# Patient Record
Sex: FEMALE | Race: WHITE | NOT HISPANIC OR LATINO | Employment: OTHER | URBAN - NONMETROPOLITAN AREA
[De-identification: names, ages, dates, MRNs, and addresses within clinical notes are randomized per-mention and may not be internally consistent; named-entity substitution may affect disease eponyms.]

---

## 2017-12-30 ENCOUNTER — HOSPITAL ENCOUNTER (EMERGENCY)
Facility: HOSPITAL | Age: 67
Discharge: HOME | End: 2017-12-30
Attending: EMERGENCY MEDICINE | Admitting: EMERGENCY MEDICINE

## 2017-12-30 VITALS
WEIGHT: 180 LBS | OXYGEN SATURATION: 100 % | SYSTOLIC BLOOD PRESSURE: 122 MMHG | TEMPERATURE: 98.8 F | HEIGHT: 69 IN | HEART RATE: 98 BPM | DIASTOLIC BLOOD PRESSURE: 84 MMHG | RESPIRATION RATE: 17 BRPM | BODY MASS INDEX: 26.66 KG/M2

## 2017-12-30 DIAGNOSIS — Z98.890 HX OF NECK SURGERY: ICD-10-CM

## 2017-12-30 DIAGNOSIS — M54.2 ACUTE NECK PAIN: Primary | ICD-10-CM

## 2017-12-30 PROCEDURE — 25010000002 TRIAMCINOLONE PER 10 MG: Performed by: NURSE PRACTITIONER

## 2017-12-30 PROCEDURE — 96372 THER/PROPH/DIAG INJ SC/IM: CPT

## 2017-12-30 PROCEDURE — 99283 EMERGENCY DEPT VISIT LOW MDM: CPT

## 2017-12-30 PROCEDURE — 25010000002 HYDROMORPHONE PER 4 MG: Performed by: EMERGENCY MEDICINE

## 2017-12-30 PROCEDURE — 25010000002 KETOROLAC TROMETHAMINE PER 15 MG: Performed by: NURSE PRACTITIONER

## 2017-12-30 RX ORDER — KETOROLAC TROMETHAMINE 30 MG/ML
60 INJECTION, SOLUTION INTRAMUSCULAR; INTRAVENOUS ONCE
Status: COMPLETED | OUTPATIENT
Start: 2017-12-30 | End: 2017-12-30

## 2017-12-30 RX ORDER — OXYCODONE AND ACETAMINOPHEN 7.5; 325 MG/1; MG/1
1 TABLET ORAL EVERY 6 HOURS PRN
Qty: 10 TABLET | Refills: 0 | Status: SHIPPED | OUTPATIENT
Start: 2017-12-30 | End: 2019-10-05

## 2017-12-30 RX ORDER — TRIAMCINOLONE ACETONIDE 40 MG/ML
80 INJECTION, SUSPENSION INTRA-ARTICULAR; INTRAMUSCULAR ONCE
Status: COMPLETED | OUTPATIENT
Start: 2017-12-30 | End: 2017-12-30

## 2017-12-30 RX ORDER — ONDANSETRON 4 MG/1
4 TABLET, ORALLY DISINTEGRATING ORAL ONCE
Status: COMPLETED | OUTPATIENT
Start: 2017-12-30 | End: 2017-12-30

## 2017-12-30 RX ADMIN — ONDANSETRON 4 MG: 4 TABLET, ORALLY DISINTEGRATING ORAL at 16:03

## 2017-12-30 RX ADMIN — KETOROLAC TROMETHAMINE 60 MG: 30 INJECTION, SOLUTION INTRAMUSCULAR at 16:03

## 2017-12-30 RX ADMIN — TRIAMCINOLONE ACETONIDE 80 MG: 40 INJECTION, SUSPENSION INTRA-ARTICULAR; INTRAMUSCULAR at 16:03

## 2017-12-30 RX ADMIN — HYDROMORPHONE HYDROCHLORIDE 2 MG: 1 INJECTION, SOLUTION INTRAMUSCULAR; INTRAVENOUS; SUBCUTANEOUS at 16:04

## 2019-04-08 ENCOUNTER — TRANSCRIBE ORDERS (OUTPATIENT)
Dept: ULTRASOUND IMAGING | Facility: HOSPITAL | Age: 69
End: 2019-04-08

## 2019-04-08 DIAGNOSIS — R79.89 ABNORMAL LIVER FUNCTION TEST: Primary | ICD-10-CM

## 2019-04-11 ENCOUNTER — APPOINTMENT (OUTPATIENT)
Dept: ULTRASOUND IMAGING | Facility: HOSPITAL | Age: 69
End: 2019-04-11

## 2019-10-05 ENCOUNTER — APPOINTMENT (OUTPATIENT)
Dept: CT IMAGING | Facility: HOSPITAL | Age: 69
End: 2019-10-05

## 2019-10-05 ENCOUNTER — APPOINTMENT (OUTPATIENT)
Dept: ULTRASOUND IMAGING | Facility: HOSPITAL | Age: 69
End: 2019-10-05

## 2019-10-05 ENCOUNTER — HOSPITAL ENCOUNTER (OUTPATIENT)
Facility: HOSPITAL | Age: 69
Setting detail: OBSERVATION
Discharge: HOME | End: 2019-10-08
Attending: EMERGENCY MEDICINE | Admitting: INTERNAL MEDICINE

## 2019-10-05 ENCOUNTER — APPOINTMENT (OUTPATIENT)
Dept: GENERAL RADIOLOGY | Facility: HOSPITAL | Age: 69
End: 2019-10-05
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
## 2019-10-05 DIAGNOSIS — S20.219A CONTUSION OF CHEST WALL, UNSPECIFIED LATERALITY, INITIAL ENCOUNTER: Primary | ICD-10-CM

## 2019-10-05 DIAGNOSIS — S42.018A CLOSED NONDISPLACED FRACTURE OF STERNAL END OF LEFT CLAVICLE, INITIAL ENCOUNTER: ICD-10-CM

## 2019-10-05 DIAGNOSIS — S22.31XA CLOSED FRACTURE OF ONE RIB OF RIGHT SIDE, INITIAL ENCOUNTER: ICD-10-CM
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
## 2019-10-05 LAB
ALBUMIN SERPL-MCNC: 4.9 G/DL (ref 3.5–5.2)
ALBUMIN/GLOB SERPL: 1.7 G/DL
ALP SERPL-CCNC: 74 U/L (ref 39–117)
ALT SERPL W P-5'-P-CCNC: 53 U/L (ref 1–33)
ANION GAP SERPL CALCULATED.3IONS-SCNC: 18.1 MMOL/L (ref 5–15)
AST SERPL-CCNC: 76 U/L (ref 1–32)
BASOPHILS # BLD AUTO: 0.02 10*3/MM3 (ref 0–0.2)
BASOPHILS NFR BLD AUTO: 0.2 % (ref 0–1.5)
BILIRUB SERPL-MCNC: 0.4 MG/DL (ref 0.2–1.2)
BUN BLD-MCNC: 16 MG/DL (ref 8–23)
BUN/CREAT SERPL: 17.8 (ref 7–25)
CALCIUM SPEC-SCNC: 9.9 MG/DL (ref 8.6–10.5)
CHLORIDE SERPL-SCNC: 95 MMOL/L (ref 98–107)
CO2 SERPL-SCNC: 18.9 MMOL/L (ref 22–29)
CREAT BLD-MCNC: 0.9 MG/DL (ref 0.57–1)
DEPRECATED RDW RBC AUTO: 42.5 FL (ref 37–54)
EOSINOPHIL # BLD AUTO: 0 10*3/MM3 (ref 0–0.4)
EOSINOPHIL NFR BLD AUTO: 0 % (ref 0.3–6.2)
ERYTHROCYTE [DISTWIDTH] IN BLOOD BY AUTOMATED COUNT: 11.9 % (ref 12.3–15.4)
GFR SERPL CREATININE-BSD FRML MDRD: 62 ML/MIN/1.73
GLOBULIN UR ELPH-MCNC: 2.9 GM/DL
GLUCOSE BLD-MCNC: 101 MG/DL (ref 65–99)
HCT VFR BLD AUTO: 37.4 % (ref 34–46.6)
HGB BLD-MCNC: 12.5 G/DL (ref 12–15.9)
IMM GRANULOCYTES # BLD AUTO: 0.19 10*3/MM3 (ref 0–0.05)
IMM GRANULOCYTES NFR BLD AUTO: 2.1 % (ref 0–0.5)
LIPASE SERPL-CCNC: 135 U/L (ref 13–60)
LYMPHOCYTES # BLD AUTO: 1.33 10*3/MM3 (ref 0.7–3.1)
LYMPHOCYTES NFR BLD AUTO: 14.4 % (ref 19.6–45.3)
MCH RBC QN AUTO: 32.5 PG (ref 26.6–33)
MCHC RBC AUTO-ENTMCNC: 33.4 G/DL (ref 31.5–35.7)
MCV RBC AUTO: 97.1 FL (ref 79–97)
MONOCYTES # BLD AUTO: 0.77 10*3/MM3 (ref 0.1–0.9)
MONOCYTES NFR BLD AUTO: 8.4 % (ref 5–12)
NEUTROPHILS # BLD AUTO: 6.9 10*3/MM3 (ref 1.7–7)
NEUTROPHILS NFR BLD AUTO: 74.9 % (ref 42.7–76)
NRBC BLD AUTO-RTO: 0 /100 WBC (ref 0–0.2)
PLATELET # BLD AUTO: 248 10*3/MM3 (ref 140–450)
PMV BLD AUTO: 10.2 FL (ref 6–12)
POTASSIUM BLD-SCNC: 4 MMOL/L (ref 3.5–5.2)
PROT SERPL-MCNC: 7.8 G/DL (ref 6–8.5)
RBC # BLD AUTO: 3.85 10*6/MM3 (ref 3.77–5.28)
SODIUM BLD-SCNC: 132 MMOL/L (ref 136–145)
TROPONIN T SERPL-MCNC: <0.01 NG/ML (ref 0–0.03)
WBC NRBC COR # BLD: 9.21 10*3/MM3 (ref 3.4–10.8)

## 2019-10-05 PROCEDURE — G0378 HOSPITAL OBSERVATION PER HR: HCPCS

## 2019-10-05 PROCEDURE — 93005 ELECTROCARDIOGRAM TRACING: CPT | Performed by: NURSE PRACTITIONER

## 2019-10-05 PROCEDURE — 96376 TX/PRO/DX INJ SAME DRUG ADON: CPT

## 2019-10-05 PROCEDURE — 93971 EXTREMITY STUDY: CPT

## 2019-10-05 PROCEDURE — 85025 COMPLETE CBC W/AUTO DIFF WBC: CPT | Performed by: NURSE PRACTITIONER

## 2019-10-05 PROCEDURE — 82550 ASSAY OF CK (CPK): CPT | Performed by: INTERNAL MEDICINE

## 2019-10-05 PROCEDURE — 96375 TX/PRO/DX INJ NEW DRUG ADDON: CPT

## 2019-10-05 PROCEDURE — 25010000002 ONDANSETRON PER 1 MG: Performed by: INTERNAL MEDICINE

## 2019-10-05 PROCEDURE — 80053 COMPREHEN METABOLIC PANEL: CPT | Performed by: NURSE PRACTITIONER

## 2019-10-05 PROCEDURE — 71250 CT THORAX DX C-: CPT

## 2019-10-05 PROCEDURE — 83690 ASSAY OF LIPASE: CPT | Performed by: NURSE PRACTITIONER

## 2019-10-05 PROCEDURE — 74176 CT ABD & PELVIS W/O CONTRAST: CPT

## 2019-10-05 PROCEDURE — 25010000002 HYDROMORPHONE 1 MG/ML SOLUTION: Performed by: INTERNAL MEDICINE

## 2019-10-05 PROCEDURE — 73030 X-RAY EXAM OF SHOULDER: CPT

## 2019-10-05 PROCEDURE — 96374 THER/PROPH/DIAG INJ IV PUSH: CPT

## 2019-10-05 PROCEDURE — 96372 THER/PROPH/DIAG INJ SC/IM: CPT

## 2019-10-05 PROCEDURE — 99284 EMERGENCY DEPT VISIT MOD MDM: CPT

## 2019-10-05 PROCEDURE — 99219 PR INITIAL OBSERVATION CARE/DAY 50 MINUTES: CPT | Performed by: INTERNAL MEDICINE

## 2019-10-05 PROCEDURE — 71045 X-RAY EXAM CHEST 1 VIEW: CPT

## 2019-10-05 PROCEDURE — 25010000002 ONDANSETRON PER 1 MG: Performed by: NURSE PRACTITIONER

## 2019-10-05 PROCEDURE — 25010000002 ENOXAPARIN PER 10 MG: Performed by: INTERNAL MEDICINE

## 2019-10-05 PROCEDURE — 84484 ASSAY OF TROPONIN QUANT: CPT | Performed by: NURSE PRACTITIONER

## 2019-10-05 PROCEDURE — 25010000002 FENTANYL CITRATE (PF) 100 MCG/2ML SOLUTION: Performed by: NURSE PRACTITIONER

## 2019-10-05 RX ORDER — BENAZEPRIL HYDROCHLORIDE 40 MG/1
40 TABLET, FILM COATED ORAL DAILY
COMMUNITY
End: 2023-01-31

## 2019-10-05 RX ORDER — TIZANIDINE 4 MG/1
4 TABLET ORAL EVERY 8 HOURS PRN
COMMUNITY

## 2019-10-05 RX ORDER — SODIUM CHLORIDE 0.9 % (FLUSH) 0.9 %
10 SYRINGE (ML) INJECTION AS NEEDED
Status: DISCONTINUED | OUTPATIENT
Start: 2019-10-05 | End: 2019-10-08 | Stop reason: HOSPADM

## 2019-10-05 RX ORDER — HYDROCODONE BITARTRATE AND ACETAMINOPHEN 7.5; 325 MG/1; MG/1
1 TABLET ORAL ONCE
Status: COMPLETED | OUTPATIENT
Start: 2019-10-05 | End: 2019-10-05

## 2019-10-05 RX ORDER — NALOXONE HCL 0.4 MG/ML
0.4 VIAL (ML) INJECTION
Status: DISCONTINUED | OUTPATIENT
Start: 2019-10-05 | End: 2019-10-08 | Stop reason: HOSPADM

## 2019-10-05 RX ORDER — ONDANSETRON 4 MG/1
4 TABLET, ORALLY DISINTEGRATING ORAL EVERY 6 HOURS PRN
Qty: 20 TABLET | Refills: 0 | Status: SHIPPED | OUTPATIENT
Start: 2019-10-05 | End: 2023-01-31 | Stop reason: ALTCHOICE

## 2019-10-05 RX ORDER — FENTANYL CITRATE 50 UG/ML
25 INJECTION, SOLUTION INTRAMUSCULAR; INTRAVENOUS ONCE
Status: COMPLETED | OUTPATIENT
Start: 2019-10-05 | End: 2019-10-05

## 2019-10-05 RX ORDER — CHOLECALCIFEROL (VITAMIN D3) 125 MCG
5 CAPSULE ORAL NIGHTLY PRN
Status: DISCONTINUED | OUTPATIENT
Start: 2019-10-05 | End: 2019-10-08 | Stop reason: HOSPADM

## 2019-10-05 RX ORDER — ACETAMINOPHEN 160 MG/5ML
650 SOLUTION ORAL EVERY 4 HOURS PRN
Status: DISCONTINUED | OUTPATIENT
Start: 2019-10-05 | End: 2019-10-08 | Stop reason: HOSPADM

## 2019-10-05 RX ORDER — GABAPENTIN 300 MG/1
300 CAPSULE ORAL 3 TIMES DAILY
COMMUNITY
End: 2022-11-29

## 2019-10-05 RX ORDER — AMLODIPINE BESYLATE 10 MG/1
10 TABLET ORAL DAILY
COMMUNITY

## 2019-10-05 RX ORDER — MONTELUKAST SODIUM 10 MG/1
10 TABLET ORAL DAILY
COMMUNITY
End: 2023-02-28

## 2019-10-05 RX ORDER — ONDANSETRON 2 MG/ML
4 INJECTION INTRAMUSCULAR; INTRAVENOUS ONCE
Status: COMPLETED | OUTPATIENT
Start: 2019-10-05 | End: 2019-10-05

## 2019-10-05 RX ORDER — HYDROCODONE BITARTRATE AND ACETAMINOPHEN 7.5; 325 MG/1; MG/1
1 TABLET ORAL EVERY 6 HOURS PRN
Qty: 12 TABLET | Refills: 0 | Status: SHIPPED | OUTPATIENT
Start: 2019-10-05 | End: 2019-10-08 | Stop reason: HOSPADM

## 2019-10-05 RX ORDER — BISACODYL 10 MG
10 SUPPOSITORY, RECTAL RECTAL DAILY PRN
Status: DISCONTINUED | OUTPATIENT
Start: 2019-10-05 | End: 2019-10-08 | Stop reason: HOSPADM

## 2019-10-05 RX ORDER — ACETAMINOPHEN 325 MG/1
650 TABLET ORAL EVERY 4 HOURS PRN
Status: DISCONTINUED | OUTPATIENT
Start: 2019-10-05 | End: 2019-10-08 | Stop reason: HOSPADM

## 2019-10-05 RX ORDER — ONDANSETRON 2 MG/ML
4 INJECTION INTRAMUSCULAR; INTRAVENOUS EVERY 6 HOURS PRN
Status: DISCONTINUED | OUTPATIENT
Start: 2019-10-05 | End: 2019-10-08 | Stop reason: HOSPADM

## 2019-10-05 RX ORDER — ACETAMINOPHEN 650 MG/1
650 SUPPOSITORY RECTAL EVERY 4 HOURS PRN
Status: DISCONTINUED | OUTPATIENT
Start: 2019-10-05 | End: 2019-10-08 | Stop reason: HOSPADM

## 2019-10-05 RX ORDER — EZETIMIBE 10 MG/1
10 TABLET ORAL DAILY
Status: ON HOLD | COMMUNITY
End: 2020-10-21

## 2019-10-05 RX ORDER — FENTANYL CITRATE 50 UG/ML
50 INJECTION, SOLUTION INTRAMUSCULAR; INTRAVENOUS ONCE
Status: COMPLETED | OUTPATIENT
Start: 2019-10-05 | End: 2019-10-05

## 2019-10-05 RX ORDER — SODIUM CHLORIDE 0.9 % (FLUSH) 0.9 %
10 SYRINGE (ML) INJECTION EVERY 12 HOURS SCHEDULED
Status: DISCONTINUED | OUTPATIENT
Start: 2019-10-05 | End: 2019-10-08 | Stop reason: HOSPADM

## 2019-10-05 RX ADMIN — ONDANSETRON 4 MG: 2 INJECTION INTRAMUSCULAR; INTRAVENOUS at 19:24

## 2019-10-05 RX ADMIN — ONDANSETRON 4 MG: 2 INJECTION INTRAMUSCULAR; INTRAVENOUS at 22:43

## 2019-10-05 RX ADMIN — HYDROCODONE BITARTRATE AND ACETAMINOPHEN 1 TABLET: 7.5; 325 TABLET ORAL at 19:47

## 2019-10-05 RX ADMIN — SODIUM CHLORIDE 1000 ML: 9 INJECTION, SOLUTION INTRAVENOUS at 19:25

## 2019-10-05 RX ADMIN — FENTANYL CITRATE 50 MCG: 50 INJECTION INTRAMUSCULAR; INTRAVENOUS at 20:09

## 2019-10-05 RX ADMIN — FENTANYL CITRATE 25 MCG: 50 INJECTION INTRAMUSCULAR; INTRAVENOUS at 19:24

## 2019-10-05 RX ADMIN — HYDROMORPHONE HYDROCHLORIDE 0.5 MG: 1 INJECTION, SOLUTION INTRAMUSCULAR; INTRAVENOUS; SUBCUTANEOUS at 22:43

## 2019-10-05 RX ADMIN — ENOXAPARIN SODIUM 40 MG: 40 INJECTION SUBCUTANEOUS at 22:48

## 2019-10-05 RX ADMIN — SODIUM CHLORIDE, PRESERVATIVE FREE 10 ML: 5 INJECTION INTRAVENOUS at 22:50

## 2019-10-06 ENCOUNTER — APPOINTMENT (OUTPATIENT)
Dept: GENERAL RADIOLOGY | Facility: HOSPITAL | Age: 69
End: 2019-10-06

## 2019-10-06 PROBLEM — I10 ESSENTIAL HYPERTENSION: Status: ACTIVE | Noted: 2019-10-06
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
## 2019-10-06 PROBLEM — S22.31XA CLOSED FRACTURE OF ONE RIB OF RIGHT SIDE: Status: ACTIVE | Noted: 2019-10-06

## 2019-10-06 LAB — CK SERPL-CCNC: 215 U/L (ref 20–180)

## 2019-10-06 PROCEDURE — 25010000002 HYDROMORPHONE 1 MG/ML SOLUTION: Performed by: INTERNAL MEDICINE

## 2019-10-06 PROCEDURE — 25010000002 PROMETHAZINE PER 50 MG: Performed by: INTERNAL MEDICINE

## 2019-10-06 PROCEDURE — 73562 X-RAY EXAM OF KNEE 3: CPT

## 2019-10-06 PROCEDURE — 25010000002 ONDANSETRON PER 1 MG: Performed by: INTERNAL MEDICINE

## 2019-10-06 PROCEDURE — 96375 TX/PRO/DX INJ NEW DRUG ADDON: CPT

## 2019-10-06 PROCEDURE — G0378 HOSPITAL OBSERVATION PER HR: HCPCS

## 2019-10-06 PROCEDURE — 96376 TX/PRO/DX INJ SAME DRUG ADON: CPT

## 2019-10-06 PROCEDURE — 97162 PT EVAL MOD COMPLEX 30 MIN: CPT

## 2019-10-06 PROCEDURE — 99225 PR SBSQ OBSERVATION CARE/DAY 25 MINUTES: CPT | Performed by: INTERNAL MEDICINE

## 2019-10-06 PROCEDURE — 96361 HYDRATE IV INFUSION ADD-ON: CPT

## 2019-10-06 RX ORDER — GABAPENTIN 300 MG/1
300 CAPSULE ORAL 2 TIMES DAILY
Status: DISCONTINUED | OUTPATIENT
Start: 2019-10-06 | End: 2019-10-08 | Stop reason: HOSPADM

## 2019-10-06 RX ORDER — AMLODIPINE BESYLATE 5 MG/1
10 TABLET ORAL NIGHTLY
Status: DISCONTINUED | OUTPATIENT
Start: 2019-10-06 | End: 2019-10-08 | Stop reason: HOSPADM

## 2019-10-06 RX ORDER — FAMOTIDINE 20 MG/1
20 TABLET, FILM COATED ORAL
Status: DISCONTINUED | OUTPATIENT
Start: 2019-10-06 | End: 2019-10-08 | Stop reason: HOSPADM

## 2019-10-06 RX ORDER — TIZANIDINE 4 MG/1
4 TABLET ORAL EVERY 8 HOURS PRN
Status: DISCONTINUED | OUTPATIENT
Start: 2019-10-06 | End: 2019-10-08 | Stop reason: HOSPADM

## 2019-10-06 RX ORDER — CALCIUM CARBONATE 200(500)MG
2 TABLET,CHEWABLE ORAL 3 TIMES DAILY PRN
Status: DISCONTINUED | OUTPATIENT
Start: 2019-10-06 | End: 2019-10-08 | Stop reason: HOSPADM

## 2019-10-06 RX ORDER — MONTELUKAST SODIUM 10 MG/1
10 TABLET ORAL DAILY
Status: DISCONTINUED | OUTPATIENT
Start: 2019-10-06 | End: 2019-10-08 | Stop reason: HOSPADM

## 2019-10-06 RX ORDER — PROMETHAZINE HYDROCHLORIDE 25 MG/ML
12.5 INJECTION, SOLUTION INTRAMUSCULAR; INTRAVENOUS EVERY 6 HOURS PRN
Status: DISCONTINUED | OUTPATIENT
Start: 2019-10-06 | End: 2019-10-07

## 2019-10-06 RX ORDER — SODIUM CHLORIDE 9 MG/ML
125 INJECTION, SOLUTION INTRAVENOUS CONTINUOUS
Status: DISCONTINUED | OUTPATIENT
Start: 2019-10-06 | End: 2019-10-07

## 2019-10-06 RX ORDER — LISINOPRIL 20 MG/1
20 TABLET ORAL
Status: DISCONTINUED | OUTPATIENT
Start: 2019-10-06 | End: 2019-10-08 | Stop reason: HOSPADM

## 2019-10-06 RX ORDER — AMLODIPINE BESYLATE 5 MG/1
10 TABLET ORAL NIGHTLY
Status: DISCONTINUED | OUTPATIENT
Start: 2019-10-06 | End: 2019-10-06 | Stop reason: SDUPTHER

## 2019-10-06 RX ORDER — SCOLOPAMINE TRANSDERMAL SYSTEM 1 MG/1
1 PATCH, EXTENDED RELEASE TRANSDERMAL
Status: DISCONTINUED | OUTPATIENT
Start: 2019-10-06 | End: 2019-10-08 | Stop reason: HOSPADM

## 2019-10-06 RX ADMIN — HYDROMORPHONE HYDROCHLORIDE 0.5 MG: 1 INJECTION, SOLUTION INTRAMUSCULAR; INTRAVENOUS; SUBCUTANEOUS at 20:33

## 2019-10-06 RX ADMIN — ONDANSETRON 4 MG: 2 INJECTION INTRAMUSCULAR; INTRAVENOUS at 06:31

## 2019-10-06 RX ADMIN — GABAPENTIN 300 MG: 300 CAPSULE ORAL at 00:47

## 2019-10-06 RX ADMIN — TIZANIDINE 4 MG: 4 TABLET ORAL at 14:13

## 2019-10-06 RX ADMIN — GABAPENTIN 300 MG: 300 CAPSULE ORAL at 20:22

## 2019-10-06 RX ADMIN — SODIUM CHLORIDE 125 ML/HR: 9 INJECTION, SOLUTION INTRAVENOUS at 16:22

## 2019-10-06 RX ADMIN — PROMETHAZINE HYDROCHLORIDE 12.5 MG: 25 INJECTION INTRAMUSCULAR; INTRAVENOUS at 08:31

## 2019-10-06 RX ADMIN — AMLODIPINE BESYLATE 10 MG: 5 TABLET ORAL at 00:47

## 2019-10-06 RX ADMIN — HYDROMORPHONE HYDROCHLORIDE 0.5 MG: 1 INJECTION, SOLUTION INTRAMUSCULAR; INTRAVENOUS; SUBCUTANEOUS at 06:30

## 2019-10-06 RX ADMIN — AMLODIPINE BESYLATE 10 MG: 5 TABLET ORAL at 20:22

## 2019-10-06 RX ADMIN — HYDROMORPHONE HYDROCHLORIDE 0.5 MG: 1 INJECTION, SOLUTION INTRAMUSCULAR; INTRAVENOUS; SUBCUTANEOUS at 02:53

## 2019-10-06 RX ADMIN — SODIUM CHLORIDE, PRESERVATIVE FREE 10 ML: 5 INJECTION INTRAVENOUS at 20:27

## 2019-10-06 RX ADMIN — HYDROMORPHONE HYDROCHLORIDE 0.5 MG: 1 INJECTION, SOLUTION INTRAMUSCULAR; INTRAVENOUS; SUBCUTANEOUS at 00:47

## 2019-10-06 RX ADMIN — GABAPENTIN 300 MG: 300 CAPSULE ORAL at 10:00

## 2019-10-06 RX ADMIN — FAMOTIDINE 20 MG: 20 TABLET ORAL at 06:31

## 2019-10-06 RX ADMIN — HYDROMORPHONE HYDROCHLORIDE 0.5 MG: 1 INJECTION, SOLUTION INTRAMUSCULAR; INTRAVENOUS; SUBCUTANEOUS at 12:10

## 2019-10-06 RX ADMIN — CALCIUM CARBONATE (ANTACID) CHEW TAB 500 MG 2 TABLET: 500 CHEW TAB at 04:10

## 2019-10-06 RX ADMIN — HYDROMORPHONE HYDROCHLORIDE 0.5 MG: 1 INJECTION, SOLUTION INTRAMUSCULAR; INTRAVENOUS; SUBCUTANEOUS at 17:04

## 2019-10-06 RX ADMIN — SCOPALAMINE 1 PATCH: 1 PATCH, EXTENDED RELEASE TRANSDERMAL at 12:10

## 2019-10-06 RX ADMIN — MONTELUKAST 10 MG: 10 TABLET, FILM COATED ORAL at 10:00

## 2019-10-06 RX ADMIN — HYDROMORPHONE HYDROCHLORIDE 0.5 MG: 1 INJECTION, SOLUTION INTRAMUSCULAR; INTRAVENOUS; SUBCUTANEOUS at 08:31

## 2019-10-06 RX ADMIN — LISINOPRIL 20 MG: 20 TABLET ORAL at 10:00

## 2019-10-07 ENCOUNTER — APPOINTMENT (OUTPATIENT)
Dept: GENERAL RADIOLOGY | Facility: HOSPITAL | Age: 69
End: 2019-10-07

## 2019-10-07 ENCOUNTER — APPOINTMENT (OUTPATIENT)
Dept: CT IMAGING | Facility: HOSPITAL | Age: 69
End: 2019-10-07

## 2019-10-07 ENCOUNTER — APPOINTMENT (OUTPATIENT)
Dept: ULTRASOUND IMAGING | Facility: HOSPITAL | Age: 69
End: 2019-10-07

## 2019-10-07 PROBLEM — R74.8 ELEVATED LIVER ENZYMES: Status: ACTIVE | Noted: 2019-10-07

## 2019-10-07 PROBLEM — S42.035D CLOSED NONDISPLACED FRACTURE OF ACROMIAL END OF LEFT CLAVICLE WITH ROUTINE HEALING: Status: ACTIVE | Noted: 2019-10-07

## 2019-10-07 PROBLEM — K76.9 LIVER LESION: Status: ACTIVE | Noted: 2019-10-07

## 2019-10-07 PROBLEM — S40.811A: Status: ACTIVE | Noted: 2019-10-07

## 2019-10-07 LAB
ALBUMIN SERPL-MCNC: 3.9 G/DL (ref 3.5–5.2)
ALBUMIN/GLOB SERPL: 1.5 G/DL
ALP SERPL-CCNC: 58 U/L (ref 39–117)
ALT SERPL W P-5'-P-CCNC: 26 U/L (ref 1–33)
ANION GAP SERPL CALCULATED.3IONS-SCNC: 14.5 MMOL/L (ref 5–15)
AST SERPL-CCNC: 25 U/L (ref 1–32)
BILIRUB SERPL-MCNC: 0.5 MG/DL (ref 0.2–1.2)
BUN BLD-MCNC: 10 MG/DL (ref 8–23)
BUN/CREAT SERPL: 13 (ref 7–25)
CALCIUM SPEC-SCNC: 9.1 MG/DL (ref 8.6–10.5)
CHLORIDE SERPL-SCNC: 104 MMOL/L (ref 98–107)
CO2 SERPL-SCNC: 19.5 MMOL/L (ref 22–29)
CREAT BLD-MCNC: 0.77 MG/DL (ref 0.57–1)
DEPRECATED RDW RBC AUTO: 44.5 FL (ref 37–54)
ERYTHROCYTE [DISTWIDTH] IN BLOOD BY AUTOMATED COUNT: 12.1 % (ref 12.3–15.4)
GFR SERPL CREATININE-BSD FRML MDRD: 75 ML/MIN/1.73
GLOBULIN UR ELPH-MCNC: 2.6 GM/DL
GLUCOSE BLD-MCNC: 104 MG/DL (ref 65–99)
HCT VFR BLD AUTO: 33.8 % (ref 34–46.6)
HGB BLD-MCNC: 11.1 G/DL (ref 12–15.9)
LIPASE SERPL-CCNC: 48 U/L (ref 13–60)
MCH RBC QN AUTO: 32.8 PG (ref 26.6–33)
MCHC RBC AUTO-ENTMCNC: 32.8 G/DL (ref 31.5–35.7)
MCV RBC AUTO: 100 FL (ref 79–97)
PLATELET # BLD AUTO: 165 10*3/MM3 (ref 140–450)
PMV BLD AUTO: 10.6 FL (ref 6–12)
POTASSIUM BLD-SCNC: 4.4 MMOL/L (ref 3.5–5.2)
PROT SERPL-MCNC: 6.5 G/DL (ref 6–8.5)
RBC # BLD AUTO: 3.38 10*6/MM3 (ref 3.77–5.28)
SODIUM BLD-SCNC: 138 MMOL/L (ref 136–145)
WBC NRBC COR # BLD: 10.38 10*3/MM3 (ref 3.4–10.8)

## 2019-10-07 PROCEDURE — 97110 THERAPEUTIC EXERCISES: CPT

## 2019-10-07 PROCEDURE — G0378 HOSPITAL OBSERVATION PER HR: HCPCS

## 2019-10-07 PROCEDURE — 97116 GAIT TRAINING THERAPY: CPT

## 2019-10-07 PROCEDURE — 96376 TX/PRO/DX INJ SAME DRUG ADON: CPT

## 2019-10-07 PROCEDURE — 96375 TX/PRO/DX INJ NEW DRUG ADDON: CPT

## 2019-10-07 PROCEDURE — 96361 HYDRATE IV INFUSION ADD-ON: CPT

## 2019-10-07 PROCEDURE — 93971 EXTREMITY STUDY: CPT

## 2019-10-07 PROCEDURE — 25010000002 HYDROMORPHONE 1 MG/ML SOLUTION: Performed by: INTERNAL MEDICINE

## 2019-10-07 PROCEDURE — 99225 PR SBSQ OBSERVATION CARE/DAY 25 MINUTES: CPT | Performed by: NURSE PRACTITIONER

## 2019-10-07 PROCEDURE — 25010000002 ENOXAPARIN PER 10 MG: Performed by: INTERNAL MEDICINE

## 2019-10-07 PROCEDURE — 71275 CT ANGIOGRAPHY CHEST: CPT

## 2019-10-07 PROCEDURE — 25010000002 KETOROLAC TROMETHAMINE PER 15 MG: Performed by: NURSE PRACTITIONER

## 2019-10-07 PROCEDURE — 97530 THERAPEUTIC ACTIVITIES: CPT

## 2019-10-07 PROCEDURE — 85027 COMPLETE CBC AUTOMATED: CPT | Performed by: NURSE PRACTITIONER

## 2019-10-07 PROCEDURE — 83690 ASSAY OF LIPASE: CPT | Performed by: NURSE PRACTITIONER

## 2019-10-07 PROCEDURE — 73562 X-RAY EXAM OF KNEE 3: CPT

## 2019-10-07 PROCEDURE — 96372 THER/PROPH/DIAG INJ SC/IM: CPT

## 2019-10-07 PROCEDURE — 25010000002 IOPAMIDOL 61 % SOLUTION: Performed by: INTERNAL MEDICINE

## 2019-10-07 PROCEDURE — 80053 COMPREHEN METABOLIC PANEL: CPT | Performed by: NURSE PRACTITIONER

## 2019-10-07 RX ORDER — BACITRACIN ZINC 500 [USP'U]/G
OINTMENT TOPICAL 2 TIMES DAILY PRN
Status: DISCONTINUED | OUTPATIENT
Start: 2019-10-07 | End: 2019-10-08 | Stop reason: HOSPADM

## 2019-10-07 RX ORDER — KETOROLAC TROMETHAMINE 30 MG/ML
15 INJECTION, SOLUTION INTRAMUSCULAR; INTRAVENOUS EVERY 6 HOURS PRN
Status: DISCONTINUED | OUTPATIENT
Start: 2019-10-07 | End: 2019-10-08 | Stop reason: HOSPADM

## 2019-10-07 RX ORDER — OXYCODONE HYDROCHLORIDE AND ACETAMINOPHEN 5; 325 MG/1; MG/1
1 TABLET ORAL EVERY 4 HOURS PRN
Status: DISCONTINUED | OUTPATIENT
Start: 2019-10-07 | End: 2019-10-08 | Stop reason: HOSPADM

## 2019-10-07 RX ORDER — KETOROLAC TROMETHAMINE 30 MG/ML
15 INJECTION, SOLUTION INTRAMUSCULAR; INTRAVENOUS ONCE
Status: COMPLETED | OUTPATIENT
Start: 2019-10-07 | End: 2019-10-07

## 2019-10-07 RX ORDER — HYDROCODONE BITARTRATE AND ACETAMINOPHEN 7.5; 325 MG/1; MG/1
1 TABLET ORAL EVERY 4 HOURS PRN
Status: DISCONTINUED | OUTPATIENT
Start: 2019-10-07 | End: 2019-10-07

## 2019-10-07 RX ORDER — HYDROCODONE BITARTRATE AND ACETAMINOPHEN 7.5; 325 MG/1; MG/1
1 TABLET ORAL EVERY 6 HOURS PRN
Status: DISCONTINUED | OUTPATIENT
Start: 2019-10-07 | End: 2019-10-07

## 2019-10-07 RX ORDER — OXYCODONE AND ACETAMINOPHEN 7.5; 325 MG/1; MG/1
1 TABLET ORAL EVERY 4 HOURS PRN
Status: DISCONTINUED | OUTPATIENT
Start: 2019-10-07 | End: 2019-10-07

## 2019-10-07 RX ORDER — DOCUSATE SODIUM 100 MG/1
100 CAPSULE, LIQUID FILLED ORAL 2 TIMES DAILY
Status: DISCONTINUED | OUTPATIENT
Start: 2019-10-07 | End: 2019-10-08 | Stop reason: HOSPADM

## 2019-10-07 RX ORDER — FLUTICASONE PROPIONATE AND SALMETEROL 250; 50 UG/1; UG/1
2 POWDER RESPIRATORY (INHALATION) EVERY MORNING
COMMUNITY
End: 2023-02-06

## 2019-10-07 RX ORDER — HYDROCODONE BITARTRATE AND ACETAMINOPHEN 7.5; 325 MG/1; MG/1
1 TABLET ORAL EVERY 4 HOURS PRN
Qty: 15 TABLET | Refills: 0 | Status: CANCELLED | OUTPATIENT
Start: 2019-10-07 | End: 2019-10-17

## 2019-10-07 RX ADMIN — HYDROMORPHONE HYDROCHLORIDE 0.5 MG: 1 INJECTION, SOLUTION INTRAMUSCULAR; INTRAVENOUS; SUBCUTANEOUS at 04:10

## 2019-10-07 RX ADMIN — OXYCODONE HYDROCHLORIDE AND ACETAMINOPHEN 1 TABLET: 7.5; 325 TABLET ORAL at 13:26

## 2019-10-07 RX ADMIN — OXYCODONE HYDROCHLORIDE AND ACETAMINOPHEN 1 TABLET: 5; 325 TABLET ORAL at 23:09

## 2019-10-07 RX ADMIN — SODIUM CHLORIDE, PRESERVATIVE FREE 10 ML: 5 INJECTION INTRAVENOUS at 20:19

## 2019-10-07 RX ADMIN — HYDROCODONE BITARTRATE AND ACETAMINOPHEN 1 TABLET: 7.5; 325 TABLET ORAL at 09:01

## 2019-10-07 RX ADMIN — AMLODIPINE BESYLATE 10 MG: 5 TABLET ORAL at 20:17

## 2019-10-07 RX ADMIN — BACITRACIN ZINC: 500 OINTMENT TOPICAL at 18:40

## 2019-10-07 RX ADMIN — GABAPENTIN 300 MG: 300 CAPSULE ORAL at 08:54

## 2019-10-07 RX ADMIN — KETOROLAC TROMETHAMINE 15 MG: 30 INJECTION, SOLUTION INTRAMUSCULAR at 20:19

## 2019-10-07 RX ADMIN — MONTELUKAST 10 MG: 10 TABLET, FILM COATED ORAL at 08:54

## 2019-10-07 RX ADMIN — ENOXAPARIN SODIUM 40 MG: 40 INJECTION SUBCUTANEOUS at 00:03

## 2019-10-07 RX ADMIN — SODIUM CHLORIDE 125 ML/HR: 9 INJECTION, SOLUTION INTRAVENOUS at 00:05

## 2019-10-07 RX ADMIN — KETOROLAC TROMETHAMINE 15 MG: 30 INJECTION, SOLUTION INTRAMUSCULAR at 13:26

## 2019-10-07 RX ADMIN — HYDROMORPHONE HYDROCHLORIDE 0.5 MG: 1 INJECTION, SOLUTION INTRAMUSCULAR; INTRAVENOUS; SUBCUTANEOUS at 00:59

## 2019-10-07 RX ADMIN — TIZANIDINE 4 MG: 4 TABLET ORAL at 13:26

## 2019-10-07 RX ADMIN — TIZANIDINE 4 MG: 4 TABLET ORAL at 23:09

## 2019-10-07 RX ADMIN — DOCUSATE SODIUM 100 MG: 100 CAPSULE, LIQUID FILLED ORAL at 20:17

## 2019-10-07 RX ADMIN — FAMOTIDINE 20 MG: 20 TABLET ORAL at 17:20

## 2019-10-07 RX ADMIN — GABAPENTIN 300 MG: 300 CAPSULE ORAL at 20:17

## 2019-10-07 RX ADMIN — FAMOTIDINE 20 MG: 20 TABLET ORAL at 06:30

## 2019-10-07 RX ADMIN — IOPAMIDOL 100 ML: 612 INJECTION, SOLUTION INTRAVENOUS at 15:00

## 2019-10-07 RX ADMIN — LISINOPRIL 20 MG: 20 TABLET ORAL at 08:54

## 2019-10-07 RX ADMIN — OXYCODONE HYDROCHLORIDE AND ACETAMINOPHEN 1 TABLET: 5; 325 TABLET ORAL at 18:40

## 2019-10-08 VITALS
HEART RATE: 72 BPM | DIASTOLIC BLOOD PRESSURE: 83 MMHG | SYSTOLIC BLOOD PRESSURE: 125 MMHG | RESPIRATION RATE: 16 BRPM | OXYGEN SATURATION: 96 % | HEIGHT: 68 IN | WEIGHT: 185 LBS | BODY MASS INDEX: 28.04 KG/M2 | TEMPERATURE: 98.8 F

## 2019-10-08 PROBLEM — S20.02XA TRAUMATIC ECCHYMOSIS OF LEFT FEMALE BREAST: Status: ACTIVE | Noted: 2019-10-08

## 2019-10-08 PROBLEM — R74.8 ELEVATED LIVER ENZYMES: Status: RESOLVED | Noted: 2019-10-07 | Resolved: 2019-10-08

## 2019-10-08 PROBLEM — J98.11 ATELECTASIS: Status: ACTIVE | Noted: 2019-10-08

## 2019-10-08 PROBLEM — S80.11XA TRAUMATIC HEMATOMA OF RIGHT LOWER LEG: Status: ACTIVE | Noted: 2019-10-08

## 2019-10-08 LAB
ANION GAP SERPL CALCULATED.3IONS-SCNC: 11 MMOL/L (ref 5–15)
BUN BLD-MCNC: 15 MG/DL (ref 8–23)
BUN/CREAT SERPL: 17.2 (ref 7–25)
CALCIUM SPEC-SCNC: 8.7 MG/DL (ref 8.6–10.5)
CHLORIDE SERPL-SCNC: 102 MMOL/L (ref 98–107)
CO2 SERPL-SCNC: 21 MMOL/L (ref 22–29)
CREAT BLD-MCNC: 0.87 MG/DL (ref 0.57–1)
DEPRECATED RDW RBC AUTO: 43.8 FL (ref 37–54)
ERYTHROCYTE [DISTWIDTH] IN BLOOD BY AUTOMATED COUNT: 11.9 % (ref 12.3–15.4)
GFR SERPL CREATININE-BSD FRML MDRD: 65 ML/MIN/1.73
GLUCOSE BLD-MCNC: 108 MG/DL (ref 65–99)
HCT VFR BLD AUTO: 27.7 % (ref 34–46.6)
HCT VFR BLD AUTO: 29.6 % (ref 34–46.6)
HGB BLD-MCNC: 9.2 G/DL (ref 12–15.9)
HGB BLD-MCNC: 9.6 G/DL (ref 12–15.9)
MCH RBC QN AUTO: 33.2 PG (ref 26.6–33)
MCHC RBC AUTO-ENTMCNC: 33.2 G/DL (ref 31.5–35.7)
MCV RBC AUTO: 100 FL (ref 79–97)
PLATELET # BLD AUTO: 176 10*3/MM3 (ref 140–450)
PMV BLD AUTO: 10.9 FL (ref 6–12)
POTASSIUM BLD-SCNC: 4.3 MMOL/L (ref 3.5–5.2)
RBC # BLD AUTO: 2.77 10*6/MM3 (ref 3.77–5.28)
SODIUM BLD-SCNC: 134 MMOL/L (ref 136–145)
WBC NRBC COR # BLD: 9.04 10*3/MM3 (ref 3.4–10.8)

## 2019-10-08 PROCEDURE — 80048 BASIC METABOLIC PNL TOTAL CA: CPT | Performed by: NURSE PRACTITIONER

## 2019-10-08 PROCEDURE — G0378 HOSPITAL OBSERVATION PER HR: HCPCS

## 2019-10-08 PROCEDURE — 85014 HEMATOCRIT: CPT | Performed by: NURSE PRACTITIONER

## 2019-10-08 PROCEDURE — 99217 PR OBSERVATION CARE DISCHARGE MANAGEMENT: CPT | Performed by: NURSE PRACTITIONER

## 2019-10-08 PROCEDURE — 85018 HEMOGLOBIN: CPT | Performed by: NURSE PRACTITIONER

## 2019-10-08 PROCEDURE — 25010000002 KETOROLAC TROMETHAMINE PER 15 MG: Performed by: NURSE PRACTITIONER

## 2019-10-08 PROCEDURE — 85027 COMPLETE CBC AUTOMATED: CPT | Performed by: NURSE PRACTITIONER

## 2019-10-08 PROCEDURE — 96376 TX/PRO/DX INJ SAME DRUG ADON: CPT

## 2019-10-08 RX ORDER — INDOMETHACIN 25 MG/1
25 CAPSULE ORAL EVERY 8 HOURS PRN
Qty: 9 CAPSULE | Refills: 0 | Status: SHIPPED | OUTPATIENT
Start: 2019-10-08 | End: 2019-10-11

## 2019-10-08 RX ORDER — GINSENG 100 MG
CAPSULE ORAL 2 TIMES DAILY PRN
Qty: 28.4 G | Refills: 0 | Status: ON HOLD | OUTPATIENT
Start: 2019-10-08 | End: 2020-10-21

## 2019-10-08 RX ORDER — OXYCODONE HYDROCHLORIDE AND ACETAMINOPHEN 5; 325 MG/1; MG/1
1 TABLET ORAL EVERY 4 HOURS PRN
Qty: 15 TABLET | Refills: 0 | Status: SHIPPED | OUTPATIENT
Start: 2019-10-08 | End: 2019-10-17

## 2019-10-08 RX ORDER — INDOMETHACIN 25 MG/1
25 CAPSULE ORAL
Qty: 9 CAPSULE | Refills: 0 | Status: SHIPPED | OUTPATIENT
Start: 2019-10-08 | End: 2019-10-08 | Stop reason: SDUPTHER

## 2019-10-08 RX ORDER — DOCUSATE SODIUM 100 MG/1
100 CAPSULE, LIQUID FILLED ORAL 2 TIMES DAILY
Qty: 20 CAPSULE | Refills: 0 | Status: ON HOLD | OUTPATIENT
Start: 2019-10-08 | End: 2020-10-21

## 2019-10-08 RX ADMIN — OXYCODONE HYDROCHLORIDE AND ACETAMINOPHEN 1 TABLET: 5; 325 TABLET ORAL at 10:43

## 2019-10-08 RX ADMIN — DOCUSATE SODIUM 100 MG: 100 CAPSULE, LIQUID FILLED ORAL at 09:23

## 2019-10-08 RX ADMIN — SODIUM CHLORIDE, PRESERVATIVE FREE 10 ML: 5 INJECTION INTRAVENOUS at 04:37

## 2019-10-08 RX ADMIN — FAMOTIDINE 20 MG: 20 TABLET ORAL at 06:39

## 2019-10-08 RX ADMIN — LISINOPRIL 20 MG: 20 TABLET ORAL at 09:23

## 2019-10-08 RX ADMIN — GABAPENTIN 300 MG: 300 CAPSULE ORAL at 09:23

## 2019-10-08 RX ADMIN — OXYCODONE HYDROCHLORIDE AND ACETAMINOPHEN 1 TABLET: 5; 325 TABLET ORAL at 06:39

## 2019-10-08 RX ADMIN — KETOROLAC TROMETHAMINE 15 MG: 30 INJECTION, SOLUTION INTRAMUSCULAR at 12:52

## 2019-10-08 RX ADMIN — OXYCODONE HYDROCHLORIDE AND ACETAMINOPHEN 1 TABLET: 5; 325 TABLET ORAL at 13:45

## 2019-10-08 RX ADMIN — MONTELUKAST 10 MG: 10 TABLET, FILM COATED ORAL at 09:23

## 2019-10-08 RX ADMIN — KETOROLAC TROMETHAMINE 15 MG: 30 INJECTION, SOLUTION INTRAMUSCULAR at 04:37

## 2019-10-09 ENCOUNTER — TRANSCRIBE ORDERS (OUTPATIENT)
Dept: ADMINISTRATIVE | Facility: HOSPITAL | Age: 69
End: 2019-10-09

## 2019-10-09 DIAGNOSIS — T14.8XXD OTHER INJURY OF UNSPECIFIED BODY REGION, SUBSEQUENT ENCOUNTER: Primary | ICD-10-CM

## 2019-10-18 ENCOUNTER — TRANSCRIBE ORDERS (OUTPATIENT)
Dept: ADMINISTRATIVE | Facility: HOSPITAL | Age: 69
End: 2019-10-18

## 2019-10-18 ENCOUNTER — HOSPITAL ENCOUNTER (OUTPATIENT)
Dept: MRI IMAGING | Facility: HOSPITAL | Age: 69
Discharge: HOME | End: 2019-10-18
Admitting: ORTHOPAEDIC SURGERY

## 2019-10-18 DIAGNOSIS — S42.001A CLOSED BILATERAL CLAVICULAR FRACTURES, INITIAL ENCOUNTER: Primary | ICD-10-CM

## 2019-10-18 DIAGNOSIS — S42.002A CLOSED BILATERAL CLAVICULAR FRACTURES, INITIAL ENCOUNTER: ICD-10-CM

## 2019-10-18 DIAGNOSIS — S42.002A CLOSED BILATERAL CLAVICULAR FRACTURES, INITIAL ENCOUNTER: Primary | ICD-10-CM

## 2019-10-18 DIAGNOSIS — S42.001A CLOSED BILATERAL CLAVICULAR FRACTURES, INITIAL ENCOUNTER: ICD-10-CM

## 2019-10-18 PROCEDURE — 73721 MRI JNT OF LWR EXTRE W/O DYE: CPT

## 2019-10-21 ENCOUNTER — HOSPITAL ENCOUNTER (OUTPATIENT)
Dept: MRI IMAGING | Facility: HOSPITAL | Age: 69
End: 2019-10-21

## 2019-11-04 ENCOUNTER — HOSPITAL ENCOUNTER (OUTPATIENT)
Dept: ULTRASOUND IMAGING | Facility: HOSPITAL | Age: 69
End: 2019-11-04

## 2019-11-08 ENCOUNTER — HOSPITAL ENCOUNTER (OUTPATIENT)
Dept: ULTRASOUND IMAGING | Facility: HOSPITAL | Age: 69
Discharge: HOME | End: 2019-11-08
Admitting: INTERNAL MEDICINE

## 2019-11-08 DIAGNOSIS — T14.8XXD OTHER INJURY OF UNSPECIFIED BODY REGION, SUBSEQUENT ENCOUNTER: ICD-10-CM

## 2019-11-08 PROCEDURE — 93971 EXTREMITY STUDY: CPT

## 2020-10-20 ENCOUNTER — APPOINTMENT (OUTPATIENT)
Dept: GENERAL RADIOLOGY | Facility: HOSPITAL | Age: 70
End: 2020-10-20

## 2020-10-20 ENCOUNTER — HOSPITAL ENCOUNTER (INPATIENT)
Facility: HOSPITAL | Age: 70
LOS: 4 days | Discharge: HOME HEALTH | End: 2020-10-24
Attending: EMERGENCY MEDICINE | Admitting: INTERNAL MEDICINE

## 2020-10-20 DIAGNOSIS — S72.112A DISPLACED FRACTURE OF GREATER TROCHANTER OF LEFT FEMUR, INITIAL ENCOUNTER FOR CLOSED FRACTURE (HCC): ICD-10-CM
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
## 2020-10-20 DIAGNOSIS — S72.002A CLOSED FRACTURE OF LEFT HIP, INITIAL ENCOUNTER (HCC): ICD-10-CM

## 2020-10-20 DIAGNOSIS — J96.01 ACUTE RESPIRATORY FAILURE WITH HYPOXIA (HCC): ICD-10-CM

## 2020-10-20 PROBLEM — S72.102A: Status: ACTIVE | Noted: 2020-10-20

## 2020-10-20 LAB
ALBUMIN SERPL-MCNC: 4.7 G/DL (ref 3.5–5.2)
ALBUMIN/GLOB SERPL: 1.7 G/DL
ALP SERPL-CCNC: 96 U/L (ref 39–117)
ALT SERPL W P-5'-P-CCNC: 12 U/L (ref 1–33)
ANION GAP SERPL CALCULATED.3IONS-SCNC: 17.2 MMOL/L (ref 5–15)
AST SERPL-CCNC: 22 U/L (ref 1–32)
BASOPHILS # BLD AUTO: 0.02 10*3/MM3 (ref 0–0.2)
BASOPHILS NFR BLD AUTO: 0.3 % (ref 0–1.5)
BILIRUB SERPL-MCNC: 0.4 MG/DL (ref 0–1.2)
BILIRUB UR QL STRIP: NEGATIVE
BUN SERPL-MCNC: 20 MG/DL (ref 8–23)
BUN/CREAT SERPL: 17.9 (ref 7–25)
CALCIUM SPEC-SCNC: 9.7 MG/DL (ref 8.6–10.5)
CHLORIDE SERPL-SCNC: 102 MMOL/L (ref 98–107)
CLARITY UR: CLEAR
CO2 SERPL-SCNC: 21.8 MMOL/L (ref 22–29)
COLOR UR: YELLOW
CREAT SERPL-MCNC: 1.12 MG/DL (ref 0.57–1)
DEPRECATED RDW RBC AUTO: 44 FL (ref 37–54)
EOSINOPHIL # BLD AUTO: 0.06 10*3/MM3 (ref 0–0.4)
EOSINOPHIL NFR BLD AUTO: 0.9 % (ref 0.3–6.2)
ERYTHROCYTE [DISTWIDTH] IN BLOOD BY AUTOMATED COUNT: 12.1 % (ref 12.3–15.4)
GFR SERPL CREATININE-BSD FRML MDRD: 48 ML/MIN/1.73
GLOBULIN UR ELPH-MCNC: 2.7 GM/DL
GLUCOSE SERPL-MCNC: 91 MG/DL (ref 65–99)
GLUCOSE UR STRIP-MCNC: NEGATIVE MG/DL
HCT VFR BLD AUTO: 36 % (ref 34–46.6)
HGB BLD-MCNC: 12.4 G/DL (ref 12–15.9)
HGB UR QL STRIP.AUTO: NEGATIVE
IMM GRANULOCYTES # BLD AUTO: 0.03 10*3/MM3 (ref 0–0.05)
IMM GRANULOCYTES NFR BLD AUTO: 0.5 % (ref 0–0.5)
KETONES UR QL STRIP: ABNORMAL
LEUKOCYTE ESTERASE UR QL STRIP.AUTO: NEGATIVE
LYMPHOCYTES # BLD AUTO: 1.82 10*3/MM3 (ref 0.7–3.1)
LYMPHOCYTES NFR BLD AUTO: 28.2 % (ref 19.6–45.3)
MCH RBC QN AUTO: 33.6 PG (ref 26.6–33)
MCHC RBC AUTO-ENTMCNC: 34.4 G/DL (ref 31.5–35.7)
MCV RBC AUTO: 97.6 FL (ref 79–97)
MONOCYTES # BLD AUTO: 0.53 10*3/MM3 (ref 0.1–0.9)
MONOCYTES NFR BLD AUTO: 8.2 % (ref 5–12)
NEUTROPHILS NFR BLD AUTO: 4 10*3/MM3 (ref 1.7–7)
NEUTROPHILS NFR BLD AUTO: 61.9 % (ref 42.7–76)
NITRITE UR QL STRIP: NEGATIVE
NRBC BLD AUTO-RTO: 0 /100 WBC (ref 0–0.2)
PH UR STRIP.AUTO: 7 [PH] (ref 5–8)
PLATELET # BLD AUTO: 206 10*3/MM3 (ref 140–450)
PMV BLD AUTO: 9.9 FL (ref 6–12)
POTASSIUM SERPL-SCNC: 3.7 MMOL/L (ref 3.5–5.2)
PROT SERPL-MCNC: 7.4 G/DL (ref 6–8.5)
PROT UR QL STRIP: NEGATIVE
RBC # BLD AUTO: 3.69 10*6/MM3 (ref 3.77–5.28)
SODIUM SERPL-SCNC: 141 MMOL/L (ref 136–145)
SP GR UR STRIP: 1.02 (ref 1–1.03)
UROBILINOGEN UR QL STRIP: ABNORMAL
WBC # BLD AUTO: 6.46 10*3/MM3 (ref 3.4–10.8)

## 2020-10-20 PROCEDURE — 25010000002 FENTANYL CITRATE (PF) 100 MCG/2ML SOLUTION: Performed by: EMERGENCY MEDICINE

## 2020-10-20 PROCEDURE — 80053 COMPREHEN METABOLIC PANEL: CPT | Performed by: PHYSICIAN ASSISTANT

## 2020-10-20 PROCEDURE — 93005 ELECTROCARDIOGRAM TRACING: CPT | Performed by: PHYSICIAN ASSISTANT

## 2020-10-20 PROCEDURE — 25010000002 MORPHINE PER 10 MG: Performed by: EMERGENCY MEDICINE

## 2020-10-20 PROCEDURE — 84484 ASSAY OF TROPONIN QUANT: CPT | Performed by: INTERNAL MEDICINE

## 2020-10-20 PROCEDURE — 99222 1ST HOSP IP/OBS MODERATE 55: CPT | Performed by: EMERGENCY MEDICINE

## 2020-10-20 PROCEDURE — 99284 EMERGENCY DEPT VISIT MOD MDM: CPT

## 2020-10-20 PROCEDURE — 25010000002 ONDANSETRON PER 1 MG: Performed by: EMERGENCY MEDICINE

## 2020-10-20 PROCEDURE — 71045 X-RAY EXAM CHEST 1 VIEW: CPT

## 2020-10-20 PROCEDURE — 94640 AIRWAY INHALATION TREATMENT: CPT

## 2020-10-20 PROCEDURE — 73502 X-RAY EXAM HIP UNI 2-3 VIEWS: CPT

## 2020-10-20 PROCEDURE — 81003 URINALYSIS AUTO W/O SCOPE: CPT | Performed by: PHYSICIAN ASSISTANT

## 2020-10-20 PROCEDURE — 93005 ELECTROCARDIOGRAM TRACING: CPT | Performed by: EMERGENCY MEDICINE

## 2020-10-20 PROCEDURE — 85025 COMPLETE CBC W/AUTO DIFF WBC: CPT | Performed by: PHYSICIAN ASSISTANT

## 2020-10-20 RX ORDER — MORPHINE SULFATE 4 MG/ML
4 INJECTION, SOLUTION INTRAMUSCULAR; INTRAVENOUS ONCE
Status: COMPLETED | OUTPATIENT
Start: 2020-10-20 | End: 2020-10-20

## 2020-10-20 RX ORDER — MONTELUKAST SODIUM 10 MG/1
10 TABLET ORAL DAILY
Status: DISCONTINUED | OUTPATIENT
Start: 2020-10-21 | End: 2020-10-24 | Stop reason: HOSPADM

## 2020-10-20 RX ORDER — MORPHINE SULFATE 4 MG/ML
4 INJECTION, SOLUTION INTRAMUSCULAR; INTRAVENOUS
Status: DISCONTINUED | OUTPATIENT
Start: 2020-10-20 | End: 2020-10-21

## 2020-10-20 RX ORDER — SODIUM CHLORIDE 9 MG/ML
100 INJECTION, SOLUTION INTRAVENOUS CONTINUOUS
Status: DISCONTINUED | OUTPATIENT
Start: 2020-10-20 | End: 2020-10-21

## 2020-10-20 RX ORDER — ONDANSETRON 2 MG/ML
4 INJECTION INTRAMUSCULAR; INTRAVENOUS ONCE
Status: COMPLETED | OUTPATIENT
Start: 2020-10-20 | End: 2020-10-20

## 2020-10-20 RX ORDER — TIZANIDINE 4 MG/1
4 TABLET ORAL EVERY 8 HOURS PRN
Status: DISCONTINUED | OUTPATIENT
Start: 2020-10-20 | End: 2020-10-21

## 2020-10-20 RX ORDER — GABAPENTIN 300 MG/1
300 CAPSULE ORAL 2 TIMES DAILY
Status: DISCONTINUED | OUTPATIENT
Start: 2020-10-20 | End: 2020-10-24 | Stop reason: HOSPADM

## 2020-10-20 RX ORDER — AMLODIPINE BESYLATE 5 MG/1
10 TABLET ORAL NIGHTLY
Status: DISCONTINUED | OUTPATIENT
Start: 2020-10-20 | End: 2020-10-24 | Stop reason: HOSPADM

## 2020-10-20 RX ORDER — SODIUM CHLORIDE 0.9 % (FLUSH) 0.9 %
10 SYRINGE (ML) INJECTION AS NEEDED
Status: DISCONTINUED | OUTPATIENT
Start: 2020-10-20 | End: 2020-10-24 | Stop reason: HOSPADM

## 2020-10-20 RX ORDER — ACETAMINOPHEN 325 MG/1
650 TABLET ORAL EVERY 4 HOURS PRN
Status: DISCONTINUED | OUTPATIENT
Start: 2020-10-20 | End: 2020-10-24 | Stop reason: HOSPADM

## 2020-10-20 RX ORDER — ACETAMINOPHEN 650 MG/1
650 SUPPOSITORY RECTAL EVERY 4 HOURS PRN
Status: DISCONTINUED | OUTPATIENT
Start: 2020-10-20 | End: 2020-10-24 | Stop reason: HOSPADM

## 2020-10-20 RX ORDER — FENTANYL CITRATE 50 UG/ML
50 INJECTION, SOLUTION INTRAMUSCULAR; INTRAVENOUS
Status: DISCONTINUED | OUTPATIENT
Start: 2020-10-20 | End: 2020-10-21

## 2020-10-20 RX ORDER — DOCUSATE SODIUM 100 MG/1
100 CAPSULE, LIQUID FILLED ORAL 2 TIMES DAILY
Status: DISCONTINUED | OUTPATIENT
Start: 2020-10-20 | End: 2020-10-24 | Stop reason: HOSPADM

## 2020-10-20 RX ORDER — ACETAMINOPHEN 160 MG/5ML
650 SOLUTION ORAL EVERY 4 HOURS PRN
Status: DISCONTINUED | OUTPATIENT
Start: 2020-10-20 | End: 2020-10-24 | Stop reason: HOSPADM

## 2020-10-20 RX ORDER — SODIUM CHLORIDE 9 MG/ML
125 INJECTION, SOLUTION INTRAVENOUS CONTINUOUS
Status: DISCONTINUED | OUTPATIENT
Start: 2020-10-20 | End: 2020-10-20

## 2020-10-20 RX ORDER — SODIUM CHLORIDE 9 MG/ML
100 INJECTION, SOLUTION INTRAVENOUS CONTINUOUS
Status: DISCONTINUED | OUTPATIENT
Start: 2020-10-20 | End: 2020-10-20

## 2020-10-20 RX ORDER — BACLOFEN 10 MG/1
10 TABLET ORAL 3 TIMES DAILY
Status: DISCONTINUED | OUTPATIENT
Start: 2020-10-20 | End: 2020-10-24 | Stop reason: HOSPADM

## 2020-10-20 RX ORDER — BUDESONIDE AND FORMOTEROL FUMARATE DIHYDRATE 80; 4.5 UG/1; UG/1
2 AEROSOL RESPIRATORY (INHALATION)
Status: DISCONTINUED | OUTPATIENT
Start: 2020-10-20 | End: 2020-10-21

## 2020-10-20 RX ORDER — BACLOFEN 10 MG/1
10 TABLET ORAL 3 TIMES DAILY
COMMUNITY
End: 2022-11-29

## 2020-10-20 RX ORDER — ONDANSETRON 2 MG/ML
4 INJECTION INTRAMUSCULAR; INTRAVENOUS EVERY 6 HOURS PRN
Status: DISCONTINUED | OUTPATIENT
Start: 2020-10-20 | End: 2020-10-21

## 2020-10-20 RX ORDER — SODIUM CHLORIDE 0.9 % (FLUSH) 0.9 %
10 SYRINGE (ML) INJECTION EVERY 12 HOURS SCHEDULED
Status: DISCONTINUED | OUTPATIENT
Start: 2020-10-20 | End: 2020-10-24 | Stop reason: HOSPADM

## 2020-10-20 RX ADMIN — MORPHINE SULFATE 4 MG: 4 INJECTION, SOLUTION INTRAMUSCULAR; INTRAVENOUS at 19:37

## 2020-10-20 RX ADMIN — GABAPENTIN 300 MG: 300 CAPSULE ORAL at 23:17

## 2020-10-20 RX ADMIN — MORPHINE SULFATE 4 MG: 4 INJECTION, SOLUTION INTRAMUSCULAR; INTRAVENOUS at 20:10

## 2020-10-20 RX ADMIN — BUDESONIDE AND FORMOTEROL FUMARATE DIHYDRATE 2 PUFF: 80; 4.5 AEROSOL RESPIRATORY (INHALATION) at 23:33

## 2020-10-20 RX ADMIN — SODIUM CHLORIDE 100 ML/HR: 9 INJECTION, SOLUTION INTRAVENOUS at 23:18

## 2020-10-20 RX ADMIN — ONDANSETRON 4 MG: 2 INJECTION INTRAMUSCULAR; INTRAVENOUS at 20:10

## 2020-10-20 RX ADMIN — SODIUM CHLORIDE 125 ML/HR: 9 INJECTION, SOLUTION INTRAVENOUS at 19:44

## 2020-10-20 RX ADMIN — TIZANIDINE 4 MG: 4 TABLET ORAL at 23:38

## 2020-10-20 RX ADMIN — FENTANYL CITRATE 50 MCG: 50 INJECTION INTRAMUSCULAR; INTRAVENOUS at 19:04

## 2020-10-20 RX ADMIN — DOCUSATE SODIUM 100 MG: 100 CAPSULE, LIQUID FILLED ORAL at 23:16

## 2020-10-20 RX ADMIN — MORPHINE SULFATE 4 MG: 4 INJECTION INTRAVENOUS at 21:59

## 2020-10-20 RX ADMIN — AMLODIPINE BESYLATE 10 MG: 5 TABLET ORAL at 23:16

## 2020-10-20 RX ADMIN — BACLOFEN 10 MG: 10 TABLET ORAL at 23:16

## 2020-10-20 NOTE — ED PROVIDER NOTES
Subjective   70-year-old female that presents to the emergency department with chief complaint of fall times just prior to arrival.  Patient states that she tripped over her dog landing on her left hip.  Patient complains of aching, throbbing left hip pain.  Patient denies any other associated injuries at this time.  Patient has history of asthma, hyperlipidemia, hypertension.  Denies any previous history of diabetes, coronary artery disease.      History provided by:  Patient   used: No    Fall  Mechanism of injury: fall    Injury location:  Leg  Leg injury location:  L hip  Incident location:  Home  Time since incident:  1 day  Arrived directly from scene: no    Fall:     Fall occurred:  Tripped and walking    Impact surface:  Hard floor    Entrapped after fall: no    Protective equipment: none    Suspicion of alcohol use: no    Suspicion of drug use: no    Tetanus status:  Unknown  Prior to arrival data:     Bystander interventions:  None    Patient ambulatory at scene: no      Blood loss:  None    Orientation at scene:  Person, place, situation and time    Loss of consciousness: no      Amnesic to event: no      Airway interventions:  None    IV access status:  None    IO access:  None    Fluids administered:  None    Cardiac interventions:  None    Medications administered:  None    Immobilization:  None  Associated symptoms: no abdominal pain, no headaches, no hearing loss, no neck pain and no seizures    Risk factors: no anticoagulation therapy, no asthma, no beta blocker therapy, no COPD, no diabetes, no dialysis, no pacemaker, no past MI and not pregnant        Review of Systems   Constitutional: Negative.  Negative for activity change, appetite change, chills and diaphoresis.   HENT: Negative for hearing loss.    Eyes: Negative.  Negative for pain, discharge and redness.   Respiratory: Negative.    Gastrointestinal: Negative for abdominal distention, abdominal pain, anal bleeding and  constipation.   Endocrine: Negative.  Negative for cold intolerance and heat intolerance.   Genitourinary: Negative.  Negative for difficulty urinating, dyspareunia, dysuria, enuresis, flank pain and frequency.   Musculoskeletal: Positive for arthralgias, joint swelling and myalgias. Negative for gait problem and neck pain.   Skin: Negative.  Negative for color change and rash.   Neurological: Negative for seizures and headaches.   Hematological: Negative.  Negative for adenopathy. Does not bruise/bleed easily.   Psychiatric/Behavioral: Negative.  Negative for agitation, behavioral problems, confusion and hallucinations.   All other systems reviewed and are negative.      Past Medical History:   Diagnosis Date   • Asthma    • Hyperlipidemia    • Hypertension    • Scoliosis        Allergies   Allergen Reactions   • Lovastatin Other (See Comments)     Muscle cramps        Past Surgical History:   Procedure Laterality Date   • ADENOIDECTOMY     • CATARACT EXTRACTION     • KNEE SURGERY     • NECK SURGERY     • TONSILLECTOMY         No family history on file.    Social History     Socioeconomic History   • Marital status:      Spouse name: Not on file   • Number of children: Not on file   • Years of education: Not on file   • Highest education level: Not on file   Tobacco Use   • Smoking status: Never Smoker   Substance and Sexual Activity   • Alcohol use: Yes     Comment: socially           Objective   Physical Exam  Vitals signs reviewed.   Constitutional:       General: She is not in acute distress.     Appearance: Normal appearance. She is not ill-appearing, toxic-appearing or diaphoretic.   HENT:      Head: Normocephalic and atraumatic.      Right Ear: Tympanic membrane, ear canal and external ear normal. There is no impacted cerumen.      Left Ear: Tympanic membrane, ear canal and external ear normal. There is no impacted cerumen.      Nose: Nose normal. No congestion or rhinorrhea.      Mouth/Throat:       Mouth: Mucous membranes are moist.      Pharynx: Oropharynx is clear. No oropharyngeal exudate or posterior oropharyngeal erythema.   Eyes:      General: No scleral icterus.        Right eye: No discharge.         Left eye: No discharge.      Extraocular Movements: Extraocular movements intact.      Conjunctiva/sclera: Conjunctivae normal.      Pupils: Pupils are equal, round, and reactive to light.   Neck:      Musculoskeletal: Normal range of motion. No neck rigidity or muscular tenderness.      Vascular: No carotid bruit.   Cardiovascular:      Rate and Rhythm: Normal rate and regular rhythm.      Pulses: Normal pulses.      Heart sounds: Normal heart sounds. No murmur. No friction rub. No gallop.    Pulmonary:      Effort: Pulmonary effort is normal. No respiratory distress.      Breath sounds: Normal breath sounds. No stridor. No wheezing, rhonchi or rales.   Chest:      Chest wall: No tenderness.   Abdominal:      General: Abdomen is flat. Bowel sounds are normal. There is no distension.      Palpations: There is no mass.      Tenderness: There is no abdominal tenderness. There is no right CVA tenderness, left CVA tenderness, guarding or rebound.      Hernia: No hernia is present.   Musculoskeletal: Normal range of motion.         General: Tenderness, deformity and signs of injury present. No swelling.      Right lower leg: No edema.      Left lower leg: No edema.   Lymphadenopathy:      Cervical: No cervical adenopathy.   Skin:     General: Skin is warm and dry.      Capillary Refill: Capillary refill takes less than 2 seconds.      Coloration: Skin is not jaundiced or pale.      Findings: No bruising, erythema, lesion or rash.   Neurological:      General: No focal deficit present.      Mental Status: She is alert and oriented to person, place, and time. Mental status is at baseline.      Cranial Nerves: No cranial nerve deficit.      Sensory: No sensory deficit.      Motor: No weakness.      Coordination:  Coordination normal.      Gait: Gait normal.      Deep Tendon Reflexes: Reflexes normal.   Psychiatric:         Mood and Affect: Mood normal.         Behavior: Behavior normal.         Thought Content: Thought content normal.         Procedures           ED Course  ED Course as of Oct 20 2021   Tue Oct 20, 2020   1950 Discussed care with Dr. Winters orthopedic surgery on-call.  Is advised to admit for medical clearance to the hospitalist.  Will consult on patient.    []   1954 Call placed to hospitalist for possible admit. Patient has a displaced greater troch fracture.     [BH]   2008 EKG interpreted by me reveals sinus tachycardia rate 111.  And low voltage.  Nonspecific T wave changes.  No ectopy.  No ischemic changes.    [PF]      ED Course User Index  [] Alfie Lundberg PA-C  [PF] Shane Gomez, DO                                           [facility]    Final diagnoses:   Fall, initial encounter   Closed fracture of left hip, initial encounter (CMS/[facility])   Displaced fracture of greater trochanter of left femur, initial encounter for closed fracture (CMS/[facility])            Alfie Lundberg PA-C  10/20/20 2021

## 2020-10-21 ENCOUNTER — APPOINTMENT (OUTPATIENT)
Dept: GENERAL RADIOLOGY | Facility: HOSPITAL | Age: 70
End: 2020-10-21

## 2020-10-21 ENCOUNTER — ANESTHESIA EVENT (OUTPATIENT)
Dept: PERIOP | Facility: HOSPITAL | Age: 70
End: 2020-10-21

## 2020-10-21 ENCOUNTER — ANESTHESIA (OUTPATIENT)
Dept: PERIOP | Facility: HOSPITAL | Age: 70
End: 2020-10-21

## 2020-10-21 PROBLEM — S72.112A: Status: ACTIVE | Noted: 2020-10-20

## 2020-10-21 PROBLEM — J96.01 ACUTE RESPIRATORY FAILURE WITH HYPOXIA: Status: ACTIVE | Noted: 2020-10-21

## 2020-10-21 PROBLEM — J69.0 ASPIRATION PNEUMONITIS: Status: ACTIVE | Noted: 2020-10-21

## 2020-10-21 LAB
A-A DO2: 50.2 MMHG
ARTERIAL PATENCY WRIST A: ABNORMAL
ATMOSPHERIC PRESS: 739 MMHG
BASE EXCESS BLDA CALC-SCNC: -0.2 MMOL/L (ref 0–2)
BDY SITE: ABNORMAL
COHGB MFR BLD: 1.3 % (ref 0–2)
GAS FLOW AIRWAY: 5 LPM
GLUCOSE BLDC GLUCOMTR-MCNC: 124 MG/DL (ref 70–130)
HCO3 BLDA-SCNC: 24.4 MMOL/L (ref 22–28)
HCT VFR BLD CALC: 36.2 %
METHGB BLD QL: 1.3 % (ref 0–1.5)
MODALITY: ABNORMAL
NOTE: ABNORMAL
OXYHGB MFR BLDV: 85.2 % (ref 94–99)
PCO2 BLDA: 38.6 MM HG (ref 35–45)
PCO2 TEMP ADJ BLD: ABNORMAL MM[HG]
PH BLDA: 7.41 PH UNITS (ref 7.3–7.5)
PH, TEMP CORRECTED: ABNORMAL
PO2 BLDA: 51.5 MM HG (ref 75–100)
PO2 TEMP ADJ BLD: ABNORMAL MM[HG]
SAO2 % BLDCOA: 87.5 % (ref 94–100)
SARS-COV-2 RNA PNL SPEC NAA+PROBE: NOT DETECTED
TROPONIN T SERPL-MCNC: <0.01 NG/ML (ref 0–0.03)
VENTILATOR MODE: ABNORMAL

## 2020-10-21 PROCEDURE — 99222 1ST HOSP IP/OBS MODERATE 55: CPT | Performed by: INTERNAL MEDICINE

## 2020-10-21 PROCEDURE — 82375 ASSAY CARBOXYHB QUANT: CPT

## 2020-10-21 PROCEDURE — 83050 HGB METHEMOGLOBIN QUAN: CPT

## 2020-10-21 PROCEDURE — 94799 UNLISTED PULMONARY SVC/PX: CPT

## 2020-10-21 PROCEDURE — 25010000002 HYDROMORPHONE 1 MG/ML SOLUTION: Performed by: ORTHOPAEDIC SURGERY

## 2020-10-21 PROCEDURE — 25010000002 ONDANSETRON PER 1 MG: Performed by: EMERGENCY MEDICINE

## 2020-10-21 PROCEDURE — 94660 CPAP INITIATION&MGMT: CPT

## 2020-10-21 PROCEDURE — 87040 BLOOD CULTURE FOR BACTERIA: CPT | Performed by: INTERNAL MEDICINE

## 2020-10-21 PROCEDURE — C1776 JOINT DEVICE (IMPLANTABLE): HCPCS | Performed by: ORTHOPAEDIC SURGERY

## 2020-10-21 PROCEDURE — 0SRS0JA REPLACEMENT OF LEFT HIP JOINT, FEMORAL SURFACE WITH SYNTHETIC SUBSTITUTE, UNCEMENTED, OPEN APPROACH: ICD-10-PCS | Performed by: ORTHOPAEDIC SURGERY

## 2020-10-21 PROCEDURE — 88311 DECALCIFY TISSUE: CPT | Performed by: ORTHOPAEDIC SURGERY

## 2020-10-21 PROCEDURE — 99232 SBSQ HOSP IP/OBS MODERATE 35: CPT | Performed by: INTERNAL MEDICINE

## 2020-10-21 PROCEDURE — 25010000003 CEFAZOLIN SODIUM-DEXTROSE 2-3 GM-%(50ML) RECONSTITUTED SOLUTION: Performed by: ORTHOPAEDIC SURGERY

## 2020-10-21 PROCEDURE — 36600 WITHDRAWAL OF ARTERIAL BLOOD: CPT

## 2020-10-21 PROCEDURE — 88305 TISSUE EXAM BY PATHOLOGIST: CPT | Performed by: ORTHOPAEDIC SURGERY

## 2020-10-21 PROCEDURE — 25010000002 SUCCINYLCHOLINE PER 20 MG: Performed by: NURSE ANESTHETIST, CERTIFIED REGISTERED

## 2020-10-21 PROCEDURE — 25010000002 DEXAMETHASONE PER 1 MG: Performed by: NURSE ANESTHETIST, CERTIFIED REGISTERED

## 2020-10-21 PROCEDURE — 25010000002 PROPOFOL 200 MG/20ML EMULSION: Performed by: NURSE ANESTHETIST, CERTIFIED REGISTERED

## 2020-10-21 PROCEDURE — 25010000002 ONDANSETRON PER 1 MG: Performed by: NURSE ANESTHETIST, CERTIFIED REGISTERED

## 2020-10-21 PROCEDURE — 73501 X-RAY EXAM HIP UNI 1 VIEW: CPT

## 2020-10-21 PROCEDURE — 25010000002 MORPHINE PER 10 MG: Performed by: EMERGENCY MEDICINE

## 2020-10-21 PROCEDURE — 82805 BLOOD GASES W/O2 SATURATION: CPT

## 2020-10-21 PROCEDURE — 25010000002 METHYLPREDNISOLONE PER 40 MG: Performed by: INTERNAL MEDICINE

## 2020-10-21 PROCEDURE — 82962 GLUCOSE BLOOD TEST: CPT

## 2020-10-21 PROCEDURE — 71045 X-RAY EXAM CHEST 1 VIEW: CPT

## 2020-10-21 PROCEDURE — 25010000002 PROMETHAZINE PER 50 MG: Performed by: ORTHOPAEDIC SURGERY

## 2020-10-21 PROCEDURE — 87635 SARS-COV-2 COVID-19 AMP PRB: CPT | Performed by: ORTHOPAEDIC SURGERY

## 2020-10-21 DEVICE — CAP PRT HIP BIPOL: Type: IMPLANTABLE DEVICE | Status: FUNCTIONAL

## 2020-10-21 DEVICE — CUP ACET RINGLOC BIPOL 28MM 46MM: Type: IMPLANTABLE DEVICE | Site: HIP | Status: FUNCTIONAL

## 2020-10-21 DEVICE — HD FEM MOD COCR 28MM STD/NK: Type: IMPLANTABLE DEVICE | Site: HIP | Status: FUNCTIONAL

## 2020-10-21 DEVICE — IMPLANTABLE DEVICE: Type: IMPLANTABLE DEVICE | Site: HIP | Status: FUNCTIONAL

## 2020-10-21 RX ORDER — ALBUTEROL SULFATE 90 UG/1
2 AEROSOL, METERED RESPIRATORY (INHALATION) EVERY 4 HOURS PRN
COMMUNITY

## 2020-10-21 RX ORDER — SODIUM CHLORIDE 0.9 % (FLUSH) 0.9 %
10 SYRINGE (ML) INJECTION AS NEEDED
Status: DISCONTINUED | OUTPATIENT
Start: 2020-10-21 | End: 2020-10-21 | Stop reason: HOSPADM

## 2020-10-21 RX ORDER — CLINDAMYCIN PHOSPHATE 900 MG/50ML
900 INJECTION, SOLUTION INTRAVENOUS ONCE
Status: CANCELLED | OUTPATIENT
Start: 2020-10-21 | End: 2020-10-21

## 2020-10-21 RX ORDER — CEFAZOLIN SODIUM 2 G/50ML
2 SOLUTION INTRAVENOUS ONCE
Status: COMPLETED | OUTPATIENT
Start: 2020-10-21 | End: 2020-10-21

## 2020-10-21 RX ORDER — IPRATROPIUM BROMIDE AND ALBUTEROL SULFATE 2.5; .5 MG/3ML; MG/3ML
3 SOLUTION RESPIRATORY (INHALATION) EVERY 4 HOURS PRN
Status: DISCONTINUED | OUTPATIENT
Start: 2020-10-21 | End: 2020-10-24 | Stop reason: HOSPADM

## 2020-10-21 RX ORDER — CEFTRIAXONE 1 G/50ML
1 INJECTION, SOLUTION INTRAVENOUS EVERY 24 HOURS
Status: COMPLETED | OUTPATIENT
Start: 2020-10-22 | End: 2020-10-24

## 2020-10-21 RX ORDER — CALCIUM CARBONATE 200(500)MG
2 TABLET,CHEWABLE ORAL 3 TIMES DAILY PRN
COMMUNITY
End: 2023-03-28

## 2020-10-21 RX ORDER — IPRATROPIUM BROMIDE AND ALBUTEROL SULFATE 2.5; .5 MG/3ML; MG/3ML
3 SOLUTION RESPIRATORY (INHALATION)
Status: DISCONTINUED | OUTPATIENT
Start: 2020-10-21 | End: 2020-10-24 | Stop reason: HOSPADM

## 2020-10-21 RX ORDER — BUPIVACAINE HYDROCHLORIDE 2.5 MG/ML
INJECTION, SOLUTION EPIDURAL; INFILTRATION; INTRACAUDAL
Status: COMPLETED
Start: 2020-10-21 | End: 2020-10-21

## 2020-10-21 RX ORDER — IPRATROPIUM BROMIDE AND ALBUTEROL SULFATE 2.5; .5 MG/3ML; MG/3ML
SOLUTION RESPIRATORY (INHALATION)
Status: COMPLETED
Start: 2020-10-21 | End: 2020-10-21

## 2020-10-21 RX ORDER — NEOSTIGMINE METHYLSULFATE 5 MG/5 ML
SYRINGE (ML) INTRAVENOUS AS NEEDED
Status: DISCONTINUED | OUTPATIENT
Start: 2020-10-21 | End: 2020-10-21 | Stop reason: SURG

## 2020-10-21 RX ORDER — METHYLPREDNISOLONE SODIUM SUCCINATE 40 MG/ML
40 INJECTION, POWDER, LYOPHILIZED, FOR SOLUTION INTRAMUSCULAR; INTRAVENOUS EVERY 12 HOURS
Status: DISCONTINUED | OUTPATIENT
Start: 2020-10-21 | End: 2020-10-23

## 2020-10-21 RX ORDER — MULTIPLE VITAMINS W/ MINERALS TAB 9MG-400MCG
1 TAB ORAL DAILY
COMMUNITY

## 2020-10-21 RX ORDER — ONDANSETRON 2 MG/ML
INJECTION INTRAMUSCULAR; INTRAVENOUS AS NEEDED
Status: DISCONTINUED | OUTPATIENT
Start: 2020-10-21 | End: 2020-10-21 | Stop reason: SURG

## 2020-10-21 RX ORDER — ONDANSETRON 2 MG/ML
4 INJECTION INTRAMUSCULAR; INTRAVENOUS EVERY 6 HOURS PRN
Status: DISCONTINUED | OUTPATIENT
Start: 2020-10-21 | End: 2020-10-24 | Stop reason: HOSPADM

## 2020-10-21 RX ORDER — CALCIUM CARBONATE 200(500)MG
2 TABLET,CHEWABLE ORAL 3 TIMES DAILY PRN
Status: DISCONTINUED | OUTPATIENT
Start: 2020-10-21 | End: 2020-10-24 | Stop reason: HOSPADM

## 2020-10-21 RX ORDER — BUDESONIDE 0.5 MG/2ML
0.5 INHALANT ORAL
Status: DISCONTINUED | OUTPATIENT
Start: 2020-10-21 | End: 2020-10-24 | Stop reason: HOSPADM

## 2020-10-21 RX ORDER — ONDANSETRON 4 MG/1
4 TABLET, FILM COATED ORAL EVERY 6 HOURS PRN
Status: DISCONTINUED | OUTPATIENT
Start: 2020-10-21 | End: 2020-10-24 | Stop reason: HOSPADM

## 2020-10-21 RX ORDER — BUPIVACAINE HYDROCHLORIDE 2.5 MG/ML
INJECTION, SOLUTION EPIDURAL; INFILTRATION; INTRACAUDAL
Status: DISCONTINUED | OUTPATIENT
Start: 2020-10-21 | End: 2020-10-21 | Stop reason: SURG

## 2020-10-21 RX ORDER — CEFAZOLIN SODIUM 2 G/50ML
2 SOLUTION INTRAVENOUS EVERY 8 HOURS
Status: COMPLETED | OUTPATIENT
Start: 2020-10-21 | End: 2020-10-22

## 2020-10-21 RX ORDER — DOCUSATE SODIUM 100 MG/1
100 CAPSULE, LIQUID FILLED ORAL 2 TIMES DAILY PRN
Status: DISCONTINUED | OUTPATIENT
Start: 2020-10-21 | End: 2020-10-24 | Stop reason: HOSPADM

## 2020-10-21 RX ORDER — AMOXICILLIN 250 MG
2 CAPSULE ORAL 2 TIMES DAILY
Status: DISCONTINUED | OUTPATIENT
Start: 2020-10-21 | End: 2020-10-24 | Stop reason: HOSPADM

## 2020-10-21 RX ORDER — HYDROCODONE BITARTRATE AND ACETAMINOPHEN 7.5; 325 MG/1; MG/1
1 TABLET ORAL EVERY 4 HOURS PRN
Status: DISCONTINUED | OUTPATIENT
Start: 2020-10-21 | End: 2020-10-24 | Stop reason: HOSPADM

## 2020-10-21 RX ORDER — SUCCINYLCHOLINE CHLORIDE 20 MG/ML
INJECTION INTRAMUSCULAR; INTRAVENOUS AS NEEDED
Status: DISCONTINUED | OUTPATIENT
Start: 2020-10-21 | End: 2020-10-21 | Stop reason: SURG

## 2020-10-21 RX ORDER — PROPOFOL 10 MG/ML
INJECTION, EMULSION INTRAVENOUS AS NEEDED
Status: DISCONTINUED | OUTPATIENT
Start: 2020-10-21 | End: 2020-10-21 | Stop reason: SURG

## 2020-10-21 RX ORDER — SODIUM CHLORIDE, SODIUM LACTATE, POTASSIUM CHLORIDE, CALCIUM CHLORIDE 600; 310; 30; 20 MG/100ML; MG/100ML; MG/100ML; MG/100ML
1000 INJECTION, SOLUTION INTRAVENOUS CONTINUOUS
Status: DISCONTINUED | OUTPATIENT
Start: 2020-10-21 | End: 2020-10-21

## 2020-10-21 RX ORDER — L.ACID,PARA/B.BIFIDUM/S.THERM 8B CELL
1 CAPSULE ORAL 2 TIMES DAILY
Status: DISCONTINUED | OUTPATIENT
Start: 2020-10-21 | End: 2020-10-24 | Stop reason: HOSPADM

## 2020-10-21 RX ORDER — DEXAMETHASONE SODIUM PHOSPHATE 4 MG/ML
INJECTION, SOLUTION INTRA-ARTICULAR; INTRALESIONAL; INTRAMUSCULAR; INTRAVENOUS; SOFT TISSUE AS NEEDED
Status: DISCONTINUED | OUTPATIENT
Start: 2020-10-21 | End: 2020-10-21 | Stop reason: SURG

## 2020-10-21 RX ORDER — OMEPRAZOLE 40 MG/1
40 CAPSULE, DELAYED RELEASE ORAL DAILY
COMMUNITY
End: 2023-02-09 | Stop reason: HOSPADM

## 2020-10-21 RX ORDER — LIDOCAINE HYDROCHLORIDE 20 MG/ML
INJECTION, SOLUTION INTRAVENOUS AS NEEDED
Status: DISCONTINUED | OUTPATIENT
Start: 2020-10-21 | End: 2020-10-21 | Stop reason: SURG

## 2020-10-21 RX ORDER — PANTOPRAZOLE SODIUM 40 MG/1
40 TABLET, DELAYED RELEASE ORAL
Status: DISCONTINUED | OUTPATIENT
Start: 2020-10-21 | End: 2020-10-24 | Stop reason: HOSPADM

## 2020-10-21 RX ORDER — IPRATROPIUM BROMIDE AND ALBUTEROL SULFATE 2.5; .5 MG/3ML; MG/3ML
3 SOLUTION RESPIRATORY (INHALATION)
Status: DISCONTINUED | OUTPATIENT
Start: 2020-10-21 | End: 2020-10-21

## 2020-10-21 RX ORDER — HYDROCODONE BITARTRATE AND ACETAMINOPHEN 7.5; 325 MG/1; MG/1
2 TABLET ORAL EVERY 4 HOURS PRN
Status: DISCONTINUED | OUTPATIENT
Start: 2020-10-21 | End: 2020-10-21

## 2020-10-21 RX ADMIN — HYDROCODONE BITARTRATE AND ACETAMINOPHEN 1 TABLET: 7.5; 325 TABLET ORAL at 17:03

## 2020-10-21 RX ADMIN — Medication 3 MG: at 13:17

## 2020-10-21 RX ADMIN — SODIUM CHLORIDE, PRESERVATIVE FREE 10 ML: 5 INJECTION INTRAVENOUS at 20:07

## 2020-10-21 RX ADMIN — MORPHINE SULFATE 4 MG: 4 INJECTION INTRAVENOUS at 00:01

## 2020-10-21 RX ADMIN — IPRATROPIUM BROMIDE AND ALBUTEROL SULFATE 3 ML: 2.5; .5 SOLUTION RESPIRATORY (INHALATION) at 14:47

## 2020-10-21 RX ADMIN — ONDANSETRON 4 MG: 2 INJECTION INTRAMUSCULAR; INTRAVENOUS at 04:40

## 2020-10-21 RX ADMIN — Medication 1 CAPSULE: at 20:04

## 2020-10-21 RX ADMIN — HYDROMORPHONE HYDROCHLORIDE 1 MG: 1 INJECTION, SOLUTION INTRAMUSCULAR; INTRAVENOUS; SUBCUTANEOUS at 10:11

## 2020-10-21 RX ADMIN — SODIUM CHLORIDE 100 ML/HR: 9 INJECTION, SOLUTION INTRAVENOUS at 04:35

## 2020-10-21 RX ADMIN — PROMETHAZINE HYDROCHLORIDE 25 MG: 25 INJECTION INTRAMUSCULAR; INTRAVENOUS at 09:47

## 2020-10-21 RX ADMIN — IPRATROPIUM BROMIDE AND ALBUTEROL SULFATE 3 ML: .5; 3 SOLUTION RESPIRATORY (INHALATION) at 14:47

## 2020-10-21 RX ADMIN — AMLODIPINE BESYLATE 10 MG: 5 TABLET ORAL at 20:03

## 2020-10-21 RX ADMIN — LIDOCAINE HYDROCHLORIDE 60 MG: 20 INJECTION, SOLUTION INTRAVENOUS at 12:12

## 2020-10-21 RX ADMIN — CEFAZOLIN SODIUM 2 G: 2 SOLUTION INTRAVENOUS at 20:05

## 2020-10-21 RX ADMIN — IPRATROPIUM BROMIDE AND ALBUTEROL SULFATE 3 ML: .5; 3 SOLUTION RESPIRATORY (INHALATION) at 18:53

## 2020-10-21 RX ADMIN — MONTELUKAST 10 MG: 10 TABLET, FILM COATED ORAL at 17:03

## 2020-10-21 RX ADMIN — SODIUM CHLORIDE, POTASSIUM CHLORIDE, SODIUM LACTATE AND CALCIUM CHLORIDE: 600; 310; 30; 20 INJECTION, SOLUTION INTRAVENOUS at 13:17

## 2020-10-21 RX ADMIN — ONDANSETRON 4 MG: 2 INJECTION INTRAMUSCULAR; INTRAVENOUS at 12:46

## 2020-10-21 RX ADMIN — GABAPENTIN 300 MG: 300 CAPSULE ORAL at 20:04

## 2020-10-21 RX ADMIN — BACLOFEN 10 MG: 10 TABLET ORAL at 20:06

## 2020-10-21 RX ADMIN — GABAPENTIN 300 MG: 300 CAPSULE ORAL at 17:03

## 2020-10-21 RX ADMIN — MORPHINE SULFATE 4 MG: 4 INJECTION INTRAVENOUS at 07:31

## 2020-10-21 RX ADMIN — BUPIVACAINE HYDROCHLORIDE 60 ML: 2.5 INJECTION, SOLUTION EPIDURAL; INFILTRATION; INTRACAUDAL; PERINEURAL at 13:50

## 2020-10-21 RX ADMIN — PROPOFOL 200 MG: 10 INJECTION, EMULSION INTRAVENOUS at 12:12

## 2020-10-21 RX ADMIN — BUDESONIDE 0.5 MG: 0.5 INHALANT RESPIRATORY (INHALATION) at 18:53

## 2020-10-21 RX ADMIN — MORPHINE SULFATE 4 MG: 4 INJECTION INTRAVENOUS at 04:35

## 2020-10-21 RX ADMIN — PANTOPRAZOLE SODIUM 40 MG: 40 TABLET, DELAYED RELEASE ORAL at 17:04

## 2020-10-21 RX ADMIN — BACLOFEN 10 MG: 10 TABLET ORAL at 17:03

## 2020-10-21 RX ADMIN — GLYCOPYRROLATE 0.4 MG: 0.2 INJECTION, SOLUTION INTRAMUSCULAR; INTRAVENOUS at 13:17

## 2020-10-21 RX ADMIN — CEFAZOLIN SODIUM 2 G: 2 SOLUTION INTRAVENOUS at 12:11

## 2020-10-21 RX ADMIN — DEXAMETHASONE SODIUM PHOSPHATE 8 MG: 4 INJECTION, SOLUTION INTRAMUSCULAR; INTRAVENOUS at 12:46

## 2020-10-21 RX ADMIN — SUCCINYLCHOLINE CHLORIDE 100 MG: 20 INJECTION, SOLUTION INTRAMUSCULAR; INTRAVENOUS at 12:12

## 2020-10-21 RX ADMIN — METHYLPREDNISOLONE SODIUM SUCCINATE 40 MG: 40 INJECTION, POWDER, FOR SOLUTION INTRAMUSCULAR; INTRAVENOUS at 17:03

## 2020-10-21 RX ADMIN — SODIUM CHLORIDE, POTASSIUM CHLORIDE, SODIUM LACTATE AND CALCIUM CHLORIDE: 600; 310; 30; 20 INJECTION, SOLUTION INTRAVENOUS at 12:01

## 2020-10-21 RX ADMIN — MORPHINE SULFATE 4 MG: 4 INJECTION INTRAVENOUS at 01:52

## 2020-10-21 NOTE — ED NOTES
Admission info to House supervisor will call back with room assignment, possibly go to women's care     Yoselin Hernández, RN  10/20/20 2033

## 2020-10-21 NOTE — ANESTHESIA PROCEDURE NOTES
Peripheral Block      Patient reassessed immediately prior to procedure    Reason for block: at surgeon's request and post-op pain management  Performed by  CRNA: Ramu Gray CRNA  Preanesthetic Checklist  Completed: patient identified, site marked, surgical consent, pre-op evaluation, timeout performed, IV checked, risks and benefits discussed and monitors and equipment checked  Prep:  Pt Position: supine  Sterile barriers:cap, gloves and mask  Prep: ChloraPrep  Patient monitoring: EKG, continuous pulse oximetry and blood pressure monitoring  Procedure  Sedation:yes    Guidance:ultrasound guided  Images:still images obtained    Block Type:fascia iliaca compartment  Injection Technique:single-shot  Needle Type:echogenic      Medications Used: bupivacaine PF (MARCAINE) injection 0.25%, 60 mL      Medications  Comment:.    Post Assessment  Injection Assessment: negative aspiration for heme, no paresthesia on injection and incremental injection  Patient Tolerance:comfortable throughout block  Complications:no  Additional Notes  Procedure:          FASCIA ILIACA BLOCK                                Patient analgesia was achieved with Skin infiltration 2ml Lidocaine or Bupivacaine      Pt was placed in the supine position.   The insertion site was prepped in sterile fashion with Chloraprep.  A BBraun echogenic needle was advance In-plane under ultrasound guidance. The Anterior superior Iliac crest was initially visualized and the probe was directed slightly medially and slightly towards the umbilicus.  The course of the needle was tracked over the sartorius muscle through the fascia Iliacus and into the anterior portion of the Iliacus muscle.  Major vessels where identified and avoided as were structures of the peritoneal cavity.  LA injection was made incrementally in 1-5 ml amounts and spread was visualized superiorly below the fascia iliacus.  Injection was completed with negative aspiration of blood and  negative intravascular injection.  Injection pressures were normal or minimal resistance.

## 2020-10-21 NOTE — PROGRESS NOTES
[facility]    Name:  Silvia Chung   Age:  70 y.o.  Sex:  female  :  1950  MRN:  [id number]   Visit Number:  [id number]  Admission Date:  10/20/2020  Date Of Service:  10/21/20  Primary Care Physician:  Janell Almazan MD     LOS: 1 day :  Patient Care Team:  Janell Almazan MD as PCP - General (Internal Medicine):    Chief Complaint:      Nausea and vomiting.  Left hip pain.    Subjective / Interval History:     Ms. Chung was seen and examined a couple of times today.  She was seen in the second floor this morning while she was waiting for orthopedic surgery.  She was complaining of multiple episodes of nausea and vomiting likely related to morphine therapy.  This was changed to Dilaudid per orthopedic surgery.  Patient was also seen by Dr. Cameron from cardiology who cleared her for surgery.    I was called later by the anesthetist that while intubation, patient did have an episode of aspiration of gastric content into the lungs.  Patient did have lavage of the lung with removal of the aspiration contents.  Postoperatively, she required 6 L of nasal cannula oxygen to maintain saturation above 90%.  She was however hemodynamically stable and was subsequently transported back to the medical floor with telemetry on the fourth floor.  Patient was admitted from the emergency room last night after she sustained a fall at home with left hip fracture.  She denies any prior history of CAD but does have history of asthma and uses inhaler at home.    Review of Systems:     General ROS: Patient denies any fevers, chills or loss of consciousness.  Respiratory ROS: Cough and shortness of breath.  Cardiovascular ROS: Denies chest pain or palpitations. No history of exertional chest pain.  Gastrointestinal ROS: Nausea and vomiting denies any abdominal pain. No diarrhea.  Neurological ROS: Denies any focal weakness. No loss of consciousness. Denies any  numbness.  Dermatological ROS: Denies any redness or pruritis.    Vital Signs:    Temp:  [98 °F (36.7 °C)-100.9 °F (38.3 °C)] 98.8 °F (37.1 °C)  Heart Rate:  [] 102  Resp:  [12-22] 12  BP: ()/(46-99) 109/69    Intake and output:    I/O last 3 completed shifts:  In: 2100 [I.V.:1000; Other:1100]  Out: -   I/O this shift:  In: 1000 [I.V.:1000]  Out: 150 [Blood:150]    Physical Examination:    General Appearance:  Alert and cooperative, not in any acute distress.   Head:  Atraumatic and normocephalic, without obvious abnormality.   Eyes:          PERRLA, conjunctivae and sclerae normal, no Icterus. No pallor. Extraocular movements are within normal limits.   Neck: Supple, trachea midline, no thyromegaly, no carotid bruit.   Lungs:   Chest shape is normal. Breath sounds heard bilaterally equally.  No crackles or wheezing. No pleural rub or bronchial breathing.   Heart:  Normal S1 and S2, no murmur, no gallop, no rub. No JVD   Abdomen:   Normal bowel sounds, no masses, no organomegaly. Soft, nontender, nondistended, no guarding, no rebound tenderness.   Extremities: Moves all extremities, no edema, no cyanosis, no clubbing.  Tenderness noted in the left hip area without any ecchymosis.   Skin: No bleeding or rash.   Neurologic: Awake, alert and oriented times 3. Moves all 4 extremities equally except for the left lower extremity.     Laboratory results:    Results from last 7 days   Lab Units 10/20/20  1941   SODIUM mmol/L 141   POTASSIUM mmol/L 3.7   CHLORIDE mmol/L 102   CO2 mmol/L 21.8*   BUN mg/dL 20   CREATININE mg/dL 1.12*   CALCIUM mg/dL 9.7   BILIRUBIN mg/dL 0.4   ALK PHOS U/L 96   ALT (SGPT) U/L 12   AST (SGOT) U/L 22   GLUCOSE mg/dL 91     Results from last 7 days   Lab Units 10/20/20  1941   WBC 10*3/mm3 6.46   HEMOGLOBIN g/dL 12.4   HEMATOCRIT % 36.0   PLATELETS 10*3/mm3 206         Results from last 7 days   Lab Units 10/20/20  1941   TROPONIN T ng/mL <0.010         Results from last 7 days   Lab  Units 10/21/20  1503   PH, ARTERIAL pH units 7.409   PO2 ART mm Hg 51.5*   PCO2, ARTERIAL mm Hg 38.6   HCO3 ART mmol/L 24.4     I have reviewed the patient's laboratory results.    Radiology results:    Imaging Results (Last 24 Hours)     Procedure Component Value Units Date/Time    XR Chest 1 View [[id number]] Collected: 10/21/20 1431     Updated: 10/21/20 1434    Narrative:      PROCEDURE: XR CHEST 1 VW-     HISTORY: possible aspiration; W19.XXXA-Unspecified fall, initial  encounter; S72.002A-Fracture of unspecified part of neck of left femur,  initial encounter for closed fracture; S72.112A-Displaced fracture of  greater trochanter of left femur, initial encounter for closed fracture     COMPARISON: None.     FINDINGS: The heart is normal in size. The mediastinum is unremarkable.  There are perihilar opacities felt to reflect pulmonary edema. No  effusions are evident. There is no pneumothorax.  There are no acute  osseous abnormalities.       Impression:      Perihilar opacities felt to reflect pulmonary edema.     Continued followup is recommended.     This report was finalized on 10/21/2020 2:32 PM by Padmini Cortez M.D..    XR Hip With or Without Pelvis 1 View Left [[id number]] Collected: 10/21/20 1428     Updated: 10/21/20 1431    Narrative:      PROCEDURE: XR HIP W OR WO PELVIS 1 VIEW LEFT-     HISTORY: Post-Op Hip Arthroplasty; W19.XXXA-Unspecified fall, initial  encounter; S72.002A-Fracture of unspecified part of neck of left femur,  initial encounter for closed fracture; S72.112A-Displaced fracture of  greater trochanter of left femur, initial encounter for closed fracture     COMPARISON: None.     FINDINGS: The patient is status post left hip hemiarthroplasty. There  are no hardware complications. The pubic symphysis and SI joints are  intact. The expected postoperative gas is seen in the soft tissues of  the left hip.       Impression:      Status post left hip hemiarthroplasty with no  hardware  complications.     This report was finalized on 10/21/2020 2:29 PM by Padmini Cortez M.D..    XR Hip With or Without Pelvis 2 - 3 View Left [[id number]] Collected: 10/21/20 0804     Updated: 10/21/20 0806    Narrative:      PROCEDURE: XR HIP W OR WO PELVIS 2-3 VIEW LEFT-     HISTORY: fall pre-op     COMPARISON: None.     FINDINGS: There is a left femoral neck fracture. No other fractures or  dislocations are evident. The pubic symphysis and SI joints are intact.  The soft tissues are unremarkable.       Impression:      Left femoral neck fracture.     This report was finalized on 10/21/2020 8:04 AM by Padmini Cortez M.D..    XR Chest 1 View [[id number]] Collected: 10/21/20 0803     Updated: 10/21/20 0806    Narrative:      PROCEDURE: XR CHEST 1 VW-     HISTORY: pre-op     COMPARISON: 10/05/2019.     FINDINGS: The heart is normal in size. The mediastinum is unremarkable.  The lungs are clear. There is no pneumothorax.  There are no acute  osseous abnormalities.       Impression:      No acute cardiopulmonary process.     Continued followup is recommended.     This report was finalized on 10/21/2020 8:04 AM by Padmini Cortez M.D..        I have reviewed the patient's radiology reports.    Medication Review:     I have reviewed the patient's active and prn medications.       Unspecified trochanteric fracture of left femur, initial encounter for closed fracture (CMS/HCC)    Essential hypertension    Closed fracture of one rib of right side    Closed nondisplaced fracture of acromial end of left clavicle with routine healing    Fall    Displaced fracture of greater trochanter of left femur, initial encounter for closed fracture (CMS/HCC)    Assessment:    1.  Status post mechanical fall at home.  2.  Left hip fracture secondary to #1, POA, left hip hemiarthroplasty on 10/21/2020.  3.  Right lower lobe aspiration pneumonitis, not present on admission.  4.  Acute hypoxic respiratory failure secondary  to #3, not present on admission.  5.  History of asthma.  6.  Essential hypertension.  7.  Nausea and vomiting secondary to opiate use.    Plan:    Ms. Chung is currently back from orthopedic surgery.  Unfortunately, she developed short episode of aspiration with right lower lobe infiltrate.  She did have a fever of 100.9.  ABG was done and does show hypoxia and we will continue her on nasal cannula oxygen and BiPAP as needed.  I will place her on IV antibiotic therapy with Rocephin for right lower lobe aspiration pneumonitis.  We will repeat her CBC and procalcitonin levels tomorrow.    Patient will be started on physical and Occupational Therapy.  She is on Lovenox for DVT prophylaxis.  She lives with her  and was independent in daily activities prior to this fall.  Discussed with nursing staff and anesthetist.    Jason Ruelas MD  10/21/20  15:48 EDT    Dictated utilizing Dragon dictation.

## 2020-10-21 NOTE — ANESTHESIA PREPROCEDURE EVALUATION
Anesthesia Evaluation     Patient summary reviewed and Nursing notes reviewed   no history of anesthetic complications:  NPO Solid Status: > 8 hours  NPO Liquid Status: > 8 hours           Airway   Mallampati: I  TM distance: >3 FB  Neck ROM: full  no difficulty expected  Dental - normal exam     Pulmonary - normal exam   (+) asthma,  Cardiovascular - normal exam    Rhythm: regular  Rate: normal    (+) hypertension 2 medications or greater, hyperlipidemia,       Neuro/Psych  GI/Hepatic/Renal/Endo      Musculoskeletal     Abdominal  - normal exam    Abdomen: soft.  Bowel sounds: normal.   Substance History   (+) alcohol use,      OB/GYN          Other                        Anesthesia Plan    ASA 2     general with block   (Risks and benefits discussed including risk of aspiration, recall and dental damage. All patient questions answered. Will continue with POC.    FI PNB)  intravenous induction     Anesthetic plan, all risks, benefits, and alternatives have been provided, discussed and informed consent has been obtained with: patient.

## 2020-10-21 NOTE — CONSULTS
[facility] Cardiology Consult Note    Silvia Chung  1950  [id number]  10/21/20    Requesting Physician: Jason Ruelas MD    Chief Complaint: Left hip pain    History of Present Illness:   Mrs. Silvia Chung is a 70 y.o. female who is being seen by Cardiology for preoperative cardiac risk assessment.  The patient has a past medical history significant for hypertension, hyperlipidemia, and asthma.  She does not have any significant past cardiac history.  She presented to [facility] on 10/20/2020 for evaluation of acute onset left hip pain after a fall.  The patient reports that after her dog (a great Pyrenees) jumped up to greet her, she subsequently fell landing on her left hip.  She notes immediate onset left hip pain.  On evaluation in the emergency department, she was found to have a left femur fracture.  Orthopedic surgery has been consulted, and plans for surgical intervention.  An ECG on admission revealed sinus rhythm with a possible prior anteroseptal MI.  On further questioning, the patient denies any recent chest pain or anginal symptoms.  No exertional dyspnea.  No orthopnea, PND, or lower extremity swelling.  She has not previously had to undergo stress testing or coronary angiography.  She does, however, report a syncopal episode several months ago for which she underwent outpatient Holter monitoring which was reportedly unremarkable.  She has not had any recurrent syncopal episodes.  No other specific complaints at this time.    Review of Systems:   Review of Systems   Constitutional: Negative for activity change, appetite change, chills, diaphoresis, fatigue, fever, unexpected weight gain and unexpected weight loss.   Respiratory: Negative for cough, chest tightness, shortness of breath and wheezing.    Cardiovascular: Negative for chest pain, palpitations and leg swelling.   Gastrointestinal: Negative for abdominal pain, anal bleeding, blood in stool  and GERD.   Endocrine: Negative for cold intolerance and heat intolerance.   Genitourinary: Negative for hematuria.   Musculoskeletal:        Left hip pain   Neurological: Negative for dizziness, syncope, weakness and light-headedness.   Hematological: Does not bruise/bleed easily.   Psychiatric/Behavioral: Negative for depressed mood and stress. The patient is not nervous/anxious.        Past Medical History:   Past Medical History:   Diagnosis Date   • Asthma    • Hyperlipidemia    • Hypertension    • Scoliosis        Past Surgical History:   Past Surgical History:   Procedure Laterality Date   • ADENOIDECTOMY     • CATARACT EXTRACTION     • KNEE SURGERY     • NECK SURGERY     • TONSILLECTOMY         Family History: No family history on file.    Social History:   Social History     Socioeconomic History   • Marital status:      Spouse name: Not on file   • Number of children: Not on file   • Years of education: Not on file   • Highest education level: Not on file   Tobacco Use   • Smoking status: Never Smoker   Substance and Sexual Activity   • Alcohol use: Yes     Comment: socially       Medications:     Current Facility-Administered Medications:   •  acetaminophen (TYLENOL) tablet 650 mg, 650 mg, Oral, Q4H PRN **OR** acetaminophen (TYLENOL) 160 MG/5ML solution 650 mg, 650 mg, Oral, Q4H PRN **OR** acetaminophen (TYLENOL) suppository 650 mg, 650 mg, Rectal, Q4H PRN, Master Chan,   •  amLODIPine (NORVASC) tablet 10 mg, 10 mg, Oral, Nightly, Master Chan, DO, 10 mg at 10/20/20 2316  •  baclofen (LIORESAL) tablet 10 mg, 10 mg, Oral, TID, Master Chan, , 10 mg at 10/20/20 2316  •  budesonide-formoterol (SYMBICORT) 80-4.5 MCG/ACT inhaler 2 puff, 2 puff, Inhalation, BID - RT, Master Chan, , 2 puff at 10/20/20 2333  •  [START ON 10/22/2020] calcium 500 mg vitamin D 5 mcg (200 UT) per tablet 2 tablet, 2 tablet, Oral, Daily, Master Chan,   •  docusate sodium (COLACE) capsule 100 mg, 100 mg, Oral, BID,  Isaias Chanar, DO, 100 mg at 10/20/20 2316  •  [START ON 10/22/2020] enoxaparin (LOVENOX) syringe 40 mg, 40 mg, Subcutaneous, Nightly, Isaias Chanar, DO  •  fentaNYL citrate (PF) (SUBLIMAZE) injection 50 mcg, 50 mcg, Intravenous, Q1H PRN, Isaias Chanar, DO, 50 mcg at 10/20/20 1904  •  gabapentin (NEURONTIN) capsule 300 mg, 300 mg, Oral, BID, Isaias Chanar, DO, 300 mg at 10/20/20 2317  •  HYDROmorphone (DILAUDID) injection 1 mg, 1 mg, Intravenous, Q2H PRN, Humza Winters MD  •  montelukast (SINGULAIR) tablet 10 mg, 10 mg, Oral, Daily, Master Chan, DO  •  ondansetron (ZOFRAN) injection 4 mg, 4 mg, Intravenous, Q6H PRN, Master Chan, DO, 4 mg at 10/21/20 0440  •  promethazine (PHENERGAN) 25 mg in sodium chloride 0.9 % 50 mL, 25 mg, Intravenous, Q4H PRN, Humza Winters MD, 25 mg at 10/21/20 0947  •  [COMPLETED] Insert peripheral IV, , , Once **AND** sodium chloride 0.9 % flush 10 mL, 10 mL, Intravenous, PRN, Rocio, Isaiasar, DO  •  sodium chloride 0.9 % flush 10 mL, 10 mL, Intravenous, Q12H, Isaias Chanar, DO  •  sodium chloride 0.9 % flush 10 mL, 10 mL, Intravenous, PRN, Rocio, Isaiasar, DO  •  sodium chloride 0.9 % infusion, 100 mL/hr, Intravenous, Continuous, Isaias Chanar, DO, Last Rate: 100 mL/hr at 10/21/20 0435, 100 mL/hr at 10/21/20 0435  •  tiZANidine (ZANAFLEX) tablet 4 mg, 4 mg, Oral, Q8H PRN, Master Chan, DO, 4 mg at 10/20/20 2338    Allergies:   Allergies   Allergen Reactions   • Lovastatin Other (See Comments)     Muscle cramps        Physical Exam:  Vital Signs:   Vitals:    10/20/20 2115 10/21/20 0100 10/21/20 0600 10/21/20 0900   BP: 149/93 121/71 145/66 119/64   BP Location: Left arm Left arm Left arm Left arm   Patient Position: Lying Lying Lying Lying   Pulse: 106 105 101 (!) 125   Resp: 20 18 18 18   Temp: 98.3 °F (36.8 °C)  98 °F (36.7 °C) (!) 100.9 °F (38.3 °C)   TempSrc: Oral  Oral Oral   SpO2:    (!) 89%   Weight:       Height:           Physical Exam  Vitals signs and nursing note reviewed.    Constitutional:       General: She is not in acute distress.     Appearance: Normal appearance. She is well-developed. She is not diaphoretic.   HENT:      Head: Normocephalic and atraumatic.   Eyes:      General: No scleral icterus.     Pupils: Pupils are equal, round, and reactive to light.   Neck:      Musculoskeletal: Neck supple. No muscular tenderness.      Trachea: No tracheal deviation.   Cardiovascular:      Rate and Rhythm: Normal rate and regular rhythm.      Heart sounds: Normal heart sounds. No murmur. No friction rub. No gallop.       Comments: Normal JVD.  Pulmonary:      Effort: Pulmonary effort is normal. No respiratory distress.      Breath sounds: Normal breath sounds. No stridor. No wheezing or rales.   Chest:      Chest wall: No tenderness.   Abdominal:      General: Bowel sounds are normal. There is no distension.      Palpations: Abdomen is soft.      Tenderness: There is no abdominal tenderness. There is no guarding or rebound.   Musculoskeletal:         General: No swelling.      Comments: Shortening and external rotation of the left lower extremity   Lymphadenopathy:      Cervical: No cervical adenopathy.   Skin:     General: Skin is warm and dry.      Findings: No erythema.   Neurological:      General: No focal deficit present.      Mental Status: She is alert and oriented to person, place, and time.   Psychiatric:         Mood and Affect: Mood normal.         Behavior: Behavior normal.         Results Review:   Results from last 7 days   Lab Units 10/20/20  1941   SODIUM mmol/L 141   POTASSIUM mmol/L 3.7   CHLORIDE mmol/L 102   CO2 mmol/L 21.8*   BUN mg/dL 20   CREATININE mg/dL 1.12*   CALCIUM mg/dL 9.7   BILIRUBIN mg/dL 0.4   ALK PHOS U/L 96   ALT (SGPT) U/L 12   AST (SGOT) U/L 22   GLUCOSE mg/dL 91     Results from last 7 days   Lab Units 10/20/20  1941   TROPONIN T ng/mL <0.010     Results from last 7 days   Lab Units 10/20/20  1941   WBC 10*3/mm3 6.46   HEMOGLOBIN g/dL 12.4    HEMATOCRIT % 36.0   PLATELETS 10*3/mm3 206                   I personally viewed and interpreted the patient's EKG/Telemetry data     Assessment / Plan:     1.  Preoperative cardiac risk assessment  --No significant past cardiac history  --Presents with a left hip fracture, planning to undergo surgical repair  --ECG on presentation shows NSR, cannot rule out prior anteroseptal MI  --Currently without chest pain or history of anginal symptoms, with low suspicion for prior MI  --No current signs or symptoms to suggest decompensated CHF, valvulopathy, or arrhythmia  --Previous outpatient Holter monitoring reportedly unremarkable  --The patient is currently at low risk for adverse perioperative cardiac events, and given her current functional status is >4 METS without anginal symptoms it is reasonable to proceed with the proposed surgery without further cardiac testing or interventions    2.  Left hip fracture  --Management per orthopedic surgery      Thank you for allowing me to participate in the care of your patient. Please do not hesitate to contact me with additional questions or concerns.     MARANDA Cameron MD  Interventional Cardiology   10/21/20  09:53 EDT

## 2020-10-21 NOTE — CONSULTS
Patient Care Team:  Janell Almazan MD as PCP - General (Internal Medicine)    Chief complaint left hip pain  Subjective     Patient is a 70 y.o. female presents with presents with left hip pain following a fall she was knocked over by a large dog complained of pain in her left hip.  She was community ambulator but she has had multiple falls and is being evaluated currently by neurology.  She denies any other previous similar symptoms she was not having hip pain prior    Review of Systems   Pertinent items are noted in HPI    History  Past Medical History:   Diagnosis Date   • Asthma    • Hyperlipidemia    • Hypertension    • Scoliosis      Past Surgical History:   Procedure Laterality Date   • ADENOIDECTOMY     • CATARACT EXTRACTION     • KNEE SURGERY     • NECK SURGERY     • TONSILLECTOMY       No family history on file.  Social History     Tobacco Use   • Smoking status: Never Smoker   Substance Use Topics   • Alcohol use: Yes     Comment: socially   • Drug use: Not on file     Medications Prior to Admission   Medication Sig Dispense Refill Last Dose   • amLODIPine (NORVASC) 10 MG tablet Take 10 mg by mouth Every Night.      • baclofen (LIORESAL) 10 MG tablet Take 10 mg by mouth 3 (Three) Times a Day.      • benazepril (LOTENSIN) 20 MG tablet Take 20 mg by mouth Daily.      • fluticasone-salmeterol (ADVAIR) 250-50 MCG/DOSE DISKUS Inhale 1 puff Every Morning.      • gabapentin (NEURONTIN) 300 MG capsule Take 300 mg by mouth 2 (Two) Times a Day.      • montelukast (SINGULAIR) 10 MG tablet Take 10 mg by mouth Daily.      • bacitracin 500 UNIT/GM ointment Apply topically to the appropriate area as directed 2 (Two) Times a Day As Needed for Wound Care. 28.4 g 0    • docusate sodium (COLACE) 100 MG capsule Take 1 capsule by mouth 2 (Two) Times a Day. 20 capsule 0    • ezetimibe (ZETIA) 10 MG tablet Take 10 mg by mouth Daily.      • ondansetron ODT (ZOFRAN-ODT) 4 MG disintegrating tablet Take 1 tablet by mouth  Every 6 (Six) Hours As Needed for Nausea. 20 tablet 0    • tiZANidine (ZANAFLEX) 4 MG tablet Take 4 mg by mouth Every 8 (Eight) Hours As Needed for Muscle Spasms.        Allergies:  Lovastatin    Objective     Vital Signs  Temp:  [98 °F (36.7 °C)-98.7 °F (37.1 °C)] 98 °F (36.7 °C)  Heart Rate:  [101-110] 101  Resp:  [18-22] 18  BP: (121-166)/(66-99) 145/66    Physical Exam:      General Appearance:    Alert, cooperative, in no acute distress   Head:    Normocephalic, without obvious abnormality, atraumatic   Eyes:            Lids and lashes normal, conjunctivae and sclerae normal, no   icterus, no pallor, corneas clear, PERRLA   Ears:    Ears appear intact with no abnormalities noted   Throat:   No oral lesions, no thrush, oral mucosa moist   Neck:   No adenopathy, supple, trachea midline, no thyromegaly, no     carotid bruit, no JVD   Back:     No kyphosis present, no scoliosis present, no skin lesions,       erythema or scars, no tenderness to percussion or                   palpation,   range of motion normal   Lungs:     Clear to auscultation,respirations regular, even and                   unlabored    Heart:    Regular rhythm and normal rate, normal S1 and S2, no            murmur, no gallop, no rub, no click   Breast Exam:    Deferred   Abdomen:     Normal bowel sounds, no masses, no organomegaly, soft        non-tender, non-distended, no guarding, no rebound                 tenderness   Genitalia:    Deferred   Extremities:   Moves all extremities well, no edema, no cyanosis, no              redness shortened externally rotated left lower extremity with sensation intact L1 S1 she has intact EHL FHL gastroc tib ant 2+ pulses   Pulses:   Pulses palpable and equal bilaterally   Skin:   No bleeding, bruising or rash   Lymph nodes:   No palpable adenopathy   Neurologic:   Cranial nerves 2 - 12 grossly intact, sensation intact, DTR        present and equal bilaterally       Results Review:    I reviewed the  patient's new clinical results.    Assessment/Plan       Unspecified trochanteric fracture of left femur, initial encounter for closed fracture (CMS/HCC)    Essential hypertension    Closed fracture of one rib of right side    Closed nondisplaced fracture of acromial end of left clavicle with routine healing    Fall      Left hip hemiarthroplasty she understands that she understands procedure risk benefits desires to proceed    I discussed the patients findings and my recommendations with patient.     Humza Winters MD  10/21/20

## 2020-10-21 NOTE — PROGRESS NOTES
Discharge Planning Assessment  [facility]     Patient Name: Silvia Chung  MRN: [id number]  Today's Date: 10/21/2020    Admit Date: 10/20/2020    Discharge Needs Assessment     Row Name 10/21/20 0956       Living Environment    Lives With  spouse    Name(s) of Who Lives With Patient  Bong Chung, spouse    Current Living Arrangements  home/apartment/condo    Duration at Residence  Reports 3 steps to enter home.  Home essentially one level with one room requiring step    Potentially Unsafe Housing Conditions  -- Has large dog contributed to fall    Primary Care Provided by  self    Provides Primary Care For  no one    Family Caregiver if Needed  spouse    Quality of Family Relationships  supportive    Able to Return to Prior Arrangements  yes Informed will probably need rehab at [facility]       Resource/Environmental Concerns    Resource/Environmental Concerns  none       Transition Planning    Patient/Family Anticipates Transition to  inpatient rehabilitation facility    Patient/Family Anticipated Services at Transition  rehabilitation services    Transportation Anticipated  family or friend will provide;other (see comments) personl vehicle vs ambulance       Discharge Needs Assessment    Readmission Within the Last 30 Days  no previous admission in last 30 days    Equipment Currently Used at Home  none    Anticipated Changes Related to Illness  other (see comments) Limited mobility initially    Equipment Needed After Discharge  rollator    Discharge Facility/Level of Care Needs  rehabilitation facility;nursing facility, skilled    Patient's Choice of Community Agency(s)  Discussed briefly pre-op.  Will address with list post-op. No family here at time of visit.    Current Discharge Risk  physical impairment        Discharge Plan     Row Name 10/21/20 1006       Plan    Plan Comments  Pt alert and oriented. c/o left hip pain. Had been medicated. Pt reported lives with spouse in single level house. 3 steps to enter  "home. Confirmed home address,telephone # and PCP.   to be pt's spouse, Bong Chung.  Pt denies difficulty obtaining medications or needs.  Pt reports being fairly healthy--does water aerobics and walks 0.5 mile.  Reports does have back issues limiting activity tolerance. Also reported some \"balance\" problems  She denies having any equipment at home.  Informed pt short term rehab will probably be recommended at [facility].  Brief discussion re: rehab which pt requests to wait until after surgery.  Pt reports has a Living Will and POA which is not in the system.  Requested she ask her spouse to bring in to be scanned when he is able.  Pt having pain and further discussion deferred until post op.        Continued Care and Services - Admitted Since 10/20/2020    Coordination has not been started for this encounter.         Demographic Summary     Row Name 10/21/20 0948       General Information    Admission Type  inpatient    Arrived From  home    Expected Length of Stay (LOS)  3 day    Referral Source  admission list    Reason for Consult  discharge planning    Preferred Language  English     Used During This Interaction  no       Contact Information    Permission Granted to Share Info With  family/designee;    Contact Information Obtained for      Contact Information Comments  :  Spouse, Bong Chung, [phone] (pt could not remember cell # at this time)        Functional Status     Row Name 10/21/20 0951       Functional Status    Usual Activity Tolerance  fair reports \"back issues limit walking distance to 0.5 mile\"    Current Activity Tolerance  other (see comments) pre-op fx left greater trochanter --activity  maribel poor    Functional Status Comments  Reports \"some balance problems\"       Functional Status, IADL    Medications  independent    Meal Preparation  independent    Housekeeping  independent    Laundry  independent    Shopping  independent    "     Psychosocial    No documentation.       Abuse/Neglect    No documentation.       Legal    No documentation.       Substance Abuse    No documentation.       Patient Forms    No documentation.           Magda Murguia RN

## 2020-10-21 NOTE — OP NOTE
[address], [address]  [place]  [address] [phone]      OPERATIVE REPORT         PATIENT NAME:  Silvia Chung                            YOB: 1950        PREOP DIAGNOSIS:   Left hip femoral neck fracture    POSTOP DIAGNOSIS:  Left hip femoral neck fracture    PROCEDURE:   Left hip hemiarthroplasty    SURGEON:   Graeme Winters MD    OPERATIVE TEAM:   Circulator: Ralph Baer RN; Rosmery Pimentel RN  Scrub Person: Krishna Sales CSA; Cecil Saucedo    ANESTHETIST:  TESFAYE: Ramu Gray CRNA    ANESTHESIA:   General      SPECIMENS:   Femoral head sent to pathology      FINDINGS:   Femoral neck fracture    HARDWARE:                        [employer]     COMPLICATIONS:  None    DISPOSITION:  Stable to recovery.    INDICATIONS AND NARRATIVE:  The patient presents for hip hemiarthroplasty following a ground-level fall and a displaced femoral neck fracture.   Treatment alternatives have been discussed, and the patient wishes to proceed with hemiarthroplasty .  risks and benefits of the proposed procedure have been discussed, and informed consent obtained.  Risks were discussed including but not limited to, anesthesia, infection, nerve/vessel/tendon injury, fracture, prosthetic wear, loosening or dislocation, DVT, pulmonary embolus, blood loss and the possible need for transfusion.  Arrangements have been made for tranexemic acid IV protocol.  Goals of the procedure include the potential for pain relief, improved hip motion, limb alignment and improved tolerance with ambulation and activities.      Antibiotic prophylaxis was given.  Surgeon site marking and a time out were performed.  Anesthesia was effective and well tolerated.  The hip and leg were prepped and draped in the usual sterile fashion.  The patient was placed in the lateral decubitus position for the procedure, with care taken to pad all areas of the body including a bean bag mold,  axillary roll, and fibular head and malleolar padding.      After sterile prep and drape, a curved incision was made centered on the greater trochanter of the hip.  Dissection proceeded in line with the skin incision and through the tensor fascia brody.  A Charnley self-retaining retractor was placed.  The hip was gently internally rotated and a blunt Cobra was placed under the gluteus medius.  The piriformis tendon and short external rotators were released from the fossa and tagged for subsequent repair.  A T capsulotomy was performed and the capsule was tagged for repair.  The femoral neck fracture was identified cut was made 1 cm proximal to the lesser trochanter.  The femoral head was extracted from the hip, and sent to pathology as a specimen.        Next, steps were taken to prepare the femur.  A box-cutting osteotome was used to lateralize the entry to the canal and along the greater trochanter.  Sequential broaching with the broach was performed, up to the best-fit sizes, which provided an excellent press fit, no toggling.  Care was taken to broach 15-20 degrees of femoral anteversion.  Next, trial heads and necks were placed to find the best range of motion and stability.  There was smooth, free flexion, full extension of the hip and smooth full external and internal rotation with stability.  The trial components were removed.  The wound and bone surfaces were pulse irrigated with copious antibiotic solution, and then suctioned.  Next, the actual stem was placed, which had an excellent press fit that was in good position.  The actual head was then Grant tapered onto the femoral stem.  A final reduction was performed.  Clinically the leg lengths were equal and there was full range of motion and stability of the hip.  The wound was irrigated copiously.      Routine closure was performed and consisted of interrupted #1 Vicryl to repair the capsule, #5 non-absorbable Ethibond to repair the piriformis tendon  through greater trochanter bone tunnels, running barbed absorbable suture to repair the tensor fascia brody, 2-0 Vicryl for the deep and superficial subcutaneous layers, and running barbed suture for the skin.  A sterile Dermabond and Covaderm dressing was applied.  An abduction pillow was placed and the patient was moved supine on the hospital bed.  Anesthesia was effective and well tolerated.  There were no complications of surgery.  The patient was transferred in stable condition to the recovery room and x-rays of the pelvis and hip were requested.

## 2020-10-21 NOTE — ANESTHESIA POSTPROCEDURE EVALUATION
Patient: Silvia Chung    Procedure Summary     Date: 10/21/20 Room / Location: [facility] OR 2 /  [facility]    Anesthesia Start: 1201 Anesthesia Stop: 1333    Procedure: HIP HEMIARTHROPLASTY LEFT (Left Hip) Diagnosis:       Displaced fracture of greater trochanter of left femur, initial encounter for closed fracture (CMS/HCC)      (Displaced fracture of greater trochanter of left femur, initial encounter for closed fracture (CMS/HCC) [S72.112A])    Surgeon: Humza Winters MD Provider: Ramu Gray CRNA    Anesthesia Type: general with block ASA Status: 2          Anesthesia Type: general with block    Vitals  Vitals Value Taken Time   /65 10/21/20 1515   Temp 98.8 °F (37.1 °C) 10/21/20 1515   Pulse 102 10/21/20 1515   Resp 16 10/21/20 1515   SpO2 93 % 10/21/20 1515           Post Anesthesia Care and Evaluation    Patient location during evaluation: PACU  Patient participation: complete - patient participated  Level of consciousness: awake  Pain score: 3  Pain management: adequate  Airway patency: patent  Anesthetic complications: No anesthetic complications  PONV Status: controlled  Cardiovascular status: acceptable and stable  Respiratory status: acceptable and face mask  Hydration status: acceptable

## 2020-10-21 NOTE — ANESTHESIA PROCEDURE NOTES
Airway  Urgency: elective    Date/Time: 10/21/2020 12:10 PM  Airway not difficult    General Information and Staff    Patient location during procedure: OR  CRNA: Ramu Gray CRNA    Indications and Patient Condition  Indications for airway management: airway protection    Preoxygenated: yes  Mask difficulty assessment: 1 - vent by mask    Final Airway Details  Final airway type: endotracheal airway      Successful airway: ETT  Cuffed: yes   Successful intubation technique: direct laryngoscopy  Endotracheal tube insertion site: oral  Blade: Gloria  Blade size: 3  ETT size (mm): 7.5  Cormack-Lehane Classification: grade I - full view of glottis  Placement verified by: chest auscultation and capnometry   Measured from: lips  ETT/EBT  to lips (cm): 21  Number of attempts at approach: 1    Additional Comments  Possible aspiration emesis noted in oropharynx will contact hospital ist for aspiration protocal

## 2020-10-21 NOTE — NURSING NOTE
Patient down via bed to OR, in Stable condition.  Pain and nausea meds. Given before going down. Report given to OR Nurse.

## 2020-10-21 NOTE — PLAN OF CARE
Goal Outcome Evaluation:  Plan of Care Reviewed With: patient  Progress: no change   Patient is doing well S/P hip surgery.

## 2020-10-21 NOTE — ED NOTES
House Supervisor called back with a change of RM. Patient will now be going to . RNEliana notified.      Franca Grijalva  10/20/20 2058

## 2020-10-21 NOTE — PLAN OF CARE
Goal Outcome Evaluation:  Plan of Care Reviewed With: patient  Progress: no change  Outcome Summary: VSS, adequate pain control,anticpate surgery today on left hip

## 2020-10-22 LAB
ANION GAP SERPL CALCULATED.3IONS-SCNC: 11.4 MMOL/L (ref 5–15)
BUN SERPL-MCNC: 13 MG/DL (ref 8–23)
BUN/CREAT SERPL: 17.8 (ref 7–25)
CALCIUM SPEC-SCNC: 9.3 MG/DL (ref 8.6–10.5)
CHLORIDE SERPL-SCNC: 102 MMOL/L (ref 98–107)
CO2 SERPL-SCNC: 23.6 MMOL/L (ref 22–29)
CREAT SERPL-MCNC: 0.73 MG/DL (ref 0.57–1)
DEPRECATED RDW RBC AUTO: 45.4 FL (ref 37–54)
ERYTHROCYTE [DISTWIDTH] IN BLOOD BY AUTOMATED COUNT: 12 % (ref 12.3–15.4)
GFR SERPL CREATININE-BSD FRML MDRD: 79 ML/MIN/1.73
GLUCOSE SERPL-MCNC: 135 MG/DL (ref 65–99)
HCT VFR BLD AUTO: 33.1 % (ref 34–46.6)
HGB BLD-MCNC: 10.7 G/DL (ref 12–15.9)
MCH RBC QN AUTO: 33.1 PG (ref 26.6–33)
MCHC RBC AUTO-ENTMCNC: 32.3 G/DL (ref 31.5–35.7)
MCV RBC AUTO: 102.5 FL (ref 79–97)
PLATELET # BLD AUTO: 138 10*3/MM3 (ref 140–450)
PMV BLD AUTO: 10.5 FL (ref 6–12)
POTASSIUM SERPL-SCNC: 3.9 MMOL/L (ref 3.5–5.2)
PROCALCITONIN SERPL-MCNC: 1.98 NG/ML (ref 0–0.25)
RBC # BLD AUTO: 3.23 10*6/MM3 (ref 3.77–5.28)
SODIUM SERPL-SCNC: 137 MMOL/L (ref 136–145)
TROPONIN T SERPL-MCNC: <0.01 NG/ML (ref 0–0.03)
WBC # BLD AUTO: 14.49 10*3/MM3 (ref 3.4–10.8)

## 2020-10-22 PROCEDURE — 99232 SBSQ HOSP IP/OBS MODERATE 35: CPT | Performed by: INTERNAL MEDICINE

## 2020-10-22 PROCEDURE — 80048 BASIC METABOLIC PNL TOTAL CA: CPT | Performed by: ORTHOPAEDIC SURGERY

## 2020-10-22 PROCEDURE — 25010000002 METHYLPREDNISOLONE PER 40 MG: Performed by: INTERNAL MEDICINE

## 2020-10-22 PROCEDURE — 84145 PROCALCITONIN (PCT): CPT | Performed by: INTERNAL MEDICINE

## 2020-10-22 PROCEDURE — 97162 PT EVAL MOD COMPLEX 30 MIN: CPT

## 2020-10-22 PROCEDURE — 97165 OT EVAL LOW COMPLEX 30 MIN: CPT

## 2020-10-22 PROCEDURE — 97110 THERAPEUTIC EXERCISES: CPT

## 2020-10-22 PROCEDURE — 94799 UNLISTED PULMONARY SVC/PX: CPT

## 2020-10-22 PROCEDURE — 97116 GAIT TRAINING THERAPY: CPT

## 2020-10-22 PROCEDURE — 85027 COMPLETE CBC AUTOMATED: CPT | Performed by: ORTHOPAEDIC SURGERY

## 2020-10-22 PROCEDURE — 25010000003 CEFAZOLIN SODIUM-DEXTROSE 2-3 GM-%(50ML) RECONSTITUTED SOLUTION: Performed by: ORTHOPAEDIC SURGERY

## 2020-10-22 PROCEDURE — 25010000002 CEFTRIAXONE SODIUM-DEXTROSE 1-3.74 GM-%(50ML) RECONSTITUTED SOLUTION: Performed by: INTERNAL MEDICINE

## 2020-10-22 PROCEDURE — 84484 ASSAY OF TROPONIN QUANT: CPT | Performed by: INTERNAL MEDICINE

## 2020-10-22 PROCEDURE — 25010000002 ENOXAPARIN PER 10 MG: Performed by: ORTHOPAEDIC SURGERY

## 2020-10-22 RX ADMIN — DOCUSATE SODIUM 100 MG: 100 CAPSULE, LIQUID FILLED ORAL at 21:34

## 2020-10-22 RX ADMIN — METHYLPREDNISOLONE SODIUM SUCCINATE 40 MG: 40 INJECTION, POWDER, FOR SOLUTION INTRAMUSCULAR; INTRAVENOUS at 16:03

## 2020-10-22 RX ADMIN — HYDROCODONE BITARTRATE AND ACETAMINOPHEN 1 TABLET: 7.5; 325 TABLET ORAL at 08:05

## 2020-10-22 RX ADMIN — HYDROCODONE BITARTRATE AND ACETAMINOPHEN 1 TABLET: 7.5; 325 TABLET ORAL at 13:38

## 2020-10-22 RX ADMIN — CEFAZOLIN SODIUM 2 G: 2 SOLUTION INTRAVENOUS at 04:31

## 2020-10-22 RX ADMIN — HYDROCODONE BITARTRATE AND ACETAMINOPHEN 1 TABLET: 7.5; 325 TABLET ORAL at 00:07

## 2020-10-22 RX ADMIN — DOCUSATE SODIUM 50MG AND SENNOSIDES 8.6MG 2 TABLET: 8.6; 5 TABLET, FILM COATED ORAL at 08:05

## 2020-10-22 RX ADMIN — PANTOPRAZOLE SODIUM 40 MG: 40 TABLET, DELAYED RELEASE ORAL at 05:47

## 2020-10-22 RX ADMIN — BUDESONIDE 0.5 MG: 0.5 INHALANT RESPIRATORY (INHALATION) at 07:08

## 2020-10-22 RX ADMIN — BACLOFEN 10 MG: 10 TABLET ORAL at 16:03

## 2020-10-22 RX ADMIN — IPRATROPIUM BROMIDE AND ALBUTEROL SULFATE 3 ML: .5; 3 SOLUTION RESPIRATORY (INHALATION) at 12:52

## 2020-10-22 RX ADMIN — MONTELUKAST 10 MG: 10 TABLET, FILM COATED ORAL at 08:05

## 2020-10-22 RX ADMIN — IPRATROPIUM BROMIDE AND ALBUTEROL SULFATE 3 ML: .5; 3 SOLUTION RESPIRATORY (INHALATION) at 07:08

## 2020-10-22 RX ADMIN — HYDROCODONE BITARTRATE AND ACETAMINOPHEN 1 TABLET: 7.5; 325 TABLET ORAL at 21:34

## 2020-10-22 RX ADMIN — GABAPENTIN 300 MG: 300 CAPSULE ORAL at 21:33

## 2020-10-22 RX ADMIN — IPRATROPIUM BROMIDE AND ALBUTEROL SULFATE 3 ML: .5; 3 SOLUTION RESPIRATORY (INHALATION) at 19:11

## 2020-10-22 RX ADMIN — AMLODIPINE BESYLATE 10 MG: 5 TABLET ORAL at 21:33

## 2020-10-22 RX ADMIN — BACLOFEN 10 MG: 10 TABLET ORAL at 21:34

## 2020-10-22 RX ADMIN — BACLOFEN 10 MG: 10 TABLET ORAL at 08:05

## 2020-10-22 RX ADMIN — CEFTRIAXONE 1 G: 1 INJECTION, SOLUTION INTRAVENOUS at 08:10

## 2020-10-22 RX ADMIN — GABAPENTIN 300 MG: 300 CAPSULE ORAL at 08:05

## 2020-10-22 RX ADMIN — SODIUM CHLORIDE, PRESERVATIVE FREE 10 ML: 5 INJECTION INTRAVENOUS at 21:34

## 2020-10-22 RX ADMIN — CALCIUM CARBONATE 500 MG (1,250 MG)-VITAMIN D3 200 UNIT TABLET 2 TABLET: at 08:05

## 2020-10-22 RX ADMIN — METHYLPREDNISOLONE SODIUM SUCCINATE 40 MG: 40 INJECTION, POWDER, FOR SOLUTION INTRAMUSCULAR; INTRAVENOUS at 04:33

## 2020-10-22 RX ADMIN — BUDESONIDE 0.5 MG: 0.5 INHALANT RESPIRATORY (INHALATION) at 19:11

## 2020-10-22 RX ADMIN — Medication 1 CAPSULE: at 21:34

## 2020-10-22 RX ADMIN — IPRATROPIUM BROMIDE AND ALBUTEROL SULFATE 3 ML: .5; 3 SOLUTION RESPIRATORY (INHALATION) at 00:13

## 2020-10-22 RX ADMIN — DOCUSATE SODIUM 100 MG: 100 CAPSULE, LIQUID FILLED ORAL at 08:05

## 2020-10-22 RX ADMIN — Medication 1 CAPSULE: at 08:05

## 2020-10-22 RX ADMIN — ENOXAPARIN SODIUM 40 MG: 40 INJECTION SUBCUTANEOUS at 08:05

## 2020-10-22 NOTE — PLAN OF CARE
Pt. Admitted to the hosp. R/T diagnosis left femur fracture. Vital signs have remained stable so far this shift. C/O pain however relieved with medication. No acute distress noted.     Problem: Fall Injury Risk  Goal: Absence of Fall and Fall-Related Injury  Outcome: Ongoing, Progressing  Intervention: Identify and Manage Contributors to Fall Injury Risk  Recent Flowsheet Documentation  Taken 10/22/2020 1200 by Gayle Morales RN  Medication Review/Management: medications reviewed  Taken 10/22/2020 0817 by Gayle Morales RN  Medication Review/Management: medications reviewed  Intervention: Promote Injury-Free Environment  Recent Flowsheet Documentation  Taken 10/22/2020 1400 by Gayle Morales RN  Safety Promotion/Fall Prevention:   activity supervised   assistive device/personal items within reach   clutter free environment maintained  Taken 10/22/2020 1200 by Gayle Morales RN  Safety Promotion/Fall Prevention:   activity supervised   assistive device/personal items within reach   clutter free environment maintained  Taken 10/22/2020 1000 by Gayle Morales RN  Safety Promotion/Fall Prevention:   activity supervised   assistive device/personal items within reach   clutter free environment maintained  Taken 10/22/2020 0817 by Gayle Morales RN  Safety Promotion/Fall Prevention:   activity supervised   assistive device/personal items within reach   clutter free environment maintained     Problem: Adult Inpatient Plan of Care  Goal: Plan of Care Review  Outcome: Ongoing, Progressing     Problem: Bleeding (Hip Arthroplasty)  Goal: Absence of Bleeding  Outcome: Ongoing, Progressing     Problem: Bowel Elimination Impaired (Hip Arthroplasty)  Goal: Effective Bowel Elimination  Outcome: Ongoing, Progressing     Problem: Infection (Hip Arthroplasty)  Goal: Absence of Infection Signs and Symptoms  Outcome: Ongoing, Progressing     Problem: Joint Function Impaired (Hip Arthroplasty)  Goal: Optimal Functional Ability  Outcome: Ongoing,  Progressing  Intervention: Protect Joint Integrity  Recent Flowsheet Documentation  Taken 10/22/2020 1400 by Gayle Morales RN  Positioning/Transfer Devices:   pillows   in use  Taken 10/22/2020 1200 by Gayle Morales RN  Positioning/Transfer Devices:   pillows   in use  Taken 10/22/2020 1000 by Gayle Morales RN  Positioning/Transfer Devices:   pillows   applied  Taken 10/22/2020 0817 by Gayle Morales RN  Positioning/Transfer Devices: aBduction splint/pillow     Problem: Ongoing Anesthesia Effects (Hip Arthroplasty)  Goal: Anesthesia/Sedation Recovery  Outcome: Ongoing, Progressing  Intervention: Optimize Anesthesia Recovery  Recent Flowsheet Documentation  Taken 10/22/2020 1400 by Gayle Morales RN  Patient Tolerance (IS): good  Safety Promotion/Fall Prevention:   activity supervised   assistive device/personal items within reach   clutter free environment maintained  Taken 10/22/2020 1200 by Gayle Morales RN  Safety Promotion/Fall Prevention:   activity supervised   assistive device/personal items within reach   clutter free environment maintained  Taken 10/22/2020 1000 by Gayle Morales RN  Patient Tolerance (IS): good  Safety Promotion/Fall Prevention:   activity supervised   assistive device/personal items within reach   clutter free environment maintained  Taken 10/22/2020 0817 by Gayle Morales RN  Patient Tolerance (IS): good  Safety Promotion/Fall Prevention:   activity supervised   assistive device/personal items within reach   clutter free environment maintained  Level Incentive Spirometer (mL): 2000     Problem: Pain (Hip Arthroplasty)  Goal: Acceptable Pain Control  Outcome: Ongoing, Progressing  Intervention: Prevent or Manage Pain  Recent Flowsheet Documentation  Taken 10/22/2020 1200 by Gayle Morales RN  Diversional Activities: television  Taken 10/22/2020 0817 by Gayle Morales RN  Diversional Activities: television     Problem: Postoperative Nausea and Vomiting (Hip Arthroplasty)  Goal: Nausea and Vomiting  Relief  Outcome: Ongoing, Progressing     Problem: Postoperative Urinary Retention (Hip Arthroplasty)  Goal: Effective Urinary Elimination  Outcome: Ongoing, Progressing     Problem: Skin Injury Risk Increased  Goal: Skin Health and Integrity  Outcome: Ongoing, Progressing  Intervention: Optimize Skin Protection  Recent Flowsheet Documentation  Taken 10/22/2020 1400 by Gayle Morales RN  Pressure Reduction Techniques: frequent weight shift encouraged  Head of Bed (HOB): [facility] elevated  Pressure Reduction Devices: pressure-redistributing mattress utilized  Taken 10/22/2020 1200 by Gayle Morales RN  Pressure Reduction Techniques: frequent weight shift encouraged  Head of Bed ([facility]): [facility] elevated  Pressure Reduction Devices: pressure-redistributing mattress utilized  Skin Protection: incontinence pads utilized  Taken 10/22/2020 1000 by Gayle Morales RN  Pressure Reduction Techniques: frequent weight shift encouraged  Head of Bed ([facility]): [facility] elevated  Pressure Reduction Devices: pressure-redistributing mattress utilized  Taken 10/22/2020 0817 by Gayle Morales RN  Pressure Reduction Techniques: frequent weight shift encouraged  Head of Bed ([facility]): [facility] elevated  Pressure Reduction Devices: pressure-redistributing mattress utilized  Skin Protection: incontinence pads utilized   Goal Outcome Evaluation:  Plan of Care Reviewed With: patient  Progress: improving

## 2020-10-22 NOTE — PROGRESS NOTES
Continued Stay Note  JAMES Gong     Patient Name: Silvia Chung  MRN: [id number]  Today's Date: 10/22/2020    Admit Date: 10/20/2020    Discharge Plan     Row Name 10/22/20 1022       Plan    Plan Spoke to pt in room, wants to go home at discharge, Lives with her  and he can help her, states she does not want inpt rehab.  Chose from [facility].  Has a walker with wheels and a BSC. Thinks she also has some hip kit items.  Unsure if will need o2.  Per Dr Ruelas will likely be ready tomorrow.        Discharge Codes    No documentation.       Expected Discharge Date and Time     Expected Discharge Date Expected Discharge Time    Oct 23, 2020             Crystal Pratt RN

## 2020-10-22 NOTE — PLAN OF CARE
Problem: Adult Inpatient Plan of Care  Goal: Plan of Care Review  Recent Flowsheet Documentation  Taken 10/22/2020 0908 by Jossy Marc, PT  Progress: no change  Plan of Care Reviewed With: patient  Outcome Summary: PT eval completed. Patient presents s/p fall and L DOROTHY surgical repair and is WBAT. She has deficits in strength, ROM, balance, endurance and independence but is expected to benefit from continued skilled PT intervention to improve her mobility status prior to D/C.

## 2020-10-22 NOTE — PROGRESS NOTES
[facility]    Name:  Silvia Chung   Age:  70 y.o.  Sex:  female  :  1950  MRN:  [id number]   Visit Number:  [id number]  Admission Date:  10/20/2020  Date Of Service:  10/22/20  Primary Care Physician:  Janell Almazan MD     LOS: 2 days :  Patient Care Team:  Janell Almazan MD as PCP - General (Internal Medicine):    Chief Complaint:      Follow-up of left hip fracture.    Subjective / Interval History:     Ms. Chung is currently sitting up on the chair and is comfortable at rest.  She does have some cough and does drop her saturations in the upper 80s on removal of nasal cannula oxygen.  She however does not have any significant shortness of breath subjectively.  Did have a spike of fever yesterday but has been afebrile since then.  Patient states that every time she has surgery or takes morphine she gets significant nausea and vomiting and states that that Zofran does not work for her but the Phenergan works better.  She did undergo left hip surgery yesterday but was complicated by aspiration.  She did undergo lung lavage by the anesthetist.  She has been on IV antibiotic therapy with Rocephin since yesterday.    Review of Systems:     General ROS: Patient denies any fevers, chills or loss of consciousness.  Respiratory ROS: Cough and chest congestion.  Cardiovascular ROS: Denies chest pain or palpitations. No history of exertional chest pain.  Gastrointestinal ROS: History of nausea and vomiting.  Denies any abdominal pain. No diarrhea.  Neurological ROS: Denies any focal weakness. No loss of consciousness. Denies any numbness.  Dermatological ROS: Denies any redness or pruritis.    Vital Signs:    Temp:  [98.5 °F (36.9 °C)-99.8 °F (37.7 °C)] 99.8 °F (37.7 °C)  Heart Rate:  [] 105  Resp:  [16-18] 16  BP: (103-126)/(52-77) 126/77    Intake and output:    I/O last 3 completed shifts:  In: 3800 [I.V.:2300; Other:1100; IV  Piggyback:400]  Out: 950 [Urine:800; Blood:150]  I/O this shift:  In: 320 [P.O.:320]  Out: -     Physical Examination:    General Appearance:  Alert and cooperative, not in any acute distress.   Head:  Atraumatic and normocephalic, without obvious abnormality.   Eyes:          PERRLA, conjunctivae and sclerae normal, no Icterus. No pallor. Extraocular movements are within normal limits.   Neck: Supple, trachea midline, no thyromegaly, no carotid bruit.   Lungs:   Chest shape is normal. Breath sounds heard bilaterally equally.  No wheezing.  Occasional basal crackles heard in the right base.  No pleural rub or bronchial breathing.   Heart:  Normal S1 and S2, no murmur, no gallop, no rub. No JVD   Abdomen:   Normal bowel sounds, no masses, no organomegaly. Soft, nontender, nondistended, no guarding, no rebound tenderness.   Extremities: Moves all extremities, no edema, no cyanosis, no clubbing.  Surgical bandage noted in the left hip area.   Skin: No bleeding or rash.   Neurologic: Awake, alert and oriented times 3. Moves all 4 extremities equally except for the left lower extremity.     Laboratory results:    Results from last 7 days   Lab Units 10/22/20  0524 10/20/20  1941   SODIUM mmol/L 137 141   POTASSIUM mmol/L 3.9 3.7   CHLORIDE mmol/L 102 102   CO2 mmol/L 23.6 21.8*   BUN mg/dL 13 20   CREATININE mg/dL 0.73 1.12*   CALCIUM mg/dL 9.3 9.7   BILIRUBIN mg/dL  --  0.4   ALK PHOS U/L  --  96   ALT (SGPT) U/L  --  12   AST (SGOT) U/L  --  22   GLUCOSE mg/dL 135* 91     Results from last 7 days   Lab Units 10/22/20  0524 10/20/20  1941   WBC 10*3/mm3 14.49* 6.46   HEMOGLOBIN g/dL 10.7* 12.4   HEMATOCRIT % 33.1* 36.0   PLATELETS 10*3/mm3 138* 206         Results from last 7 days   Lab Units 10/22/20  0524 10/20/20  1941   TROPONIN T ng/mL <0.010 <0.010     Results from last 7 days   Lab Units 10/21/20  1652 10/21/20  1643   BLOODCX  No growth at less than 24 hours No growth at less than 24 hours     Results from last  7 days   Lab Units 10/21/20  1503   PH, ARTERIAL pH units 7.409   PO2 ART mm Hg 51.5*   PCO2, ARTERIAL mm Hg 38.6   HCO3 ART mmol/L 24.4     I have reviewed the patient's laboratory results.    Radiology results:    Imaging Results (Last 24 Hours)     ** No results found for the last 24 hours. **        I have reviewed the patient's radiology reports.    Medication Review:     I have reviewed the patient's active and prn medications.       Unspecified trochanteric fracture of left femur, initial encounter for closed fracture (CMS/[facility])    Essential hypertension    Closed fracture of one rib of right side    Closed nondisplaced fracture of acromial end of left clavicle with routine healing    Fall    Displaced fracture of greater trochanter of left femur, initial encounter for closed fracture (CMS/HCC)    Acute respiratory failure with hypoxia (CMS/HCC)    Aspiration pneumonitis (CMS/[facility])    Assessment:    1.  Status post mechanical fall at home.  2.  Left hip fracture secondary to #1, POA, left hip hemiarthroplasty on 10/21/2020.  3.  Right lower lobe aspiration pneumonitis, not present on admission.  4.  Acute hypoxic respiratory failure secondary to #3, not present on admission.  5.  History of asthma.  6.  Essential hypertension.  7.  Nausea and vomiting secondary to opiate use.    Plan:    Ms. Chung is hemodynamically stable and is improving.  We will continue nasal cannula oxygen today.  She will be continued on IV Solu-Medrol and bronchodilators.  Have strongly advised her to use the incentive spirometry.  We will continue Rocephin.    Patient will be continued on physical and Occupational Therapy.  Her home medications have been continued.  She is on Lovenox for DVT prophylaxis.  She plans to return home with home health hopefully tomorrow.  I have discussed the patient's condition and discharge plan with her  who is at the bedside.    Jason Ruelas MD  10/22/20  15:31 EDT    Dictated utilizing Dragon  dictation.

## 2020-10-22 NOTE — PLAN OF CARE
Problem: Fall Injury Risk  Goal: Absence of Fall and Fall-Related Injury  Outcome: Ongoing, Progressing  Intervention: Identify and Manage Contributors to Fall Injury Risk  Recent Flowsheet Documentation  Taken 10/22/2020 0000 by Macrina Dejesus RN  Medication Review/Management: medications reviewed  Taken 10/21/2020 2000 by Macrina Dejesus RN  Medication Review/Management: medications reviewed  Intervention: Promote Injury-Free Environment  Recent Flowsheet Documentation  Taken 10/22/2020 0400 by Macrina Dejesus RN  Safety Promotion/Fall Prevention: safety round/check completed  Taken 10/22/2020 0200 by Macrina Dejesus RN  Safety Promotion/Fall Prevention: safety round/check completed  Taken 10/22/2020 0000 by Macrina Dejesus RN  Safety Promotion/Fall Prevention: safety round/check completed  Taken 10/21/2020 2200 by Macrina Dejesus RN  Safety Promotion/Fall Prevention: safety round/check completed  Taken 10/21/2020 2000 by Macrina Dejesus RN  Safety Promotion/Fall Prevention: safety round/check completed     Problem: Adult Inpatient Plan of Care  Goal: Plan of Care Review  Outcome: Ongoing, Progressing  Flowsheets (Taken 10/22/2020 0415)  Plan of Care Reviewed With: patient  Outcome Summary: VSS. Patient resting well in bed. Pain controlled per MAR. Will continue to monitor.  Goal: Patient-Specific Goal (Individualized)  Outcome: Ongoing, Progressing  Goal: Absence of Hospital-Acquired Illness or Injury  Outcome: Ongoing, Progressing  Intervention: Identify and Manage Fall Risk  Recent Flowsheet Documentation  Taken 10/22/2020 0400 by Macrina Dejesus RN  Safety Promotion/Fall Prevention: safety round/check completed  Taken 10/22/2020 0200 by Macrina Dejesus RN  Safety Promotion/Fall Prevention: safety round/check completed  Taken 10/22/2020 0000 by Macrina Dejesus RN  Safety Promotion/Fall Prevention: safety round/check completed  Taken 10/21/2020 2200 by Macrina Dejesus RN  Safety Promotion/Fall Prevention: safety round/check  completed  Taken 10/21/2020 2000 by Macrina Dejesus RN  Safety Promotion/Fall Prevention: safety round/check completed  Intervention: Prevent Skin Injury  Recent Flowsheet Documentation  Taken 10/22/2020 0400 by Macrina Dejesus RN  Body Position: supine  Taken 10/22/2020 0200 by Macrina Dejesus RN  Body Position: supine  Taken 10/22/2020 0000 by Macrina Dejesus RN  Body Position: supine  Taken 10/21/2020 2200 by Macrina Dejesus RN  Body Position: supine, legs elevated  Taken 10/21/2020 2000 by Macrina Dejesus RN  Body Position: supine, legs elevated  Goal: Optimal Comfort and Wellbeing  Outcome: Ongoing, Progressing  Intervention: Provide Person-Centered Care  Recent Flowsheet Documentation  Taken 10/21/2020 2000 by Macrina Dejesus RN  Trust Relationship/Rapport: care explained  Goal: Readiness for Transition of Care  Outcome: Ongoing, Progressing     Problem: Bleeding (Hip Arthroplasty)  Goal: Absence of Bleeding  Outcome: Ongoing, Progressing     Problem: Bowel Elimination Impaired (Hip Arthroplasty)  Goal: Effective Bowel Elimination  Outcome: Ongoing, Progressing     Problem: Infection (Hip Arthroplasty)  Goal: Absence of Infection Signs and Symptoms  Outcome: Ongoing, Progressing     Problem: Joint Function Impaired (Hip Arthroplasty)  Goal: Optimal Functional Ability  Outcome: Ongoing, Progressing  Intervention: Protect Joint Integrity  Recent Flowsheet Documentation  Taken 10/22/2020 0400 by Macrina Dejesus RN  Positioning/Transfer Devices:   pillows   aBduction splint/pillow  Taken 10/22/2020 0200 by Macrina Dejesus RN  Positioning/Transfer Devices:   pillows   aBduction splint/pillow  Taken 10/22/2020 0000 by Macrina Dejesus RN  Positioning/Transfer Devices:   pillows   aBduction splint/pillow  Taken 10/21/2020 2200 by Macrina Dejesus RN  Positioning/Transfer Devices:   pillows   in use  Taken 10/21/2020 2000 by Macrina Dejesus RN  Positioning/Transfer Devices:   pillows   in use     Problem: Ongoing Anesthesia Effects (Hip  Arthroplasty)  Goal: Anesthesia/Sedation Recovery  Outcome: Ongoing, Progressing  Intervention: Optimize Anesthesia Recovery  Recent Flowsheet Documentation  Taken 10/22/2020 0400 by Macrina Dejesus RN  Safety Promotion/Fall Prevention: safety round/check completed  Taken 10/22/2020 0200 by Macrina Dejesus RN  Safety Promotion/Fall Prevention: safety round/check completed  Taken 10/22/2020 0000 by Macrina Dejesus RN  Safety Promotion/Fall Prevention: safety round/check completed  Taken 10/21/2020 2200 by Macrina Dejesus RN  Safety Promotion/Fall Prevention: safety round/check completed  Taken 10/21/2020 2000 by Macrina Dejesus RN  Patient Tolerance (IS): good  Safety Promotion/Fall Prevention: safety round/check completed  Administration (IS): instruction provided, follow-up  Level Incentive Spirometer (mL): 1500  Number of Repetitions (IS): 6     Problem: Pain (Hip Arthroplasty)  Goal: Acceptable Pain Control  Outcome: Ongoing, Progressing  Intervention: Prevent or Manage Pain  Recent Flowsheet Documentation  Taken 10/22/2020 0007 by Macrina Dejesus RN  Pain Management Interventions: see MAR     Problem: Postoperative Nausea and Vomiting (Hip Arthroplasty)  Goal: Nausea and Vomiting Relief  Outcome: Ongoing, Progressing     Problem: Postoperative Urinary Retention (Hip Arthroplasty)  Goal: Effective Urinary Elimination  Outcome: Ongoing, Progressing     Problem: Skin Injury Risk Increased  Goal: Skin Health and Integrity  Outcome: Ongoing, Progressing  Intervention: Optimize Skin Protection  Recent Flowsheet Documentation  Taken 10/22/2020 0400 by Macrina Dejesus RN  Head of Bed (HOB): HOB elevated  Taken 10/22/2020 0200 by Macrina Dejesus RN  Head of Bed (HOB): HOB elevated  Taken 10/22/2020 0000 by Macrina Dejesus RN  Head of Bed (HOB): HOB elevated  Taken 10/21/2020 2200 by Macrina Dejesus RN  Head of Bed (HOB): HOB elevated  Taken 10/21/2020 2000 by Macrina Dejesus RN  Head of Bed (HOB): HOB elevated   Goal Outcome Evaluation:  Plan  of Care Reviewed With: patient  Progress: no change  Outcome Summary: VSS. Patient resting well in bed. Pain controlled per MAR. Will continue to monitor.
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
## 2020-10-22 NOTE — PLAN OF CARE
Problem: Adult Inpatient Plan of Care  Goal: Plan of Care Review  Recent Flowsheet Documentation  Taken 10/22/2020 1308 by Jossy Marc, PT  Progress: improving  Plan of Care Reviewed With: patient  Outcome Summary: PT tx completed. Patient performs LE ther ex as indicated on care map. Able to ambulate 56' with RW and CGA. Cont to goals.  Taken 10/22/2020 0908 by Jossy Marc, PT  Progress: no change  Plan of Care Reviewed With: patient  Outcome Summary: PT eval completed. Patient presents s/p fall and L DOROTHY surgical repair and is WBAT. She has deficits in strength, ROM, balance, endurance and independence but is expected to benefit from continued skilled PT intervention to improve her mobility status prior to D/C.
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
## 2020-10-22 NOTE — PROGRESS NOTES
Orthopedic Spine Progress Note    Subjective     Post-Operative Day: POD 1 sp Left hip hemiarthroplasty  Systemic or Specific Complaints: Pain well controlled. Pt walked the hallway with PT this AM.    Objective     Vital signs in last 24 hours:  Temp:  [98.5 °F (36.9 °C)-99.8 °F (37.7 °C)] 99.8 °F (37.7 °C)  Heart Rate:  [] 105  Resp:  [12-19] 16  BP: ()/(46-77) 126/77    General: A&OXIII,NAD.   Neurovascular: NV intact LLE   Wound: No Drainage and Wound Intact   Range of Motion: {   DVT Exam: Negative Constance's sign.  No significant calf/ankle edema.     Data Review  CBC:  Results from last 7 days   Lab Units 10/22/20  0524   WBC 10*3/mm3 14.49*   RBC 10*6/mm3 3.23*   HEMOGLOBIN g/dL 10.7*   HEMATOCRIT % 33.1*   PLATELETS 10*3/mm3 138*       Assessment/Plan     Status post-left hip hemiarthroplasty, stable    Pain Relief: stable        Activity: WBAT with walker and fall precautions. Plan DC to home with home PT, nursing care for home needs assessment. FU with ortho in 10-14 days.    Weight Bearing: WBAT     LOS: 2 days     Devon Avila PA-C    Date: 10/22/2020  Time: 12:06 EDT
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
## 2020-10-22 NOTE — PLAN OF CARE
Problem: Adult Inpatient Plan of Care  Goal: Plan of Care Review  Recent Flowsheet Documentation  Taken 10/22/2020 0956 by Joelle Duvall OT  Progress: no change  Plan of Care Reviewed With: patient  Outcome Summary:   OT eval completed. Patient presents deficits in strength, endurance, balance, mobility and ADL performance following L DOROTHY   patient is expected to benefit from continued OT services prior to DC.

## 2020-10-23 ENCOUNTER — APPOINTMENT (OUTPATIENT)
Dept: GENERAL RADIOLOGY | Facility: HOSPITAL | Age: 70
End: 2020-10-23

## 2020-10-23 LAB
ANION GAP SERPL CALCULATED.3IONS-SCNC: 9.6 MMOL/L (ref 5–15)
BUN SERPL-MCNC: 15 MG/DL (ref 8–23)
BUN/CREAT SERPL: 21.7 (ref 7–25)
CALCIUM SPEC-SCNC: 9.7 MG/DL (ref 8.6–10.5)
CHLORIDE SERPL-SCNC: 102 MMOL/L (ref 98–107)
CO2 SERPL-SCNC: 27.4 MMOL/L (ref 22–29)
CREAT SERPL-MCNC: 0.69 MG/DL (ref 0.57–1)
DEPRECATED RDW RBC AUTO: 45 FL (ref 37–54)
ERYTHROCYTE [DISTWIDTH] IN BLOOD BY AUTOMATED COUNT: 12 % (ref 12.3–15.4)
GFR SERPL CREATININE-BSD FRML MDRD: 85 ML/MIN/1.73
GLUCOSE SERPL-MCNC: 143 MG/DL (ref 65–99)
HCT VFR BLD AUTO: 32.3 % (ref 34–46.6)
HGB BLD-MCNC: 10.6 G/DL (ref 12–15.9)
MCH RBC QN AUTO: 33 PG (ref 26.6–33)
MCHC RBC AUTO-ENTMCNC: 32.8 G/DL (ref 31.5–35.7)
MCV RBC AUTO: 100.6 FL (ref 79–97)
PLATELET # BLD AUTO: 149 10*3/MM3 (ref 140–450)
PMV BLD AUTO: 10.7 FL (ref 6–12)
POTASSIUM SERPL-SCNC: 4.1 MMOL/L (ref 3.5–5.2)
RBC # BLD AUTO: 3.21 10*6/MM3 (ref 3.77–5.28)
SODIUM SERPL-SCNC: 139 MMOL/L (ref 136–145)
WBC # BLD AUTO: 16.03 10*3/MM3 (ref 3.4–10.8)

## 2020-10-23 PROCEDURE — 97530 THERAPEUTIC ACTIVITIES: CPT

## 2020-10-23 PROCEDURE — 25010000002 PROMETHAZINE PER 50 MG: Performed by: ORTHOPAEDIC SURGERY

## 2020-10-23 PROCEDURE — 94799 UNLISTED PULMONARY SVC/PX: CPT

## 2020-10-23 PROCEDURE — 25010000002 ONDANSETRON PER 1 MG: Performed by: ORTHOPAEDIC SURGERY

## 2020-10-23 PROCEDURE — 99232 SBSQ HOSP IP/OBS MODERATE 35: CPT | Performed by: INTERNAL MEDICINE

## 2020-10-23 PROCEDURE — 80048 BASIC METABOLIC PNL TOTAL CA: CPT | Performed by: INTERNAL MEDICINE

## 2020-10-23 PROCEDURE — 97116 GAIT TRAINING THERAPY: CPT

## 2020-10-23 PROCEDURE — 85027 COMPLETE CBC AUTOMATED: CPT | Performed by: INTERNAL MEDICINE

## 2020-10-23 PROCEDURE — 25010000002 CEFTRIAXONE SODIUM-DEXTROSE 1-3.74 GM-%(50ML) RECONSTITUTED SOLUTION: Performed by: INTERNAL MEDICINE

## 2020-10-23 PROCEDURE — 97535 SELF CARE MNGMENT TRAINING: CPT

## 2020-10-23 PROCEDURE — 25010000002 ENOXAPARIN PER 10 MG: Performed by: ORTHOPAEDIC SURGERY

## 2020-10-23 PROCEDURE — 71046 X-RAY EXAM CHEST 2 VIEWS: CPT

## 2020-10-23 PROCEDURE — 97110 THERAPEUTIC EXERCISES: CPT

## 2020-10-23 PROCEDURE — 25010000002 METHYLPREDNISOLONE PER 40 MG: Performed by: INTERNAL MEDICINE

## 2020-10-23 RX ORDER — ONDANSETRON 4 MG/1
4 TABLET, FILM COATED ORAL EVERY 8 HOURS PRN
Qty: 20 TABLET | Refills: 0 | Status: SHIPPED | OUTPATIENT
Start: 2020-10-23 | End: 2023-01-31 | Stop reason: ALTCHOICE

## 2020-10-23 RX ORDER — SIMETHICONE 80 MG
80 TABLET,CHEWABLE ORAL 4 TIMES DAILY PRN
Status: DISCONTINUED | OUTPATIENT
Start: 2020-10-23 | End: 2020-10-24 | Stop reason: HOSPADM

## 2020-10-23 RX ORDER — HYDROCODONE BITARTRATE AND ACETAMINOPHEN 7.5; 325 MG/1; MG/1
1 TABLET ORAL EVERY 4 HOURS PRN
Qty: 20 TABLET | Refills: 0 | OUTPATIENT
Start: 2020-10-23 | End: 2022-11-23

## 2020-10-23 RX ORDER — CEPHALEXIN 500 MG/1
500 CAPSULE ORAL EVERY 12 HOURS
Qty: 6 CAPSULE | Refills: 0 | Status: SHIPPED | OUTPATIENT
Start: 2020-10-23 | End: 2020-10-26

## 2020-10-23 RX ORDER — ASPIRIN 325 MG
325 TABLET, DELAYED RELEASE (ENTERIC COATED) ORAL DAILY
Qty: 7 TABLET | Refills: 0 | Status: SHIPPED | OUTPATIENT
Start: 2020-10-23 | End: 2020-10-30

## 2020-10-23 RX ADMIN — METHYLPREDNISOLONE SODIUM SUCCINATE 40 MG: 40 INJECTION, POWDER, FOR SOLUTION INTRAMUSCULAR; INTRAVENOUS at 04:09

## 2020-10-23 RX ADMIN — BUDESONIDE 0.5 MG: 0.5 INHALANT RESPIRATORY (INHALATION) at 06:46

## 2020-10-23 RX ADMIN — MONTELUKAST 10 MG: 10 TABLET, FILM COATED ORAL at 08:18

## 2020-10-23 RX ADMIN — IPRATROPIUM BROMIDE AND ALBUTEROL SULFATE 3 ML: .5; 3 SOLUTION RESPIRATORY (INHALATION) at 12:36

## 2020-10-23 RX ADMIN — SIMETHICONE 80 MG: 80 TABLET, CHEWABLE ORAL at 16:30

## 2020-10-23 RX ADMIN — DOCUSATE SODIUM 50MG AND SENNOSIDES 8.6MG 2 TABLET: 8.6; 5 TABLET, FILM COATED ORAL at 20:47

## 2020-10-23 RX ADMIN — Medication 1 CAPSULE: at 08:19

## 2020-10-23 RX ADMIN — BACLOFEN 10 MG: 10 TABLET ORAL at 08:19

## 2020-10-23 RX ADMIN — BUDESONIDE 0.5 MG: 0.5 INHALANT RESPIRATORY (INHALATION) at 20:02

## 2020-10-23 RX ADMIN — GABAPENTIN 300 MG: 300 CAPSULE ORAL at 08:19

## 2020-10-23 RX ADMIN — GABAPENTIN 300 MG: 300 CAPSULE ORAL at 20:48

## 2020-10-23 RX ADMIN — PROMETHAZINE HYDROCHLORIDE 25 MG: 25 INJECTION INTRAMUSCULAR; INTRAVENOUS at 17:33

## 2020-10-23 RX ADMIN — CALCIUM CARBONATE 2 TABLET: 500 TABLET, CHEWABLE ORAL at 05:42

## 2020-10-23 RX ADMIN — AMLODIPINE BESYLATE 10 MG: 5 TABLET ORAL at 20:47

## 2020-10-23 RX ADMIN — DOCUSATE SODIUM 100 MG: 100 CAPSULE, LIQUID FILLED ORAL at 08:18

## 2020-10-23 RX ADMIN — CEFTRIAXONE 1 G: 1 INJECTION, SOLUTION INTRAVENOUS at 08:20

## 2020-10-23 RX ADMIN — IPRATROPIUM BROMIDE AND ALBUTEROL SULFATE 3 ML: .5; 3 SOLUTION RESPIRATORY (INHALATION) at 20:02

## 2020-10-23 RX ADMIN — ENOXAPARIN SODIUM 40 MG: 40 INJECTION SUBCUTANEOUS at 08:19

## 2020-10-23 RX ADMIN — Medication 1 CAPSULE: at 20:48

## 2020-10-23 RX ADMIN — METHYLPREDNISOLONE SODIUM SUCCINATE 40 MG: 40 INJECTION, POWDER, FOR SOLUTION INTRAMUSCULAR; INTRAVENOUS at 16:00

## 2020-10-23 RX ADMIN — BACLOFEN 10 MG: 10 TABLET ORAL at 16:00

## 2020-10-23 RX ADMIN — BACLOFEN 10 MG: 10 TABLET ORAL at 20:48

## 2020-10-23 RX ADMIN — HYDROCODONE BITARTRATE AND ACETAMINOPHEN 1 TABLET: 7.5; 325 TABLET ORAL at 08:19

## 2020-10-23 RX ADMIN — PANTOPRAZOLE SODIUM 40 MG: 40 TABLET, DELAYED RELEASE ORAL at 06:01

## 2020-10-23 RX ADMIN — ONDANSETRON 4 MG: 2 INJECTION INTRAMUSCULAR; INTRAVENOUS at 13:34

## 2020-10-23 RX ADMIN — CALCIUM CARBONATE 500 MG (1,250 MG)-VITAMIN D3 200 UNIT TABLET 2 TABLET: at 08:19

## 2020-10-23 RX ADMIN — HYDROCODONE BITARTRATE AND ACETAMINOPHEN 1 TABLET: 7.5; 325 TABLET ORAL at 20:48

## 2020-10-23 RX ADMIN — IPRATROPIUM BROMIDE AND ALBUTEROL SULFATE 3 ML: .5; 3 SOLUTION RESPIRATORY (INHALATION) at 01:14

## 2020-10-23 RX ADMIN — IPRATROPIUM BROMIDE AND ALBUTEROL SULFATE 3 ML: .5; 3 SOLUTION RESPIRATORY (INHALATION) at 06:46

## 2020-10-23 RX ADMIN — SODIUM CHLORIDE, PRESERVATIVE FREE 10 ML: 5 INJECTION INTRAVENOUS at 08:20

## 2020-10-23 RX ADMIN — DOCUSATE SODIUM 50MG AND SENNOSIDES 8.6MG 2 TABLET: 8.6; 5 TABLET, FILM COATED ORAL at 08:19

## 2020-10-23 RX ADMIN — DOCUSATE SODIUM 100 MG: 100 CAPSULE, LIQUID FILLED ORAL at 20:47
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
## 2020-10-23 NOTE — PROGRESS NOTES
[facility]    Name:  Silvia Chung   Age:  70 y.o.  Sex:  female  :  1950  MRN:  [id number]   Visit Number:  [id number]  Admission Date:  10/20/2020  Date Of Service:  10/23/20  Primary Care Physician:  Janell Almazan MD     LOS: 3 days :  Patient Care Team:  Janell Almazan MD as PCP - General (Internal Medicine):    Chief Complaint:      Follow-up of left hip fracture.    Subjective / Interval History:     Ms. Chung is currently lying down on the bed and is comfortable at rest.  She does however states that she is feeling generalized weakness today.  She states her shortness of breath has improved.  She is apparently a retired pediatrician.  Repeat chest x-ray done this morning shows improvement in her lung aeration with minimal opacity in the left lower lobe.  She has been afebrile.  She has been tolerating physical therapy.  No further episodes of nausea or vomiting.  She is on IV antibiotic therapy with Rocephin.    Review of Systems:     General ROS: Patient denies any fevers, chills or loss of consciousness.  Generalized weakness.  Respiratory ROS: Cough and chest congestion.  Cardiovascular ROS: Denies chest pain or palpitations. No history of exertional chest pain.  Gastrointestinal ROS: History of nausea and vomiting.  Denies any abdominal pain. No diarrhea.  Neurological ROS: Denies any focal weakness. No loss of consciousness. Denies any numbness.  Dermatological ROS: Denies any redness or pruritis.    Vital Signs:    Temp:  [97.6 °F (36.4 °C)-98.8 °F (37.1 °C)] 97.7 °F (36.5 °C)  Heart Rate:  [] 95  Resp:  [16-18] 18  BP: (120-151)/(69-85) 148/85    Intake and output:    I/O last 3 completed shifts:  In: 720 [P.O.:720]  Out: 192 [Urine:1920]  I/O this shift:  In: 240 [P.O.:240]  Out: -     Physical Examination:    General Appearance:  Alert and cooperative, not in any acute distress.   Head:  Atraumatic and normocephalic,  without obvious abnormality.   Eyes:          PERRLA, conjunctivae and sclerae normal, no Icterus. No pallor. Extraocular movements are within normal limits.   Neck: Supple, trachea midline, no thyromegaly, no carotid bruit.   Lungs:   Chest shape is normal. Breath sounds heard bilaterally equally.  No wheezing.  Occasional basal crackles heard in the right base.  No pleural rub or bronchial breathing.   Heart:  Normal S1 and S2, no murmur, no gallop, no rub. No JVD   Abdomen:   Normal bowel sounds, no masses, no organomegaly. Soft, nontender, nondistended, no guarding, no rebound tenderness.   Extremities: Moves all extremities, no edema, no cyanosis, no clubbing.  Surgical bandage noted in the left hip area.   Skin: No bleeding or rash.   Neurologic: Awake, alert and oriented times 3. Moves all 4 extremities equally except for the left lower extremity.     Laboratory results:    Results from last 7 days   Lab Units 10/23/20  0522 10/22/20  0524 10/20/20  1941   SODIUM mmol/L 139 137 141   POTASSIUM mmol/L 4.1 3.9 3.7   CHLORIDE mmol/L 102 102 102   CO2 mmol/L 27.4 23.6 21.8*   BUN mg/dL 15 13 20   CREATININE mg/dL 0.69 0.73 1.12*   CALCIUM mg/dL 9.7 9.3 9.7   BILIRUBIN mg/dL  --   --  0.4   ALK PHOS U/L  --   --  96   ALT (SGPT) U/L  --   --  12   AST (SGOT) U/L  --   --  22   GLUCOSE mg/dL 143* 135* 91     Results from last 7 days   Lab Units 10/23/20  0522 10/22/20  0524 10/20/20  1941   WBC 10*3/mm3 16.03* 14.49* 6.46   HEMOGLOBIN g/dL 10.6* 10.7* 12.4   HEMATOCRIT % 32.3* 33.1* 36.0   PLATELETS 10*3/mm3 149 138* 206         Results from last 7 days   Lab Units 10/22/20  0524 10/20/20  1941   TROPONIN T ng/mL <0.010 <0.010     Results from last 7 days   Lab Units 10/21/20  1652 10/21/20  1643   BLOODCX  No growth at 24 hours No growth at 24 hours     Results from last 7 days   Lab Units 10/21/20  1503   PH, ARTERIAL pH units 7.409   PO2 ART mm Hg 51.5*   PCO2, ARTERIAL mm Hg 38.6   HCO3 ART mmol/L 24.4     I  have reviewed the patient's laboratory results.    Radiology results:    Imaging Results (Last 24 Hours)     Procedure Component Value Units Date/Time    XR Chest PA & Lateral [[id number]] Collected: 10/23/20 1141     Updated: 10/23/20 1438    Narrative:      PROCEDURE: XR CHEST PA AND LATERAL-        HISTORY: Aspiration; W19.XXXA-Unspecified fall, initial encounter;  S72.002A-Fracture of unspecified part of neck of left femur, initial  encounter for closed fracture; S72.112A-Displaced fracture of greater  trochanter of left femur, initial encounter for closed fracture     COMPARISON: 10/21/2020.     FINDINGS: The heart is normal in size. The mediastinum is unremarkable.  There is improved aeration of the lungs with patchy airspace disease  remaining in the left lung base. There is no pneumothorax. There are no  acute osseous abnormalities.       Impression:      Improved aeration of the lungs with patchy airspace disease  remaining in the left lung base.                       Images were reviewed, interpreted, and dictated by Dr. Padmini Cortez M.D.  Transcribed by Roxane Weber PA-C.     This report was finalized on 10/23/2020 2:36 PM by Padmini Cortez M.D..        I have reviewed the patient's radiology reports.    Medication Review:     I have reviewed the patient's active and prn medications.       Unspecified trochanteric fracture of left femur, initial encounter for closed fracture (CMS/HCC)    Essential hypertension    Closed fracture of one rib of right side    Closed nondisplaced fracture of acromial end of left clavicle with routine healing    Fall    Displaced fracture of greater trochanter of left femur, initial encounter for closed fracture (CMS/HCC)    Acute respiratory failure with hypoxia (CMS/HCC)    Aspiration pneumonitis (CMS/HCC)    Assessment:    1.  Status post mechanical fall at home.  2.  Left hip fracture secondary to #1, POA, left hip hemiarthroplasty on 10/21/2020.  3.  Right  lower lobe aspiration pneumonia, not present on admission, improving.  4.  Acute hypoxic respiratory failure secondary to #3, not present on admission, improving.  5.  History of asthma.  6.  Essential hypertension.  7.  Nausea and vomiting secondary to opiate use.    Plan:    Ms. Chung is hemodynamically stable and is improving.  Repeat chest x-ray with PA and lateral views shows improvement in her opacities.  Lung auscultation is clear.  I will discontinue Solu-Medrol.  She does have mild elevation of white blood cell count likely related to steroid use.    She will be continued on physical therapy.  We will continue ceftriaxone for aspiration pneumonia.  Blood cultures done on 10/21/2020 have been negative so far.  She continues to use incentive spirometry.  We will get walking oximetry in the morning tomorrow to see if she qualifies for home oxygen.  She is on Lovenox for DVT prophylaxis.  She lives with her  and is independent in daily activities.  We will arrange home health at the time of discharge.      Jason Ruelas MD  10/23/20  16:22 EDT    Dictated utilizing Dragon dictation.

## 2020-10-23 NOTE — PLAN OF CARE
Problem: Adult Inpatient Plan of Care  Goal: Plan of Care Review  Recent Flowsheet Documentation  Taken 10/23/2020 1159 by Elvin Woods, PTA  Progress: improving  Plan of Care Reviewed With: patient  Outcome Summary: Pt tolerates treatment well. Pt performed L LE strengthening in supine, bed mobility and gait training.  Pt was reviewed per hip precautions and maintaining those with mobility.  Pt was able to advance gait distance to 145' cga with rw.  Pt was left uic reclined with call light at hand as well as pillow between knees.  See flowsheet for details

## 2020-10-23 NOTE — THERAPY TREATMENT NOTE
Pt declines treatment this afternoon. Pt reports feeling nauseous and dizzy even after given medication. HEP was given to pt and copy placed in chart. Pt was instructed and performed HEP in am treatment. Pt will be instructed in stair training tomorrow.

## 2020-10-23 NOTE — PLAN OF CARE
Goal Outcome Evaluation:  Plan of Care Reviewed With: patient  Progress: improving   VSS. Pain controlled with prn meds. Pt states she feels more weak tonight. Will continue to monitor.
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
## 2020-10-23 NOTE — PROGRESS NOTES
Continued Stay Note   [place]     Patient Name: Silvia Chung  MRN: [id number]  Today's Date: 10/23/2020    Admit Date: 10/20/2020    Discharge Plan     Row Name 10/23/20 0932       Plan    Plan Spoke again to pt in room.  Worried about going home and getting up steps at her home, states her  can bring her walker so she will have it.  Informed could also use lift assist by EMS if necessary.  Wants to use [facility] and would like to have Rx ready today if possible if going to be discharged over weekend. Dr Ruelas updated.         Discharge Codes    No documentation.       Expected Discharge Date and Time     Expected Discharge Date Expected Discharge Time    Oct 23, 2020             Crystal Pratt RN

## 2020-10-23 NOTE — PLAN OF CARE
Problem: Adult Inpatient Plan of Care  Goal: Plan of Care Review  Recent Flowsheet Documentation  Taken 10/23/2020 1239 by Janell Coronado  Progress: improving  Plan of Care Reviewed With: patient  Outcome Summary: Pt received sitting up in her chair, educated on use of adaptive equipment for LB dressing/bathing.  Pt verbal cues with supervision to doff/don her socks and underwear.  Pt cga to stand and pull up her underwear.  Pt was left sitting reclined in her chair.  Called [employer] rep, to order hip kit for pt.

## 2020-10-24 VITALS
HEART RATE: 70 BPM | DIASTOLIC BLOOD PRESSURE: 88 MMHG | HEIGHT: 68 IN | OXYGEN SATURATION: 91 % | SYSTOLIC BLOOD PRESSURE: 142 MMHG | WEIGHT: 172 LBS | RESPIRATION RATE: 18 BRPM | BODY MASS INDEX: 26.07 KG/M2 | TEMPERATURE: 98.6 F

## 2020-10-24 LAB
DEPRECATED RDW RBC AUTO: 46.5 FL (ref 37–54)
ERYTHROCYTE [DISTWIDTH] IN BLOOD BY AUTOMATED COUNT: 12.5 % (ref 12.3–15.4)
HCT VFR BLD AUTO: 30.7 % (ref 34–46.6)
HGB BLD-MCNC: 10.1 G/DL (ref 12–15.9)
MCH RBC QN AUTO: 33.2 PG (ref 26.6–33)
MCHC RBC AUTO-ENTMCNC: 32.9 G/DL (ref 31.5–35.7)
MCV RBC AUTO: 101 FL (ref 79–97)
PLATELET # BLD AUTO: 152 10*3/MM3 (ref 140–450)
PMV BLD AUTO: 10.8 FL (ref 6–12)
RBC # BLD AUTO: 3.04 10*6/MM3 (ref 3.77–5.28)
WBC # BLD AUTO: 15.5 10*3/MM3 (ref 3.4–10.8)

## 2020-10-24 PROCEDURE — 25010000002 CEFTRIAXONE SODIUM-DEXTROSE 1-3.74 GM-%(50ML) RECONSTITUTED SOLUTION: Performed by: INTERNAL MEDICINE

## 2020-10-24 PROCEDURE — 97116 GAIT TRAINING THERAPY: CPT

## 2020-10-24 PROCEDURE — 94618 PULMONARY STRESS TESTING: CPT

## 2020-10-24 PROCEDURE — 85027 COMPLETE CBC AUTOMATED: CPT | Performed by: INTERNAL MEDICINE

## 2020-10-24 PROCEDURE — 94799 UNLISTED PULMONARY SVC/PX: CPT

## 2020-10-24 PROCEDURE — 25010000002 ENOXAPARIN PER 10 MG: Performed by: ORTHOPAEDIC SURGERY

## 2020-10-24 PROCEDURE — 99238 HOSP IP/OBS DSCHRG MGMT 30/<: CPT | Performed by: INTERNAL MEDICINE

## 2020-10-24 RX ORDER — GUAIFENESIN/DEXTROMETHORPHAN 100-10MG/5
5 SYRUP ORAL EVERY 4 HOURS PRN
Status: DISCONTINUED | OUTPATIENT
Start: 2020-10-24 | End: 2020-10-24 | Stop reason: HOSPADM

## 2020-10-24 RX ORDER — GUAIFENESIN/DEXTROMETHORPHAN 100-10MG/5
5 SYRUP ORAL EVERY 4 HOURS PRN
Qty: 354 ML | Refills: 0 | Status: SHIPPED | OUTPATIENT
Start: 2020-10-24 | End: 2022-11-29

## 2020-10-24 RX ORDER — GUAIFENESIN/DEXTROMETHORPHAN 100-10MG/5
5 SYRUP ORAL EVERY 4 HOURS PRN
Start: 2020-10-24 | End: 2020-10-24

## 2020-10-24 RX ORDER — METHYLPREDNISOLONE 4 MG/1
TABLET ORAL
Qty: 21 EACH | Refills: 0 | Status: SHIPPED | OUTPATIENT
Start: 2020-10-24 | End: 2022-11-29

## 2020-10-24 RX ADMIN — CEFTRIAXONE 1 G: 1 INJECTION, SOLUTION INTRAVENOUS at 08:41

## 2020-10-24 RX ADMIN — DOCUSATE SODIUM 100 MG: 100 CAPSULE, LIQUID FILLED ORAL at 08:41

## 2020-10-24 RX ADMIN — Medication 1 CAPSULE: at 08:41

## 2020-10-24 RX ADMIN — CALCIUM CARBONATE 500 MG (1,250 MG)-VITAMIN D3 200 UNIT TABLET 2 TABLET: at 08:41

## 2020-10-24 RX ADMIN — PANTOPRAZOLE SODIUM 40 MG: 40 TABLET, DELAYED RELEASE ORAL at 05:43

## 2020-10-24 RX ADMIN — DOCUSATE SODIUM 50MG AND SENNOSIDES 8.6MG 2 TABLET: 8.6; 5 TABLET, FILM COATED ORAL at 08:41

## 2020-10-24 RX ADMIN — GUAIFENESIN AND DEXTROMETHORPHAN 5 ML: 100; 10 SYRUP ORAL at 12:49

## 2020-10-24 RX ADMIN — MONTELUKAST 10 MG: 10 TABLET, FILM COATED ORAL at 08:41

## 2020-10-24 RX ADMIN — SODIUM CHLORIDE, PRESERVATIVE FREE 10 ML: 5 INJECTION INTRAVENOUS at 08:41

## 2020-10-24 RX ADMIN — BUDESONIDE 0.5 MG: 0.5 INHALANT RESPIRATORY (INHALATION) at 07:34

## 2020-10-24 RX ADMIN — BACLOFEN 10 MG: 10 TABLET ORAL at 08:41

## 2020-10-24 RX ADMIN — ENOXAPARIN SODIUM 40 MG: 40 INJECTION SUBCUTANEOUS at 08:41

## 2020-10-24 RX ADMIN — HYDROCODONE BITARTRATE AND ACETAMINOPHEN 1 TABLET: 7.5; 325 TABLET ORAL at 01:32

## 2020-10-24 RX ADMIN — IPRATROPIUM BROMIDE AND ALBUTEROL SULFATE 3 ML: .5; 3 SOLUTION RESPIRATORY (INHALATION) at 07:34

## 2020-10-24 RX ADMIN — GABAPENTIN 300 MG: 300 CAPSULE ORAL at 08:41

## 2020-10-24 RX ADMIN — HYDROCODONE BITARTRATE AND ACETAMINOPHEN 1 TABLET: 7.5; 325 TABLET ORAL at 10:22

## 2020-10-24 NOTE — DISCHARGE SUMMARY
[facility]   DISCHARGE SUMMARY      Name:  Silvia Chung   Age:  70 y.o.  Sex:  female  :  1950  MRN:  [id number]   Visit Number:  [id number]    Admission Date:  10/20/2020  Date of Discharge:  10/24/2020  Primary Care Physician:  Janell Almazan MD    Important issues to note:    1.  Continue Keflex as prescribed for 4 more days with Medrol Dosepak use as directed.  2.  Follow-up with Dr. Winters in 2 weeks.  3.  Follow-up with primary care physician in 1 week.  4.  Patient did have a walking oximetry prior to discharge and will be arranged home oxygen at 2 L/min continuously.  5.  Patient will be arranged home health.    Discharge Diagnoses:     1.  Status post mechanical fall at home.  2.  Left hip fracture secondary to #1, POA, left hip hemiarthroplasty on 10/21/2020.  3.  Right lower lobe aspiration pneumonia, not present on admission, improving.  4.  Acute hypoxic respiratory failure secondary to #3, not present on admission.  5.  History of asthma, no evidence of exacerbation..  6.  Essential hypertension.  7.  Nausea and vomiting secondary to opiate use, resolved.    Problem List:       Unspecified trochanteric fracture of left femur, initial encounter for closed fracture (CMS/HCC)    Essential hypertension    Closed fracture of one rib of right side    Closed nondisplaced fracture of acromial end of left clavicle with routine healing    Fall    Displaced fracture of greater trochanter of left femur, initial encounter for closed fracture (CMS/HCC)    Acute respiratory failure with hypoxia (CMS/HCC)    Aspiration pneumonitis (CMS/HCC)    Presenting Problem:    Fall, initial encounter [W19.XXXA]     Consults:     Consulting Physician(s)     Provider Relationship Specialty    Perez Cameron MD Consulting Physician Cardiology    Humza Winters MD Consulting Physician Orthopedic Surgery        Procedures Performed:    1.  Left hip hemiarthroplasty done on  10/21/2020.    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  She was noted to have left hip fracture.  Patient was placed on IV fluids and IV morphine and was admitted to the medical floor.  Unfortunately, she had significant nausea and vomiting related to the IV morphine that was not controlled with Zofran.  She was subsequently placed on Phenergan and her morphine was changed to Dilaudid which did not cause significant nausea and vomiting.  Patient was seen by Dr. Winters from orthopedic services.    Patient was evaluated by Dr. Cameron from cardiology and he cleared her for surgery.  Patient did undergo left hip hemiarthroplasty on 10/21/2020.  However, during intubation, she did have an episode of aspiration into the lung.  This was moderate and the anesthetist was immediately able to lavage her lungs and clean her lungs from the vomitus.  Patient was subsequently extubated and was placed on nonrebreather mask.  She was given IV steroids and bronchodilator nebulizations.  She did have an episode of fever.  She received cefazolin perioperatively but was continued on ceftriaxone for suspected aspiration pneumonia.  She required 4 to 6 L of nasal cannula oxygen on the floor which slowly was decreased.  She was advised incentive spirometry.  Blood cultures were drawn and they have remained negative so far.  She has been afebrile.    Patient was evaluated by physical and Occupational Therapy and was able to walk a few steps in the room.  Postoperative hemoglobin remained stable.  She did not have any significant wheezing.  Repeat chest x-ray with PA and lateral views done on 10/23/2020 showed improvement in her infiltrates.    Patient is currently feeling better except for cough and chest congestion.  She will be discharged home with home health.   She will be given antibiotic therapy with Keflex for 3 more days as well as a Medrol Dosepak taper.  She is advised to follow-up with Dr. Winters in 2 weeks and her primary care physician in 1 week.  Patient states that she is planning to follow-up with a pulmonologist as an outpatient.  She lives with her  and was independent in daily activities prior to her hip fracture.  Patient is fairly ambulatory and will be treated with aspirin for 1 week for DVT prophylaxis.  Patient did have a walking oximetry prior to discharge and requires 2 L of nasal cannula oxygen on exertion.  She will also be prescribed Zofran for nausea, Lortab for pain and Robitussin-DM for cough.    Vital Signs:    Temp:  [97.7 °F (36.5 °C)-98.6 °F (37 °C)] 98.6 °F (37 °C)  Heart Rate:  [70-96] 70  Resp:  [16-18] 18  BP: (134-148)/(76-88) 142/88    Physical Exam:    General Appearance:  Alert and cooperative, not in any acute distress.   Head:  Atraumatic and normocephalic, without obvious abnormality.   Eyes:          PERRLA, conjunctivae and sclerae normal, no icterus. No pallor. Extraocular movements are within normal limits.   Ears:  Ears appear intact with no abnormalities noted.   Throat: No oral lesions, no thrush, oral mucosa moist.   Neck: Supple, trachea midline, no thyromegaly, no carotid bruit.   Back:   No kyphoscoliosis present. No tenderness to palpation,   range of motion normal.   Lungs:   Chest shape is normal. Breath sounds heard bilaterally equally.  No crackles or wheezing. No Pleural rub or bronchial breathing.   Heart:  Normal S1 and S2, no murmur, no gallop, no rub. No JVD.   Abdomen:   Normal bowel sounds, no masses, no organomegaly. Soft, nontender, nondistended, no guarding, no rebound tenderness.   Extremities: Moves all extremities, no edema, no cyanosis, no clubbing.  Surgical bandage noted in the left hip area.   Pulses: Pulses palpable and equal bilaterally.   Skin: No bleeding or rash.   Neurologic: Alert and  oriented x 3. Moves all four limbs equally but does have generalized weakness. No tremors. No facial asymmetry.     Pertinent Lab Results:     Results from last 7 days   Lab Units 10/23/20  0522 10/22/20  0524 10/20/20  1941   SODIUM mmol/L 139 137 141   POTASSIUM mmol/L 4.1 3.9 3.7   CHLORIDE mmol/L 102 102 102   CO2 mmol/L 27.4 23.6 21.8*   BUN mg/dL 15 13 20   CREATININE mg/dL 0.69 0.73 1.12*   CALCIUM mg/dL 9.7 9.3 9.7   BILIRUBIN mg/dL  --   --  0.4   ALK PHOS U/L  --   --  96   ALT (SGPT) U/L  --   --  12   AST (SGOT) U/L  --   --  22   GLUCOSE mg/dL 143* 135* 91     Results from last 7 days   Lab Units 10/24/20  0518 10/23/20  0522 10/22/20  0524   WBC 10*3/mm3 15.50* 16.03* 14.49*   HEMOGLOBIN g/dL 10.1* 10.6* 10.7*   HEMATOCRIT % 30.7* 32.3* 33.1*   PLATELETS 10*3/mm3 152 149 138*         Results from last 7 days   Lab Units 10/22/20  0524 10/20/20  1941   TROPONIN T ng/mL <0.010 <0.010                 Results from last 7 days   Lab Units 10/21/20  1503   PH, ARTERIAL pH units 7.409   PO2 ART mm Hg 51.5*   PCO2, ARTERIAL mm Hg 38.6   HCO3 ART mmol/L 24.4     Results from last 7 days   Lab Units 10/20/20  2021   COLOR UA  Yellow   GLUCOSE UA  Negative   KETONES UA  Trace*   LEUKOCYTES UA  Negative   PH, URINE  7.0   BILIRUBIN UA  Negative   UROBILINOGEN UA  1.0 E.U./dL     Results from last 7 days   Lab Units 10/21/20  1652 10/21/20  1643   BLOODCX  No growth at 2 days No growth at 2 days     Pertinent Radiology Results:    Imaging Results (All)     Procedure Component Value Units Date/Time    XR Chest PA & Lateral [[id number]] Collected: 10/23/20 1141     Updated: 10/23/20 1438    Narrative:      PROCEDURE: XR CHEST PA AND LATERAL-        HISTORY: Aspiration; W19.XXXA-Unspecified fall, initial encounter;  S72.002A-Fracture of unspecified part of neck of left femur, initial  encounter for closed fracture; S72.112A-Displaced fracture of greater  trochanter of left femur, initial encounter for closed fracture      COMPARISON: 10/21/2020.     FINDINGS: The heart is normal in size. The mediastinum is unremarkable.  There is improved aeration of the lungs with patchy airspace disease  remaining in the left lung base. There is no pneumothorax. There are no  acute osseous abnormalities.       Impression:      Improved aeration of the lungs with patchy airspace disease  remaining in the left lung base.     Images were reviewed, interpreted, and dictated by Dr. Padmini Cortez M.D.  Transcribed by Roxane Weber PA-C.     This report was finalized on 10/23/2020 2:36 PM by Padmini Cortez M.D..    XR Chest 1 View [[id number]] Collected: 10/21/20 1431     Updated: 10/21/20 1434    Narrative:      PROCEDURE: XR CHEST 1 VW-     HISTORY: possible aspiration; W19.XXXA-Unspecified fall, initial  encounter; S72.002A-Fracture of unspecified part of neck of left femur,  initial encounter for closed fracture; S72.112A-Displaced fracture of  greater trochanter of left femur, initial encounter for closed fracture     COMPARISON: None.     FINDINGS: The heart is normal in size. The mediastinum is unremarkable.  There are perihilar opacities felt to reflect pulmonary edema. No  effusions are evident. There is no pneumothorax.  There are no acute  osseous abnormalities.       Impression:      Perihilar opacities felt to reflect pulmonary edema.     Continued followup is recommended.     This report was finalized on 10/21/2020 2:32 PM by Padmini Cortez M.D..    XR Hip With or Without Pelvis 1 View Left [[id number]] Collected: 10/21/20 1428     Updated: 10/21/20 1431    Narrative:      PROCEDURE: XR HIP W OR WO PELVIS 1 VIEW LEFT-     HISTORY: Post-Op Hip Arthroplasty; W19.XXXA-Unspecified fall, initial  encounter; S72.002A-Fracture of unspecified part of neck of left femur,  initial encounter for closed fracture; S72.112A-Displaced fracture of  greater trochanter of left femur, initial encounter for closed fracture     COMPARISON:  None.     FINDINGS: The patient is status post left hip hemiarthroplasty. There  are no hardware complications. The pubic symphysis and SI joints are  intact. The expected postoperative gas is seen in the soft tissues of  the left hip.       Impression:      Status post left hip hemiarthroplasty with no hardware  complications.     This report was finalized on 10/21/2020 2:29 PM by Padmini Cortez M.D..    XR Hip With or Without Pelvis 2 - 3 View Left [[id number]] Collected: 10/21/20 0804     Updated: 10/21/20 0806    Narrative:      PROCEDURE: XR HIP W OR WO PELVIS 2-3 VIEW LEFT-     HISTORY: fall pre-op     COMPARISON: None.     FINDINGS: There is a left femoral neck fracture. No other fractures or  dislocations are evident. The pubic symphysis and SI joints are intact.  The soft tissues are unremarkable.       Impression:      Left femoral neck fracture.     This report was finalized on 10/21/2020 8:04 AM by Padmini Cortez M.D..    XR Chest 1 View [[id number]] Collected: 10/21/20 0803     Updated: 10/21/20 0806    Narrative:      PROCEDURE: XR CHEST 1 VW-     HISTORY: pre-op     COMPARISON: 10/05/2019.     FINDINGS: The heart is normal in size. The mediastinum is unremarkable.  The lungs are clear. There is no pneumothorax.  There are no acute  osseous abnormalities.       Impression:      No acute cardiopulmonary process.     Continued followup is recommended.     This report was finalized on 10/21/2020 8:04 AM by Padmini Cortez M.D..        Condition on Discharge:      Stable.    Code status during the hospital stay:    Code Status and Medical Interventions:   Ordered at: 10/20/20 2210     Code Status:    CPR     Medical Interventions (Level of Support Prior to Arrest):    Full     Discharge Disposition:    Home-Health Care [facility] [place]    Discharge Medications:       Discharge Medications      New Medications      Instructions Start Date   aspirin  MG tablet   325 mg, Oral, Daily      cephalexin 500  MG capsule  Commonly known as: Keflex   500 mg, Oral, Every 12 Hours      guaiFENesin-dextromethorphan 100-10 MG/5ML syrup  Commonly known as: ROBITUSSIN DM   5 mL, Oral, Every 4 Hours PRN      HYDROcodone-acetaminophen 7.5-325 MG per tablet  Commonly known as: NORCO  Notes to patient: Take only as needed. Can cause constipation and sedation. No driving. Use stool softener when taking this to prevent constipation and straining.   1 tablet, Oral, Every 4 Hours PRN      methylPREDNISolone 4 MG dose pack  Commonly known as: MEDROL   Take as directed on package instructions.      ondansetron 4 MG tablet  Commonly known as: Zofran   4 mg, Oral, Every 8 Hours PRN         Continue These Medications      Instructions Start Date   albuterol sulfate  (90 Base) MCG/ACT inhaler  Commonly known as: PROVENTIL HFA;VENTOLIN HFA;PROAIR HFA   2 puffs, Inhalation, Every 4 Hours PRN      amLODIPine 10 MG tablet  Commonly known as: NORVASC   10 mg, Oral, Nightly      baclofen 10 MG tablet  Commonly known as: LIORESAL   10 mg, Oral, 3 Times Daily      benazepril 20 MG tablet  Commonly known as: LOTENSIN   40 mg, Oral, Daily      calcium carbonate 500 MG chewable tablet  Commonly known as: TUMS   2 tablets, Oral, 3 Times Daily PRN      fluticasone-salmeterol 500-50 MCG/DOSE DISKUS  Commonly known as: ADVAIR   2 puffs, Inhalation, Every Morning      gabapentin 300 MG capsule  Commonly known as: NEURONTIN   300 mg, Oral, 3 Times Daily      montelukast 10 MG tablet  Commonly known as: SINGULAIR   10 mg, Oral, Daily      multivitamin with minerals tablet tablet   1 tablet, Oral, Daily      omeprazole 20 MG capsule  Commonly known as: priLOSEC   20 mg, Oral, Daily      ondansetron ODT 4 MG disintegrating tablet  Commonly known as: ZOFRAN-ODT   4 mg, Oral, Every 6 Hours PRN      tiZANidine 4 MG tablet  Commonly known as: ZANAFLEX   4 mg, Oral, Every 8 Hours PRN           Discharge Diet:     Diet Instructions     Diet: Cardiac; Thin       Discharge Diet: Cardiac    Fluid Consistency: Thin        Activity at Discharge:     Activity Instructions     Activity as Tolerated          Follow-up Appointments:    Additional Instructions for the Follow-ups that You Need to Schedule     Ambulatory Referral to Home Health   As directed      Face to Face Visit Date: 10/24/2020    Follow-up provider for Plan of Care?: I treated the patient in an acute care facility and will not continue treatment after discharge.    Follow-up provider: JANELL ALMAZAN [[id number]]    Reason/Clinical Findings: Left hip Fracture, Asthma, Pneumonia    Describe mobility limitations that make leaving home difficult: Hip Fracture, Fall risk    Nursing/Therapeutic Services Requested: Skilled Nursing Physical Therapy Occupational Therapy    Skilled nursing orders: Medication education Cardiopulmonary assessments    PT orders: Therapeutic exercise Total joint pathway Gait Training Transfer training Strengthening    Weight Bearing Status: As Tolerated    Occupational orders: Activities of daily living Strengthening    Frequency: 1 Week 1           Follow-up Information     Janell Almazan MD Follow up in 1 week(s).    Specialty: Internal Medicine  Contact information:  [address] [facility] [address]  [phone]             Humza Winters MD Follow up in 2 week(s).    Specialty: Orthopedic Surgery  Contact information:  [address] [place] [address] 7  [facility] [address]  [phone]                 Test Results Pending at Discharge:    Pending Labs     Order Current Status    Tissue Pathology Exam In process    Blood Culture With QUINTON - Blood, Arm, Left Preliminary result    Blood Culture With QUINTON - Blood, Arm, Left Preliminary result           Jason Ruelas MD  10/24/20  12:50 EDT    Time: I spent 25 minutes on this discharge activity which included: face-to-face encounter with the patient, reviewing the data in the system, coordination of the care with the nursing staff as well as  consultants, documentation, and entering orders.     Dictated utilizing Dragon dictation.
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
## 2020-10-24 NOTE — PLAN OF CARE
Goal Outcome Evaluation:  Plan of Care Reviewed With: patient  Progress: improving  Outcome Summary: VSS.  No acute events noted.  Pt S/P hip fx.  Plan to DC home in AM with Home health

## 2020-10-24 NOTE — PROGRESS NOTES
Case Management Discharge Note      Final Note: Discharged home         Selected Continued Care - Admitted Since 10/20/2020     Destination    No services have been selected for the patient.              Durable Medical Equipment Coordination complete    Service Provider Selected Services Address Phone Fax    [facility]  [facility] [address] [place], [place] [address] [address] [phone]          Dialysis/Infusion    No services have been selected for the patient.              Home Medical Care Coordination complete    Service Provider Selected Services Address Phone Fax    [facility]  Home Health [facility] [address] [facility] [address] [phone]          Therapy    No services have been selected for the patient.              Community Resources    No services have been selected for the patient.                  Transportation Services  Private: Car    Final Discharge Disposition Code: 06 - home with home health care

## 2020-10-24 NOTE — PROGRESS NOTES
Discharge Planning Assessment   [facility]     Patient Name: Silvia Chung  MRN: [id number]  Today's Date: 10/24/2020    Admit Date: 10/20/2020    Discharge Needs Assessment    No documentation.       Discharge Plan     Row Name 10/24/20 1235       Plan    Plan  Discharge orders for [facility]  noted (pts choice) and spoke with on call staff about referral  Orders for O2@[facility] [place] continuously noted   Spoke with pt about DME and she chose [facility]  Spoke with Alex about O2 order and faxed along with discharge summary, face sheet and 6 min walk     Pt awaiting O2 and discharge instructions        Continued Care and Services - Admitted Since 10/20/2020    Coordination has not been started for this encounter.       Expected Discharge Date and Time     Expected Discharge Date Expected Discharge Time    Oct 24, 2020         Demographic Summary    No documentation.       Functional Status    No documentation.       Psychosocial    No documentation.       Abuse/Neglect    No documentation.       Legal    No documentation.       Substance Abuse    No documentation.       Patient Forms    No documentation.           Roselia Kennedy RN

## 2020-10-24 NOTE — PROGRESS NOTES
Exercise Oximetry    Patient Name:Silvia Chung   MRN: [id number]   Date: 10/24/20             ROOM AIR BASELINE   SpO2%  91   Heart Rate   101   Blood Pressure      EXERCISE ON ROOM AIR SpO2% EXERCISE ON O2 @  LPM SpO2%   1 MINUTE 89 1 MINUTE    2 MINUTES 87 2 MINUTES    3 MINUTES  3 MINUTES                     2 90   4 MINUTES  4 MINUTES    5 MINUTES  5 MINUTES    6 MINUTES  6 MINUTES               Distance Walked   Distance Walked                       50   Dyspnea (Jus Scale)   Dyspnea (Jus Scale)                3   Fatigue (Jus Scale)   Fatigue (Jus Scale)                   8   SpO2% Post Exercise   SpO2% Post Exercise               94   HR Post Exercise   HR Post Exercise                         99   Time to Recovery   Time to Recovery     Comments: pt walked at a slow pace with the assistance of a walker.  Pt required nc @ 2lpm while ambulating.

## 2020-10-25 ENCOUNTER — READMISSION MANAGEMENT (OUTPATIENT)
Dept: CALL CENTER | Facility: HOSPITAL | Age: 70
End: 2020-10-25

## 2020-10-25 NOTE — OUTREACH NOTE
Prep Survey      Responses   [facility]   Is LACE score < 7 ?  No   Eligibility  Readm Mgmt   Discharge diagnosis  Status post mechanical fall at home.   Does the patient have one of the following disease processes/diagnoses(primary or secondary)?  General Surgery   Does the patient have Home health ordered?  Yes   What is the Home health agency?   [facility]    Is there a DME ordered?  No   Medication alerts for this patient  ASA    General alerts for this patient   Pt retired pediatrician    Prep survey completed?  Yes          Danielle Gifford RN

## 2020-10-26 LAB
BACTERIA SPEC AEROBE CULT: NORMAL
BACTERIA SPEC AEROBE CULT: NORMAL

## 2020-10-26 NOTE — PROGRESS NOTES
Continued Stay Note   [facility]     Patient Name: Silvia Chung  MRN: [id number]  Today's Date: 10/26/2020    Admit Date: 10/20/2020    Discharge Plan     Row Name 10/26/20 1256       Plan    Plan Received message from pt staying she did not get her Hip Kit.  and was wondering if she would be getting and another o2 system stated they had told her they would bring out another kind of tank.  Called to Zoila at [facility] and does plan to deliver another tank today.  Zoila does not have hip kits.  Called to Janell at [facility] and stated she was told by Occupational Therapy on Friday that she needed a kit but was unable to get it delivered.  Plans to take it to her tomorrow as she is home sick today.  Called back to pt and informed.  Will need to call before [place] takes them to the wrong place.          Discharge Codes    No documentation.       Expected Discharge Date and Time     Expected Discharge Date Expected Discharge Time    Oct 24, 2020             Crystal Pratt RN

## 2020-10-28 LAB
LAB AP CASE REPORT: NORMAL
PATH REPORT.FINAL DX SPEC: NORMAL

## 2020-10-30 ENCOUNTER — READMISSION MANAGEMENT (OUTPATIENT)
Dept: CALL CENTER | Facility: HOSPITAL | Age: 70
End: 2020-10-30

## 2020-10-30 NOTE — OUTREACH NOTE
General Surgery Week 1 Survey      Responses   [facility] patient discharged from?  [facility]   Does the patient have one of the following disease processes/diagnoses(primary or secondary)?  General Surgery   Week 1 attempt successful?  Yes   Call start time  1015   Call end time  1021   General alerts for this patient      Discharge diagnosis  Status post mechanical fall at home.   Meds reviewed with patient/caregiver?  Yes   Is the patient having any side effects they believe may be caused by any medication additions or changes?  No   Does the patient have all medications related to this admission filled (includes all antibiotics, pain medications, etc.)  Yes   Is the patient taking all medications as directed (includes completed medication regime)?  Yes   Does the patient have a follow up appointment scheduled with their surgeon?  Yes   Has the patient kept scheduled appointments due by today?  Yes   What DME was ordered?  o2   Has all DME been delivered?  Yes   Psychosocial issues?  No   Did the patient receive a copy of their discharge instructions?  Yes   Nursing interventions  Reviewed instructions with patient   What is the patient's perception of their health status since discharge?  Improving   Nursing interventions  Nurse provided patient education   Is the patient /caregiver able to teach back basic post-op care?  Take showers only when approved by MD-sponge bathe until then, No tub bath, swimming, or hot tub until instructed by MD, Lifting as instructed by MD in discharge instructions, Keep incision areas clean,dry and protected, Drive as instructed by MD in discharge instructions   Is the patient/caregiver able to teach back signs and symptoms of incisional infection?  Increased drainage or bleeding, Fever, Pus or odor from incision, Increased redness, swelling or pain at the incisonal site, Incisional warmth   Is the patient/caregiver able to teach back steps to recovery at home?  Rest and  rebuild strength, gradually increase activity, Set small, achievable goals for return to baseline health, Practice good oral hygiene, Weigh daily   Is the patient/caregiver able to teach back the hierarchy of who to call/visit for symptoms/problems? PCP, Specialist, Home health nurse, Urgent Care, ED, 911  Yes   Week 1 call completed?  Yes          Wale Kilpatrick RN

## 2020-11-09 ENCOUNTER — READMISSION MANAGEMENT (OUTPATIENT)
Dept: CALL CENTER | Facility: HOSPITAL | Age: 70
End: 2020-11-09

## 2020-11-09 NOTE — OUTREACH NOTE
General Surgery Week 2 Survey      Responses   [facility] patient discharged from?  [facility]   Does the patient have one of the following disease processes/diagnoses(primary or secondary)?  General Surgery   Week 2 attempt successful?  Yes   Call start time  1637   Call end time  1644   Meds reviewed with patient/caregiver?  Yes   Is the patient having any side effects they believe may be caused by any medication additions or changes?  No   Does the patient have all medications related to this admission filled (includes all antibiotics, pain medications, etc.)  Yes   Is the patient taking all medications as directed (includes completed medication regime)?  Yes   Does the patient have a follow up appointment scheduled with their surgeon?  Yes   Has the patient kept scheduled appointments due by today?  Yes   Has home health visited the patient within 72 hours of discharge?  Yes   Has all DME been delivered?  Yes   DME comments  States no longer using home oxygen.   Psychosocial issues?  No   Did the patient receive a copy of their discharge instructions?  Yes   Nursing interventions  Reviewed instructions with patient   What is the patient's perception of their health status since discharge?  Improving   Nursing interventions  Nurse provided patient education   If the patient is a current smoker, are they able to teach back resources for cessation?  Not a smoker   Is the patient/caregiver able to teach back the hierarchy of who to call/visit for symptoms/problems? PCP, Specialist, Home health nurse, Urgent Care, ED, 911  Yes   Week 2 call completed?  Yes   Revoked  No further contact(revokes)-requires comment   Graduated/Revoked comments  States is doing well-denies any needs or any need for further calls.   Wrap up additional comments  States is feeling better-denies any s/s of infection at incision. Reports O2 sats at 95% room air. States no longer needing oxygen. Continues with HH PT. Denies any needs today.           Diamond Campos, RN

## 2022-05-31 ENCOUNTER — TRANSCRIBE ORDERS (OUTPATIENT)
Dept: PHYSICAL THERAPY | Facility: CLINIC | Age: 72
End: 2022-05-31

## 2022-05-31 DIAGNOSIS — G20 PARKINSON'S DISEASE: Primary | ICD-10-CM

## 2022-06-14 ENCOUNTER — TREATMENT (OUTPATIENT)
Dept: PHYSICAL THERAPY | Facility: CLINIC | Age: 72
End: 2022-06-14

## 2022-06-14 DIAGNOSIS — G20 PARKINSON'S DISEASE: Primary | ICD-10-CM

## 2022-06-14 PROCEDURE — 97162 PT EVAL MOD COMPLEX 30 MIN: CPT | Performed by: PHYSICAL THERAPIST

## 2022-06-14 NOTE — PROGRESS NOTES
Physical Therapy Initial Evaluation and Plan of Care      Patient: Silvia Chung   : 1950  Diagnosis/ICD-10 Code:  Parkinson's disease (HCC) [G20]  Referring practitioner: Janell Almazan MD  Date of Initial Visit: 2022  Today's Date: 2022  Patient seen for 1 sessions      Subjective:     Subjective Questionnaire: NEWELL 38/56  FGA   TUG 11.25 sec  10 Meter 12.04 sec      Subjective Evaluation    History of Present Illness  Mechanism of injury: Patient relates she has PD plus she has had back and neck pain due to DDD. She was dx about 2 years ago when she fell and broke her L hip. She had a L hip replacement. She relates she has no reaction and cannot respond to falling. Pt relates she fell because her dog jumped on her. Her physician is Dr. Colon from [facility]. She is on carbidopa/levodopa 3x/day. Amantadine 1x/day. She is starting to see fine motor control issues. Pt is fearful of falling in the shower. She has a bench in the shower and bars.       Patient Occupation: retired - pediatrician  Pain  Current pain ratin  At best pain ratin  At worst pain ratin    Social Support  Lives in: one-story house  Lives with: spouse    Treatments  Previous treatment: physical therapy  Patient Goals  Patient goals for therapy: increased strength and improved balance             Objective          Static Posture     Comments  Scoliotic curve noted, R side curve    Neurological Testing     Sensation     Ankle/Foot   Left Ankle/Foot   Diminished: light touch    Right Ankle/Foot   Diminished: light touch    Strength/Myotome Testing     Left Hip   Planes of Motion   Flexion: 4  Extension: 2+  Abduction: 4    Right Hip   Planes of Motion   Flexion: 4  Extension: 2-  Abduction: 2    Left Knee   Flexion: 4+  Extension: 4+    Right Knee   Flexion: 4+  Extension: 4+    Left Ankle/Foot   Dorsiflexion: 4+    Right Ankle/Foot   Dorsiflexion: 4+    Ambulation     Observational Gait      Additional Observational Gait Details  Pt demonstrates midline crossing of LE's bilaterally, however L>R. Pt is also noted to have a LLD L>R.         PT Neuro         Assessment & Plan     Assessment  Impairments: abnormal coordination, abnormal gait, activity intolerance, impaired balance, impaired physical strength and safety issue  Functional Limitations: carrying objects, walking, standing and stooping  Assessment details: Patient is a 72 YOF that presents to PT with issues related to PD diagnosis. She is reporting several falls, one of which caused her to fracture her L hip with subsequent L hip replacement. Patient demonstrates bilateral weak hip musculature with R worse than L, along with antalgic gait pattern with midline cross, minimal arm swing and decreased step length. She is highly fearful of falls and demonstrates static and dynamic balance deficits as seen on NEWELL and FGA testing. Patient will require skilled PT intervention to address deficits listed in order to decrease risk of falls, improve gait pattern, as well as improve strength.   Prognosis: fair    Goals  Plan Goals: STG (4 visits)  1. Patient will report compliance with initial HEP.   2. Patient to improve NEWELL balance score to >/= 42/56 to decrease patient's risk of falls.  3. Patient to perform TUG within <11 sec without LOB for improved functional mobility.  4. Patient to ambulate 10 meters without AD within 11 sec without LOB for improved gait brittany and functional mobility.  5. Patient to improve FGA score to >/= 17 /30 to decrease patient's risk of falls and improve community ambulation.    LTG (8 visits)  1. Patient will be I with final HEP.   2. Patient to improve NEWELL balance score to >/= 46 /56 to decrease patient's risk of falls.  3. Patient to perform TUG within 10 sec without LOB for improved functional mobility.  4. Patient to ambulate 10 meters without AD within 10 sec without LOB for improved gait brittany and functional  mobility.  5. Patient to improve FGA score to >/= 20 /30 to decrease patient's risk of falls and improve community ambulation.    Plan  Therapy options: will be seen for skilled therapy services  Planned modality interventions: TENS  Planned therapy interventions: ADL retraining, balance/weight-bearing training, flexibility, gait training, manual therapy, neuromuscular re-education, motor coordination training, postural training, strengthening, stretching, therapeutic activities, transfer training and home exercise program  Frequency: 1x week  Duration in visits: 8  Treatment plan discussed with: patient  Plan details: Patient will be seen 1x/wk x 8wks with treatment to include strengthening, stretching, manual therapy, neuromuscular re-education, balance, gait and endurance training. An OT order will be placed to her PCP.           Timed:  Manual Therapy:    0     mins  48411;  Therapeutic Exercise:    0     mins  15512;     Neuromuscular Neena:    0    mins  19053;    Therapeutic Activity:     0     mins  94078;     Gait Trainin     mins  23404;     Electrical Stimulation:    0     mins  62214 ( );    Untimed:  Canalith Repositioning    0     mins 76698    Timed Treatment:   0   mins   Total Treatment:     45   mins    PT SIGNATURE: Maribel Paz, PT, DPT, MSCS, CDP, CSRS  [place] License #[id number]  DATE TREATMENT INITIATED: 2022      Medicare Initial Certification Certification Period: 2022thru2022  I certify that the therapy services are furnished while this patient is under my care.  The services outlined above are required by this patient, and will be reviewed every 90 days.     PHYSICIAN: Janell Almazan MD  NPI: [id number]                                         DATE:     Please sign and return via fax to [phone].   Thank you,   [facility] Physical Therapy.

## 2022-06-15 ENCOUNTER — TRANSCRIBE ORDERS (OUTPATIENT)
Dept: PHYSICAL THERAPY | Facility: CLINIC | Age: 72
End: 2022-06-15

## 2022-06-15 DIAGNOSIS — G20 PARKINSON'S DISEASE: Primary | ICD-10-CM

## 2022-06-20 ENCOUNTER — TREATMENT (OUTPATIENT)
Dept: PHYSICAL THERAPY | Facility: CLINIC | Age: 72
End: 2022-06-20

## 2022-06-20 DIAGNOSIS — Z74.09 IMPAIRED FUNCTIONAL MOBILITY, BALANCE, GAIT, AND ENDURANCE: ICD-10-CM

## 2022-06-20 DIAGNOSIS — G20 PARKINSON'S DISEASE: Primary | ICD-10-CM

## 2022-06-20 PROCEDURE — 97116 GAIT TRAINING THERAPY: CPT | Performed by: PHYSICAL THERAPIST

## 2022-06-20 PROCEDURE — 97112 NEUROMUSCULAR REEDUCATION: CPT | Performed by: PHYSICAL THERAPIST

## 2022-06-20 NOTE — PROGRESS NOTES
Physical Therapy Daily Note  Visit: 2  Date of Initial Visit: Type: THERAPY  Noted: 2022    Patient: Silvia Chung   : 1950  Diagnosis/ICD-10 Code:  Parkinson's disease (HCC) [G20]  Referring practitioner: Janell Almazan MD  Date of Initial Visit: Type: THERAPY  Noted: 2022  Today's Date: 2022  Patient seen for 2 sessions      Subjective:   Patient reports: She has been exercising frequently in the pool. Her low back has been bothering her. She relates she tripped and fell prior to coming to therapy today over a cord.    Pain: 0/10  Clinical Progress: unchanged  Home Program Compliance: Yes  Treatment has included: neuromuscular re-education and gait training    Objective   See Exercise, Manual, and Modality Logs for complete treatment.    PT Neuro          Assessment & Plan     Assessment    Assessment details: Patient demonstrated decreased ankle proprioception, postural awareness, and hip and gluteal strength. She is heavily reliant on her visual system due to decreased somatosensory sensation. She exhibited difficulty with static and dynamic balance on unstable surfaces and proper gait mechanics with retro walking. Her right LE is more affected than her left.     Plan  Plan details: Patient will continue with PT interventions to address deficits related to balance, coordination, gait, transfers, functional strength, and overall global mobility.         Timed:  Manual Therapy:                 mins  89471;  Therapeutic Exercise:        mins  91021;     Neuromuscular Neena:    25    mins  08951;    Therapeutic Activity:           mins  25035;     Gait Trainin    mins  53041;     Electrical Stimulation:        mins  74147 ( );     Untimed:  Canalith Repositioning techniques __ 03999      Timed Treatment:   45   mins   Total Treatment:     45   mins      Fernando Che PT student     I was present in the PT Department guiding the student by approving,  concurring, and confirming the skilled judgement for all services rendered.     Maribel Paz, PT  [place] License: [id number]

## 2022-06-21 ENCOUNTER — TREATMENT (OUTPATIENT)
Dept: PHYSICAL THERAPY | Facility: CLINIC | Age: 72
End: 2022-06-21

## 2022-06-21 DIAGNOSIS — Z74.09 IMPAIRED MOBILITY AND ADLS: Primary | ICD-10-CM

## 2022-06-21 DIAGNOSIS — R29.898 DECREASED GRIP STRENGTH OF LEFT HAND: ICD-10-CM

## 2022-06-21 DIAGNOSIS — R27.8 DECREASED COORDINATION: ICD-10-CM

## 2022-06-21 DIAGNOSIS — Z78.9 IMPAIRED MOBILITY AND ADLS: Primary | ICD-10-CM

## 2022-06-21 PROCEDURE — 97166 OT EVAL MOD COMPLEX 45 MIN: CPT | Performed by: OCCUPATIONAL THERAPIST

## 2022-06-21 NOTE — PROGRESS NOTES
Occupational Therapy Initial Evaluation and Plan of Care      Patient: Silvia Chung   : 1950  Diagnosis/ICD-10 Code:  Impaired mobility and ADLs [Z74.09, Z78.9]  Referring practitioner: Janell Almazan MD  Date of Initial Visit: Type: THERAPY  Noted: 2022  Today's Date: 2022  Patient seen for 1 sessions      Subjective:     Subjective Questionnaire: 9HPT RIGHT: 22.08       LEFT: 24.66  Appropriate translation and in-hand manipulation bilaterally however mild targeting issues L.     Subjective Evaluation    History of Present Illness  Mechanism of injury: Patient states she was dx w/ PD plus but has had back and neck pain due to DDD. She was dx about 2 years ago when she fell and broke her L hip. She underwent a L hip replacement. She states she has no balance reaction and cannot respond when falling. Her physician is Dr. Colon from [facility] and is on carbidopa/levodopa 3x/day. & Amantadine 1x/day. She is starting to see fine motor control issues, with particular difficulty performing jewelry clasps, buttons,  & zippers. She has had to apply magnetic latches on her clasps which has helped significantly. Notes slight tremor in her R hand but is not noticing it affecting her daily tasks yet. Pt notes that her HW has become slightly less legibile over the past few years. Does notice difficulty with applying eye make-up such as liner, eye shadow, etc.   She is also starting to note difficulty with getting up off lower surfaces. Feels stiffness first thing in the morning and has slightly increased difficulty with getting OOB in the morning. Has had family and friends start to assist her with donning jackets so she feels as though she must be less flexible with reaching behind herself. She is having trouble performing clasps/hooks on bras and must complete in front and slide bra around self to position properly.  Placing fitted sheets and making her bed has become very difficult and  fatigues her greatly.   She is now noting mild difficulty with swallowing pills and hoarseness of voice. After eating a dry consistency, notes choking feeling in her throat.   Feels as though she is weaker in her hands and is required to use scissors or a knife more often for opening packages.   She becomes easily fatigued with showering. Has LOB occasionally when stepping out of shower but notes it has improved as her  no longer has to physically assist her. She experiences difficulty with washing and drying and feet but contributes this somewhat to her low back pain.       Patient Occupation: retired - pediatrician  Pain  Current pain ratin  At best pain ratin  At worst pain ratin    Social Support  Lives in: one-story house ([place] shower with bench and grab bars)  Lives with: spouse    Hand dominance: right    Treatments  Previous treatment: physical therapy  Patient Goals  Patient goals for therapy: increased strength and improved balance           Objective          Active Range of Motion   Left Shoulder   Flexion: WFL    Right Shoulder   Flexion: WFL    Left Elbow   Flexion: WFL  Extension: WFL    Right Elbow   Flexion: WFL  Extension: WFL    Additional Active Range of Motion Details  N/T in fingers after sleeping-positional-mostly thumb through ring finger bilaterally (L slightly worse than R) goes up lateral portion of arms and into neck    Strength/Myotome Testing     Left Wrist/Hand      (2nd hand position)     Trial 1: 40 lbs    Trial 2: 55 lbs    Trial 3: 53 lbs    Average: 49.33 lbs    Right Wrist/Hand      (2nd hand position)     Trial 1: 55 lbs    Trial 2: 60 lbs    Trial 3: 50 lbs    Average: 55 lbs      OT Neuro        Assessment & Plan     Assessment  Impairments: abnormal coordination, abnormal gait, activity intolerance, impaired balance, impaired physical strength, lacks appropriate home exercise program, pain with function and safety issue  Functional Limitations:  carrying objects, lifting, walking, pulling, pushing, uncomfortable because of pain, moving in bed, standing, stooping, reaching behind back, reaching overhead and unable to perform repetitive tasks  Assessment details: Pt presents with deficits related to her PD that directly relate to her daily performance in ADLs, mobility, transfers and HM engagement. In particular, pt is having difficulty with HW, performing jewelry clasps, buttons and all LB dressing tasks. Pt is also having difficulty maintaining balance on uneven surfaces and completing transfers from any low surface. Recommend skilled OT services to address these areas and to promote increased safety and overall QOL. Pt to benefit from LSVT BIG when able.   Prognosis: fair    Goals  Plan Goals:   Pt will improve bilateral scores by 3 or more seconds on the 9HPT to demonstrate improved accuracy and speed with fine motor coordination by 8 wks.      Pt will increase R/L  strength by 5 or more # by 8 wks to demonstrate improved strength and function for daily tasks.     Pt will be independent with hand strengthening HEP to increase independence with ADL/IADL performance tasks by 4 wks.    Pt will be independent with hand FMC HEP to increase independence with ADL/IADL performance tasks by 4 wks.     Pt will be independent assist with LBD dressing tasks w/use of AE PRN to promote independence and efficiency by 8 wks.     Pt will complete successfully 5/5 transfers with correct technique to improve safety and independence in daily activities by 4 wks.       Plan  Planned therapy interventions: ADL retraining, balance/weight-bearing training, fine motor coordination training, flexibility, functional ROM exercises, home exercise program, motor coordination training, neuromuscular re-education, postural training, strengthening, stretching, therapeutic activities, transfer training and IADL retraining  Frequency: 1x week  Duration in visits: 12  Treatment plan  discussed with: patient  Plan details: Est OT POC and goals to meet pt needs and promote increased independence, safety and engagement in daily tasks.         Timed:  Manual Therapy:    0     mins  87069;  Therapeutic Exercise:    0     mins  96814;     Neuromuscular Neena:    0    mins  79531;    Therapeutic Activity:     0     mins  56978;     Self-Care/ADL     0     mins  99666;   Sensory Int. Tech      0     mins 14609;  Ultrasound:     0     mins  13292;    Electrical Stimulation:    0     mins  34342 ( );    Untimed:  Electrical Stimulation:    0     mins  79654 ( );    Timed Treatment:   0   mins   Total Treatment:     45   mins    OT Signature: Martha Crowder MS, OTR/L, CDP  [place] License #: [id number]  DATE TREATMENT INITIATED: 6/21/2022    Initial Certification Certification Period: 6/21/2022 thru 9/18/2022  I certify that the therapy services are furnished while this patient is under my care. The services outlined above are required by this patient and will be reviewed every 90 days.     PHYSICIAN: Janell Almazan MD  NPI: [id number]                                      DATE:     Physician Signature: __________________________________  Janell Almazan MD    Please sign and return via fax to [phone]  Thank you,   [facility] Occupational Therapy

## 2022-06-27 ENCOUNTER — TREATMENT (OUTPATIENT)
Dept: PHYSICAL THERAPY | Facility: CLINIC | Age: 72
End: 2022-06-27

## 2022-06-27 DIAGNOSIS — G20 PARKINSON'S DISEASE: Primary | ICD-10-CM

## 2022-06-27 DIAGNOSIS — Z74.09 IMPAIRED FUNCTIONAL MOBILITY, BALANCE, GAIT, AND ENDURANCE: ICD-10-CM

## 2022-06-27 PROCEDURE — 97110 THERAPEUTIC EXERCISES: CPT | Performed by: PHYSICAL THERAPIST

## 2022-06-27 PROCEDURE — 97112 NEUROMUSCULAR REEDUCATION: CPT | Performed by: PHYSICAL THERAPIST

## 2022-06-27 NOTE — PROGRESS NOTES
Physical Therapy Daily Note  Visit: 3  Date of Initial Visit: Type: THERAPY  Noted: 2022    Patient: Silvia Chung   : 1950  Diagnosis/ICD-10 Code:  Parkinson's disease (HCC) [G20]  Referring practitioner: Janell Almazan MD  Date of Initial Visit: Type: THERAPY  Noted: 2022  Today's Date: 2022  Patient seen for 3 sessions      Subjective:   Patient reports: Her low back is hurting her today and has been for the past few days.   Pain: 6/10  Clinical Progress: unchanged  Home Program Compliance: Yes  Treatment has included: therapeutic exercise and neuromuscular re-education    Objective   See Exercise, Manual, and Modality Logs for complete treatment.    PT Neuro          Assessment & Plan     Assessment    Assessment details: Patient's LBP limited the amount of functional activities that could be performed today. The pain seems more muscular in nature and was slightly relieved with various stretches, but flared back up with closed chain activities. She exhibited a scissored gait pattern with excessive hip IR due to compensation from weak hip musculature. Interventions also targeted thoracic rotation (decreased from WFL) and postural awareness/control during movements. Will continue to progress patient as tolerated and as her LBP decreases.     Plan  Plan details: Patient will continue receiving skilled PT interventions to address impairments related to gait, balance, coordination, endurance, stretching, strengthening, and overall functional mobility.         Timed:  Manual Therapy:                 mins  23906;  Therapeutic Exercise:    25    mins  46336;     Neuromuscular Neena:    13    mins  44996;    Therapeutic Activity:           mins  70026;     Gait Training:                     mins  26825;     Electrical Stimulation:        mins  71970 ( );     Untimed:  Canalith Repositioning techniques __ 31983      Timed Treatment:   38   mins   Total Treatment:     38    radha Che, PT student     I was present in the PT Department guiding the student by approving, concurring, and confirming the skilled judgement for all services rendered.     Maribel Paz, PT  [place] License: [id number]

## 2022-07-06 ENCOUNTER — TREATMENT (OUTPATIENT)
Dept: PHYSICAL THERAPY | Facility: CLINIC | Age: 72
End: 2022-07-06

## 2022-07-06 DIAGNOSIS — G20 PARKINSON'S DISEASE: Primary | ICD-10-CM

## 2022-07-06 DIAGNOSIS — Z74.09 IMPAIRED MOBILITY AND ADLS: Primary | ICD-10-CM

## 2022-07-06 DIAGNOSIS — R29.898 DECREASED GRIP STRENGTH OF LEFT HAND: ICD-10-CM

## 2022-07-06 DIAGNOSIS — Z74.09 IMPAIRED FUNCTIONAL MOBILITY, BALANCE, GAIT, AND ENDURANCE: ICD-10-CM

## 2022-07-06 DIAGNOSIS — R27.8 DECREASED COORDINATION: ICD-10-CM

## 2022-07-06 DIAGNOSIS — Z78.9 IMPAIRED MOBILITY AND ADLS: Primary | ICD-10-CM

## 2022-07-06 PROCEDURE — 97112 NEUROMUSCULAR REEDUCATION: CPT | Performed by: PHYSICAL THERAPIST

## 2022-07-06 PROCEDURE — 97110 THERAPEUTIC EXERCISES: CPT | Performed by: OCCUPATIONAL THERAPIST

## 2022-07-06 PROCEDURE — 97535 SELF CARE MNGMENT TRAINING: CPT | Performed by: OCCUPATIONAL THERAPIST

## 2022-07-06 PROCEDURE — 97116 GAIT TRAINING THERAPY: CPT | Performed by: PHYSICAL THERAPIST

## 2022-07-06 NOTE — PROGRESS NOTES
"Occupational Therapy Daily Treatment Note  Visit: 2  Date of Initial Visit: Type: THERAPY  Noted: 2022      Patient: Silvia Chung   : 1950  Diagnosis/ICD-10 Code:  Impaired mobility and ADLs [Z74.09, Z78.9]  Referring practitioner: Janell Almazan MD  Date of Initial Visit: Type: THERAPY  Noted: 2022  Today's Date: 2022  Patient seen for 2 sessions      Subjective:   Patient reports: \"I saw my neurosx and to my surprise I will be having to undergo sx on my neck. He said it is unstable and will have an EMG to f/u with the increasing N/T in both my arms and hands then schedule sx from there\"  Pain: 3/10 hands, shoulders, 5/10 neck pre and post  Subjective Questionnaire: n/a  Clinical Progress: unchanged  Home Program Compliance: Yes  Treatment has included: therapeutic exercise and self-care/ADL retraining    Subjective   Objective     OT Neuro          OT Exercises     Row Name 22 1415             Exercise 1    Exercise Name 1 log roll education for in/OOB with print-out for carry over with correct technique  -ST              Exercise 2    Exercise Name 2 education on donning/doffing LB clothing with cervical precautions (avoiding trunk flexion) and performing LE crossing method  -ST              Exercise 3    Exercise Name 3 education on hair washing techniques with addition of neck brace (simulation of in preparation for sx)  -ST              Exercise 4    Exercise Name 4 pink foam block, power grasp, pincer grasp, thumb IP flexion R/L hands R/L  -ST            User Key  (r) = Recorded By, (t) = Taken By, (c) = Cosigned By    Initials Name Provider Type    Martha Jaime OTR Occupational Therapist                 Assessment & Plan     Assessment    Assessment details: Pt reports that she will be undergoing cervical sx of some kind to stabilize her unstable neck in the upcoming month. She was not given any directions or precautions however educated pt on general cervical " precautions with no lifting over 5#, no pushing/pulling with force and performing log roll in/out of bed to ensure straightness of spine. Also educated pt at length on ADL/IADL retaining techniques to improve outcomes after sx (hair washing, LB/UB dressing, grooming w/o trunk flexion, etc.) Issued pt  strengthening for hands with focus on pincer, power and thumb IP joint strengthening to maintain fxnl grasp for feeding and basic self-care tasks.        Visit Diagnoses:    ICD-10-CM ICD-9-CM   1. Impaired mobility and ADLs  Z74.09 V49.89    Z78.9    2. Decreased coordination  R27.8 781.3   3. Decreased  strength of left hand  R29.898 729.89             Timed:  Manual Therapy:    0     mins  50052;  Therapeutic Exercise:    10     mins  89967;     Neuromuscular Neena:    0    mins  66971;    Therapeutic Activity:     0     mins  27529;     Self-Care/ADL     30     mins  27899;   Sensory Int. Tech      0     mins 16772;  Ultrasound:     0     mins  25477;    Electrical Stimulation:    0     mins  37962 ( );    Untimed:  Electrical Stimulation:    0     mins  54781 ( );    Timed Treatment:   40   mins   Total Treatment:     40   mins    OT Signature: Marhta Crowder MS, OTR/L, CDP  [place] License #: [id number]

## 2022-07-06 NOTE — PROGRESS NOTES
Physical Therapy Daily Note  Visit: 4  Date of Initial Visit: Type: THERAPY  Noted: 2022    Patient: Silvia Chung   : 1950  Diagnosis/ICD-10 Code:  Parkinson's disease (HCC) [G20]  Referring practitioner: Janell Almazan MD  Date of Initial Visit: Type: THERAPY  Noted: 2022  Today's Date: 2022  Patient seen for 4 sessions      Subjective:   Patient reports: Went to the doctor for neck/back pain and doctor reported that her c-spine is unstable after previous fusion. She is to have an EMG/NCS soon and surgery in 2 weeks.   Pain: 0/10  Clinical Progress: unchanged  Home Program Compliance: Yes  Treatment has included: neuromuscular re-education and gait training    Objective   See Exercise, Manual, and Modality Logs for complete treatment.    PT Neuro          Assessment & Plan     Assessment    Assessment details: Patient exhibits gait scissoring and narrow MINISTERIO causing unsteadiness. Interventions focused on this without use of UEs given current orthopedic issue (cervical spine instability). Resistance was applied to her LEs to help decrease knee valgus and strengthen hips. Pt is consulting with surgeon to determine whether to continue with PT before surgery due to unstable nature of cervical spine. Current professional opinion is to hold off on PT until after procedure due to safety.         Timed:  Manual Therapy:                 mins  93121;  Therapeutic Exercise:        mins  57910;     Neuromuscular Neena:    14    mins  04005;    Therapeutic Activity:           mins  01265;     Gait Trainin    mins  50815;     Electrical Stimulation:        mins  92678 ( );     Untimed:  Canalith Repositioning techniques __ 74819      Timed Treatment:   38   mins   Total Treatment:     38   mins      Fernando Che PT student     I was present in the PT Department guiding the student by approving, concurring, and confirming the skilled judgement for all services rendered.      Maribel Paz, PT  [place] License: [id number]

## 2022-08-03 ENCOUNTER — DOCUMENTATION (OUTPATIENT)
Dept: PHYSICAL THERAPY | Facility: CLINIC | Age: 72
End: 2022-08-03

## 2022-08-03 DIAGNOSIS — Z78.9 IMPAIRED MOBILITY AND ADLS: Primary | ICD-10-CM

## 2022-08-03 DIAGNOSIS — R29.898 DECREASED GRIP STRENGTH OF LEFT HAND: ICD-10-CM

## 2022-08-03 DIAGNOSIS — Z74.09 IMPAIRED MOBILITY AND ADLS: Primary | ICD-10-CM

## 2022-08-03 DIAGNOSIS — R27.8 DECREASED COORDINATION: ICD-10-CM

## 2022-08-03 NOTE — PROGRESS NOTES
Occupational Therapy D/C Note    Patient: Silvia Chung   : 1950  Diagnosis/ICD-10 Code:  Impaired mobility and ADLs [Z74.09, Z78.9]  Date of Initial Visit: Type: THERAPY  Noted: 2022  Today's Date: 8/3/2022    Assessment & Plan     Assessment    Assessment details: Pt seeing neuro sx for unstable cervical intervention. Will d/c at this time until after pt undergoes sx and released from MD for therapy services.    Plan  Plan details: D/C until released by neuro surgery services.        Visit Diagnoses:    ICD-10-CM ICD-9-CM   1. Impaired mobility and ADLs  Z74.09 V49.89    Z78.9    2. Decreased coordination  R27.8 781.3   3. Decreased  strength of left hand  R29.898 729.89             Timed:  Manual Therapy:    0     mins  04871;  Therapeutic Exercise:    0     mins  90913;     Neuromuscular Neena:    0    mins  44002;    Therapeutic Activity:     0     mins  82772;     Self-Care/ADL     0     mins  68921;   Sensory Int. Tech      0     mins 62850;  Ultrasound:     0     mins  77237;    Electrical Stimulation:    0     mins  80200 ( );    Untimed:  Electrical Stimulation:    0     mins  79541 ( );    Timed Treatment:   0   mins   Total Treatment:     0   mins    OT Signature: Martha Crowder MS, OTR/L, CDP  [place] License #: [id number]

## 2022-11-23 ENCOUNTER — APPOINTMENT (OUTPATIENT)
Dept: CT IMAGING | Facility: HOSPITAL | Age: 72
End: 2022-11-23

## 2022-11-23 ENCOUNTER — HOSPITAL ENCOUNTER (EMERGENCY)
Facility: HOSPITAL | Age: 72
Discharge: HOME | End: 2022-11-23
Attending: EMERGENCY MEDICINE | Admitting: EMERGENCY MEDICINE

## 2022-11-23 VITALS
HEIGHT: 69 IN | OXYGEN SATURATION: 100 % | WEIGHT: 156 LBS | SYSTOLIC BLOOD PRESSURE: 160 MMHG | TEMPERATURE: 97.8 F | BODY MASS INDEX: 23.11 KG/M2 | DIASTOLIC BLOOD PRESSURE: 102 MMHG | RESPIRATION RATE: 18 BRPM | HEART RATE: 119 BPM

## 2022-11-23 DIAGNOSIS — K86.89 PANCREATIC MASS: Primary | ICD-10-CM

## 2022-11-23 LAB
ALBUMIN SERPL-MCNC: 4.2 G/DL (ref 3.5–5.2)
ALBUMIN/GLOB SERPL: 1.4 G/DL
ALP SERPL-CCNC: 100 U/L (ref 39–117)
ALT SERPL W P-5'-P-CCNC: <5 U/L (ref 1–33)
ANION GAP SERPL CALCULATED.3IONS-SCNC: 11.7 MMOL/L (ref 5–15)
AST SERPL-CCNC: 16 U/L (ref 1–32)
BACTERIA UR QL AUTO: ABNORMAL /HPF
BASOPHILS # BLD AUTO: 0.02 10*3/MM3 (ref 0–0.2)
BASOPHILS NFR BLD AUTO: 0.3 % (ref 0–1.5)
BILIRUB SERPL-MCNC: 0.2 MG/DL (ref 0–1.2)
BILIRUB UR QL STRIP: NEGATIVE
BUN SERPL-MCNC: 11 MG/DL (ref 8–23)
BUN/CREAT SERPL: 16.2 (ref 7–25)
CALCIUM SPEC-SCNC: 9.7 MG/DL (ref 8.6–10.5)
CHLORIDE SERPL-SCNC: 97 MMOL/L (ref 98–107)
CLARITY UR: ABNORMAL
CO2 SERPL-SCNC: 25.3 MMOL/L (ref 22–29)
COLOR UR: YELLOW
CREAT SERPL-MCNC: 0.68 MG/DL (ref 0.57–1)
DEPRECATED RDW RBC AUTO: 47.5 FL (ref 37–54)
EGFRCR SERPLBLD CKD-EPI 2021: 93.8 ML/MIN/1.73
EOSINOPHIL # BLD AUTO: 0.13 10*3/MM3 (ref 0–0.4)
EOSINOPHIL NFR BLD AUTO: 2 % (ref 0.3–6.2)
ERYTHROCYTE [DISTWIDTH] IN BLOOD BY AUTOMATED COUNT: 13 % (ref 12.3–15.4)
GLOBULIN UR ELPH-MCNC: 3.1 GM/DL
GLUCOSE SERPL-MCNC: 108 MG/DL (ref 65–99)
GLUCOSE UR STRIP-MCNC: NEGATIVE MG/DL
HCT VFR BLD AUTO: 38 % (ref 34–46.6)
HGB BLD-MCNC: 12.9 G/DL (ref 12–15.9)
HGB UR QL STRIP.AUTO: NEGATIVE
HYALINE CASTS UR QL AUTO: ABNORMAL /LPF
IMM GRANULOCYTES # BLD AUTO: 0.01 10*3/MM3 (ref 0–0.05)
IMM GRANULOCYTES NFR BLD AUTO: 0.2 % (ref 0–0.5)
KETONES UR QL STRIP: NEGATIVE
LEUKOCYTE ESTERASE UR QL STRIP.AUTO: ABNORMAL
LIPASE SERPL-CCNC: 28 U/L (ref 13–60)
LYMPHOCYTES # BLD AUTO: 1.48 10*3/MM3 (ref 0.7–3.1)
LYMPHOCYTES NFR BLD AUTO: 22.5 % (ref 19.6–45.3)
MCH RBC QN AUTO: 33.8 PG (ref 26.6–33)
MCHC RBC AUTO-ENTMCNC: 33.9 G/DL (ref 31.5–35.7)
MCV RBC AUTO: 99.5 FL (ref 79–97)
MONOCYTES # BLD AUTO: 0.6 10*3/MM3 (ref 0.1–0.9)
MONOCYTES NFR BLD AUTO: 9.1 % (ref 5–12)
NEUTROPHILS NFR BLD AUTO: 4.34 10*3/MM3 (ref 1.7–7)
NEUTROPHILS NFR BLD AUTO: 65.9 % (ref 42.7–76)
NITRITE UR QL STRIP: NEGATIVE
NRBC BLD AUTO-RTO: 0 /100 WBC (ref 0–0.2)
PH UR STRIP.AUTO: 7 [PH] (ref 5–8)
PLATELET # BLD AUTO: 313 10*3/MM3 (ref 140–450)
PMV BLD AUTO: 10.7 FL (ref 6–12)
POTASSIUM SERPL-SCNC: 3.8 MMOL/L (ref 3.5–5.2)
PROT SERPL-MCNC: 7.3 G/DL (ref 6–8.5)
PROT UR QL STRIP: NEGATIVE
RBC # BLD AUTO: 3.82 10*6/MM3 (ref 3.77–5.28)
RBC # UR STRIP: ABNORMAL /HPF
REF LAB TEST METHOD: ABNORMAL
SODIUM SERPL-SCNC: 134 MMOL/L (ref 136–145)
SP GR UR STRIP: 1.01 (ref 1–1.03)
SQUAMOUS #/AREA URNS HPF: ABNORMAL /HPF
UNIDENT CRYS URNS QL MICRO: ABNORMAL /HPF
UROBILINOGEN UR QL STRIP: ABNORMAL
WBC # UR STRIP: ABNORMAL /HPF
WBC NRBC COR # BLD: 6.58 10*3/MM3 (ref 3.4–10.8)

## 2022-11-23 PROCEDURE — 80053 COMPREHEN METABOLIC PANEL: CPT | Performed by: EMERGENCY MEDICINE

## 2022-11-23 PROCEDURE — 99283 EMERGENCY DEPT VISIT LOW MDM: CPT

## 2022-11-23 PROCEDURE — 36415 COLL VENOUS BLD VENIPUNCTURE: CPT

## 2022-11-23 PROCEDURE — 25010000002 MORPHINE PER 10 MG: Performed by: NURSE PRACTITIONER

## 2022-11-23 PROCEDURE — 96374 THER/PROPH/DIAG INJ IV PUSH: CPT

## 2022-11-23 PROCEDURE — 81001 URINALYSIS AUTO W/SCOPE: CPT | Performed by: EMERGENCY MEDICINE

## 2022-11-23 PROCEDURE — 85025 COMPLETE CBC W/AUTO DIFF WBC: CPT | Performed by: EMERGENCY MEDICINE

## 2022-11-23 PROCEDURE — 74176 CT ABD & PELVIS W/O CONTRAST: CPT

## 2022-11-23 PROCEDURE — 25010000002 ONDANSETRON PER 1 MG: Performed by: NURSE PRACTITIONER

## 2022-11-23 PROCEDURE — 96375 TX/PRO/DX INJ NEW DRUG ADDON: CPT

## 2022-11-23 PROCEDURE — 83690 ASSAY OF LIPASE: CPT | Performed by: EMERGENCY MEDICINE

## 2022-11-23 RX ORDER — ONDANSETRON 4 MG/1
4 TABLET, ORALLY DISINTEGRATING ORAL 4 TIMES DAILY PRN
Qty: 20 TABLET | Refills: 0 | Status: SHIPPED | OUTPATIENT
Start: 2022-11-23 | End: 2022-11-29

## 2022-11-23 RX ORDER — HYDROCODONE BITARTRATE AND ACETAMINOPHEN 5; 325 MG/1; MG/1
1 TABLET ORAL EVERY 6 HOURS PRN
Qty: 10 TABLET | Refills: 0 | Status: SHIPPED | OUTPATIENT
Start: 2022-11-23 | End: 2022-11-29 | Stop reason: SDUPTHER

## 2022-11-23 RX ORDER — ONDANSETRON 2 MG/ML
4 INJECTION INTRAMUSCULAR; INTRAVENOUS ONCE
Status: COMPLETED | OUTPATIENT
Start: 2022-11-23 | End: 2022-11-23

## 2022-11-23 RX ORDER — SODIUM CHLORIDE 0.9 % (FLUSH) 0.9 %
10 SYRINGE (ML) INJECTION AS NEEDED
Status: DISCONTINUED | OUTPATIENT
Start: 2022-11-23 | End: 2022-11-23 | Stop reason: HOSPADM

## 2022-11-23 RX ADMIN — MORPHINE SULFATE 4 MG: 4 INJECTION, SOLUTION INTRAMUSCULAR; INTRAVENOUS at 14:37

## 2022-11-23 RX ADMIN — ONDANSETRON 4 MG: 2 INJECTION INTRAMUSCULAR; INTRAVENOUS at 14:37

## 2022-11-23 NOTE — ED PROVIDER NOTES
Subjective   History of Present Illness  72-year-old female presents to the ED with a chief complaint of abdominal pain.  Patient complaining of left upper quadrant abdominal pain that is been intermittent for 3 to 4 months.  Slightly worsening since onset.  She notes that the pain does come and go.  Pain is present as a sharp stabbing pain.  The pain is associated with nausea and a few episodes of vomiting.  She denies diarrhea.  No fever or chills.  No chest pain or shortness of breath.  No cough or wheeze.  No prior treatments or alleviating factors.  No prior evaluations.  No other complaints at this time.        Review of Systems   Constitutional: Negative for fatigue and fever.   Respiratory: Negative for cough, shortness of breath and wheezing.    Cardiovascular: Negative for chest pain.   Gastrointestinal: Positive for abdominal pain, nausea and vomiting. Negative for diarrhea.   All other systems reviewed and are negative.      Past Medical History:   Diagnosis Date   • Asthma    • Hyperlipidemia    • Hypertension    • Impaired functional mobility, balance, gait, and endurance    • Scoliosis        Allergies   Allergen Reactions   • Lovastatin Other (See Comments)     Muscle cramps        Past Surgical History:   Procedure Laterality Date   • ADENOIDECTOMY     • CATARACT EXTRACTION     • HIP HEMIARTHROPLASTY Left 10/21/2020    Procedure: HIP HEMIARTHROPLASTY LEFT;  Surgeon: Humza Winters MD;  Location: [facility];  Service: Orthopedics;  Laterality: Left;   • KNEE SURGERY     • NECK SURGERY     • TONSILLECTOMY         History reviewed. No pertinent family history.    Social History     Socioeconomic History   • Marital status:    Tobacco Use   • Smoking status: Never   Substance and Sexual Activity   • Alcohol use: Yes     Comment: socially           Objective   Physical Exam  Vitals and nursing note reviewed.   Constitutional:       General: She is not in acute distress.     Appearance: She is  well-developed. She is not diaphoretic.   HENT:      Head: Normocephalic and atraumatic.      Nose: Nose normal.   Eyes:      Conjunctiva/sclera: Conjunctivae normal.   Cardiovascular:      Rate and Rhythm: Normal rate and regular rhythm.   Pulmonary:      Effort: Pulmonary effort is normal. No respiratory distress.      Breath sounds: Normal breath sounds.   Abdominal:      General: There is no distension.      Palpations: Abdomen is soft.      Tenderness: There is abdominal tenderness in the left upper quadrant. There is no guarding.   Musculoskeletal:         General: No deformity.   Neurological:      Mental Status: She is alert and oriented to person, place, and time.      Cranial Nerves: No cranial nerve deficit.         Procedures           ED Course                                           MDM  Lab Results (last 24 hours)     Procedure Component Value Units Date/Time    CBC & Differential [[id number]]  (Abnormal) Collected: 11/23/22 1138    Specimen: Blood Updated: 11/23/22 1146    Narrative:      The following orders were created for panel order CBC & Differential.  Procedure                               Abnormality         Status                     ---------                               -----------         ------                     CBC Auto Differential[[id number]]        Abnormal            Final result                 Please view results for these tests on the individual orders.    CBC Auto Differential [[id number]]  (Abnormal) Collected: 11/23/22 1138    Specimen: Blood Updated: 11/23/22 1146     WBC 6.58 10*3/mm3      RBC 3.82 10*6/mm3      Hemoglobin 12.9 g/dL      Hematocrit 38.0 %      MCV 99.5 fL      MCH 33.8 pg      MCHC 33.9 g/dL      RDW 13.0 %      RDW-SD 47.5 fl      MPV 10.7 fL      Platelets 313 10*3/mm3      Neutrophil % 65.9 %      Lymphocyte % 22.5 %      Monocyte % 9.1 %      Eosinophil % 2.0 %      Basophil % 0.3 %      Immature Grans % 0.2 %      Neutrophils, Absolute 4.34 10*3/mm3       Lymphocytes, Absolute 1.48 10*3/mm3      Monocytes, Absolute 0.60 10*3/mm3      Eosinophils, Absolute 0.13 10*3/mm3      Basophils, Absolute 0.02 10*3/mm3      Immature Grans, Absolute 0.01 10*3/mm3      nRBC 0.0 /100 WBC     Urinalysis With Microscopic If Indicated (No Culture) - Urine, Clean Catch [[id number]]  (Abnormal) Collected: 11/23/22 1200    Specimen: Urine, Clean Catch Updated: 11/23/22 1210     Color, UA Yellow     Appearance, UA Cloudy     pH, UA 7.0     Specific Gravity, UA 1.010     Glucose, UA Negative     Ketones, UA Negative     Bilirubin, UA Negative     Blood, UA Negative     Protein, UA Negative     Leuk Esterase, UA Large (3+)     Nitrite, UA Negative     Urobilinogen, UA 0.2 E.U./dL    Urinalysis, Microscopic Only - Urine, Clean Catch [[id number]]  (Abnormal) Collected: 11/23/22 1200    Specimen: Urine, Clean Catch Updated: 11/23/22 1213     RBC, UA 0-2 /HPF      WBC, UA 3-5 /HPF      Bacteria, UA 2+ /HPF      Squamous Epithelial Cells, UA 3-6 /HPF      Hyaline Casts, UA None Seen /LPF      Amorphous Urate Crystals, UA Small/1+ /HPF      Methodology Manual Light Microscopy    Comprehensive Metabolic Panel [[id number]]  (Abnormal) Collected: 11/23/22 1215    Specimen: Blood Updated: 11/23/22 1239     Glucose 108 mg/dL      BUN 11 mg/dL      Creatinine 0.68 mg/dL      Sodium 134 mmol/L      Potassium 3.8 mmol/L      Comment: Slight hemolysis detected by analyzer. Results may be affected.        Chloride 97 mmol/L      CO2 25.3 mmol/L      Calcium 9.7 mg/dL      Total Protein 7.3 g/dL      Albumin 4.20 g/dL      ALT (SGPT) <5 U/L      AST (SGOT) 16 U/L      Comment: Slight hemolysis detected by analyzer. Results may be affected.        Alkaline Phosphatase 100 U/L      Total Bilirubin 0.2 mg/dL      Globulin 3.1 gm/dL      A/G Ratio 1.4 g/dL      BUN/Creatinine Ratio 16.2     Anion Gap 11.7 mmol/L      eGFR 93.8 mL/min/1.73      Comment: National Kidney Foundation and American Society of  Nephrology (ASN) Task Force recommended calculation based on the Chronic Kidney Disease Epidemiology Collaboration (CKD-EPI) equation refit without adjustment for race.       Narrative:      GFR Normal >60  Chronic Kidney Disease <60  Kidney Failure <15      Lipase [[id number]]  (Normal) Collected: 11/23/22 1215    Specimen: Blood Updated: 11/23/22 1237     Lipase 28 U/L         CT Abdomen Pelvis Without Contrast    Result Date: 11/23/2022  CT SCAN OF THE ABDOMEN AND PELVIS WITHOUT CONTRAST     11/23/2022 1:03 PM  HISTORY: LUQ abd pain nvd left upper quadrant abdominal pain. Nausea, vomiting and diarrhea.  PROCEDURE: Axial CT images were obtained from the lung bases to the pubic symphysis without IV contrast. Reformatted images were generated from the axial data set and provided for interpretation. This study was performed with techniques to keep radiation doses as low as reasonably achievable (ALARA). Individualized dose reduction techniques using automated exposure control or adjustment of mA and/or kV according to the patient size were employed. 599.83 mGy.cm 11.59 mGy  COMPARISON: CT abdomen and pelvis dated 10/05/2019.  FINDINGS: Lack of IV contrast limits evaluation of the abdominal and pelvic solid organs.  LOWER CHEST: The heart is normal in size. Coronary artery disease present. Lingular and left lower lobe subsegmental atelectasis. Otherwise the lung bases are clear. Subcentimeter groundglass nodule in the right middle lobe.  ABDOMEN/PELVIS: Liver, gallbladder and bile ducts: Unenhanced liver is grossly unremarkable without new suspicious focal abnormality. Stable 2.3 cm hypodense lesion in the right hemiliver, probable benign findings such as cysts. Unremarkable gallbladder. No definite biliary ductal dilatation.  Adrenal glands: The adrenal glands are morphologically unremarkable without suspicious lesion.  Kidneys, ureters and urinary bladder: No nephrolithiasis. No hydronephrosis. Unremarkable urinary  bladder.  Spleen: The spleen is normal in size.  Pancreas: There is lobulated soft tissue mass in the distal pancreatic body and tail, measuring 6.8 x 3.7 cm in axial dimension (series 2, image 38) and measuring 5.6 cm in craniocaudal dimension (series 5, image 45). There is very minimal surrounding inflammation. No pancreatic ductal dilatation. Uncinate process, pancreatic head and neck are grossly unremarkable.  Gastrointestinal system and mesentery: There is no evidence of bowel obstruction. The appendix is visualized and unremarkable. No significant mesenteric inflammation. Moderate amount of stool burden in the colon.  Lymph nodes: Multiple borderline enlarged lymph nodes near the celiac axis and mesenteric axis (series 2, image 37)..  Vessels: The abdominal aorta is normal in caliber. Aortic and bilateral iliac atherosclerotic disease. The inferior vena cava is unremarkable.  Peritoneum: No free intraperitoneal fluid or pneumoperitoneum.  Pelvic viscera: No acute findings. Stable 2.2 cm left ovarian cystic lesion.  Body wall: No acute findings. No significant body wall hernias.  Bones: No acute fracture. Postsurgical changes from left total hip arthroplasty prosthesis placement. Severe degenerative changes of the thoracolumbar spine with dextroscoliosis of the lumbar spine. No aggressive osseous sclerotic or lucent lesions.      Impression: There is a lobulated soft tissue mass in the distal pancreatic body and tail (6.8 x 3.7 x 5.6 cm) with very minimal surrounding inflammation. This finding is concerning for primary pancreatic malignancy/adenocarcinoma. Other differential considerations include sequela of prior pancreatitis or autoimmune pancreatitis. Mildly enlarged celiac axis lymph nodes, infectious/inflammatory/neoplastic in etiology. No pancreatic ductal dilatation. Evaluation is limited due to lack of intravenous contrast. Consider further evaluation with CT/MRI pancreatic mass protocol as clinically  indicated. Other chronic findings as above.      Images personally reviewed, interpreted and dictated by SIMON Rooney.       This report was signed and finalized on 11/23/2022 1:18 PM by SIMON Rooney.        72-year-old female presents to the ED with left upper quadrant abdominal pain.  Intermittent in nature with associated nausea and vomiting.  Labs are reassuring.  CT abdomen pelvis shows large pancreatic mass.  Discussed with general surgery on-call, Dr. Garcia.  He will see the patient in office on Monday and help with referral to oncology.  Patient is agreeable to this plan.        Final diagnoses:   Pancreatic mass       ED Disposition  ED Disposition     ED Disposition   Discharge    Condition   Stable    Comment   --             Janell Almazan MD  [address] [facility] [address]  [phone]               Medication List      New Prescriptions    HYDROcodone-acetaminophen 5-325 MG per tablet  Commonly known as: NORCO  Take 1 tablet by mouth Every 6 (Six) Hours As Needed for Severe Pain.  Replaces: HYDROcodone-acetaminophen 7.5-325 MG per tablet        Changed    * ondansetron ODT 4 MG disintegrating tablet  Commonly known as: ZOFRAN-ODT  Take 1 tablet by mouth Every 6 (Six) Hours As Needed for Nausea.  What changed: Another medication with the same name was added. Make sure you understand how and when to take each.     * ondansetron ODT 4 MG disintegrating tablet  Commonly known as: ZOFRAN-ODT  Place 1 tablet on the tongue 4 (Four) Times a Day As Needed for Nausea.  What changed: You were already taking a medication with the same name, and this prescription was added. Make sure you understand how and when to take each.         * This list has 2 medication(s) that are the same as other medications prescribed for you. Read the directions carefully, and ask your doctor or other care provider to review them with you.            Stop    HYDROcodone-acetaminophen 7.5-325 MG per  tablet  Commonly known as: NORCO  Replaced by: HYDROcodone-acetaminophen 5-325 MG per tablet           Where to Get Your Medications      These medications were sent to [id number] - [place], [place] - [address] - [phone]  - [phone] FX  [address] [place] [address] [place] [address]    Phone: [phone]   · HYDROcodone-acetaminophen 5-325 MG per tablet  · ondansetron ODT 4 MG disintegrating tablet          Felix Petty, DO  11/23/22 0527

## 2022-11-28 ENCOUNTER — OFFICE VISIT (OUTPATIENT)
Dept: SURGERY | Facility: CLINIC | Age: 72
End: 2022-11-28

## 2022-11-28 VITALS
DIASTOLIC BLOOD PRESSURE: 84 MMHG | OXYGEN SATURATION: 97 % | HEART RATE: 128 BPM | BODY MASS INDEX: 23.55 KG/M2 | WEIGHT: 159 LBS | SYSTOLIC BLOOD PRESSURE: 138 MMHG | TEMPERATURE: 97.8 F | HEIGHT: 69 IN

## 2022-11-28 DIAGNOSIS — K86.89 PANCREATIC MASS: Primary | ICD-10-CM

## 2022-11-28 PROCEDURE — 99204 OFFICE O/P NEW MOD 45 MIN: CPT | Performed by: SURGERY

## 2022-11-28 NOTE — PROGRESS NOTES
Patient: Silvia Chung    YOB: 1950    Date: 11/28/2022    Primary Care Provider: Janell Almazan MD    Chief Complaint   Patient presents with   • Abdominal Pain     Left upper quadrant   • Nausea     And vomiting       SUBJECTIVE:    History of present illness:  Patient is here for evaluation of a pancreatic mass which was found by CT scan of the abdomen and pelvis which was performed at the time of recent emergency department visit 11/23/2022.  Patient presented to the emergency department at [facility] complaining of left upper quadrant pain with bouts of nausea and vomiting.    She does give a several month history of vague left upper quadrant pain that radiates into her back.  Apparently this initially started out as a dull discomfort and now can be sharp.  Nothing seems to make it worse or better, this is uncomfortable to the patient.  There is occasional bouts of nausea but she does not know if this is associated with the pain or not.    The following portions of the patient's history were reviewed and updated as appropriate: allergies, current medications, past family history, past medical history, past social history, past surgical history and problem list.      Review of Systems   Constitutional: Negative for chills, fever and unexpected weight change.   HENT: Negative for hearing loss, trouble swallowing and voice change.    Eyes: Negative for visual disturbance.   Respiratory: Negative for apnea, cough, chest tightness, shortness of breath and wheezing.    Cardiovascular: Negative for chest pain, palpitations and leg swelling.   Gastrointestinal: Negative for abdominal distention, abdominal pain, anal bleeding, blood in stool, constipation, diarrhea, nausea, rectal pain and vomiting.   Endocrine: Negative for cold intolerance and heat intolerance.   Genitourinary: Negative for difficulty urinating, dysuria and flank pain.   Musculoskeletal: Negative for back pain and gait  problem.   Skin: Negative for color change, rash and wound.   Neurological: Negative for dizziness, syncope, speech difficulty, weakness, light-headedness, numbness and headaches.   Hematological: Negative for adenopathy. Does not bruise/bleed easily.   Psychiatric/Behavioral: Negative for confusion. The patient is not nervous/anxious.        Allergies:  Allergies   Allergen Reactions   • Zofran [Ondansetron] GI Intolerance   • Lovastatin Other (See Comments)     Muscle cramps        Medications:    Current Outpatient Medications:   •  albuterol sulfate  (90 Base) MCG/ACT inhaler, Inhale 2 puffs Every 4 (Four) Hours As Needed for Wheezing., Disp: , Rfl:   •  amLODIPine (NORVASC) 10 MG tablet, Take 10 mg by mouth Every Night., Disp: , Rfl:   •  baclofen (LIORESAL) 10 MG tablet, Take 10 mg by mouth 3 (Three) Times a Day., Disp: , Rfl:   •  benazepril (LOTENSIN) 20 MG tablet, Take 40 mg by mouth Daily., Disp: , Rfl:   •  calcium carbonate (TUMS) 500 MG chewable tablet, Chew 2 tablets 3 (Three) Times a Day As Needed for Indigestion or Heartburn., Disp: , Rfl:   •  fluticasone-salmeterol (ADVAIR) 500-50 MCG/DOSE DISKUS, Inhale 2 puffs Every Morning., Disp: , Rfl:   •  gabapentin (NEURONTIN) 300 MG capsule, Take 300 mg by mouth 3 (Three) Times a Day., Disp: , Rfl:   •  guaiFENesin-dextromethorphan (ROBITUSSIN DM) 100-10 MG/5ML syrup, Take 5 mL by mouth Every 4 (Four) Hours As Needed for Cough., Disp: 354 mL, Rfl: 0  •  HYDROcodone-acetaminophen (NORCO) 5-325 MG per tablet, Take 1 tablet by mouth Every 6 (Six) Hours As Needed for Severe Pain., Disp: 10 tablet, Rfl: 0  •  methylPREDNISolone (MEDROL) 4 MG dose pack, Take as directed on package instructions., Disp: 21 each, Rfl: 0  •  montelukast (SINGULAIR) 10 MG tablet, Take 10 mg by mouth Daily., Disp: , Rfl:   •  Multiple Vitamins-Minerals (multivitamin with minerals) tablet tablet, Take 1 tablet by mouth Daily., Disp: , Rfl:   •  omeprazole (priLOSEC) 20 MG  "capsule, Take 20 mg by mouth Daily., Disp: , Rfl:   •  tiZANidine (ZANAFLEX) 4 MG tablet, Take 4 mg by mouth Every 8 (Eight) Hours As Needed for Muscle Spasms., Disp: , Rfl:   •  ondansetron (Zofran) 4 MG tablet, Take 1 tablet by mouth Every 8 (Eight) Hours As Needed for Nausea or Vomiting., Disp: 20 tablet, Rfl: 0  •  ondansetron ODT (ZOFRAN-ODT) 4 MG disintegrating tablet, Take 1 tablet by mouth Every 6 (Six) Hours As Needed for Nausea., Disp: 20 tablet, Rfl: 0  •  ondansetron ODT (ZOFRAN-ODT) 4 MG disintegrating tablet, Place 1 tablet on the tongue 4 (Four) Times a Day As Needed for Nausea., Disp: 20 tablet, Rfl: 0    History:  Past Medical History:   Diagnosis Date   • Asthma    • Hyperlipidemia    • Hypertension    • Impaired functional mobility, balance, gait, and endurance    • Parkinson disease (HCC)    • Scoliosis        Past Surgical History:   Procedure Laterality Date   • ADENOIDECTOMY     • CATARACT EXTRACTION     • HIP HEMIARTHROPLASTY Left 10/21/2020    Procedure: HIP HEMIARTHROPLASTY LEFT;  Surgeon: Humza Winters MD;  Location: [facility];  Service: Orthopedics;  Laterality: Left;   • KNEE SURGERY     • NECK SURGERY     • TONSILLECTOMY         Family History   Problem Relation Age of Onset   • Cancer Father    • Cancer Brother        Social History     Tobacco Use   • Smoking status: Never   Vaping Use   • Vaping Use: Never used   Substance Use Topics   • Alcohol use: Yes     Comment: socially   • Drug use: Defer        OBJECTIVE:    Vital Signs:   Vitals:    11/28/22 0854   BP: 138/84   Pulse: (!) 128   Temp: 97.8 °F (36.6 °C)   SpO2: 97%   Weight: 72.1 kg (159 lb)   Height: 175.3 cm (69\")       Physical Exam:   General Appearance:    Alert, cooperative, in no acute distress   Head:    Normocephalic, without obvious abnormality, atraumatic   Eyes:            Lids and lashes normal, conjunctivae and sclerae normal, no   icterus, no pallor, corneas clear, PERRLA   Ears:    Ears appear intact with no " abnormalities noted   Throat:   No oral lesions, no thrush, oral mucosa moist   Neck:   No adenopathy, supple, trachea midline, no thyromegaly, no   carotid bruit, no JVD   Lungs:     Clear to auscultation,respirations regular, even and                  unlabored    Heart:    Regular rhythm and normal rate, normal S1 and S2, no            murmur, no gallop, no rub, no click   Chest Wall:    No abnormalities observed   Abdomen:     Normal bowel sounds, no masses, no organomegaly, soft        non-tender, non-distended, no guarding, no rebound                tenderness, no evidence of palpable masses   Extremities:   Moves all extremities well, no edema, no cyanosis, no             redness   Pulses:   Pulses palpable and equal bilaterally   Skin:   No bleeding, bruising or rash, well-healed wound on the posterior cervical region from recent neck surgery for spinal fusion   Lymph nodes:   No palpable adenopathy   Neurologic:   Cranial nerves 2 - 12 grossly intact, sensation intact, DTR       present and equal bilaterally     Results Review:   I reviewed the patient's new clinical results.  I reviewed the patient's new imaging results and agree with the interpretation.  I reviewed the patient's other test results and agree with the interpretation    Review of Systems was reviewed and confirmed as accurate as documented by the MA.     I did review all the patient's old records, recent ER visit, I did look at the CT scan myself and my interpretation shows evidence of a pancreatic body mass present    ASSESSMENT/PLAN:    1. Pancreatic mass        I did have a detailed and extensive discussion with the patient and her  in the office today.  We went over the findings of the recent CT scan and the fact that I do not think surgical intervention is necessary but rather she needs to be referred for oncology evaluation.  I will try to set this up for this week if an appointment can be obtained.    I discussed the patients  findings and my recommendations with patient and family        Electronically signed by Marcio Garcia MD  11/28/22

## 2022-11-29 ENCOUNTER — LAB (OUTPATIENT)
Dept: LAB | Facility: HOSPITAL | Age: 72
End: 2022-11-29

## 2022-11-29 ENCOUNTER — HOSPITAL ENCOUNTER (OUTPATIENT)
Dept: CARDIOLOGY | Facility: HOSPITAL | Age: 72
Discharge: HOME | End: 2022-11-29

## 2022-11-29 ENCOUNTER — CONSULT (OUTPATIENT)
Dept: ONCOLOGY | Facility: CLINIC | Age: 72
End: 2022-11-29

## 2022-11-29 VITALS
HEART RATE: 141 BPM | BODY MASS INDEX: 23.11 KG/M2 | SYSTOLIC BLOOD PRESSURE: 143 MMHG | DIASTOLIC BLOOD PRESSURE: 84 MMHG | TEMPERATURE: 98.4 F | RESPIRATION RATE: 16 BRPM | OXYGEN SATURATION: 97 % | WEIGHT: 156 LBS | HEIGHT: 69 IN

## 2022-11-29 DIAGNOSIS — C25.1 MALIGNANT NEOPLASM OF BODY OF PANCREAS: ICD-10-CM

## 2022-11-29 DIAGNOSIS — K86.89 PANCREATIC MASS: ICD-10-CM

## 2022-11-29 DIAGNOSIS — K86.89 MASS OF PANCREAS: ICD-10-CM

## 2022-11-29 DIAGNOSIS — K86.89 MASS OF PANCREAS: Primary | ICD-10-CM

## 2022-11-29 LAB — LDH SERPL-CCNC: 159 U/L (ref 135–214)

## 2022-11-29 PROCEDURE — 36415 COLL VENOUS BLD VENIPUNCTURE: CPT

## 2022-11-29 PROCEDURE — 83615 LACTATE (LD) (LDH) ENZYME: CPT

## 2022-11-29 PROCEDURE — 99205 OFFICE O/P NEW HI 60 MIN: CPT | Performed by: INTERNAL MEDICINE

## 2022-11-29 PROCEDURE — 86301 IMMUNOASSAY TUMOR CA 19-9: CPT

## 2022-11-29 RX ORDER — CARBIDOPA AND LEVODOPA 50; 200 MG/1; MG/1
1 TABLET, EXTENDED RELEASE ORAL 3 TIMES DAILY
COMMUNITY
End: 2023-03-28

## 2022-11-29 RX ORDER — AMANTADINE HYDROCHLORIDE 100 MG/1
100 TABLET ORAL DAILY
COMMUNITY
End: 2023-01-31

## 2022-11-29 RX ORDER — SERTRALINE HYDROCHLORIDE 100 MG/1
100 TABLET, FILM COATED ORAL DAILY
COMMUNITY

## 2022-11-29 RX ORDER — HYDROCODONE BITARTRATE AND ACETAMINOPHEN 5; 325 MG/1; MG/1
1 TABLET ORAL EVERY 6 HOURS PRN
Qty: 45 TABLET | Refills: 0 | Status: SHIPPED | OUTPATIENT
Start: 2022-11-29 | End: 2022-12-13 | Stop reason: SDUPTHER

## 2022-11-29 NOTE — PROGRESS NOTES
"  Subjective     PROBLEM LIST:  1. Pancreas mass  2. parkinsons disease  3. Hypertension  4. Hyperlipidemia  5. asthma    CHIEF COMPLAINT: pancreas mass      HISTORY OF PRESENT ILLNESS:  The patient is a 72 y.o. female, referred for evaluation of a mass in the pancreas.    She presented to the ED on 11/23/22 with LUQ pain, worsening over 3-4 months, associated with nausea and occasional vomiting.  She has had GI side effects with sinemet for her PD so she associated this symptom with that medication.  She has lost about 10 lbs. CT a/p without contrast showed a 6.8 x 3.7 cm mass in the distal pancreatic body and tail with minimal surrounding inflammation.  Multiple borderline enalrged nodes near celiac axis and mesenteric axis.  She followed up with Dr. Garcia yesterday.    She has been taking hydrocodone 2-3 times a day for the pain.    She is a retired pediatrician, previously worked in [place].  She and her  moved to [place] when she retired about 4 years ago.    Her heart rate has been high recently.  She reports having an ekg in September which was ok.    REVIEW OF SYSTEMS:  A 14 point review of systems was performed and is negative except as noted above.    Past Medical History:   Diagnosis Date   • Asthma    • Hyperlipidemia    • Hypertension    • Impaired functional mobility, balance, gait, and endurance    • Parkinson disease (HCC)    • Scoliosis              Objective     /84   Pulse (!) 141   Temp 98.4 °F (36.9 °C) (Temporal)   Resp 16   Ht 175.3 cm (69\")   Wt 70.8 kg (156 lb)   SpO2 97%   BMI 23.04 kg/m²   Performance Status:1              General: well appearing female in no acute distress  Neuro: alert and oriented  HEENT: sclerae anicteric, oropharynx clear  Cardiovascular: tachycardic, regular, no murmurs  Extremities: no lower extremity edema  Skin: no rashes, lesions, bruising, or petechiae  Psych: mood and affect appropriate    Lab Results   Component Value Date    WBC 6.58 " 11/23/2022    HGB 12.9 11/23/2022    HCT 38.0 11/23/2022    MCV 99.5 (H) 11/23/2022     11/23/2022     Lab Results   Component Value Date    GLUCOSE 108 (H) 11/23/2022    BUN 11 11/23/2022    CREATININE 0.68 11/23/2022    EGFRIFNONA 85 10/23/2020    BCR 16.2 11/23/2022    K 3.8 11/23/2022    CO2 25.3 11/23/2022    CALCIUM 9.7 11/23/2022    ALBUMIN 4.20 11/23/2022    AST 16 11/23/2022    ALT <5 11/23/2022       CT Abdomen Pelvis Without Contrast  Narrative: CT SCAN OF THE ABDOMEN AND PELVIS WITHOUT CONTRAST     11/23/2022 1:03  PM      HISTORY:  LUQ abd pain nvd left upper quadrant abdominal pain. Nausea, vomiting  and diarrhea.     PROCEDURE:   Axial CT images were obtained from the lung bases to the pubic symphysis  without IV contrast. Reformatted images were generated from the axial  data set and provided for interpretation. This study was performed with  techniques to keep radiation doses as low as reasonably achievable  (ALARA). Individualized dose reduction techniques using automated  exposure control or adjustment of mA and/or kV according to the patient  size were employed. 599.83 mGy.cm 11.59 mGy     COMPARISON:   CT abdomen and pelvis dated 10/05/2019.     FINDINGS:   Lack of IV contrast limits evaluation of the abdominal and pelvic solid  organs.     LOWER CHEST:  The heart is normal in size. Coronary artery disease present. Lingular  and left lower lobe subsegmental atelectasis. Otherwise the lung bases  are clear. Subcentimeter groundglass nodule in the right middle lobe.     ABDOMEN/PELVIS:  Liver, gallbladder and bile ducts:  Unenhanced liver is grossly unremarkable without new suspicious focal  abnormality. Stable 2.3 cm hypodense lesion in the right hemiliver,  probable benign findings such as cysts. Unremarkable gallbladder. No  definite biliary ductal dilatation.      Adrenal glands:  The adrenal glands are morphologically unremarkable without suspicious  lesion.     Kidneys, ureters and  urinary bladder:  No nephrolithiasis. No hydronephrosis. Unremarkable urinary bladder.     Spleen:  The spleen is normal in size.     Pancreas:  There is lobulated soft tissue mass in the distal pancreatic body and  tail, measuring 6.8 x 3.7 cm in axial dimension (series 2, image 38) and  measuring 5.6 cm in craniocaudal dimension (series 5, image 45). There  is very minimal surrounding inflammation. No pancreatic ductal  dilatation. Uncinate process, pancreatic head and neck are grossly  unremarkable.      Gastrointestinal system and mesentery:  There is no evidence of bowel obstruction. The appendix is visualized  and unremarkable. No significant mesenteric inflammation. Moderate  amount of stool burden in the colon.     Lymph nodes:  Multiple borderline enlarged lymph nodes near the celiac axis and  mesenteric axis (series 2, image 37)..     Vessels:  The abdominal aorta is normal in caliber. Aortic and bilateral iliac  atherosclerotic disease. The inferior vena cava is unremarkable.     Peritoneum:  No free intraperitoneal fluid or pneumoperitoneum.     Pelvic viscera:  No acute findings. Stable 2.2 cm left ovarian cystic lesion.     Body wall:  No acute findings. No significant body wall hernias.     Bones:  No acute fracture. Postsurgical changes from left total hip arthroplasty  prosthesis placement. Severe degenerative changes of the thoracolumbar  spine with dextroscoliosis of the lumbar spine. No aggressive osseous  sclerotic or lucent lesions.     Impression: There is a lobulated soft tissue mass in the distal pancreatic body and  tail (6.8 x 3.7 x 5.6 cm) with very minimal surrounding inflammation.  This finding is concerning for primary pancreatic  malignancy/adenocarcinoma. Other differential considerations include  sequela of prior pancreatitis or autoimmune pancreatitis. Mildly  enlarged celiac axis lymph nodes, infectious/inflammatory/neoplastic in  etiology. No pancreatic ductal dilatation.  Evaluation is limited due to  lack of intravenous contrast. Consider further evaluation with CT/MRI  pancreatic mass protocol as clinically indicated. Other chronic findings  as above.                 Images personally reviewed, interpreted and dictated by SIMON Rooney.                    This report was signed and finalized on 11/23/2022 1:18 PM by SIMON Rooney.    I personally reviewed the ct images.        ASSESSMENT AND PLAN:     Silvia Chung is a 72 y.o. female with a newly discovered pancreatic mass.    We discussed that there is certainly concern for malignancy based on the imaging, but occasionally other non-malignant entities can present with a pancreatic mass.  The first step is to obtain better imaging.  I will get a ct a/p with contrast/pancreas protocol, as well as labs including ca 19-9, LDH.    Depending on imaging findings, we can discuss evaluation by surgery if this appears to be resectable disease.  If not, we will likely try to obtain a biopsy for definitive diagnosis.    I will contact her by phone with ct results when available and make further plans from there.    Tachycardia: ekg today.  No prior history of arrhythmia    Cancer related pain: norco refilled today, 45 tabs.    Parkinson disease: continue current medications.               A total greater than 60 mins minutes was spent in face to face patient time, examination, counseling, charting, reviewing test results, and reviewing outside records.    Janell Corrales MD    11/29/2022

## 2022-11-30 LAB — CANCER AG19-9 SERPL-ACNC: 175 U/ML

## 2022-12-01 ENCOUNTER — HOSPITAL ENCOUNTER (OUTPATIENT)
Dept: CT IMAGING | Facility: HOSPITAL | Age: 72
Discharge: HOME | End: 2022-12-01
Admitting: INTERNAL MEDICINE

## 2022-12-01 DIAGNOSIS — K86.89 MASS OF PANCREAS: ICD-10-CM

## 2022-12-01 PROCEDURE — 0 IOPAMIDOL PER 1 ML: Performed by: INTERNAL MEDICINE

## 2022-12-01 PROCEDURE — 74178 CT ABD&PLV WO CNTR FLWD CNTR: CPT

## 2022-12-01 RX ADMIN — IOPAMIDOL 95 ML: 755 INJECTION, SOLUTION INTRAVENOUS at 13:38

## 2022-12-02 ENCOUNTER — TELEPHONE (OUTPATIENT)
Dept: ONCOLOGY | Facility: CLINIC | Age: 72
End: 2022-12-02

## 2022-12-02 DIAGNOSIS — C25.1 MALIGNANT NEOPLASM OF BODY OF PANCREAS: Primary | ICD-10-CM

## 2022-12-02 NOTE — TELEPHONE ENCOUNTER
Called patient re: ct scan results.  No evidence of distant spread or belgica disease.  Spoke with Dr. Robles at  - he will see her next week.  Briefly discussed potential role of chemotherapy in her treatment.

## 2022-12-13 DIAGNOSIS — K86.89 PANCREATIC MASS: ICD-10-CM

## 2022-12-13 RX ORDER — HYDROCODONE BITARTRATE AND ACETAMINOPHEN 5; 325 MG/1; MG/1
1 TABLET ORAL EVERY 6 HOURS PRN
Qty: 45 TABLET | Refills: 0 | Status: SHIPPED | OUTPATIENT
Start: 2022-12-13 | End: 2023-01-31

## 2022-12-13 NOTE — TELEPHONE ENCOUNTER
Caller: Silvia Chung    Relationship: Self    Best call back number: [phone]    Requested Prescriptions:   Requested Prescriptions     Pending Prescriptions Disp Refills   • HYDROcodone-acetaminophen (NORCO) 5-325 MG per tablet 45 tablet 0     Sig: Take 1 tablet by mouth Every 6 (Six) Hours As Needed for Severe Pain.        Pharmacy where request should be sent: [id number], [place] - [address]  - [phone] [facility] [phone] FX     Additional details provided by patient: PT STATED SHE IS ALMOST OUT OF HER HYDROCODONE AND SURGERY ISN'T SCHED UNTIL NEXT Monday.     Does the patient have less than a 3 day supply:  [x] Yes  [] No    Would you like a call back once the refill request has been completed: [x] Yes [] No    If the office needs to give you a call back, can they leave a voicemail: [x] Yes [] No

## 2022-12-14 ENCOUNTER — TRANSCRIBE ORDERS (OUTPATIENT)
Dept: LAB | Facility: HOSPITAL | Age: 72
End: 2022-12-14

## 2022-12-14 ENCOUNTER — LAB (OUTPATIENT)
Dept: LAB | Facility: HOSPITAL | Age: 72
End: 2022-12-14

## 2022-12-14 DIAGNOSIS — Z01.818 OTHER SPECIFIED PRE-OPERATIVE EXAMINATION: Primary | ICD-10-CM

## 2022-12-14 DIAGNOSIS — Z01.818 OTHER SPECIFIED PRE-OPERATIVE EXAMINATION: ICD-10-CM

## 2022-12-14 PROCEDURE — U0004 COV-19 TEST NON-CDC HGH THRU: HCPCS

## 2022-12-14 PROCEDURE — U0005 INFEC AGEN DETEC AMPLI PROBE: HCPCS

## 2022-12-14 PROCEDURE — C9803 HOPD COVID-19 SPEC COLLECT: HCPCS

## 2022-12-16 LAB — SARS-COV-2 RNA PNL SPEC NAA+PROBE: NOT DETECTED

## 2023-01-19 ENCOUNTER — TELEPHONE (OUTPATIENT)
Dept: ONCOLOGY | Facility: CLINIC | Age: 73
End: 2023-01-19
Payer: MEDICARE

## 2023-01-19 NOTE — TELEPHONE ENCOUNTER
Per Janell Corrales MD, I called patient to set up an appt to see us in [facility].  Patient has staples and drains out now but is very fatigued. She has a post op appt at  again on Jan 25 so we will see her Tuesday, Jan. 31st.  I gave appt time to patient and scheduling.

## 2023-01-31 ENCOUNTER — OFFICE VISIT (OUTPATIENT)
Dept: ONCOLOGY | Facility: CLINIC | Age: 73
End: 2023-01-31
Payer: MEDICARE

## 2023-01-31 VITALS
TEMPERATURE: 97.7 F | HEART RATE: 81 BPM | WEIGHT: 155 LBS | RESPIRATION RATE: 16 BRPM | OXYGEN SATURATION: 99 % | DIASTOLIC BLOOD PRESSURE: 69 MMHG | SYSTOLIC BLOOD PRESSURE: 114 MMHG | BODY MASS INDEX: 22.96 KG/M2 | HEIGHT: 69 IN

## 2023-01-31 DIAGNOSIS — C25.1 MALIGNANT NEOPLASM OF BODY OF PANCREAS: Primary | ICD-10-CM

## 2023-01-31 PROCEDURE — 99215 OFFICE O/P EST HI 40 MIN: CPT | Performed by: INTERNAL MEDICINE

## 2023-01-31 RX ORDER — ASPIRIN 81 MG/1
81 TABLET, CHEWABLE ORAL DAILY
COMMUNITY
Start: 2023-01-04 | End: 2024-01-04

## 2023-01-31 RX ORDER — LIDOCAINE AND PRILOCAINE 25; 25 MG/G; MG/G
1 CREAM TOPICAL AS NEEDED
Qty: 30 G | Refills: 3 | Status: SHIPPED | OUTPATIENT
Start: 2023-01-31

## 2023-01-31 RX ORDER — PSEUDOEPHEDRINE HCL 30 MG
100 TABLET ORAL
COMMUNITY

## 2023-01-31 RX ORDER — ONDANSETRON HYDROCHLORIDE 8 MG/1
8 TABLET, FILM COATED ORAL 3 TIMES DAILY PRN
Qty: 30 TABLET | Refills: 5 | Status: SHIPPED | OUTPATIENT
Start: 2023-01-31

## 2023-01-31 RX ORDER — DIPHENHYDRAMINE HCL 25 MG
25 CAPSULE ORAL
COMMUNITY

## 2023-01-31 RX ORDER — POLYETHYLENE GLYCOL 3350 17 G/17G
17 POWDER, FOR SOLUTION ORAL
COMMUNITY

## 2023-01-31 NOTE — PROGRESS NOTES
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  She has had GI side effects with sinemet for her PD so she associated this symptom with that medication.  She has lost about 10 lbs. CT a/p without contrast showed a 6.8 x 3.7 cm mass in the distal pancreatic body and tail with minimal surrounding inflammation.  Multiple borderline enalrged nodes near celiac axis and mesenteric axis.  CT a/p with contrast 12/1/22 showed a 5.6 x 3.6 cm mass in the tail and distal body of the pancreas, no adenopathy.  B) distal pancreatectomy and splenectomy performed on 12/19/2022.  Pathology showed poorly differentiated pancreatic adenosquamous carcinoma with involvement of adrenal gland by direct extension, 1 out of 5 lymph nodes involved.  Surgical margins negative. + lymphovascular invasion, + perineural invasion.  2. parkinsons disease  3. Hypertension  4. Hyperlipidemia  5. asthma    Subjective     CHIEF COMPLAINT: pancreas cancer    HISTORY OF PRESENT ILLNESS:   Silvia Chung returns for follow-up.   She underwent surgical resection of the pancreas tumor about a month ago.    She says she has been feeling better recently.  She feels like she is getting some strength and appetite back.  She has started doing some water aerobics.  No nausea.  Bowel movements have been normal.      Objective      /69   Pulse 81   Temp 97.7 °F (36.5 °C) (Temporal)   Resp 16   Ht 175.3 cm (69\")   Wt 70.3 kg (155 lb)   SpO2 99%   BMI 22.89 kg/m²      Performance Status:0                General: well appearing female in no acute distress  Neuro: alert and oriented  HEENT: sclera anicteric, oropharynx clear  Skin: no rashes, lesions, bruising, or petechiae  Psych: mood and affect appropriate          RECENT LABS:  Lab Results   Component Value Date    WBC 7.53 01/25/2023    " HGB 11.5 01/25/2023    HCT 35.1 01/25/2023    MCV 99 (H) 01/25/2023     (H) 01/25/2023       Lab Results   Component Value Date    GLUCOSE 172 (H) 12/20/2022    BUN 11 11/23/2022    CREATININE 0.68 11/23/2022    EGFRIFNONA 85 10/23/2020    BCR 16.2 11/23/2022    K 4.5 12/20/2022    CO2 25.3 11/23/2022    CALCIUM 9.7 11/23/2022    ALBUMIN 4.20 11/23/2022    AST 16 11/23/2022    ALT <5 11/23/2022               ASSESSMENT AND PLAN:     Silvia Chung is a 72 y.o. female with a T3N1M0 adenosquamous carcinoma of the pancreas, here for follow up after surgery.      We discussed that lymph node involvement does somewhat increase the risk of recurrence, but fortunately the margins were all negative with her resection.    We discussed the option of adjuvant chemotherapy with FOLFIRINOX.  We reviewed the side effects of FOLFIRINOX including nausea, fatigue, alopecia, neuropathy, mucositis, cold sensitivity, myelosuppression, and diarrhea.    I will request port placement, and will hope to begin treatment next week.    Parkinson's: She should be able to continue her regular Parkinson's medications.    Post splenectomy vaccines given at [facility].    F/u 3 week with cycle 2.          Total time of patient care on day of service including time prior to, face to face with patient, and following visit spent in reviewing records, lab results, imaging studies, discussion with patient, and documentation/charting was > 42 minutes.          Janell Corrales MD  [facility]    1/31/2023          CC:

## 2023-02-01 ENCOUNTER — TELEPHONE (OUTPATIENT)
Dept: ONCOLOGY | Facility: CLINIC | Age: 73
End: 2023-02-01

## 2023-02-01 NOTE — TELEPHONE ENCOUNTER
Caller: Silvia Chung    Relationship: Self    Best call back number: [phone]    What is the best time to reach you: ANYTIME     Who are you requesting to speak with (clinical staff, provider,  specific staff member): SCHEDULING     What was the call regarding: PT NEEDS TO R/S HER CHEMO EDUCATION ON 2/6    Do you require a callback: YES TO ADVISE OF CHANGE PLEASE

## 2023-02-03 NOTE — PROGRESS NOTES
Patient: Silvia Chung    YOB: 1950    Date: 02/06/2023    Primary Care Provider: Janell Almazan MD    Chief Complaint   Patient presents with   • Consult     Port placement        SUBJECTIVE:    History of present illness: Patient has a history significant for pancreatic carcinoma.   I saw the patient in the office today as a consultation for a port-a-cath placement.    I saw the patient in late October as a consultation from the ER after CT scan showed evidence of a pancreatic body mass.  She subsequently has undergone exploratory laparotomy with pancreatectomy of the body and tail of the pancreas associated with splenectomy, adrenalectomy, and cholecystectomy at that time.  This was done at the [facility], she is now scheduled to undergo chemotherapy and needs Port-A-Cath placement.    T3N1M0 adenosquamous carcinoma of the pancreas.    The following portions of the patient's history were reviewed and updated as appropriate: allergies, current medications, past family history, past medical history, past social history, past surgical history and problem list.    Review of Systems    History:  Past Medical History:   Diagnosis Date   • Asthma    • Hyperlipidemia    • Hypertension    • Impaired functional mobility, balance, gait, and endurance    • Parkinson disease (HCC)    • Scoliosis        Past Surgical History:   Procedure Laterality Date   • ADENOIDECTOMY     • CATARACT EXTRACTION     • HIP HEMIARTHROPLASTY Left 10/21/2020    Procedure: HIP HEMIARTHROPLASTY LEFT;  Surgeon: Humza Winters MD;  Location: [facility];  Service: Orthopedics;  Laterality: Left;   • KNEE SURGERY     • NECK SURGERY     • TONSILLECTOMY         Family History   Problem Relation Age of Onset   • Heart failure Father    • Lung cancer Father    • Cancer Brother        Social History     Tobacco Use   • Smoking status: Never   • Smokeless tobacco: Never   Vaping Use   • Vaping Use: Never used    Substance Use Topics   • Alcohol use: Yes     Alcohol/week: 2.0 standard drinks     Types: 2 Cans of beer per week     Comment: socially   • Drug use: Defer       Medications:     Current Outpatient Medications:   •  amLODIPine (NORVASC) 10 MG tablet, Take 10 mg by mouth Every Night., Disp: , Rfl:   •  aspirin 81 MG chewable tablet, Chew 81 mg Daily., Disp: , Rfl:   •  carbidopa-levodopa CR (SINEMET CR)  MG per CR tablet, Take 1 tablet by mouth Daily., Disp: , Rfl:   •  diphenhydrAMINE (BENADRYL) 25 mg capsule, 25 mg., Disp: , Rfl:   •  docusate sodium 100 MG capsule, Take 100 mg by mouth., Disp: , Rfl:   •  metoprolol tartrate (LOPRESSOR) 25 MG tablet, Take 12.5 mg by mouth 2 (Two) Times a Day., Disp: , Rfl:   •  montelukast (SINGULAIR) 10 MG tablet, Take 10 mg by mouth Daily., Disp: , Rfl:   •  Multiple Vitamins-Minerals (multivitamin with minerals) tablet tablet, Take 1 tablet by mouth Daily., Disp: , Rfl:   •  ondansetron (ZOFRAN) 8 MG tablet, Take 1 tablet by mouth 3 (Three) Times a Day As Needed for Nausea or Vomiting., Disp: 30 tablet, Rfl: 5  •  polyethylene glycol (MIRALAX) 17 GM/SCOOP powder, 17 g., Disp: , Rfl:   •  Promethazine HCl (PHENERGAN PO), Take  by mouth Daily As Needed., Disp: , Rfl:   •  sertraline (ZOLOFT) 100 MG tablet, Take 100 mg by mouth Daily., Disp: , Rfl:   •  tiZANidine (ZANAFLEX) 4 MG tablet, Take 4 mg by mouth Every 8 (Eight) Hours As Needed for Muscle Spasms., Disp: , Rfl:   •  albuterol sulfate  (90 Base) MCG/ACT inhaler, Inhale 2 puffs Every 4 (Four) Hours As Needed for Wheezing., Disp: , Rfl:   •  calcium carbonate (TUMS) 500 MG chewable tablet, Chew 2 tablets 3 (Three) Times a Day As Needed for Indigestion or Heartburn., Disp: , Rfl:   •  lidocaine-prilocaine (EMLA) 2.5-2.5 % cream, Apply 1 application topically to the appropriate area as directed As Needed (45-60 minutes prior to port access.  Cover with saran/plastic wrap.)., Disp: 30 g, Rfl: 3  •  omeprazole  "(priLOSEC) 40 MG capsule, Take 40 mg by mouth Daily., Disp: , Rfl:      Allergies:  Allergies   Allergen Reactions   • Lovastatin Other (See Comments)     Muscle cramps    • Zofran [Ondansetron] GI Intolerance       OBJECTIVE:    Vital Signs:   Vitals:    02/06/23 1010   BP: 112/82   BP Location: Left arm   Patient Position: Sitting   Cuff Size: Adult   Pulse: 118   Resp: 18   Temp: 97.7 °F (36.5 °C)   TempSrc: Temporal   SpO2: 97%   Weight: 68.9 kg (152 lb)   Height: 175.3 cm (69\")       Physical Exam:   General Appearance:    Alert, cooperative, in no acute distress   Head:    Normocephalic, without obvious abnormality, atraumatic   Eyes:            Lids and lashes normal, conjunctivae and sclerae normal, no   icterus, no pallor, corneas clear,   Ears:    Ears appear intact with no abnormalities noted   Throat:   No oral lesions, no thrush, oral mucosa moist   Lungs:     Clear to auscultation,respirations regular, even and                  Unlabored    Heart:    Regular rhythm and normal rate, no murmur, no gallop.   Chest Wall:    No abnormalities observed   Abdomen:     Normal bowel sounds, no masses, no organomegaly, soft        non-tender, non-distended, no guarding.   Extremities:   Moves all extremities well, no edema, no cyanosis, no             redness   Pulses:   Pulses palpable and equal bilaterally   Skin:   No bleeding, bruising or rash   Lymph nodes:   No palpable adenopathy   Neurologic:   Cranial nerves 2 - 12 grossly intact.     Results Review:   I reviewed the patient's new clinical results.  I reviewed the patient's new imaging results and agree with the interpretation.  I reviewed the patient's other test results and agree with the interpretation    Review of Systems was reviewed and confirmed as accurate as documented by the MA.    ASSESSMENT/PLAN:    1. Pancreatic mass        I offered the patient a Bren-cath. I explained the procedure in detail and the patient understood the procedure. The " patient also understands the risks which include but are not limited to bleeding, infection, pneumothorax, DVT etc. I have answered their questions and they wish to proceed with Bren-cath placement.     Electronically signed by Marcio Garcia MD  02/06/23  10:30 EST

## 2023-02-03 NOTE — H&P (VIEW-ONLY)
Patient: Silvia Chung    YOB: 1950    Date: 02/06/2023    Primary Care Provider: Janell Almazan MD    Chief Complaint   Patient presents with   • Consult     Port placement        SUBJECTIVE:    History of present illness: Patient has a history significant for pancreatic carcinoma.   I saw the patient in the office today as a consultation for a port-a-cath placement.    I saw the patient in late October as a consultation from the ER after CT scan showed evidence of a pancreatic body mass.  She subsequently has undergone exploratory laparotomy with pancreatectomy of the body and tail of the pancreas associated with splenectomy, adrenalectomy, and cholecystectomy at that time.  This was done at the [facility], she is now scheduled to undergo chemotherapy and needs Port-A-Cath placement.    T3N1M0 adenosquamous carcinoma of the pancreas.    The following portions of the patient's history were reviewed and updated as appropriate: allergies, current medications, past family history, past medical history, past social history, past surgical history and problem list.    Review of Systems    History:  Past Medical History:   Diagnosis Date   • Asthma    • Hyperlipidemia    • Hypertension    • Impaired functional mobility, balance, gait, and endurance    • Parkinson disease (HCC)    • Scoliosis        Past Surgical History:   Procedure Laterality Date   • ADENOIDECTOMY     • CATARACT EXTRACTION     • HIP HEMIARTHROPLASTY Left 10/21/2020    Procedure: HIP HEMIARTHROPLASTY LEFT;  Surgeon: Humza Winters MD;  Location: [facility];  Service: Orthopedics;  Laterality: Left;   • KNEE SURGERY     • NECK SURGERY     • TONSILLECTOMY         Family History   Problem Relation Age of Onset   • Heart failure Father    • Lung cancer Father    • Cancer Brother        Social History     Tobacco Use   • Smoking status: Never   • Smokeless tobacco: Never   Vaping Use   • Vaping Use: Never used    Substance Use Topics   • Alcohol use: Yes     Alcohol/week: 2.0 standard drinks     Types: 2 Cans of beer per week     Comment: socially   • Drug use: Defer       Medications:     Current Outpatient Medications:   •  amLODIPine (NORVASC) 10 MG tablet, Take 10 mg by mouth Every Night., Disp: , Rfl:   •  aspirin 81 MG chewable tablet, Chew 81 mg Daily., Disp: , Rfl:   •  carbidopa-levodopa CR (SINEMET CR)  MG per CR tablet, Take 1 tablet by mouth Daily., Disp: , Rfl:   •  diphenhydrAMINE (BENADRYL) 25 mg capsule, 25 mg., Disp: , Rfl:   •  docusate sodium 100 MG capsule, Take 100 mg by mouth., Disp: , Rfl:   •  metoprolol tartrate (LOPRESSOR) 25 MG tablet, Take 12.5 mg by mouth 2 (Two) Times a Day., Disp: , Rfl:   •  montelukast (SINGULAIR) 10 MG tablet, Take 10 mg by mouth Daily., Disp: , Rfl:   •  Multiple Vitamins-Minerals (multivitamin with minerals) tablet tablet, Take 1 tablet by mouth Daily., Disp: , Rfl:   •  ondansetron (ZOFRAN) 8 MG tablet, Take 1 tablet by mouth 3 (Three) Times a Day As Needed for Nausea or Vomiting., Disp: 30 tablet, Rfl: 5  •  polyethylene glycol (MIRALAX) 17 GM/SCOOP powder, 17 g., Disp: , Rfl:   •  Promethazine HCl (PHENERGAN PO), Take  by mouth Daily As Needed., Disp: , Rfl:   •  sertraline (ZOLOFT) 100 MG tablet, Take 100 mg by mouth Daily., Disp: , Rfl:   •  tiZANidine (ZANAFLEX) 4 MG tablet, Take 4 mg by mouth Every 8 (Eight) Hours As Needed for Muscle Spasms., Disp: , Rfl:   •  albuterol sulfate  (90 Base) MCG/ACT inhaler, Inhale 2 puffs Every 4 (Four) Hours As Needed for Wheezing., Disp: , Rfl:   •  calcium carbonate (TUMS) 500 MG chewable tablet, Chew 2 tablets 3 (Three) Times a Day As Needed for Indigestion or Heartburn., Disp: , Rfl:   •  lidocaine-prilocaine (EMLA) 2.5-2.5 % cream, Apply 1 application topically to the appropriate area as directed As Needed (45-60 minutes prior to port access.  Cover with saran/plastic wrap.)., Disp: 30 g, Rfl: 3  •  omeprazole  "(priLOSEC) 40 MG capsule, Take 40 mg by mouth Daily., Disp: , Rfl:      Allergies:  Allergies   Allergen Reactions   • Lovastatin Other (See Comments)     Muscle cramps    • Zofran [Ondansetron] GI Intolerance       OBJECTIVE:    Vital Signs:   Vitals:    02/06/23 1010   BP: 112/82   BP Location: Left arm   Patient Position: Sitting   Cuff Size: Adult   Pulse: 118   Resp: 18   Temp: 97.7 °F (36.5 °C)   TempSrc: Temporal   SpO2: 97%   Weight: 68.9 kg (152 lb)   Height: 175.3 cm (69\")       Physical Exam:   General Appearance:    Alert, cooperative, in no acute distress   Head:    Normocephalic, without obvious abnormality, atraumatic   Eyes:            Lids and lashes normal, conjunctivae and sclerae normal, no   icterus, no pallor, corneas clear,   Ears:    Ears appear intact with no abnormalities noted   Throat:   No oral lesions, no thrush, oral mucosa moist   Lungs:     Clear to auscultation,respirations regular, even and                  Unlabored    Heart:    Regular rhythm and normal rate, no murmur, no gallop.   Chest Wall:    No abnormalities observed   Abdomen:     Normal bowel sounds, no masses, no organomegaly, soft        non-tender, non-distended, no guarding.   Extremities:   Moves all extremities well, no edema, no cyanosis, no             redness   Pulses:   Pulses palpable and equal bilaterally   Skin:   No bleeding, bruising or rash   Lymph nodes:   No palpable adenopathy   Neurologic:   Cranial nerves 2 - 12 grossly intact.     Results Review:   I reviewed the patient's new clinical results.  I reviewed the patient's new imaging results and agree with the interpretation.  I reviewed the patient's other test results and agree with the interpretation    Review of Systems was reviewed and confirmed as accurate as documented by the MA.    ASSESSMENT/PLAN:    1. Pancreatic mass        I offered the patient a Bren-cath. I explained the procedure in detail and the patient understood the procedure. The " patient also understands the risks which include but are not limited to bleeding, infection, pneumothorax, DVT etc. I have answered their questions and they wish to proceed with Bren-cath placement.     Electronically signed by Marcio Garcia MD  02/06/23  10:30 EST

## 2023-02-06 ENCOUNTER — OFFICE VISIT (OUTPATIENT)
Dept: SURGERY | Facility: CLINIC | Age: 73
End: 2023-02-06
Payer: MEDICARE

## 2023-02-06 VITALS
RESPIRATION RATE: 18 BRPM | HEART RATE: 118 BPM | BODY MASS INDEX: 22.51 KG/M2 | SYSTOLIC BLOOD PRESSURE: 112 MMHG | OXYGEN SATURATION: 97 % | DIASTOLIC BLOOD PRESSURE: 82 MMHG | TEMPERATURE: 97.7 F | HEIGHT: 69 IN | WEIGHT: 152 LBS

## 2023-02-06 DIAGNOSIS — K86.89 PANCREATIC MASS: Primary | ICD-10-CM

## 2023-02-06 PROCEDURE — 99214 OFFICE O/P EST MOD 30 MIN: CPT | Performed by: SURGERY

## 2023-02-06 NOTE — PROGRESS NOTES
"CHEMOTHERAPY PREPARATION    Silvia Chung  [id number]  1950    Subjective   Chief Complaint: Treatment Preparation and Needs Assessment    History of present illness:  Silvia Chung is a 72 y.o. year old female who is here today for chemotherapy preparation and needs assessment. The patient has been diagnosed with pancreatic cancer and is scheduled to begin  IV treatment with FOLFIRINOX.     Oncology History:    Oncology/Hematology History   Malignant neoplasm of body of pancreas (HCC)   1/31/2023 Initial Diagnosis    Malignant neoplasm of body of pancreas (HCC)     2/13/2023 -  Chemotherapy    OP PANCREATIC mFOLFIRINOX (OXALIplatin / Leucovorin / Irinotecan / Fluorouracil CIV)         The current medication list and allergy list were reviewed and reconciled.     Past Medical History, Past Surgical History, Social History, Family History have been reviewed and are without significant changes except as mentioned.      Review of Systems    Objective   Physical Exam  Vital Signs: /62   Pulse 72   Temp 98.4 °F (36.9 °C) (Temporal)   Resp 16   Ht 172.7 cm (68\")   Wt 69.9 kg (154 lb)   SpO2 97%   BMI 23.42 kg/m²    General Appearance:  alert, cooperative, no apparent distress and appears stated age   Neurologic/Psychiatric: A&O x 3, gait steady, appropriate affect   HEENT:  Normocephalic, without obvious abnormality, mucous membranes moist   Lungs:   Clear to auscultation bilaterally; respirations regular, even, and unlabored bilaterally   Heart:  Regular rate and rhythm, no murmurs appreciated   Extremities: Normal, atraumatic; no clubbing, cyanosis, or edema    Skin: No rashes, lesions, or abnormal coloration noted     ECOG Performance Status: 1 - Symptomatic but completely ambulatory            NEEDS ASSESSMENTS    Genetics  The patient's new diagnosis and family history have been reviewed for genetic counseling needs. A genetic referral is not recommended.     Psychosocial  The patient has " "completed a PHQ-9 Depression Screening and the Distress Thermometer (DT) today.   PHQ-9 results show 5-9 (Mild Depression). The patient scored their distress today as 2 on a scale of 0-10 with 0 being no distress and 10 being extreme distress.   Problems marked as being an issue for her within the last week include physical problems.   Results were reviewed along with psychosocial resources offered by our cancer center. Our oncology social worker will be flagged for a DT score of 4 or above, and a same day call will be made for a score of 9 or 10. A mental health referral is not recommended at this time. The patient is not accepting of a referral to [facility].   Copies of patient's questionnaires will be scanned into EMR for details and further reference.    Barriers to care  A barriers form was also completed by the patient today. We discussed services offered by our facility to help her have adequate access to care. The patient was given the name and card for our Oncology Social Worker. Based upon barriers assessment today, the patient will not require a follow-up call from the  to further discuss needs.   A copy of the barriers form will also be scanned into EMR for details and further reference.     VAD Assessment  The patient and I discussed planned intervenous chemotherapy as well as other IV treatments that are often needed throughout the course of treatment. These may include, but are not limited to blood transfusions, antibiotics, and IV hydration. The patient's vasculature does not appear to be adequate for multiple peripheral IVs throughout their treatment course. Discussed risks and benefits of VADs. The patient would like to pursue Port-A-Cath insertion prior to initiation of treatment.       Advance Care Planning   The patient and I discussed advanced care planning, \"Conversations that Matter\".   This service was offered, free of charge, for development of advance directives with a " certified ACP facilitator.  The patient does have an up-to-date advanced directive. This document is on file with our office. The patient is not interested in an appointment with one of our facilitators to create or update their advanced directives.         Palliative Care  The patient and I discussed palliative care services. Palliative care is not the same as Hospice care. This is specialized medical care for people living with serious illness with the goal of improving quality of life for the patient and their family. [facility] has partnered with [facility] to offer our patients outpatient palliative care early along with their treatment to assist in coordination of care, symptom management, pain management, and medical decision making.  Oncology criteria for palliative care referral is not met at this time. The patient is not interested in a palliative care consultation.     Additional Referral needs  Nutrition      IV CHEMOTHERAPY EDUCATION    Booklets Given: Chemotherapy and You [x]  Eating Hints [x]    Sexuality/Fertility Books []      Chemotherapy/Biotherapy Education Sheets: (list all that apply)  nausea management, acid reflux management, diarrhea management, Cancer resourse contacts information, skin and mouth care and vaccination information                                                                                                                                                                 Chemotherapy Regimen:   Treatment Plans     Name Type Plan Dates Plan Provider         Active    OP PANCREATIC mFOLFIRINOX (OXALIplatin / Leucovorin / Irinotecan / Fluorouracil CIV) ONCOLOGY TREATMENT  2/12/2023 - Present Janell Corrales MD                      Chemotherapy education comprehension reviewed. Questions answered and additional information discussed on topics including:  Anemia, Thrombocytopenia, Neutropenia, Nutrition and appetite changes, Constipation, Diarrhea, Nausea & vomiting,  Mouth sores, Alopecia, Infertility & sexuality, Nervous system changes, Pain, Skin & nail changes, Organ toxicities, Survivorship, Home care and Vaccinations      TOPICS COMMENTS   Anemia: role of RBC, cause, s/s, ways to manage, role of transfusion Reviewed the role of RBC and the use of transfusions if hemoglobin decreases too much.  Patient to notify us if they experience shortness of breath, dizziness, or palpitations.  Also let patient know they could feel more tired than usual and to try to stay active, but rest if they need to.    Thrombocytopenia: role of platelet, cause, s/s, ways to prevent bleeding, things to avoid, when to seek help Reviewed the role of platelets in blood clotting and when to call clinic (bloody nose that bleeds for 5 mins despite pressure, a cut that won't stop bleeding despite pressure, gums that bleed excessively with brushing or flossing). Recommended using an electric razor, soft bristle toothbrush, and blowing your nose gently.    Neutropenia: role of WBC, cause, infection precautions, s/s of infection, when to call MD Reviewed the role of WBC, good infection prevention practices, and when to call the clinic (temperature 100.4F, sore throat, burning urination, etc)  · COVID Vaccines: Unknown  · Flu Vaccine: Unknown   Nutrition and Appetite Changes:  importance of maintaining healthy diet & weight, ways to manage to improve intake, dietary consult, exercise regimen Discussed risk of decreased appetite, reviewed plan for small more frequent meals.    Diarrhea: causes, s/s of dehydration, ways to manage, dietary changes, when to call MD Chemotherapy - Discussed risk of diarrhea. Instructed patient that they can use OTC loperamide at first presentation of diarrhea, but call MD if 4-6 episodes in 24 hours not relieved by OTC loperamide.   Constipation: causes, ways to manage, dietary changes, when to call MD Provided supplementary handout with instructions for use of docusate and other  OTC therapies to manage constipation.  Instructed to call us if medications aren't working.   Nausea/Vomiting: cause, use of antiemetics, dietary changes, when to call MD · Emetic risk: Moderate  · Scheduled meds: none  · PRN home meds: Ondansetron  · Pharmacy home meds sent to: Patient has at home.     Instructed the patient to take a dose of the PRN medication at the first onset of nausea and if it's not working to call us for additional medications.  Also provided non-drug measures to mitigate nausea.   Mouth Sores: causes, oral care, ways to manage Mouth sores can be prevented by making a mouth wash mixture of salt, baking soda, and water. The patient was instructed to swish and spit four times daily after meals and before bedtime.  Use of a soft bristle toothbrush was recommended.  The patient was instructed to avoid alcohol-containing OTC mouthwashes.    Alopecia: cause, ways to manage, resources Discussed the possibility of hair loss with the patient. Informed patient that they could request a prescription for a wig if desired and most of the cost is usually covered by insurance.   Infertility and Sexuality:  causes, fertility preservation options, sexuality changes, ways to manage, importance of birth control Discussed safe sex practices.   Nervous System Changes: causes, s/s, neuropathies, cognitive changes, ways to manage discussed the adverse effect of peripheral neuropathy and signs/symptoms associated with this adverse effect. Instructed patient to call if symptoms become more frequent or worsen.     Pain: causes, ways to manage Chemo - Discussed muscle and joint aches/pains with chemotherapy, and recommended the use of OTC pain relief with ibuprofen or acetaminophen if needed.         Skin/Nail Changes: cause, s/s, ways to manage  s/s, prevention measures, and treatment measures. A supplementary handout with this information was also given to the patient.          Organ Toxicities: cause, s/s, need for  diagnostic tests, labs, when to notify MD Discussed potential effects on organ systems, monitoring, diagnostic tests, labs, and when to notify their MD. Discussed the signs/symptoms of the following: hepatotoxicity, nephrotoxicity, cardiotoxicity, eye changes and skin changes   Survivorship: distress, distress assessment, secondary malignancies, early/late effects, follow-up, social issues, social support     Home Care: how to manage bodily fluids Counseled on management of soiled linens and proper flush technique.  Discussed how to manage all the side effects at home and advised when to contact the MD office   Infusion-Related Reactions: Cause, s/s, anaphylaxis, vesicant, etc. Discussed risks of infusion related reactions, s/s, management plan   Miscellaneous · Lab Draws: Patient will have labs with Day 1 of each cycle   ·       Assessment and Plan:    Diagnoses and all orders for this visit:    1. Malignant neoplasm of body of pancreas (HCC) (Primary)        This was a 60 minute face-to-face visit with 55 minutes spent in  counseling and coordination of care as documented above.   The patient and I have reviewed their new cancer diagnosis and scheduled treatment plan. Needs assessment was completed including genetics, psychosocial needs, barriers to care, VAD evaluation, advanced care planning, and palliative care services. Referrals have been ordered as appropriate based upon our evaluation and patient desires.     Zofran was sent to her pharmacy.  We discussed she will take 1 tablet by mouth every 8 hours as needed for nausea vomiting.  Patient picked up Emla cream we discussed how to use    Nutrition will see patient on day 1 of treatment.  Defers referrals to social work, financial counseling, mental health provider, and spiritual counseling    Patient met with Dr. Garcia on 2/6/2023 for discussion of port placement.  This has been scheduled. Emla cream was sent to her pharmacy.    IV chemotherapy teaching  was also completed today as documented above. Adequate time was given to answer all questions to her satisfaction. Patient and family are aware of their care team members and contact information if they have questions or problems throughout the treatment course. Needs assessments and education has been completed. The patient is adequately prepared to begin treatment as scheduled.     Electronically signed by [facility]menta on 02/10/23

## 2023-02-09 ENCOUNTER — APPOINTMENT (OUTPATIENT)
Dept: GENERAL RADIOLOGY | Facility: HOSPITAL | Age: 73
End: 2023-02-09
Payer: MEDICARE

## 2023-02-09 ENCOUNTER — ANESTHESIA (OUTPATIENT)
Dept: PERIOP | Facility: HOSPITAL | Age: 73
End: 2023-02-09
Payer: MEDICARE

## 2023-02-09 ENCOUNTER — HOSPITAL ENCOUNTER (OUTPATIENT)
Facility: HOSPITAL | Age: 73
Setting detail: HOSPITAL OUTPATIENT SURGERY
Discharge: HOME | End: 2023-02-09
Attending: SURGERY | Admitting: SURGERY
Payer: MEDICARE

## 2023-02-09 ENCOUNTER — ANESTHESIA EVENT (OUTPATIENT)
Dept: PERIOP | Facility: HOSPITAL | Age: 73
End: 2023-02-09
Payer: MEDICARE

## 2023-02-09 VITALS
SYSTOLIC BLOOD PRESSURE: 133 MMHG | DIASTOLIC BLOOD PRESSURE: 79 MMHG | HEART RATE: 71 BPM | TEMPERATURE: 97.6 F | WEIGHT: 152 LBS | BODY MASS INDEX: 23.04 KG/M2 | RESPIRATION RATE: 16 BRPM | OXYGEN SATURATION: 99 % | HEIGHT: 68 IN

## 2023-02-09 DIAGNOSIS — C25.1 MALIGNANT NEOPLASM OF BODY OF PANCREAS: Primary | ICD-10-CM

## 2023-02-09 DIAGNOSIS — K86.89 PANCREATIC MASS: ICD-10-CM

## 2023-02-09 PROCEDURE — 25010000002 FENTANYL CITRATE (PF) 50 MCG/ML SOLUTION: Performed by: NURSE ANESTHETIST, CERTIFIED REGISTERED

## 2023-02-09 PROCEDURE — 71045 X-RAY EXAM CHEST 1 VIEW: CPT

## 2023-02-09 PROCEDURE — 76000 FLUOROSCOPY <1 HR PHYS/QHP: CPT

## 2023-02-09 PROCEDURE — 36561 INSERT TUNNELED CV CATH: CPT | Performed by: SURGERY

## 2023-02-09 PROCEDURE — 25010000002 PROPOFOL 10 MG/ML EMULSION: Performed by: NURSE ANESTHETIST, CERTIFIED REGISTERED

## 2023-02-09 PROCEDURE — C1788 PORT, INDWELLING, IMP: HCPCS | Performed by: SURGERY

## 2023-02-09 PROCEDURE — 77001 FLUOROGUIDE FOR VEIN DEVICE: CPT | Performed by: SURGERY

## 2023-02-09 PROCEDURE — 25010000002 HEPARIN (PORCINE) PER 1000 UNITS: Performed by: SURGERY

## 2023-02-09 PROCEDURE — 25010000002 MIDAZOLAM PER 1MG: Performed by: NURSE ANESTHETIST, CERTIFIED REGISTERED

## 2023-02-09 PROCEDURE — 0 LIDOCAINE 1 % SOLUTION: Performed by: SURGERY

## 2023-02-09 DEVICE — PRT INTRO VASC/INTERV VORTEX FILL/HL DETACH/POLYURET/CATH 8F: Type: IMPLANTABLE DEVICE | Site: SUBCLAVIAN | Status: FUNCTIONAL

## 2023-02-09 RX ORDER — SCOLOPAMINE TRANSDERMAL SYSTEM 1 MG/1
1 PATCH, EXTENDED RELEASE TRANSDERMAL ONCE
Status: DISCONTINUED | OUTPATIENT
Start: 2023-02-09 | End: 2023-02-09 | Stop reason: HOSPADM

## 2023-02-09 RX ORDER — LIDOCAINE HYDROCHLORIDE 10 MG/ML
INJECTION, SOLUTION INFILTRATION; PERINEURAL AS NEEDED
Status: DISCONTINUED | OUTPATIENT
Start: 2023-02-09 | End: 2023-02-09 | Stop reason: HOSPADM

## 2023-02-09 RX ORDER — LIDOCAINE HYDROCHLORIDE 20 MG/ML
INJECTION, SOLUTION INTRAVENOUS AS NEEDED
Status: DISCONTINUED | OUTPATIENT
Start: 2023-02-09 | End: 2023-02-09 | Stop reason: SURG

## 2023-02-09 RX ORDER — SODIUM CHLORIDE, SODIUM LACTATE, POTASSIUM CHLORIDE, CALCIUM CHLORIDE 600; 310; 30; 20 MG/100ML; MG/100ML; MG/100ML; MG/100ML
1000 INJECTION, SOLUTION INTRAVENOUS CONTINUOUS
Status: DISCONTINUED | OUTPATIENT
Start: 2023-02-09 | End: 2023-02-09 | Stop reason: HOSPADM

## 2023-02-09 RX ORDER — HYDROCODONE BITARTRATE AND ACETAMINOPHEN 7.5; 325 MG/1; MG/1
1 TABLET ORAL EVERY 6 HOURS PRN
Qty: 15 TABLET | Refills: 0 | Status: SHIPPED | OUTPATIENT
Start: 2023-02-09 | End: 2023-03-28

## 2023-02-09 RX ORDER — LORAZEPAM 2 MG/ML
1 INJECTION INTRAMUSCULAR
Status: DISCONTINUED | OUTPATIENT
Start: 2023-02-09 | End: 2023-02-09 | Stop reason: HOSPADM

## 2023-02-09 RX ORDER — PROPOFOL 10 MG/ML
VIAL (ML) INTRAVENOUS AS NEEDED
Status: DISCONTINUED | OUTPATIENT
Start: 2023-02-09 | End: 2023-02-09 | Stop reason: SURG

## 2023-02-09 RX ORDER — DEXTROSE MONOHYDRATE 50 MG/ML
250 INJECTION, SOLUTION INTRAVENOUS ONCE
Status: CANCELLED | OUTPATIENT
Start: 2023-02-13

## 2023-02-09 RX ORDER — ATROPINE SULFATE 1 MG/ML
0.25 INJECTION, SOLUTION INTRAMUSCULAR; INTRAVENOUS; SUBCUTANEOUS
Status: CANCELLED | OUTPATIENT
Start: 2023-02-13

## 2023-02-09 RX ORDER — SODIUM CHLORIDE 0.9 % (FLUSH) 0.9 %
10 SYRINGE (ML) INJECTION AS NEEDED
Status: DISCONTINUED | OUTPATIENT
Start: 2023-02-09 | End: 2023-02-09 | Stop reason: HOSPADM

## 2023-02-09 RX ORDER — MIDAZOLAM HYDROCHLORIDE 2 MG/2ML
INJECTION, SOLUTION INTRAMUSCULAR; INTRAVENOUS AS NEEDED
Status: DISCONTINUED | OUTPATIENT
Start: 2023-02-09 | End: 2023-02-09 | Stop reason: SURG

## 2023-02-09 RX ORDER — PALONOSETRON 0.05 MG/ML
0.25 INJECTION, SOLUTION INTRAVENOUS ONCE
Status: CANCELLED | OUTPATIENT
Start: 2023-02-13

## 2023-02-09 RX ORDER — MAGNESIUM HYDROXIDE 1200 MG/15ML
LIQUID ORAL AS NEEDED
Status: DISCONTINUED | OUTPATIENT
Start: 2023-02-09 | End: 2023-02-09 | Stop reason: HOSPADM

## 2023-02-09 RX ORDER — FENTANYL CITRATE 50 UG/ML
INJECTION, SOLUTION INTRAMUSCULAR; INTRAVENOUS AS NEEDED
Status: DISCONTINUED | OUTPATIENT
Start: 2023-02-09 | End: 2023-02-09 | Stop reason: SURG

## 2023-02-09 RX ORDER — KETAMINE HYDROCHLORIDE 50 MG/ML
INJECTION, SOLUTION, CONCENTRATE INTRAMUSCULAR; INTRAVENOUS AS NEEDED
Status: DISCONTINUED | OUTPATIENT
Start: 2023-02-09 | End: 2023-02-09 | Stop reason: SURG

## 2023-02-09 RX ORDER — DIPHENHYDRAMINE HYDROCHLORIDE 50 MG/ML
50 INJECTION INTRAMUSCULAR; INTRAVENOUS AS NEEDED
Status: CANCELLED | OUTPATIENT
Start: 2023-02-13

## 2023-02-09 RX ORDER — HEPARIN SODIUM 1000 [USP'U]/ML
INJECTION, SOLUTION INTRAVENOUS; SUBCUTANEOUS AS NEEDED
Status: DISCONTINUED | OUTPATIENT
Start: 2023-02-09 | End: 2023-02-09 | Stop reason: HOSPADM

## 2023-02-09 RX ORDER — HEPARIN SODIUM 5000 [USP'U]/ML
INJECTION, SOLUTION INTRAVENOUS; SUBCUTANEOUS AS NEEDED
Status: DISCONTINUED | OUTPATIENT
Start: 2023-02-09 | End: 2023-02-09 | Stop reason: HOSPADM

## 2023-02-09 RX ORDER — FAMOTIDINE 10 MG/ML
20 INJECTION, SOLUTION INTRAVENOUS AS NEEDED
Status: CANCELLED | OUTPATIENT
Start: 2023-02-13

## 2023-02-09 RX ORDER — CLINDAMYCIN PHOSPHATE 900 MG/50ML
900 INJECTION INTRAVENOUS ONCE
Status: COMPLETED | OUTPATIENT
Start: 2023-02-09 | End: 2023-02-09

## 2023-02-09 RX ORDER — MEPERIDINE HYDROCHLORIDE 25 MG/ML
12.5 INJECTION INTRAMUSCULAR; INTRAVENOUS; SUBCUTANEOUS
Status: DISCONTINUED | OUTPATIENT
Start: 2023-02-09 | End: 2023-02-09 | Stop reason: HOSPADM

## 2023-02-09 RX ORDER — PROCHLORPERAZINE EDISYLATE 5 MG/ML
10 INJECTION INTRAMUSCULAR; INTRAVENOUS ONCE AS NEEDED
Status: DISCONTINUED | OUTPATIENT
Start: 2023-02-09 | End: 2023-02-09 | Stop reason: HOSPADM

## 2023-02-09 RX ORDER — ESOMEPRAZOLE MAGNESIUM 40 MG/1
40 CAPSULE, DELAYED RELEASE ORAL
COMMUNITY

## 2023-02-09 RX ADMIN — LIDOCAINE HYDROCHLORIDE 60 MG: 20 INJECTION, SOLUTION INTRAVENOUS at 09:37

## 2023-02-09 RX ADMIN — PROPOFOL 50 MG: 10 INJECTION, EMULSION INTRAVENOUS at 09:50

## 2023-02-09 RX ADMIN — PROPOFOL 50 MG: 10 INJECTION, EMULSION INTRAVENOUS at 09:37

## 2023-02-09 RX ADMIN — MIDAZOLAM HYDROCHLORIDE 2 MG: 1 INJECTION, SOLUTION INTRAMUSCULAR; INTRAVENOUS at 09:37

## 2023-02-09 RX ADMIN — SODIUM CHLORIDE, POTASSIUM CHLORIDE, SODIUM LACTATE AND CALCIUM CHLORIDE 1000 ML: 600; 310; 30; 20 INJECTION, SOLUTION INTRAVENOUS at 08:29

## 2023-02-09 RX ADMIN — FENTANYL CITRATE 100 MCG: 50 INJECTION INTRAMUSCULAR; INTRAVENOUS at 09:37

## 2023-02-09 RX ADMIN — KETAMINE HYDROCHLORIDE 25 MG: 50 INJECTION, SOLUTION INTRAMUSCULAR; INTRAVENOUS at 09:37

## 2023-02-09 RX ADMIN — SCOPALAMINE 1 PATCH: 1 PATCH, EXTENDED RELEASE TRANSDERMAL at 08:35

## 2023-02-09 RX ADMIN — CLINDAMYCIN PHOSPHATE 900 MG: 900 INJECTION, SOLUTION INTRAVENOUS at 09:40

## 2023-02-09 NOTE — OP NOTE
PATIENT:    Silvia Chung    DATE OF SURGERY:  2/9/2023    PHYSICIAN:    Marcio Garcia MD    REFERRING PHYSICIAN:  Marcio Garcia MD    YOB: 1950    PREOPERATIVE DIAGNOSIS:  Need for chemotherapy    POSTOPERATIVE DIAGNOSIS:  Need for chemotherapy    PROCEDURE:  Right subclavian port-a-cath placement    EBL:  Less than 50 cc    COMPLICATIONS:  None    OPERATIVE PROCEDURE:  The patient was taken to the operating room in the normal manner.  I did speak with the patient today and marked them accordingly.  An appropriate timeout was performed by the nursing staff intraoperatively prior to the incision.  Preoperative IV antibiotics were given.  The patient was prepped and draped in a normal sterile fashion after being placed in the prone position.     The subclavian vein on the corresponding side was accessed via Seldinger technique after the use of lidocaine for anesthesia.  The wire was guided under fluoroscopy and then a pocket was created.  The dilator was placed and then the catheter was placed via the dilator sheath in good position utilizing fluoroscopy.  The catheter was then connected to the port itself which was then sutured in the pocket with a 3-0 silk suture.    Fluoroscopy was then used to confirm that the catheter was in good position.  3-0 Vicryl was used to close the wound and then 4-0 Vicryl was used to close the skin.  Steri-Strips were placed without difficulty.  The port was accessed via Armstrong needle and flushed with heparinized saline, final flush was performed without difficulty.    The patient was stable at this point in time and transferred to the recovery room in stable condition where CXR will be obtained.     Marcio Garcia MD  2/9/2023  10:05 EST

## 2023-02-09 NOTE — ANESTHESIA PREPROCEDURE EVALUATION
Anesthesia Evaluation     Patient summary reviewed and Nursing notes reviewed   no history of anesthetic complications:  NPO Solid Status: > 8 hours  NPO Liquid Status: > 8 hours           Airway   Mallampati: II  TM distance: >3 FB  Neck ROM: full  no difficulty expected  Dental - normal exam     Pulmonary - normal exam   (+) asthma,  (-) not a smoker  Cardiovascular - normal exam  Exercise tolerance: good (4-7 METS)    PT is on anticoagulation therapy  Patient on routine beta blocker  Rhythm: regular  Rate: normal    (+) hypertension 2 medications or greater, hyperlipidemia,       Neuro/Psych  GI/Hepatic/Renal/Endo    (+)   liver disease (lesions),     Musculoskeletal     Abdominal  - normal exam    Abdomen: soft.  Bowel sounds: normal.   Substance History   (+) alcohol use,      OB/GYN          Other      history of cancer (pancreatic) active                      Anesthesia Plan    ASA 3     MAC     (Risks and benefits discussed including risk of aspiration, recall and dental damage. All patient questions answered.    Will continue with plan of care.)  intravenous induction     Anesthetic plan, risks, benefits, and alternatives have been provided, discussed and informed consent has been obtained with: patient.  Pre-procedure education provided  Plan discussed with CRNA.

## 2023-02-09 NOTE — DISCHARGE INSTRUCTIONS
No pushing, pulling, tugging,  heavy lifting, or strenuous activity.  No major decision making, driving, or drinking alcoholic beverages for 24 hours. ( due to the medications you have  received)  Always use good hand hygiene/washing techniques.  NO driving while taking pain medications.    * if you have an incision:  Check your incision area every day for signs of infection.   Check for:  * more redness, swelling, or pain  *more fluid or blood  *warmth  *pus or bad smell.    To assist you in voiding:  Drink plenty of fluids  Listen to running water while attempting to void.    If you are unable to urinate and you have an uncomfortable urge to void or it has been   6 hours since you were discharged, return to the Emergency Room.

## 2023-02-09 NOTE — ANESTHESIA POSTPROCEDURE EVALUATION
Patient: Silvia Chung    Procedure Summary     Date: 02/09/23 Room / Location: [facility] OR  /  [facility]    Anesthesia Start: 0935 Anesthesia Stop: 1009    Procedure: INSERTION OF PORTACATH (Right) Diagnosis:       Pancreatic mass      (Pancreatic mass [K86.89])    Surgeons: Marcio Garcia MD Provider: Kevin Dennis CRNA    Anesthesia Type: MAC ASA Status: 3          Anesthesia Type: MAC    Vitals  No vitals data found for the desired time range.          Post Anesthesia Care and Evaluation    Patient location during evaluation: bedside  Patient participation: complete - patient participated  Level of consciousness: awake  Pain score: 0  Pain management: adequate    Airway patency: patent  Anesthetic complications: No anesthetic complications  PONV Status: controlled  Cardiovascular status: acceptable and stable  Respiratory status: acceptable and room air  Hydration status: acceptable    Comments: Vital signs as noted in nursing documentation as per protocol

## 2023-02-10 ENCOUNTER — OFFICE VISIT (OUTPATIENT)
Dept: ONCOLOGY | Facility: CLINIC | Age: 73
End: 2023-02-10
Payer: MEDICARE

## 2023-02-10 VITALS
SYSTOLIC BLOOD PRESSURE: 100 MMHG | BODY MASS INDEX: 23.34 KG/M2 | RESPIRATION RATE: 16 BRPM | WEIGHT: 154 LBS | HEIGHT: 68 IN | TEMPERATURE: 98.4 F | DIASTOLIC BLOOD PRESSURE: 62 MMHG | OXYGEN SATURATION: 97 % | HEART RATE: 72 BPM

## 2023-02-10 DIAGNOSIS — C25.1 MALIGNANT NEOPLASM OF BODY OF PANCREAS: Primary | ICD-10-CM

## 2023-02-10 PROCEDURE — 99215 OFFICE O/P EST HI 40 MIN: CPT | Performed by: NURSE PRACTITIONER

## 2023-02-13 ENCOUNTER — INFUSION (OUTPATIENT)
Dept: ONCOLOGY | Facility: HOSPITAL | Age: 73
End: 2023-02-13
Payer: MEDICARE

## 2023-02-13 VITALS
WEIGHT: 157.2 LBS | RESPIRATION RATE: 18 BRPM | DIASTOLIC BLOOD PRESSURE: 66 MMHG | OXYGEN SATURATION: 97 % | TEMPERATURE: 98.4 F | HEART RATE: 67 BPM | BODY MASS INDEX: 23.9 KG/M2 | SYSTOLIC BLOOD PRESSURE: 110 MMHG

## 2023-02-13 DIAGNOSIS — K86.89 PANCREATIC MASS: ICD-10-CM

## 2023-02-13 DIAGNOSIS — C25.1 MALIGNANT NEOPLASM OF BODY OF PANCREAS: Primary | ICD-10-CM

## 2023-02-13 LAB
ALBUMIN SERPL-MCNC: 4 G/DL (ref 3.5–5.2)
ALBUMIN/GLOB SERPL: 1.6 G/DL
ALP SERPL-CCNC: 124 U/L (ref 39–117)
ALT SERPL W P-5'-P-CCNC: <5 U/L (ref 1–33)
ANION GAP SERPL CALCULATED.3IONS-SCNC: 10.2 MMOL/L (ref 5–15)
AST SERPL-CCNC: 19 U/L (ref 1–32)
BASOPHILS # BLD AUTO: 0.04 10*3/MM3 (ref 0–0.2)
BASOPHILS NFR BLD AUTO: 0.7 % (ref 0–1.5)
BILIRUB SERPL-MCNC: 0.2 MG/DL (ref 0–1.2)
BUN SERPL-MCNC: 15 MG/DL (ref 8–23)
BUN/CREAT SERPL: 21.7 (ref 7–25)
CALCIUM SPEC-SCNC: 9.3 MG/DL (ref 8.6–10.5)
CHLORIDE SERPL-SCNC: 101 MMOL/L (ref 98–107)
CO2 SERPL-SCNC: 23.8 MMOL/L (ref 22–29)
CREAT SERPL-MCNC: 0.69 MG/DL (ref 0.57–1)
DEPRECATED RDW RBC AUTO: 53.3 FL (ref 37–54)
EGFRCR SERPLBLD CKD-EPI 2021: 93.5 ML/MIN/1.73
EOSINOPHIL # BLD AUTO: 0.22 10*3/MM3 (ref 0–0.4)
EOSINOPHIL NFR BLD AUTO: 3.9 % (ref 0.3–6.2)
ERYTHROCYTE [DISTWIDTH] IN BLOOD BY AUTOMATED COUNT: 14.8 % (ref 12.3–15.4)
GLOBULIN UR ELPH-MCNC: 2.5 GM/DL
GLUCOSE SERPL-MCNC: 88 MG/DL (ref 65–99)
HCT VFR BLD AUTO: 33 % (ref 34–46.6)
HGB BLD-MCNC: 10.9 G/DL (ref 12–15.9)
IMM GRANULOCYTES # BLD AUTO: 0.02 10*3/MM3 (ref 0–0.05)
IMM GRANULOCYTES NFR BLD AUTO: 0.4 % (ref 0–0.5)
LYMPHOCYTES # BLD AUTO: 1.53 10*3/MM3 (ref 0.7–3.1)
LYMPHOCYTES NFR BLD AUTO: 27.3 % (ref 19.6–45.3)
MCH RBC QN AUTO: 32.2 PG (ref 26.6–33)
MCHC RBC AUTO-ENTMCNC: 33 G/DL (ref 31.5–35.7)
MCV RBC AUTO: 97.6 FL (ref 79–97)
MONOCYTES # BLD AUTO: 0.71 10*3/MM3 (ref 0.1–0.9)
MONOCYTES NFR BLD AUTO: 12.7 % (ref 5–12)
NEUTROPHILS NFR BLD AUTO: 3.08 10*3/MM3 (ref 1.7–7)
NEUTROPHILS NFR BLD AUTO: 55 % (ref 42.7–76)
NRBC BLD AUTO-RTO: 0 /100 WBC (ref 0–0.2)
PLATELET # BLD AUTO: 406 10*3/MM3 (ref 140–450)
PMV BLD AUTO: 9.4 FL (ref 6–12)
POTASSIUM SERPL-SCNC: 4.5 MMOL/L (ref 3.5–5.2)
PROT SERPL-MCNC: 6.5 G/DL (ref 6–8.5)
RBC # BLD AUTO: 3.38 10*6/MM3 (ref 3.77–5.28)
SODIUM SERPL-SCNC: 135 MMOL/L (ref 136–145)
WBC NRBC COR # BLD: 5.6 10*3/MM3 (ref 3.4–10.8)

## 2023-02-13 PROCEDURE — 25010000002 LEUCOVORIN CALCIUM PER 50 MG: Performed by: INTERNAL MEDICINE

## 2023-02-13 PROCEDURE — 96416 CHEMO PROLONG INFUSE W/PUMP: CPT

## 2023-02-13 PROCEDURE — G0498 CHEMO EXTEND IV INFUS W/PUMP: HCPCS

## 2023-02-13 PROCEDURE — 96376 TX/PRO/DX INJ SAME DRUG ADON: CPT

## 2023-02-13 PROCEDURE — 85025 COMPLETE CBC W/AUTO DIFF WBC: CPT

## 2023-02-13 PROCEDURE — 80053 COMPREHEN METABOLIC PANEL: CPT

## 2023-02-13 PROCEDURE — 25010000002 IRINOTECAN PER 20 MG: Performed by: INTERNAL MEDICINE

## 2023-02-13 PROCEDURE — 96375 TX/PRO/DX INJ NEW DRUG ADDON: CPT

## 2023-02-13 PROCEDURE — 25010000002 FLUOROURACIL PER 500 MG: Performed by: INTERNAL MEDICINE

## 2023-02-13 PROCEDURE — 96415 CHEMO IV INFUSION ADDL HR: CPT

## 2023-02-13 PROCEDURE — 96549 UNLISTED CHEMOTHERAPY PX: CPT

## 2023-02-13 PROCEDURE — 25010000002 ONDANSETRON PER 1 MG: Performed by: NURSE PRACTITIONER

## 2023-02-13 PROCEDURE — 25010000002 DEXAMETHASONE SODIUM PHOSPHATE 20 MG/5ML SOLUTION: Performed by: INTERNAL MEDICINE

## 2023-02-13 PROCEDURE — 96417 CHEMO IV INFUS EACH ADDL SEQ: CPT

## 2023-02-13 PROCEDURE — 25010000002 LEUCOVORIN 500 MG RECONSTITUTED SOLUTION 1 EACH VIAL: Performed by: INTERNAL MEDICINE

## 2023-02-13 PROCEDURE — 25010000002 OXALIPLATIN PER 0.5 MG: Performed by: INTERNAL MEDICINE

## 2023-02-13 PROCEDURE — 25010000002 LEUCOVORIN 200 MG RECONSTITUTED SOLUTION 200 MG VIAL: Performed by: INTERNAL MEDICINE

## 2023-02-13 PROCEDURE — 25010000002 PALONOSETRON 0.25 MG/5ML SOLUTION PREFILLED SYRINGE: Performed by: INTERNAL MEDICINE

## 2023-02-13 PROCEDURE — 96413 CHEMO IV INFUSION 1 HR: CPT

## 2023-02-13 PROCEDURE — 96368 THER/DIAG CONCURRENT INF: CPT

## 2023-02-13 RX ORDER — DEXTROSE MONOHYDRATE 50 MG/ML
250 INJECTION, SOLUTION INTRAVENOUS ONCE
Status: DISCONTINUED | OUTPATIENT
Start: 2023-02-13 | End: 2023-02-13 | Stop reason: HOSPADM

## 2023-02-13 RX ORDER — SODIUM CHLORIDE 0.9 % (FLUSH) 0.9 %
10 SYRINGE (ML) INJECTION AS NEEDED
Status: DISCONTINUED | OUTPATIENT
Start: 2023-02-13 | End: 2023-02-13 | Stop reason: HOSPADM

## 2023-02-13 RX ORDER — HEPARIN SODIUM (PORCINE) LOCK FLUSH IV SOLN 100 UNIT/ML 100 UNIT/ML
500 SOLUTION INTRAVENOUS AS NEEDED
Status: DISCONTINUED | OUTPATIENT
Start: 2023-02-13 | End: 2023-02-13 | Stop reason: HOSPADM

## 2023-02-13 RX ORDER — PALONOSETRON 0.05 MG/ML
0.25 INJECTION, SOLUTION INTRAVENOUS ONCE
Status: COMPLETED | OUTPATIENT
Start: 2023-02-13 | End: 2023-02-13

## 2023-02-13 RX ORDER — HEPARIN SODIUM (PORCINE) LOCK FLUSH IV SOLN 100 UNIT/ML 100 UNIT/ML
500 SOLUTION INTRAVENOUS AS NEEDED
Status: CANCELLED | OUTPATIENT
Start: 2023-02-13

## 2023-02-13 RX ORDER — ONDANSETRON 2 MG/ML
8 INJECTION INTRAMUSCULAR; INTRAVENOUS ONCE
Status: CANCELLED
Start: 2023-02-13 | End: 2023-02-13

## 2023-02-13 RX ORDER — FAMOTIDINE 10 MG/ML
20 INJECTION, SOLUTION INTRAVENOUS AS NEEDED
Status: DISCONTINUED | OUTPATIENT
Start: 2023-02-13 | End: 2023-02-13 | Stop reason: HOSPADM

## 2023-02-13 RX ORDER — SODIUM CHLORIDE 0.9 % (FLUSH) 0.9 %
10 SYRINGE (ML) INJECTION AS NEEDED
Status: CANCELLED | OUTPATIENT
Start: 2023-02-13

## 2023-02-13 RX ORDER — DIPHENHYDRAMINE HYDROCHLORIDE 50 MG/ML
50 INJECTION INTRAMUSCULAR; INTRAVENOUS AS NEEDED
Status: DISCONTINUED | OUTPATIENT
Start: 2023-02-13 | End: 2023-02-13 | Stop reason: HOSPADM

## 2023-02-13 RX ORDER — ONDANSETRON 2 MG/ML
8 INJECTION INTRAMUSCULAR; INTRAVENOUS ONCE
Status: COMPLETED | OUTPATIENT
Start: 2023-02-13 | End: 2023-02-13

## 2023-02-13 RX ADMIN — DEXAMETHASONE SODIUM PHOSPHATE 12 MG: 4 INJECTION, SOLUTION INTRAMUSCULAR; INTRAVENOUS at 10:26

## 2023-02-13 RX ADMIN — LEUCOVORIN CALCIUM 740 MG: 500 INJECTION, POWDER, LYOPHILIZED, FOR SOLUTION INTRAMUSCULAR; INTRAVENOUS at 13:18

## 2023-02-13 RX ADMIN — FLUOROURACIL 4440 MG: 50 INJECTION, SOLUTION INTRAVENOUS at 15:25

## 2023-02-13 RX ADMIN — ONDANSETRON 8 MG: 2 INJECTION INTRAMUSCULAR; INTRAVENOUS at 15:04

## 2023-02-13 RX ADMIN — OXALIPLATIN 150 MG: 5 INJECTION, SOLUTION INTRAVENOUS at 11:05

## 2023-02-13 RX ADMIN — ATROPINE SULFATE 0.25 MG: 0.4 INJECTION, SOLUTION INTRAVENOUS at 13:10

## 2023-02-13 RX ADMIN — IRINOTECAN HYDROCHLORIDE 280 MG: 20 INJECTION, SOLUTION INTRAVENOUS at 13:18

## 2023-02-13 RX ADMIN — ATROPINE SULFATE 0.25 MG: 0.4 INJECTION, SOLUTION INTRAVENOUS at 13:46

## 2023-02-13 RX ADMIN — PALONOSETRON 0.25 MG: 0.25 INJECTION, SOLUTION INTRAVENOUS at 10:26

## 2023-02-14 ENCOUNTER — DOCUMENTATION (OUTPATIENT)
Dept: NUTRITION | Facility: HOSPITAL | Age: 73
End: 2023-02-14
Payer: MEDICARE
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
## 2023-02-15 ENCOUNTER — INFUSION (OUTPATIENT)
Dept: ONCOLOGY | Facility: HOSPITAL | Age: 73
End: 2023-02-15
Payer: MEDICARE

## 2023-02-15 VITALS
RESPIRATION RATE: 12 BRPM | HEART RATE: 82 BPM | DIASTOLIC BLOOD PRESSURE: 66 MMHG | TEMPERATURE: 98.4 F | SYSTOLIC BLOOD PRESSURE: 109 MMHG

## 2023-02-15 DIAGNOSIS — K86.89 PANCREATIC MASS: ICD-10-CM

## 2023-02-15 DIAGNOSIS — C25.1 MALIGNANT NEOPLASM OF BODY OF PANCREAS: Primary | ICD-10-CM

## 2023-02-15 PROCEDURE — 25010000002 PROCHLORPERAZINE 10 MG/2ML SOLUTION: Performed by: NURSE PRACTITIONER

## 2023-02-15 PROCEDURE — 96375 TX/PRO/DX INJ NEW DRUG ADDON: CPT

## 2023-02-15 PROCEDURE — 96376 TX/PRO/DX INJ SAME DRUG ADON: CPT

## 2023-02-15 PROCEDURE — 25010000002 DEXAMETHASONE PER 1 MG: Performed by: NURSE PRACTITIONER

## 2023-02-15 PROCEDURE — 96374 THER/PROPH/DIAG INJ IV PUSH: CPT

## 2023-02-15 PROCEDURE — 96360 HYDRATION IV INFUSION INIT: CPT

## 2023-02-15 PROCEDURE — 25010000002 HEPARIN LOCK FLUSH PER 10 UNITS: Performed by: INTERNAL MEDICINE

## 2023-02-15 PROCEDURE — 96361 HYDRATE IV INFUSION ADD-ON: CPT

## 2023-02-15 RX ORDER — SODIUM CHLORIDE 0.9 % (FLUSH) 0.9 %
10 SYRINGE (ML) INJECTION AS NEEDED
Status: CANCELLED | OUTPATIENT
Start: 2023-02-15

## 2023-02-15 RX ORDER — PROCHLORPERAZINE MALEATE 10 MG
10 TABLET ORAL EVERY 6 HOURS PRN
Qty: 60 TABLET | Refills: 5 | Status: SHIPPED | OUTPATIENT
Start: 2023-02-15

## 2023-02-15 RX ORDER — HEPARIN SODIUM (PORCINE) LOCK FLUSH IV SOLN 100 UNIT/ML 100 UNIT/ML
500 SOLUTION INTRAVENOUS AS NEEDED
Status: DISCONTINUED | OUTPATIENT
Start: 2023-02-15 | End: 2023-02-15 | Stop reason: HOSPADM

## 2023-02-15 RX ORDER — SODIUM CHLORIDE 9 MG/ML
1000 INJECTION, SOLUTION INTRAVENOUS CONTINUOUS
Status: DISCONTINUED | OUTPATIENT
Start: 2023-02-15 | End: 2023-02-15 | Stop reason: HOSPADM

## 2023-02-15 RX ORDER — PROCHLORPERAZINE EDISYLATE 5 MG/ML
10 INJECTION INTRAMUSCULAR; INTRAVENOUS ONCE
Status: COMPLETED | OUTPATIENT
Start: 2023-02-15 | End: 2023-02-15

## 2023-02-15 RX ORDER — HEPARIN SODIUM (PORCINE) LOCK FLUSH IV SOLN 100 UNIT/ML 100 UNIT/ML
500 SOLUTION INTRAVENOUS AS NEEDED
Status: CANCELLED | OUTPATIENT
Start: 2023-02-15

## 2023-02-15 RX ORDER — SODIUM CHLORIDE 0.9 % (FLUSH) 0.9 %
10 SYRINGE (ML) INJECTION AS NEEDED
Status: DISCONTINUED | OUTPATIENT
Start: 2023-02-15 | End: 2023-02-15 | Stop reason: HOSPADM

## 2023-02-15 RX ORDER — DEXAMETHASONE SODIUM PHOSPHATE 4 MG/ML
8 INJECTION, SOLUTION INTRA-ARTICULAR; INTRALESIONAL; INTRAMUSCULAR; INTRAVENOUS; SOFT TISSUE ONCE
Status: COMPLETED | OUTPATIENT
Start: 2023-02-15 | End: 2023-02-15

## 2023-02-15 RX ADMIN — Medication 500 UNITS: at 15:47

## 2023-02-15 RX ADMIN — SODIUM CHLORIDE 1000 ML: 9 INJECTION, SOLUTION INTRAVENOUS at 14:35

## 2023-02-15 RX ADMIN — PROCHLORPERAZINE EDISYLATE 10 MG: 5 INJECTION INTRAMUSCULAR; INTRAVENOUS at 14:39

## 2023-02-15 RX ADMIN — Medication 10 ML: at 15:46

## 2023-02-15 RX ADMIN — DEXAMETHASONE SODIUM PHOSPHATE 8 MG: 4 INJECTION, SOLUTION INTRA-ARTICULAR; INTRALESIONAL; INTRAMUSCULAR; INTRAVENOUS; SOFT TISSUE at 14:43

## 2023-02-28 ENCOUNTER — PATIENT OUTREACH (OUTPATIENT)
Dept: ONCOLOGY | Facility: HOSPITAL | Age: 73
End: 2023-02-28
Payer: MEDICARE

## 2023-02-28 ENCOUNTER — OFFICE VISIT (OUTPATIENT)
Dept: ONCOLOGY | Facility: CLINIC | Age: 73
End: 2023-02-28
Payer: MEDICARE

## 2023-02-28 ENCOUNTER — INFUSION (OUTPATIENT)
Dept: ONCOLOGY | Facility: HOSPITAL | Age: 73
End: 2023-02-28
Payer: MEDICARE

## 2023-02-28 VITALS
HEART RATE: 87 BPM | RESPIRATION RATE: 16 BRPM | TEMPERATURE: 97.5 F | WEIGHT: 148 LBS | SYSTOLIC BLOOD PRESSURE: 119 MMHG | OXYGEN SATURATION: 99 % | BODY MASS INDEX: 22.43 KG/M2 | DIASTOLIC BLOOD PRESSURE: 71 MMHG | HEIGHT: 68 IN

## 2023-02-28 DIAGNOSIS — C25.1 MALIGNANT NEOPLASM OF BODY OF PANCREAS: Primary | ICD-10-CM

## 2023-02-28 LAB
ALBUMIN SERPL-MCNC: 3.8 G/DL (ref 3.5–5.2)
ALBUMIN/GLOB SERPL: 1.5 G/DL
ALP SERPL-CCNC: 84 U/L (ref 39–117)
ALT SERPL W P-5'-P-CCNC: 5 U/L (ref 1–33)
ANION GAP SERPL CALCULATED.3IONS-SCNC: 9 MMOL/L (ref 5–15)
AST SERPL-CCNC: 27 U/L (ref 1–32)
BASOPHILS # BLD AUTO: 0.07 10*3/MM3 (ref 0–0.2)
BASOPHILS NFR BLD AUTO: 0.9 % (ref 0–1.5)
BILIRUB SERPL-MCNC: 0.2 MG/DL (ref 0–1.2)
BUN SERPL-MCNC: 9 MG/DL (ref 8–23)
BUN/CREAT SERPL: 17 (ref 7–25)
CALCIUM SPEC-SCNC: 8.4 MG/DL (ref 8.6–10.5)
CHLORIDE SERPL-SCNC: 100 MMOL/L (ref 98–107)
CO2 SERPL-SCNC: 22 MMOL/L (ref 22–29)
CREAT SERPL-MCNC: 0.53 MG/DL (ref 0.57–1)
DEPRECATED RDW RBC AUTO: 53.1 FL (ref 37–54)
EGFRCR SERPLBLD CKD-EPI 2021: 99.6 ML/MIN/1.73
EOSINOPHIL # BLD AUTO: 0.42 10*3/MM3 (ref 0–0.4)
EOSINOPHIL NFR BLD AUTO: 5.3 % (ref 0.3–6.2)
ERYTHROCYTE [DISTWIDTH] IN BLOOD BY AUTOMATED COUNT: 14.9 % (ref 12.3–15.4)
GLOBULIN UR ELPH-MCNC: 2.5 GM/DL
GLUCOSE SERPL-MCNC: 95 MG/DL (ref 65–99)
HCT VFR BLD AUTO: 33.9 % (ref 34–46.6)
HGB BLD-MCNC: 11.2 G/DL (ref 12–15.9)
IMM GRANULOCYTES # BLD AUTO: 0.03 10*3/MM3 (ref 0–0.05)
IMM GRANULOCYTES NFR BLD AUTO: 0.4 % (ref 0–0.5)
LYMPHOCYTES # BLD AUTO: 1.76 10*3/MM3 (ref 0.7–3.1)
LYMPHOCYTES NFR BLD AUTO: 22 % (ref 19.6–45.3)
MCH RBC QN AUTO: 32 PG (ref 26.6–33)
MCHC RBC AUTO-ENTMCNC: 33 G/DL (ref 31.5–35.7)
MCV RBC AUTO: 96.9 FL (ref 79–97)
MONOCYTES # BLD AUTO: 1.09 10*3/MM3 (ref 0.1–0.9)
MONOCYTES NFR BLD AUTO: 13.6 % (ref 5–12)
NEUTROPHILS NFR BLD AUTO: 4.62 10*3/MM3 (ref 1.7–7)
NEUTROPHILS NFR BLD AUTO: 57.8 % (ref 42.7–76)
NRBC BLD AUTO-RTO: 0 /100 WBC (ref 0–0.2)
PLATELET # BLD AUTO: 471 10*3/MM3 (ref 140–450)
PMV BLD AUTO: 9.2 FL (ref 6–12)
POTASSIUM SERPL-SCNC: 4.3 MMOL/L (ref 3.5–5.2)
PROT SERPL-MCNC: 6.3 G/DL (ref 6–8.5)
RBC # BLD AUTO: 3.5 10*6/MM3 (ref 3.77–5.28)
SODIUM SERPL-SCNC: 131 MMOL/L (ref 136–145)
WBC NRBC COR # BLD: 7.99 10*3/MM3 (ref 3.4–10.8)

## 2023-02-28 PROCEDURE — 96375 TX/PRO/DX INJ NEW DRUG ADDON: CPT

## 2023-02-28 PROCEDURE — 96361 HYDRATE IV INFUSION ADD-ON: CPT

## 2023-02-28 PROCEDURE — 96417 CHEMO IV INFUS EACH ADDL SEQ: CPT

## 2023-02-28 PROCEDURE — 80053 COMPREHEN METABOLIC PANEL: CPT

## 2023-02-28 PROCEDURE — 25010000002 FLUOROURACIL PER 500 MG: Performed by: INTERNAL MEDICINE

## 2023-02-28 PROCEDURE — 25010000002 IRINOTECAN PER 20 MG: Performed by: INTERNAL MEDICINE

## 2023-02-28 PROCEDURE — 96413 CHEMO IV INFUSION 1 HR: CPT

## 2023-02-28 PROCEDURE — 96415 CHEMO IV INFUSION ADDL HR: CPT

## 2023-02-28 PROCEDURE — 99214 OFFICE O/P EST MOD 30 MIN: CPT | Performed by: INTERNAL MEDICINE

## 2023-02-28 PROCEDURE — 85025 COMPLETE CBC W/AUTO DIFF WBC: CPT

## 2023-02-28 PROCEDURE — 25010000002 OXALIPLATIN PER 0.5 MG: Performed by: INTERNAL MEDICINE

## 2023-02-28 PROCEDURE — 96376 TX/PRO/DX INJ SAME DRUG ADON: CPT

## 2023-02-28 PROCEDURE — 96416 CHEMO PROLONG INFUSE W/PUMP: CPT

## 2023-02-28 PROCEDURE — 25010000002 LEUCOVORIN CALCIUM PER 50MG: Performed by: INTERNAL MEDICINE

## 2023-02-28 PROCEDURE — 96368 THER/DIAG CONCURRENT INF: CPT

## 2023-02-28 PROCEDURE — G0498 CHEMO EXTEND IV INFUS W/PUMP: HCPCS

## 2023-02-28 PROCEDURE — 25010000002 DEXAMETHASONE SODIUM PHOSPHATE 20 MG/5ML SOLUTION: Performed by: INTERNAL MEDICINE

## 2023-02-28 PROCEDURE — 25010000002 PALONOSETRON 0.25 MG/5ML SOLUTION PREFILLED SYRINGE: Performed by: INTERNAL MEDICINE

## 2023-02-28 RX ORDER — PALONOSETRON 0.05 MG/ML
0.25 INJECTION, SOLUTION INTRAVENOUS ONCE
Status: CANCELLED | OUTPATIENT
Start: 2023-02-28

## 2023-02-28 RX ORDER — DIPHENHYDRAMINE HYDROCHLORIDE 50 MG/ML
50 INJECTION INTRAMUSCULAR; INTRAVENOUS AS NEEDED
Status: DISCONTINUED | OUTPATIENT
Start: 2023-02-28 | End: 2023-02-28 | Stop reason: HOSPADM

## 2023-02-28 RX ORDER — SODIUM CHLORIDE 9 MG/ML
1000 INJECTION, SOLUTION INTRAVENOUS ONCE
Status: CANCELLED
Start: 2023-02-28 | End: 2023-02-28

## 2023-02-28 RX ORDER — FAMOTIDINE 10 MG/ML
20 INJECTION, SOLUTION INTRAVENOUS AS NEEDED
Status: CANCELLED | OUTPATIENT
Start: 2023-02-28

## 2023-02-28 RX ORDER — PALONOSETRON 0.05 MG/ML
0.25 INJECTION, SOLUTION INTRAVENOUS ONCE
Status: COMPLETED | OUTPATIENT
Start: 2023-02-28 | End: 2023-02-28

## 2023-02-28 RX ORDER — DIPHENHYDRAMINE HYDROCHLORIDE 50 MG/ML
50 INJECTION INTRAMUSCULAR; INTRAVENOUS AS NEEDED
Status: CANCELLED | OUTPATIENT
Start: 2023-02-28

## 2023-02-28 RX ORDER — ATROPINE SULFATE 1 MG/ML
0.25 INJECTION, SOLUTION INTRAMUSCULAR; INTRAVENOUS; SUBCUTANEOUS
Status: CANCELLED | OUTPATIENT
Start: 2023-02-28

## 2023-02-28 RX ORDER — SODIUM CHLORIDE 9 MG/ML
1000 INJECTION, SOLUTION INTRAVENOUS ONCE
Status: CANCELLED
Start: 2023-03-02 | End: 2023-03-02

## 2023-02-28 RX ORDER — DEXTROSE MONOHYDRATE 50 MG/ML
250 INJECTION, SOLUTION INTRAVENOUS ONCE
Status: DISCONTINUED | OUTPATIENT
Start: 2023-02-28 | End: 2023-02-28 | Stop reason: HOSPADM

## 2023-02-28 RX ORDER — SODIUM CHLORIDE 9 MG/ML
1000 INJECTION, SOLUTION INTRAVENOUS ONCE
Status: COMPLETED | OUTPATIENT
Start: 2023-02-28 | End: 2023-02-28

## 2023-02-28 RX ORDER — FAMOTIDINE 10 MG/ML
20 INJECTION, SOLUTION INTRAVENOUS AS NEEDED
Status: DISCONTINUED | OUTPATIENT
Start: 2023-02-28 | End: 2023-02-28 | Stop reason: HOSPADM

## 2023-02-28 RX ORDER — DRONABINOL 2.5 MG/1
2.5 CAPSULE ORAL
Qty: 60 CAPSULE | Refills: 1 | Status: SHIPPED | OUTPATIENT
Start: 2023-02-28 | End: 2023-03-28

## 2023-02-28 RX ORDER — DEXTROSE MONOHYDRATE 50 MG/ML
250 INJECTION, SOLUTION INTRAVENOUS ONCE
Status: CANCELLED | OUTPATIENT
Start: 2023-02-28

## 2023-02-28 RX ADMIN — FLUOROURACIL 3890 MG: 50 INJECTION, SOLUTION INTRAVENOUS at 15:24

## 2023-02-28 RX ADMIN — IRINOTECAN HYDROCHLORIDE 240 MG: 20 INJECTION, SOLUTION INTRAVENOUS at 13:50

## 2023-02-28 RX ADMIN — ATROPINE SULFATE 0.25 MG: 0.4 INJECTION, SOLUTION INTRAVENOUS at 13:45

## 2023-02-28 RX ADMIN — DEXAMETHASONE SODIUM PHOSPHATE 12 MG: 4 INJECTION, SOLUTION INTRAMUSCULAR; INTRAVENOUS at 11:21

## 2023-02-28 RX ADMIN — PALONOSETRON 0.25 MG: 0.25 INJECTION, SOLUTION INTRAVENOUS at 11:20

## 2023-02-28 RX ADMIN — SODIUM CHLORIDE 1000 ML: 9 INJECTION, SOLUTION INTRAVENOUS at 10:15

## 2023-02-28 RX ADMIN — LEUCOVORIN CALCIUM 700 MG: 10 INJECTION INTRAMUSCULAR; INTRAVENOUS at 13:50

## 2023-02-28 RX ADMIN — OXALIPLATIN 140 MG: 5 INJECTION, SOLUTION INTRAVENOUS at 11:47

## 2023-02-28 NOTE — PROGRESS NOTES
"      PROBLEM LIST:  1. 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  She has had GI side effects with sinemet for her PD so she associated this symptom with that medication.  She has lost about 10 lbs. CT a/p without contrast showed a 6.8 x 3.7 cm mass in the distal pancreatic body and tail with minimal surrounding inflammation.  Multiple borderline enalrged nodes near celiac axis and mesenteric axis.  CT a/p with contrast 12/1/22 showed a 5.6 x 3.6 cm mass in the tail and distal body of the pancreas, no adenopathy.  B) distal pancreatectomy and splenectomy performed on 12/19/2022.  Pathology showed poorly differentiated pancreatic adenosquamous carcinoma with involvement of adrenal gland by direct extension, 1 out of 5 lymph nodes involved.  Surgical margins negative. + lymphovascular invasion, + perineural invasion.  2. parkinsons disease  3. Hypertension  4. Hyperlipidemia  5. asthma    Subjective     CHIEF COMPLAINT: pancreas cancer    HISTORY OF PRESENT ILLNESS:   Silvia Chung returns for follow-up.   She has had her first cycle of FOLFIRINOX chemotherapy.  She has lost about 6-9 pounds.  She has had some dizziness and fell on arrival to the office this morning.    Her 5-FU bulb did not discharge with her first cycle of treatment.    She says for about 3 to 4 days after the treatment she did notice cold sensitivity, some mild nausea, and diarrhea which was manageable.  The main issues she has been dealing with is poor appetite and fatigue.  Food just does not taste very good and she knows she is not eating and drinking enough.      Objective      /71   Pulse 87   Temp 97.5 °F (36.4 °C) (Temporal)   Resp 16   Ht 172.7 cm (68\")   Wt 67.1 kg (148 lb)   SpO2 99%   BMI 22.50 kg/m²      Performance Status:0                General: well appearing female in no " acute distress  Neuro: alert and oriented  HEENT: sclera anicteric, oropharynx clear  Cardiovascular: Regular rate and rhythm  Lungs: Clear to auscultation bilaterally abdomen: Soft nontender nondistended  Skin: no rashes, lesions, bruising, or petechiae  Psych: mood and affect appropriate          RECENT LABS:  Lab Results   Component Value Date    WBC 5.60 02/13/2023    HGB 10.9 (L) 02/13/2023    HCT 33.0 (L) 02/13/2023    MCV 97.6 (H) 02/13/2023     02/13/2023       Lab Results   Component Value Date    GLUCOSE 88 02/13/2023    BUN 15 02/13/2023    CREATININE 0.69 02/13/2023    EGFRIFNONA 85 10/23/2020    BCR 21.7 02/13/2023    K 4.5 02/13/2023    CO2 23.8 02/13/2023    CALCIUM 9.3 02/13/2023    ALBUMIN 4.0 02/13/2023    AST 19 02/13/2023    ALT <5 02/13/2023               ASSESSMENT AND PLAN:     Silvia Chung is a 72 y.o. female with a T3N1M0 adenosquamous carcinoma of the pancreas, here for follow up on adjuvant chemotherapy.    Her first cycle of treatment was complicated by fatigue, weight loss, and poor appetite.  I think her dizziness is likely related to dehydration which can exacerbate her underlying Parkinson's symptoms.  We will give an extra liter of fluids today and again on Thursday with her treatment.  I am going to reduce her dose to 90% for this next cycle to see if this leads to better tolerance.    Anorexia: We discussed trying Megace or Marinol.  She would like to try Marinol.  We will start with 2.5 mg twice daily.    Parkinson's: Continue regular medications.    Post splenectomy vaccines given at [facility].    F/u 2 weeks.          Total time of patient care on day of service including time prior to, face to face with patient, and following visit spent in reviewing records, lab results,  discussion with patient, and documentation/charting was > 38 minutes.          Janell Corrlaes MD  [facility] Hematology and Oncology    2/28/2023          CC:

## 2023-03-02 ENCOUNTER — INFUSION (OUTPATIENT)
Dept: ONCOLOGY | Facility: HOSPITAL | Age: 73
End: 2023-03-02
Payer: MEDICARE

## 2023-03-02 VITALS
HEART RATE: 82 BPM | DIASTOLIC BLOOD PRESSURE: 70 MMHG | RESPIRATION RATE: 12 BRPM | OXYGEN SATURATION: 95 % | TEMPERATURE: 98.9 F | SYSTOLIC BLOOD PRESSURE: 111 MMHG

## 2023-03-02 DIAGNOSIS — C25.1 MALIGNANT NEOPLASM OF BODY OF PANCREAS: Primary | ICD-10-CM

## 2023-03-02 DIAGNOSIS — K86.89 PANCREATIC MASS: ICD-10-CM

## 2023-03-02 PROCEDURE — 25010000002 HEPARIN LOCK FLUSH PER 10 UNITS: Performed by: INTERNAL MEDICINE

## 2023-03-02 PROCEDURE — 96360 HYDRATION IV INFUSION INIT: CPT

## 2023-03-02 RX ORDER — HEPARIN SODIUM (PORCINE) LOCK FLUSH IV SOLN 100 UNIT/ML 100 UNIT/ML
500 SOLUTION INTRAVENOUS AS NEEDED
Status: DISCONTINUED | OUTPATIENT
Start: 2023-03-02 | End: 2023-03-02 | Stop reason: HOSPADM

## 2023-03-02 RX ORDER — HEPARIN SODIUM (PORCINE) LOCK FLUSH IV SOLN 100 UNIT/ML 100 UNIT/ML
500 SOLUTION INTRAVENOUS AS NEEDED
Status: CANCELLED | OUTPATIENT
Start: 2023-03-02

## 2023-03-02 RX ORDER — SODIUM CHLORIDE 0.9 % (FLUSH) 0.9 %
10 SYRINGE (ML) INJECTION AS NEEDED
Status: DISCONTINUED | OUTPATIENT
Start: 2023-03-02 | End: 2023-03-02 | Stop reason: HOSPADM

## 2023-03-02 RX ORDER — SODIUM CHLORIDE 0.9 % (FLUSH) 0.9 %
10 SYRINGE (ML) INJECTION AS NEEDED
Status: CANCELLED | OUTPATIENT
Start: 2023-03-02

## 2023-03-02 RX ORDER — SODIUM CHLORIDE 9 MG/ML
1000 INJECTION, SOLUTION INTRAVENOUS ONCE
Status: COMPLETED | OUTPATIENT
Start: 2023-03-02 | End: 2023-03-02

## 2023-03-02 RX ADMIN — SODIUM CHLORIDE 1000 ML: 9 INJECTION, SOLUTION INTRAVENOUS at 14:21

## 2023-03-02 RX ADMIN — Medication 10 ML: at 15:21

## 2023-03-02 RX ADMIN — Medication 500 UNITS: at 15:21

## 2023-03-09 ENCOUNTER — TELEPHONE (OUTPATIENT)
Dept: NUTRITION | Facility: HOSPITAL | Age: 73
End: 2023-03-09

## 2023-03-09 NOTE — PROGRESS NOTES
Nutrition Services    Patient Name:  Silvia Chung  YOB: 1950  MRN: [id number]    RD called pt 3/9/23 for oncology F/U. No answer. Will continue to F/U and available PRN.    Electronically signed by:  Kandi Ibarra RD  03/09/23 15:55 EST

## 2023-03-13 NOTE — PROGRESS NOTES
PROBLEM LIST:  1. [id number] adenosquamous carcinoma of the pancreatic body/tail  A) presented to the ED on 11/23/22 with LUQ pain, worsening over 3-4 months, associated with nausea and occasional vomiting.  She has had GI side effects with sinemet for her PD so she associated this symptom with that medication.  She has lost about 10 lbs. CT a/p without contrast showed a 6.8 x 3.7 cm mass in the distal pancreatic body and tail with minimal surrounding inflammation.  Multiple borderline enalrged nodes near celiac axis and mesenteric axis.  CT a/p with contrast 12/1/22 showed a 5.6 x 3.6 cm mass in the tail and distal body of the pancreas, no adenopathy.  B) distal pancreatectomy and splenectomy performed on 12/19/2022.  Pathology showed poorly differentiated pancreatic adenosquamous carcinoma with involvement of adrenal gland by direct extension, 1 out of 5 lymph nodes involved.  Surgical margins negative. + lymphovascular invasion, + perineural invasion.  2. parkinsons disease  3. Hypertension  4. Hyperlipidemia  5. asthma    Subjective     CHIEF COMPLAINT: pancreas cancer    HISTORY OF PRESENT ILLNESS:   Silvia Chung returns for follow-up.   She has 2 doses of FOLFIRINOX chemotherapy.  Her first cycle was complicated with nausea, vomiting, and weight loss.  With second cycle she tolerated much better.  She has gained about 6 pounds since last visit.  She continues to have 3 to 4 days after treatment with cold sensitivity, some nausea, and diarrhea is manageable at this point.  She says she is feeling pretty good.  However, she is having some left upper quadrant pain.  She says it reminds her of when she was first diagnosed.  She said it does not happen all the time and is periodic.  She does not relate it to any food intake or bowel movements.  Her bowels are moving normally at this time.  She notes that she is having some trouble processing food.  She says if she eats any food with any bit of fat at all  "it does cause her to have significant diarrhea.  She is interested in pancreatic enzymes.        Objective      /73   Pulse 105   Temp 97.8 °F (36.6 °C) (Temporal)   Resp 16   Ht 172.7 cm (68\")   Wt 70.3 kg (155 lb)   SpO2 100%   BMI 23.57 kg/m²      Performance Status:0                General: well appearing female in no acute distress  Neuro: alert and oriented  HEENT: sclera anicteric, oropharynx clear  Cardiovascular: Regular rate and rhythm  Lungs: Clear to auscultation bilaterally abdomen: Soft nontender nondistended  Skin: no rashes, lesions, bruising, or petechiae  Psych: mood and affect appropriate          RECENT LABS:  Lab Results   Component Value Date    WBC 7.99 02/28/2023    HGB 11.2 (L) 02/28/2023    HCT 33.9 (L) 02/28/2023    MCV 96.9 02/28/2023     (H) 02/28/2023       Lab Results   Component Value Date    GLUCOSE 95 02/28/2023    BUN 9 02/28/2023    CREATININE 0.53 (L) 02/28/2023    EGFRIFNONA 85 10/23/2020    BCR 17.0 02/28/2023    K 4.3 02/28/2023    CO2 22.0 02/28/2023    CALCIUM 8.4 (L) 02/28/2023    ALBUMIN 3.8 02/28/2023    AST 27 02/28/2023    ALT 5 02/28/2023               ASSESSMENT AND PLAN:     Silvia Chung is a 72 y.o. female with a T3N1M0 adenosquamous carcinoma of the pancreas, here for follow up on adjuvant chemotherapy.    Overall, she is doing well.  She has gained about 6 pounds.  She tolerated her second cycle much better than the first cycle.  We will continue with a liter of fluids with treatment.  This made a significant difference in her side effects.  We will continue with reduced dose of 90% as she tolerated her treatment much better.      Left upper quadrant pain.  We will try some pancreatic enzymes to see if enzymes will help with pain.  We will also check a CA 19-9.  We discussed if this is not improved by next cycle we will plan to do CT scans.  Otherwise, we will plan for scans after cycle 6.    Anorexia: Stable.  We discussed trying Megace " or Marinol.  She would like to try Marinol.  She was unable to get Marinol due to by her not caring the medication.  With her current weight gain she would like to hold off on this being sent elsewhere.  She will notify us if she changes her mind.     Parkinson's: Continue regular medications.    Post splenectomy vaccines given at .    F/u 2 weeks.          Total time of patient care including time prior to, face to face with patient, and following visit spent in reviewing records, lab results, imaging studies, discussion with patient, and documentation/charting was > 30 minutes            Arlene Najera APRN  [facility] Hematology and Oncology    3/14/2023          CC:

## 2023-03-14 ENCOUNTER — OFFICE VISIT (OUTPATIENT)
Dept: ONCOLOGY | Facility: CLINIC | Age: 73
End: 2023-03-14
Payer: MEDICARE

## 2023-03-14 ENCOUNTER — INFUSION (OUTPATIENT)
Dept: ONCOLOGY | Facility: HOSPITAL | Age: 73
End: 2023-03-14
Payer: MEDICARE

## 2023-03-14 VITALS
SYSTOLIC BLOOD PRESSURE: 127 MMHG | HEIGHT: 68 IN | OXYGEN SATURATION: 100 % | TEMPERATURE: 97.8 F | BODY MASS INDEX: 23.49 KG/M2 | WEIGHT: 155 LBS | DIASTOLIC BLOOD PRESSURE: 73 MMHG | RESPIRATION RATE: 16 BRPM | HEART RATE: 105 BPM

## 2023-03-14 DIAGNOSIS — C25.1 MALIGNANT NEOPLASM OF BODY OF PANCREAS: Primary | ICD-10-CM

## 2023-03-14 LAB
ALBUMIN SERPL-MCNC: 3.8 G/DL (ref 3.5–5.2)
ALBUMIN/GLOB SERPL: 1.5 G/DL
ALP SERPL-CCNC: 76 U/L (ref 39–117)
ALT SERPL W P-5'-P-CCNC: 7 U/L (ref 1–33)
ANION GAP SERPL CALCULATED.3IONS-SCNC: 10.6 MMOL/L (ref 5–15)
AST SERPL-CCNC: 22 U/L (ref 1–32)
BASOPHILS # BLD AUTO: 0.05 10*3/MM3 (ref 0–0.2)
BASOPHILS NFR BLD AUTO: 0.8 % (ref 0–1.5)
BILIRUB SERPL-MCNC: 0.2 MG/DL (ref 0–1.2)
BUN SERPL-MCNC: 11 MG/DL (ref 8–23)
BUN/CREAT SERPL: 17.7 (ref 7–25)
CALCIUM SPEC-SCNC: 8.8 MG/DL (ref 8.6–10.5)
CANCER AG19-9 SERPL-ACNC: 16.3 U/ML
CHLORIDE SERPL-SCNC: 101 MMOL/L (ref 98–107)
CO2 SERPL-SCNC: 22.4 MMOL/L (ref 22–29)
CREAT SERPL-MCNC: 0.62 MG/DL (ref 0.57–1)
DEPRECATED RDW RBC AUTO: 54.7 FL (ref 37–54)
EGFRCR SERPLBLD CKD-EPI 2021: 95.9 ML/MIN/1.73
EOSINOPHIL # BLD AUTO: 0.53 10*3/MM3 (ref 0–0.4)
EOSINOPHIL NFR BLD AUTO: 8.3 % (ref 0.3–6.2)
ERYTHROCYTE [DISTWIDTH] IN BLOOD BY AUTOMATED COUNT: 15.5 % (ref 12.3–15.4)
GLOBULIN UR ELPH-MCNC: 2.6 GM/DL
GLUCOSE SERPL-MCNC: 101 MG/DL (ref 65–99)
HCT VFR BLD AUTO: 34.3 % (ref 34–46.6)
HGB BLD-MCNC: 11.5 G/DL (ref 12–15.9)
IMM GRANULOCYTES # BLD AUTO: 0.01 10*3/MM3 (ref 0–0.05)
IMM GRANULOCYTES NFR BLD AUTO: 0.2 % (ref 0–0.5)
LYMPHOCYTES # BLD AUTO: 1.58 10*3/MM3 (ref 0.7–3.1)
LYMPHOCYTES NFR BLD AUTO: 24.7 % (ref 19.6–45.3)
MCH RBC QN AUTO: 32.3 PG (ref 26.6–33)
MCHC RBC AUTO-ENTMCNC: 33.5 G/DL (ref 31.5–35.7)
MCV RBC AUTO: 96.3 FL (ref 79–97)
MONOCYTES # BLD AUTO: 1 10*3/MM3 (ref 0.1–0.9)
MONOCYTES NFR BLD AUTO: 15.6 % (ref 5–12)
NEUTROPHILS NFR BLD AUTO: 3.22 10*3/MM3 (ref 1.7–7)
NEUTROPHILS NFR BLD AUTO: 50.4 % (ref 42.7–76)
NRBC BLD AUTO-RTO: 0 /100 WBC (ref 0–0.2)
PLATELET # BLD AUTO: 308 10*3/MM3 (ref 140–450)
PMV BLD AUTO: 9 FL (ref 6–12)
POTASSIUM SERPL-SCNC: 4.6 MMOL/L (ref 3.5–5.2)
PROT SERPL-MCNC: 6.4 G/DL (ref 6–8.5)
RBC # BLD AUTO: 3.56 10*6/MM3 (ref 3.77–5.28)
SODIUM SERPL-SCNC: 134 MMOL/L (ref 136–145)
WBC NRBC COR # BLD: 6.39 10*3/MM3 (ref 3.4–10.8)

## 2023-03-14 PROCEDURE — 86301 IMMUNOASSAY TUMOR CA 19-9: CPT

## 2023-03-14 PROCEDURE — 36415 COLL VENOUS BLD VENIPUNCTURE: CPT

## 2023-03-14 PROCEDURE — 25010000002 LEUCOVORIN 500 MG RECONSTITUTED SOLUTION 1 EACH VIAL: Performed by: NURSE PRACTITIONER

## 2023-03-14 PROCEDURE — 96375 TX/PRO/DX INJ NEW DRUG ADDON: CPT

## 2023-03-14 PROCEDURE — 99214 OFFICE O/P EST MOD 30 MIN: CPT | Performed by: NURSE PRACTITIONER

## 2023-03-14 PROCEDURE — 96417 CHEMO IV INFUS EACH ADDL SEQ: CPT

## 2023-03-14 PROCEDURE — 25010000002 IRINOTECAN PER 20 MG: Performed by: NURSE PRACTITIONER

## 2023-03-14 PROCEDURE — 96368 THER/DIAG CONCURRENT INF: CPT

## 2023-03-14 PROCEDURE — 25010000002 FLUOROURACIL PER 500 MG: Performed by: NURSE PRACTITIONER

## 2023-03-14 PROCEDURE — 25010000002 LEUCOVORIN 200 MG RECONSTITUTED SOLUTION 200 MG VIAL: Performed by: NURSE PRACTITIONER

## 2023-03-14 PROCEDURE — 25010000002 DEXAMETHASONE SODIUM PHOSPHATE 20 MG/5ML SOLUTION: Performed by: NURSE PRACTITIONER

## 2023-03-14 PROCEDURE — 80053 COMPREHEN METABOLIC PANEL: CPT

## 2023-03-14 PROCEDURE — G0498 CHEMO EXTEND IV INFUS W/PUMP: HCPCS

## 2023-03-14 PROCEDURE — 96416 CHEMO PROLONG INFUSE W/PUMP: CPT

## 2023-03-14 PROCEDURE — 96413 CHEMO IV INFUSION 1 HR: CPT

## 2023-03-14 PROCEDURE — 96415 CHEMO IV INFUSION ADDL HR: CPT

## 2023-03-14 PROCEDURE — 85025 COMPLETE CBC W/AUTO DIFF WBC: CPT

## 2023-03-14 PROCEDURE — 25010000002 OXALIPLATIN PER 0.5 MG: Performed by: NURSE PRACTITIONER

## 2023-03-14 PROCEDURE — 25010000002 PALONOSETRON 0.25 MG/5ML SOLUTION PREFILLED SYRINGE: Performed by: NURSE PRACTITIONER

## 2023-03-14 RX ORDER — DEXTROSE MONOHYDRATE 50 MG/ML
250 INJECTION, SOLUTION INTRAVENOUS ONCE
Status: CANCELLED | OUTPATIENT
Start: 2023-03-14

## 2023-03-14 RX ORDER — FAMOTIDINE 10 MG/ML
20 INJECTION, SOLUTION INTRAVENOUS AS NEEDED
Status: CANCELLED | OUTPATIENT
Start: 2023-03-14

## 2023-03-14 RX ORDER — DEXTROSE MONOHYDRATE 50 MG/ML
250 INJECTION, SOLUTION INTRAVENOUS ONCE
Status: DISCONTINUED | OUTPATIENT
Start: 2023-03-14 | End: 2023-03-14 | Stop reason: HOSPADM

## 2023-03-14 RX ORDER — PALONOSETRON 0.05 MG/ML
0.25 INJECTION, SOLUTION INTRAVENOUS ONCE
Status: COMPLETED | OUTPATIENT
Start: 2023-03-14 | End: 2023-03-14

## 2023-03-14 RX ORDER — PALONOSETRON 0.05 MG/ML
0.25 INJECTION, SOLUTION INTRAVENOUS ONCE
Status: CANCELLED | OUTPATIENT
Start: 2023-03-14

## 2023-03-14 RX ORDER — PANCRELIPASE 24000; 76000; 120000 [USP'U]/1; [USP'U]/1; [USP'U]/1
CAPSULE, DELAYED RELEASE PELLETS ORAL
Qty: 120 CAPSULE | Refills: 3 | Status: SHIPPED | OUTPATIENT
Start: 2023-03-14

## 2023-03-14 RX ORDER — ATROPINE SULFATE 1 MG/ML
0.25 INJECTION, SOLUTION INTRAMUSCULAR; INTRAVENOUS; SUBCUTANEOUS
Status: CANCELLED | OUTPATIENT
Start: 2023-03-14

## 2023-03-14 RX ORDER — DIPHENHYDRAMINE HYDROCHLORIDE 50 MG/ML
50 INJECTION INTRAMUSCULAR; INTRAVENOUS AS NEEDED
Status: CANCELLED | OUTPATIENT
Start: 2023-03-14

## 2023-03-14 RX ADMIN — PALONOSETRON 0.25 MG: 0.25 INJECTION, SOLUTION INTRAVENOUS at 11:16

## 2023-03-14 RX ADMIN — LEUCOVORIN CALCIUM 720 MG: 500 INJECTION, POWDER, LYOPHILIZED, FOR SOLUTION INTRAMUSCULAR; INTRAVENOUS at 13:34

## 2023-03-14 RX ADMIN — ATROPINE SULFATE 0.25 MG: 0.4 INJECTION, SOLUTION INTRAVENOUS at 13:33

## 2023-03-14 RX ADMIN — OXALIPLATIN 140 MG: 5 INJECTION, SOLUTION INTRAVENOUS at 11:35

## 2023-03-14 RX ADMIN — FLUOROURACIL 3890 MG: 50 INJECTION, SOLUTION INTRAVENOUS at 15:09

## 2023-03-14 RX ADMIN — DEXAMETHASONE SODIUM PHOSPHATE 12 MG: 4 INJECTION, SOLUTION INTRAMUSCULAR; INTRAVENOUS at 11:16

## 2023-03-14 RX ADMIN — IRINOTECAN HYDROCHLORIDE 240 MG: 20 INJECTION INTRAVENOUS at 13:34

## 2023-03-16 ENCOUNTER — INFUSION (OUTPATIENT)
Dept: ONCOLOGY | Facility: HOSPITAL | Age: 73
End: 2023-03-16
Payer: MEDICARE

## 2023-03-16 DIAGNOSIS — C25.1 MALIGNANT NEOPLASM OF BODY OF PANCREAS: Primary | ICD-10-CM

## 2023-03-16 DIAGNOSIS — K86.89 PANCREATIC MASS: ICD-10-CM

## 2023-03-16 PROCEDURE — 96523 IRRIG DRUG DELIVERY DEVICE: CPT

## 2023-03-16 PROCEDURE — 25010000002 HEPARIN LOCK FLUSH PER 10 UNITS: Performed by: INTERNAL MEDICINE

## 2023-03-16 RX ORDER — HEPARIN SODIUM (PORCINE) LOCK FLUSH IV SOLN 100 UNIT/ML 100 UNIT/ML
500 SOLUTION INTRAVENOUS AS NEEDED
Status: CANCELLED | OUTPATIENT
Start: 2023-03-16

## 2023-03-16 RX ORDER — HEPARIN SODIUM (PORCINE) LOCK FLUSH IV SOLN 100 UNIT/ML 100 UNIT/ML
500 SOLUTION INTRAVENOUS AS NEEDED
Status: DISCONTINUED | OUTPATIENT
Start: 2023-03-16 | End: 2023-03-16 | Stop reason: HOSPADM

## 2023-03-16 RX ORDER — SODIUM CHLORIDE 0.9 % (FLUSH) 0.9 %
10 SYRINGE (ML) INJECTION AS NEEDED
Status: CANCELLED | OUTPATIENT
Start: 2023-03-16

## 2023-03-16 RX ORDER — SODIUM CHLORIDE 0.9 % (FLUSH) 0.9 %
10 SYRINGE (ML) INJECTION AS NEEDED
Status: DISCONTINUED | OUTPATIENT
Start: 2023-03-16 | End: 2023-03-16 | Stop reason: HOSPADM

## 2023-03-16 RX ADMIN — Medication 500 UNITS: at 14:18

## 2023-03-16 RX ADMIN — Medication 10 ML: at 14:18

## 2023-03-28 ENCOUNTER — OFFICE VISIT (OUTPATIENT)
Dept: ONCOLOGY | Facility: CLINIC | Age: 73
End: 2023-03-28
Payer: MEDICARE

## 2023-03-28 ENCOUNTER — INFUSION (OUTPATIENT)
Dept: ONCOLOGY | Facility: HOSPITAL | Age: 73
End: 2023-03-28
Payer: MEDICARE

## 2023-03-28 ENCOUNTER — PATIENT OUTREACH (OUTPATIENT)
Dept: ONCOLOGY | Facility: HOSPITAL | Age: 73
End: 2023-03-28
Payer: MEDICARE

## 2023-03-28 VITALS
TEMPERATURE: 98 F | DIASTOLIC BLOOD PRESSURE: 70 MMHG | HEART RATE: 87 BPM | OXYGEN SATURATION: 98 % | RESPIRATION RATE: 16 BRPM | SYSTOLIC BLOOD PRESSURE: 145 MMHG | HEIGHT: 68 IN | BODY MASS INDEX: 23.19 KG/M2 | WEIGHT: 153 LBS

## 2023-03-28 DIAGNOSIS — C25.1 MALIGNANT NEOPLASM OF BODY OF PANCREAS: Primary | ICD-10-CM

## 2023-03-28 LAB
ALBUMIN SERPL-MCNC: 3.6 G/DL (ref 3.5–5.2)
ALBUMIN/GLOB SERPL: 1.3 G/DL
ALP SERPL-CCNC: 71 U/L (ref 39–117)
ALT SERPL W P-5'-P-CCNC: 7 U/L (ref 1–33)
ANION GAP SERPL CALCULATED.3IONS-SCNC: 14.1 MMOL/L (ref 5–15)
AST SERPL-CCNC: 22 U/L (ref 1–32)
BASOPHILS # BLD MANUAL: 0.03 10*3/MM3 (ref 0–0.2)
BASOPHILS NFR BLD MANUAL: 1 % (ref 0–1.5)
BILIRUB SERPL-MCNC: <0.2 MG/DL (ref 0–1.2)
BUN SERPL-MCNC: 8 MG/DL (ref 8–23)
BUN/CREAT SERPL: 13.6 (ref 7–25)
CALCIUM SPEC-SCNC: 8.6 MG/DL (ref 8.6–10.5)
CHLORIDE SERPL-SCNC: 102 MMOL/L (ref 98–107)
CO2 SERPL-SCNC: 22.9 MMOL/L (ref 22–29)
CREAT SERPL-MCNC: 0.59 MG/DL (ref 0.57–1)
DEPRECATED RDW RBC AUTO: 58.6 FL (ref 37–54)
EGFRCR SERPLBLD CKD-EPI 2021: 97.1 ML/MIN/1.73
ERYTHROCYTE [DISTWIDTH] IN BLOOD BY AUTOMATED COUNT: 16.2 % (ref 12.3–15.4)
GLOBULIN UR ELPH-MCNC: 2.8 GM/DL
GLUCOSE SERPL-MCNC: 93 MG/DL (ref 65–99)
HCT VFR BLD AUTO: 33.1 % (ref 34–46.6)
HGB BLD-MCNC: 11 G/DL (ref 12–15.9)
LYMPHOCYTES # BLD MANUAL: 1.1 10*3/MM3 (ref 0.7–3.1)
LYMPHOCYTES NFR BLD MANUAL: 29 % (ref 5–12)
MCH RBC QN AUTO: 32.5 PG (ref 26.6–33)
MCHC RBC AUTO-ENTMCNC: 33.2 G/DL (ref 31.5–35.7)
MCV RBC AUTO: 97.9 FL (ref 79–97)
MONOCYTES # BLD: 1 10*3/MM3 (ref 0.1–0.9)
NEUTROPHILS # BLD AUTO: 1.31 10*3/MM3 (ref 1.7–7)
NEUTROPHILS NFR BLD MANUAL: 37 % (ref 42.7–76)
NEUTS BAND NFR BLD MANUAL: 1 % (ref 0–5)
PLATELET # BLD AUTO: 368 10*3/MM3 (ref 140–450)
PMV BLD AUTO: 9.2 FL (ref 6–12)
POTASSIUM SERPL-SCNC: 3.9 MMOL/L (ref 3.5–5.2)
PROT SERPL-MCNC: 6.4 G/DL (ref 6–8.5)
RBC # BLD AUTO: 3.38 10*6/MM3 (ref 3.77–5.28)
RBC MORPH BLD: NORMAL
SCAN SLIDE: NORMAL
SMALL PLATELETS BLD QL SMEAR: ADEQUATE
SODIUM SERPL-SCNC: 139 MMOL/L (ref 136–145)
VARIANT LYMPHS NFR BLD MANUAL: 32 % (ref 19.6–45.3)
WBC MORPH BLD: NORMAL
WBC NRBC COR # BLD: 3.44 10*3/MM3 (ref 3.4–10.8)

## 2023-03-28 PROCEDURE — 85007 BL SMEAR W/DIFF WBC COUNT: CPT

## 2023-03-28 PROCEDURE — 25010000002 PALONOSETRON 0.25 MG/5ML SOLUTION PREFILLED SYRINGE: Performed by: INTERNAL MEDICINE

## 2023-03-28 PROCEDURE — 96549 UNLISTED CHEMOTHERAPY PX: CPT

## 2023-03-28 PROCEDURE — 96417 CHEMO IV INFUS EACH ADDL SEQ: CPT

## 2023-03-28 PROCEDURE — 96415 CHEMO IV INFUSION ADDL HR: CPT

## 2023-03-28 PROCEDURE — 1126F AMNT PAIN NOTED NONE PRSNT: CPT | Performed by: INTERNAL MEDICINE

## 2023-03-28 PROCEDURE — 3078F DIAST BP <80 MM HG: CPT | Performed by: INTERNAL MEDICINE

## 2023-03-28 PROCEDURE — 25010000002 LEUCOVORIN 500 MG RECONSTITUTED SOLUTION 1 EACH VIAL: Performed by: INTERNAL MEDICINE

## 2023-03-28 PROCEDURE — 25010000002 FLUOROURACIL PER 500 MG: Performed by: INTERNAL MEDICINE

## 2023-03-28 PROCEDURE — 25010000002 ONDANSETRON PER 1 MG: Performed by: INTERNAL MEDICINE

## 2023-03-28 PROCEDURE — 25010000002 OXALIPLATIN PER 0.5 MG: Performed by: INTERNAL MEDICINE

## 2023-03-28 PROCEDURE — 96368 THER/DIAG CONCURRENT INF: CPT

## 2023-03-28 PROCEDURE — 99214 OFFICE O/P EST MOD 30 MIN: CPT | Performed by: INTERNAL MEDICINE

## 2023-03-28 PROCEDURE — 96376 TX/PRO/DX INJ SAME DRUG ADON: CPT

## 2023-03-28 PROCEDURE — 96375 TX/PRO/DX INJ NEW DRUG ADDON: CPT

## 2023-03-28 PROCEDURE — 85025 COMPLETE CBC W/AUTO DIFF WBC: CPT

## 2023-03-28 PROCEDURE — 25010000002 IRINOTECAN PER 20 MG: Performed by: INTERNAL MEDICINE

## 2023-03-28 PROCEDURE — 96416 CHEMO PROLONG INFUSE W/PUMP: CPT

## 2023-03-28 PROCEDURE — 3077F SYST BP >= 140 MM HG: CPT | Performed by: INTERNAL MEDICINE

## 2023-03-28 PROCEDURE — 25010000002 LEUCOVORIN 200 MG RECONSTITUTED SOLUTION 200 MG VIAL: Performed by: INTERNAL MEDICINE

## 2023-03-28 PROCEDURE — 25010000002 DEXAMETHASONE SODIUM PHOSPHATE 20 MG/5ML SOLUTION: Performed by: INTERNAL MEDICINE

## 2023-03-28 PROCEDURE — 25010000002 LEUCOVORIN CALCIUM PER 50 MG: Performed by: INTERNAL MEDICINE

## 2023-03-28 PROCEDURE — G0498 CHEMO EXTEND IV INFUS W/PUMP: HCPCS

## 2023-03-28 PROCEDURE — 96413 CHEMO IV INFUSION 1 HR: CPT

## 2023-03-28 PROCEDURE — 80053 COMPREHEN METABOLIC PANEL: CPT

## 2023-03-28 RX ORDER — DEXTROSE MONOHYDRATE 50 MG/ML
250 INJECTION, SOLUTION INTRAVENOUS ONCE
Status: CANCELLED | OUTPATIENT
Start: 2023-03-28

## 2023-03-28 RX ORDER — PALONOSETRON 0.05 MG/ML
0.25 INJECTION, SOLUTION INTRAVENOUS ONCE
Status: CANCELLED | OUTPATIENT
Start: 2023-03-28

## 2023-03-28 RX ORDER — PALONOSETRON 0.05 MG/ML
0.25 INJECTION, SOLUTION INTRAVENOUS ONCE
Status: COMPLETED | OUTPATIENT
Start: 2023-03-28 | End: 2023-03-28

## 2023-03-28 RX ORDER — DIPHENHYDRAMINE HYDROCHLORIDE 50 MG/ML
50 INJECTION INTRAMUSCULAR; INTRAVENOUS AS NEEDED
Status: CANCELLED | OUTPATIENT
Start: 2023-03-28

## 2023-03-28 RX ORDER — ONDANSETRON 2 MG/ML
8 INJECTION INTRAMUSCULAR; INTRAVENOUS EVERY 6 HOURS PRN
Status: DISCONTINUED | OUTPATIENT
Start: 2023-03-28 | End: 2023-03-28 | Stop reason: HOSPADM

## 2023-03-28 RX ORDER — DEXTROSE MONOHYDRATE 50 MG/ML
250 INJECTION, SOLUTION INTRAVENOUS ONCE
Status: DISCONTINUED | OUTPATIENT
Start: 2023-03-28 | End: 2023-03-28 | Stop reason: HOSPADM

## 2023-03-28 RX ORDER — ATROPINE SULFATE 1 MG/ML
0.25 INJECTION, SOLUTION INTRAMUSCULAR; INTRAVENOUS; SUBCUTANEOUS
Status: CANCELLED | OUTPATIENT
Start: 2023-03-28

## 2023-03-28 RX ORDER — FAMOTIDINE 10 MG/ML
20 INJECTION, SOLUTION INTRAVENOUS AS NEEDED
Status: CANCELLED | OUTPATIENT
Start: 2023-03-28

## 2023-03-28 RX ORDER — ONDANSETRON 2 MG/ML
8 INJECTION INTRAMUSCULAR; INTRAVENOUS EVERY 6 HOURS PRN
Status: CANCELLED
Start: 2023-03-28

## 2023-03-28 RX ADMIN — ATROPINE SULFATE 0.25 MG: 0.4 INJECTION, SOLUTION INTRAVENOUS at 14:38

## 2023-03-28 RX ADMIN — OXALIPLATIN 120 MG: 5 INJECTION, SOLUTION INTRAVENOUS at 11:28

## 2023-03-28 RX ADMIN — IRINOTECAN HYDROCHLORIDE 240 MG: 20 INJECTION, SOLUTION INTRAVENOUS at 13:26

## 2023-03-28 RX ADMIN — DEXAMETHASONE SODIUM PHOSPHATE 12 MG: 4 INJECTION, SOLUTION INTRAMUSCULAR; INTRAVENOUS at 11:10

## 2023-03-28 RX ADMIN — ONDANSETRON 8 MG: 2 INJECTION INTRAMUSCULAR; INTRAVENOUS at 10:55

## 2023-03-28 RX ADMIN — LEUCOVORIN CALCIUM 720 MG: 500 INJECTION, POWDER, LYOPHILIZED, FOR SOLUTION INTRAMUSCULAR; INTRAVENOUS at 13:26

## 2023-03-28 RX ADMIN — ATROPINE SULFATE 0.25 MG: 0.4 INJECTION, SOLUTION INTRAVENOUS at 13:21

## 2023-03-28 RX ADMIN — PALONOSETRON 0.25 MG: 0.25 INJECTION, SOLUTION INTRAVENOUS at 10:55

## 2023-03-28 RX ADMIN — FLUOROURACIL 3890 MG: 50 INJECTION, SOLUTION INTRAVENOUS at 14:58

## 2023-03-28 NOTE — PROGRESS NOTES
"      PROBLEM LIST:  1. [id number] adenosquamous carcinoma of the pancreatic body/tail  A) presented to the ED on 11/23/22 with LUQ pain, worsening over 3-4 months, associated with nausea and occasional vomiting.  She has had GI side effects with sinemet for her PD so she associated this symptom with that medication.  She has lost about 10 lbs. CT a/p without contrast showed a 6.8 x 3.7 cm mass in the distal pancreatic body and tail with minimal surrounding inflammation.  Multiple borderline enalrged nodes near celiac axis and mesenteric axis.  CT a/p with contrast 12/1/22 showed a 5.6 x 3.6 cm mass in the tail and distal body of the pancreas, no adenopathy.  B) distal pancreatectomy and splenectomy performed on 12/19/2022.  Pathology showed poorly differentiated pancreatic adenosquamous carcinoma with involvement of adrenal gland by direct extension, 1 out of 5 lymph nodes involved.  Surgical margins negative. + lymphovascular invasion, + perineural invasion.  2. parkinsons disease  3. Hypertension  4. Hyperlipidemia  5. asthma    Subjective     CHIEF COMPLAINT: pancreas cancer    HISTORY OF PRESENT ILLNESS:   Silvia Chung returns for follow-up.   She has had 3 doses of FOLFIRINOX chemotherapy.      She says she is doing okay.  The upper abdominal pain has resolved.  She is taking the pancreatic enzymes.  Her diarrhea is better but she wonders if this might of been a viral illness because her  had diarrhea around the same time.  She has a lot of fatigue for a few days after treatment.  Weight has been stable.  She had some baseline neuropathy in her feet, but does think this is a little bit worse recently.          Objective      /70   Pulse 87   Temp 98 °F (36.7 °C) (Temporal)   Resp 16   Ht 172.7 cm (68\")   Wt 69.4 kg (153 lb)   SpO2 98%   BMI 23.26 kg/m²      Performance Status:0                General: well appearing female in no acute distress  Neuro: alert and oriented  HEENT: sclera " anicteric, oropharynx clear  Cardiovascular: Regular rate and rhythm  Lungs: Clear to auscultation bilaterally   Abdomen: Soft nontender nondistended  Skin: no rashes, lesions, bruising, or petechiae  Psych: mood and affect appropriate          RECENT LABS:  Lab Results   Component Value Date    WBC 6.39 03/14/2023    HGB 11.5 (L) 03/14/2023    HCT 34.3 03/14/2023    MCV 96.3 03/14/2023     03/14/2023       Lab Results   Component Value Date    GLUCOSE 101 (H) 03/14/2023    BUN 11 03/14/2023    CREATININE 0.62 03/14/2023    EGFRIFNONA 85 10/23/2020    BCR 17.7 03/14/2023    K 4.6 03/14/2023    CO2 22.4 03/14/2023    CALCIUM 8.8 03/14/2023    ALBUMIN 3.8 03/14/2023    AST 22 03/14/2023    ALT 7 03/14/2023               ASSESSMENT AND PLAN:     Silvia Chung is a 72 y.o. female with a T3N1M0 adenosquamous carcinoma of the pancreas, here for follow up on adjuvant chemotherapy.    She continues to do well with treatment.  Her upper abdominal pain has resolved, potentially from the addition of pancreatic enzymes.  CA 19-9 was within the normal limits.  We will plan for repeat imaging following cycle 6.    Neuropathy: will reduce oxaliplatin dose to 80%    Diarrhea: Continue Creon    Parkinson's: Continue regular medications.    Post splenectomy vaccines given at [facility].    F/u 2 weeks.          Total time of patient care including time prior to, face to face with patient, and following visit spent in reviewing records, lab results, discussion with patient, and documentation/charting was > 31 minutes            Janell Corrales MD  [facility] Hematology and Oncology    3/28/2023          CC:

## 2023-03-28 NOTE — SIGNIFICANT NOTE
Met with patient today during chemo. Patient denies any issues at this time. Patient v/u that I am available if she needs any support.

## 2023-03-29 LAB — REF LAB TEST METHOD: NORMAL

## 2023-03-30 ENCOUNTER — INFUSION (OUTPATIENT)
Dept: ONCOLOGY | Facility: HOSPITAL | Age: 73
End: 2023-03-30
Payer: MEDICARE

## 2023-03-30 DIAGNOSIS — K86.89 PANCREATIC MASS: Primary | ICD-10-CM

## 2023-03-30 PROCEDURE — 96523 IRRIG DRUG DELIVERY DEVICE: CPT

## 2023-03-30 PROCEDURE — 25010000002 HEPARIN LOCK FLUSH PER 10 UNITS: Performed by: INTERNAL MEDICINE

## 2023-03-30 RX ORDER — SODIUM CHLORIDE 0.9 % (FLUSH) 0.9 %
10 SYRINGE (ML) INJECTION AS NEEDED
Status: DISCONTINUED | OUTPATIENT
Start: 2023-03-30 | End: 2023-03-30 | Stop reason: HOSPADM

## 2023-03-30 RX ORDER — HEPARIN SODIUM (PORCINE) LOCK FLUSH IV SOLN 100 UNIT/ML 100 UNIT/ML
500 SOLUTION INTRAVENOUS AS NEEDED
OUTPATIENT
Start: 2023-03-30

## 2023-03-30 RX ORDER — SODIUM CHLORIDE 0.9 % (FLUSH) 0.9 %
10 SYRINGE (ML) INJECTION AS NEEDED
OUTPATIENT
Start: 2023-03-30

## 2023-03-30 RX ORDER — HEPARIN SODIUM (PORCINE) LOCK FLUSH IV SOLN 100 UNIT/ML 100 UNIT/ML
500 SOLUTION INTRAVENOUS AS NEEDED
Status: DISCONTINUED | OUTPATIENT
Start: 2023-03-30 | End: 2023-03-30 | Stop reason: HOSPADM

## 2023-03-30 RX ADMIN — Medication 10 ML: at 14:33

## 2023-03-30 RX ADMIN — Medication 500 UNITS: at 14:33

## 2023-04-10 NOTE — PROGRESS NOTES
"      PROBLEM LIST:  1. [id number] adenosquamous carcinoma of the pancreatic body/tail  A) presented to the ED on 11/23/22 with LUQ pain, worsening over 3-4 months, associated with nausea and occasional vomiting.  She has had GI side effects with sinemet for her PD so she associated this symptom with that medication.  She has lost about 10 lbs. CT a/p without contrast showed a 6.8 x 3.7 cm mass in the distal pancreatic body and tail with minimal surrounding inflammation.  Multiple borderline enalrged nodes near celiac axis and mesenteric axis.  CT a/p with contrast 12/1/22 showed a 5.6 x 3.6 cm mass in the tail and distal body of the pancreas, no adenopathy.  B) distal pancreatectomy and splenectomy performed on 12/19/2022.  Pathology showed poorly differentiated pancreatic adenosquamous carcinoma with involvement of adrenal gland by direct extension, 1 out of 5 lymph nodes involved.  Surgical margins negative. + lymphovascular invasion, + perineural invasion.  2. parkinsons disease  3. Hypertension  4. Hyperlipidemia  5. asthma    Subjective     CHIEF COMPLAINT: pancreas cancer    HISTORY OF PRESENT ILLNESS:   Silvia Chnug returns for follow-up.   She has had 3 doses of FOLFIRINOX chemotherapy.      She says she is doing fairly well.  Her upper abdominal pain has resolved.  She continues on pancreatic enzymes.  Denies any diarrhea or constipation.  She had a hard fall about 3 days ago.  She says she stood up and was dizzy and pale.  She reports her blood pressure being 100/60 before getting out of bed.  Continues with baseline neuropathy stable overall.        Objective      /65   Pulse 86   Temp 97.7 °F (36.5 °C) (Temporal)   Resp 12   Ht 172.7 cm (68\")   Wt 68.9 kg (152 lb)   SpO2 97%   BMI 23.11 kg/m²      Performance Status:1            General: well appearing female in no acute distress  Neuro: alert and oriented  HEENT: sclera anicteric, oropharynx clear  Lymphatics: no cervical, " supraclavicular, or axillary adenopathy  Cardiovascular: regular rate and rhythm, no murmurs  Lungs: clear to auscultation bilaterally  Abdomen: soft, nontender, nondistended.  No palpable organomegaly  Extremeties: no lower extremity edema  Skin: no rashes, lesions, bruising, or petechiae  Psych: mood and affect appropriate          RECENT LABS:  Lab Results   Component Value Date    WBC 3.44 03/28/2023    HGB 11.0 (L) 03/28/2023    HCT 33.1 (L) 03/28/2023    MCV 97.9 (H) 03/28/2023     03/28/2023       Lab Results   Component Value Date    GLUCOSE 93 03/28/2023    BUN 8 03/28/2023    CREATININE 0.59 03/28/2023    EGFRIFNONA 85 10/23/2020    BCR 13.6 03/28/2023    K 3.9 03/28/2023    CO2 22.9 03/28/2023    CALCIUM 8.6 03/28/2023    ALBUMIN 3.6 03/28/2023    AST 22 03/28/2023    ALT 7 03/28/2023               ASSESSMENT AND PLAN:     Silvia Chung is a 72 y.o. female with a T3N1M0 adenosquamous carcinoma of the pancreas, here for follow up on adjuvant chemotherapy.    She continues to do well with treatment.  Her abdominal pain remains resolved.  We will continue with pancreatic enzymes.  CA 19-9 was within normal limits in March.  We will plan to repeat with cycle #6.      Neuropathy: Continue reduced dose of oxaliplatin    Diarrhea: Resolved.  Continue Creon    Parkinson's: Continue regular medications.    Post splenectomy vaccines given at .    Hypertension.  I will stop Norvasc at this point blood pressures been running 100/60 with episodes of orthostatic hypotension with the fall.  I discussed with the patient we can add in 5 mg if needed.  She will continue to monitor blood pressure at home and notify us if it goes above 140/90.    F/u 2 weeks.        Total time of patient care including time prior to, face to face with patient, and following visit spent in reviewing records, lab results, imaging studies, discussion with patient, and documentation/charting was > 35 minutes            Arlene Najera  RALEIGH SMYTH  [facility] Hematology and Oncology    4/11/2023          CC:

## 2023-04-11 ENCOUNTER — OFFICE VISIT (OUTPATIENT)
Dept: ONCOLOGY | Facility: CLINIC | Age: 73
End: 2023-04-11
Payer: MEDICARE

## 2023-04-11 ENCOUNTER — INFUSION (OUTPATIENT)
Dept: ONCOLOGY | Facility: HOSPITAL | Age: 73
End: 2023-04-11
Payer: MEDICARE

## 2023-04-11 VITALS
WEIGHT: 152 LBS | TEMPERATURE: 97.7 F | SYSTOLIC BLOOD PRESSURE: 112 MMHG | HEART RATE: 86 BPM | BODY MASS INDEX: 23.04 KG/M2 | HEIGHT: 68 IN | OXYGEN SATURATION: 97 % | RESPIRATION RATE: 12 BRPM | DIASTOLIC BLOOD PRESSURE: 65 MMHG

## 2023-04-11 DIAGNOSIS — C25.1 MALIGNANT NEOPLASM OF BODY OF PANCREAS: Primary | ICD-10-CM

## 2023-04-11 LAB
ALBUMIN SERPL-MCNC: 3.5 G/DL (ref 3.5–5.2)
ALBUMIN/GLOB SERPL: 1.2 G/DL
ALP SERPL-CCNC: 66 U/L (ref 39–117)
ALT SERPL W P-5'-P-CCNC: 9 U/L (ref 1–33)
ANION GAP SERPL CALCULATED.3IONS-SCNC: 11.1 MMOL/L (ref 5–15)
AST SERPL-CCNC: 25 U/L (ref 1–32)
BASOPHILS # BLD AUTO: 0.03 10*3/MM3 (ref 0–0.2)
BASOPHILS NFR BLD AUTO: 0.7 % (ref 0–1.5)
BILIRUB SERPL-MCNC: 0.2 MG/DL (ref 0–1.2)
BUN SERPL-MCNC: 10 MG/DL (ref 8–23)
BUN/CREAT SERPL: 13.5 (ref 7–25)
CALCIUM SPEC-SCNC: 8.5 MG/DL (ref 8.6–10.5)
CHLORIDE SERPL-SCNC: 102 MMOL/L (ref 98–107)
CO2 SERPL-SCNC: 22.9 MMOL/L (ref 22–29)
CREAT SERPL-MCNC: 0.74 MG/DL (ref 0.57–1)
DEPRECATED RDW RBC AUTO: 62 FL (ref 37–54)
EGFRCR SERPLBLD CKD-EPI 2021: 87.2 ML/MIN/1.73
EOSINOPHIL # BLD AUTO: 0.11 10*3/MM3 (ref 0–0.4)
EOSINOPHIL NFR BLD AUTO: 2.6 % (ref 0.3–6.2)
ERYTHROCYTE [DISTWIDTH] IN BLOOD BY AUTOMATED COUNT: 17.2 % (ref 12.3–15.4)
GLOBULIN UR ELPH-MCNC: 2.9 GM/DL
GLUCOSE SERPL-MCNC: 104 MG/DL (ref 65–99)
HCT VFR BLD AUTO: 33.4 % (ref 34–46.6)
HGB BLD-MCNC: 11.1 G/DL (ref 12–15.9)
IMM GRANULOCYTES # BLD AUTO: 0.01 10*3/MM3 (ref 0–0.05)
IMM GRANULOCYTES NFR BLD AUTO: 0.2 % (ref 0–0.5)
LYMPHOCYTES # BLD AUTO: 1.45 10*3/MM3 (ref 0.7–3.1)
LYMPHOCYTES NFR BLD AUTO: 33.8 % (ref 19.6–45.3)
MCH RBC QN AUTO: 32.8 PG (ref 26.6–33)
MCHC RBC AUTO-ENTMCNC: 33.2 G/DL (ref 31.5–35.7)
MCV RBC AUTO: 98.8 FL (ref 79–97)
MONOCYTES # BLD AUTO: 0.79 10*3/MM3 (ref 0.1–0.9)
MONOCYTES NFR BLD AUTO: 18.4 % (ref 5–12)
NEUTROPHILS NFR BLD AUTO: 1.9 10*3/MM3 (ref 1.7–7)
NEUTROPHILS NFR BLD AUTO: 44.3 % (ref 42.7–76)
NRBC BLD AUTO-RTO: 0 /100 WBC (ref 0–0.2)
PLATELET # BLD AUTO: 259 10*3/MM3 (ref 140–450)
PMV BLD AUTO: 9.6 FL (ref 6–12)
POTASSIUM SERPL-SCNC: 4 MMOL/L (ref 3.5–5.2)
PROT SERPL-MCNC: 6.4 G/DL (ref 6–8.5)
RBC # BLD AUTO: 3.38 10*6/MM3 (ref 3.77–5.28)
SODIUM SERPL-SCNC: 136 MMOL/L (ref 136–145)
WBC NRBC COR # BLD: 4.29 10*3/MM3 (ref 3.4–10.8)

## 2023-04-11 PROCEDURE — 3074F SYST BP LT 130 MM HG: CPT | Performed by: NURSE PRACTITIONER

## 2023-04-11 PROCEDURE — 25010000002 OXALIPLATIN PER 0.5 MG: Performed by: NURSE PRACTITIONER

## 2023-04-11 PROCEDURE — 99214 OFFICE O/P EST MOD 30 MIN: CPT | Performed by: NURSE PRACTITIONER

## 2023-04-11 PROCEDURE — 25010000002 LEUCOVORIN 200 MG RECONSTITUTED SOLUTION 200 MG VIAL: Performed by: NURSE PRACTITIONER

## 2023-04-11 PROCEDURE — 96368 THER/DIAG CONCURRENT INF: CPT

## 2023-04-11 PROCEDURE — 25010000002 LEUCOVORIN CALCIUM PER 50 MG: Performed by: NURSE PRACTITIONER

## 2023-04-11 PROCEDURE — 25010000002 LEUCOVORIN 500 MG RECONSTITUTED SOLUTION 1 EACH VIAL: Performed by: NURSE PRACTITIONER

## 2023-04-11 PROCEDURE — 96417 CHEMO IV INFUS EACH ADDL SEQ: CPT

## 2023-04-11 PROCEDURE — 0 ATROPINE PER 0.01 MG: Performed by: NURSE PRACTITIONER

## 2023-04-11 PROCEDURE — 96416 CHEMO PROLONG INFUSE W/PUMP: CPT

## 2023-04-11 PROCEDURE — 96375 TX/PRO/DX INJ NEW DRUG ADDON: CPT

## 2023-04-11 PROCEDURE — 25010000002 FLUOROURACIL PER 500 MG: Performed by: NURSE PRACTITIONER

## 2023-04-11 PROCEDURE — 25010000002 PALONOSETRON PER 25 MCG: Performed by: NURSE PRACTITIONER

## 2023-04-11 PROCEDURE — G0498 CHEMO EXTEND IV INFUS W/PUMP: HCPCS

## 2023-04-11 PROCEDURE — 3078F DIAST BP <80 MM HG: CPT | Performed by: NURSE PRACTITIONER

## 2023-04-11 PROCEDURE — 85025 COMPLETE CBC W/AUTO DIFF WBC: CPT

## 2023-04-11 PROCEDURE — 25010000002 IRINOTECAN PER 20 MG: Performed by: NURSE PRACTITIONER

## 2023-04-11 PROCEDURE — 96413 CHEMO IV INFUSION 1 HR: CPT

## 2023-04-11 PROCEDURE — 96376 TX/PRO/DX INJ SAME DRUG ADON: CPT

## 2023-04-11 PROCEDURE — 1126F AMNT PAIN NOTED NONE PRSNT: CPT | Performed by: NURSE PRACTITIONER

## 2023-04-11 PROCEDURE — 96415 CHEMO IV INFUSION ADDL HR: CPT

## 2023-04-11 PROCEDURE — 96549 UNLISTED CHEMOTHERAPY PX: CPT

## 2023-04-11 PROCEDURE — 25010000002 DEXAMETHASONE SODIUM PHOSPHATE 20 MG/5ML SOLUTION: Performed by: NURSE PRACTITIONER

## 2023-04-11 PROCEDURE — 1159F MED LIST DOCD IN RCRD: CPT | Performed by: NURSE PRACTITIONER

## 2023-04-11 PROCEDURE — 80053 COMPREHEN METABOLIC PANEL: CPT

## 2023-04-11 PROCEDURE — 1160F RVW MEDS BY RX/DR IN RCRD: CPT | Performed by: NURSE PRACTITIONER

## 2023-04-11 RX ORDER — PALONOSETRON 0.05 MG/ML
0.25 INJECTION, SOLUTION INTRAVENOUS ONCE
Status: COMPLETED | OUTPATIENT
Start: 2023-04-11 | End: 2023-04-11

## 2023-04-11 RX ORDER — ATROPINE SULFATE 1 MG/ML
0.25 INJECTION, SOLUTION INTRAMUSCULAR; INTRAVENOUS; SUBCUTANEOUS
Status: CANCELLED | OUTPATIENT
Start: 2023-04-11

## 2023-04-11 RX ORDER — CARBIDOPA/LEVODOPA 25MG-250MG
1 TABLET ORAL 2 TIMES DAILY
COMMUNITY
Start: 2023-03-30

## 2023-04-11 RX ORDER — DEXTROSE MONOHYDRATE 50 MG/ML
250 INJECTION, SOLUTION INTRAVENOUS ONCE
Status: DISCONTINUED | OUTPATIENT
Start: 2023-04-11 | End: 2023-04-11 | Stop reason: HOSPADM

## 2023-04-11 RX ORDER — DEXTROSE MONOHYDRATE 50 MG/ML
250 INJECTION, SOLUTION INTRAVENOUS ONCE
Status: CANCELLED | OUTPATIENT
Start: 2023-04-11

## 2023-04-11 RX ORDER — DIPHENHYDRAMINE HYDROCHLORIDE 50 MG/ML
50 INJECTION INTRAMUSCULAR; INTRAVENOUS AS NEEDED
Status: CANCELLED | OUTPATIENT
Start: 2023-04-11

## 2023-04-11 RX ORDER — FAMOTIDINE 10 MG/ML
20 INJECTION, SOLUTION INTRAVENOUS AS NEEDED
Status: CANCELLED | OUTPATIENT
Start: 2023-04-11

## 2023-04-11 RX ORDER — PALONOSETRON 0.05 MG/ML
0.25 INJECTION, SOLUTION INTRAVENOUS ONCE
Status: CANCELLED | OUTPATIENT
Start: 2023-04-11

## 2023-04-11 RX ADMIN — LEUCOVORIN CALCIUM 720 MG: 500 INJECTION, POWDER, LYOPHILIZED, FOR SOLUTION INTRAMUSCULAR; INTRAVENOUS at 12:39

## 2023-04-11 RX ADMIN — ATROPINE SULFATE 0.25 MG: 0.4 INJECTION, SOLUTION INTRAMUSCULAR; INTRAVENOUS; SUBCUTANEOUS at 13:43

## 2023-04-11 RX ADMIN — DEXAMETHASONE SODIUM PHOSPHATE 12 MG: 4 INJECTION, SOLUTION INTRAMUSCULAR; INTRAVENOUS at 10:22

## 2023-04-11 RX ADMIN — IRINOTECAN HYDROCHLORIDE 240 MG: 20 INJECTION, SOLUTION INTRAVENOUS at 12:38

## 2023-04-11 RX ADMIN — PALONOSETRON 0.25 MG: 0.25 INJECTION, SOLUTION INTRAVENOUS at 10:14

## 2023-04-11 RX ADMIN — FLUOROURACIL 3890 MG: 50 INJECTION, SOLUTION INTRAVENOUS at 14:40

## 2023-04-11 RX ADMIN — OXALIPLATIN 120 MG: 5 INJECTION, SOLUTION INTRAVENOUS at 10:39

## 2023-04-11 RX ADMIN — ATROPINE SULFATE 0.25 MG: 0.4 INJECTION, SOLUTION INTRAMUSCULAR; INTRAVENOUS; SUBCUTANEOUS at 12:36

## 2023-04-13 ENCOUNTER — INFUSION (OUTPATIENT)
Dept: ONCOLOGY | Facility: HOSPITAL | Age: 73
End: 2023-04-13
Payer: MEDICARE

## 2023-04-13 VITALS — SYSTOLIC BLOOD PRESSURE: 112 MMHG | HEART RATE: 60 BPM | DIASTOLIC BLOOD PRESSURE: 66 MMHG | TEMPERATURE: 97.5 F

## 2023-04-13 DIAGNOSIS — K86.89 PANCREATIC MASS: ICD-10-CM

## 2023-04-13 DIAGNOSIS — C25.1 MALIGNANT NEOPLASM OF BODY OF PANCREAS: Primary | ICD-10-CM

## 2023-04-13 PROCEDURE — 25010000002 HEPARIN LOCK FLUSH PER 10 UNITS

## 2023-04-13 PROCEDURE — 96523 IRRIG DRUG DELIVERY DEVICE: CPT

## 2023-04-13 RX ORDER — SODIUM CHLORIDE 0.9 % (FLUSH) 0.9 %
10 SYRINGE (ML) INJECTION AS NEEDED
OUTPATIENT
Start: 2023-04-13

## 2023-04-13 RX ORDER — HEPARIN SODIUM (PORCINE) LOCK FLUSH IV SOLN 100 UNIT/ML 100 UNIT/ML
SOLUTION INTRAVENOUS
Status: COMPLETED
Start: 2023-04-13 | End: 2023-04-13

## 2023-04-13 RX ORDER — HEPARIN SODIUM (PORCINE) LOCK FLUSH IV SOLN 100 UNIT/ML 100 UNIT/ML
500 SOLUTION INTRAVENOUS AS NEEDED
Status: DISCONTINUED | OUTPATIENT
Start: 2023-04-13 | End: 2023-04-13 | Stop reason: HOSPADM

## 2023-04-13 RX ORDER — SODIUM CHLORIDE 0.9 % (FLUSH) 0.9 %
10 SYRINGE (ML) INJECTION AS NEEDED
Status: DISCONTINUED | OUTPATIENT
Start: 2023-04-13 | End: 2023-04-13 | Stop reason: HOSPADM

## 2023-04-13 RX ORDER — HEPARIN SODIUM (PORCINE) LOCK FLUSH IV SOLN 100 UNIT/ML 100 UNIT/ML
500 SOLUTION INTRAVENOUS AS NEEDED
OUTPATIENT
Start: 2023-04-13

## 2023-04-13 RX ADMIN — Medication 10 ML: at 13:28

## 2023-04-13 RX ADMIN — Medication 500 UNITS: at 13:29

## 2023-04-13 RX ADMIN — HEPARIN SODIUM (PORCINE) LOCK FLUSH IV SOLN 100 UNIT/ML 500 UNITS: 100 SOLUTION at 13:29

## 2023-04-17 ENCOUNTER — OFFICE VISIT (OUTPATIENT)
Dept: INTERNAL MEDICINE | Facility: CLINIC | Age: 73
End: 2023-04-17
Payer: MEDICARE

## 2023-04-17 VITALS
SYSTOLIC BLOOD PRESSURE: 142 MMHG | DIASTOLIC BLOOD PRESSURE: 80 MMHG | HEIGHT: 68 IN | TEMPERATURE: 97.5 F | OXYGEN SATURATION: 96 % | HEART RATE: 101 BPM | BODY MASS INDEX: 23.04 KG/M2 | WEIGHT: 152 LBS

## 2023-04-17 DIAGNOSIS — I10 HTN (HYPERTENSION), BENIGN: ICD-10-CM

## 2023-04-17 DIAGNOSIS — F51.01 PRIMARY INSOMNIA: ICD-10-CM

## 2023-04-17 DIAGNOSIS — Z12.31 ENCOUNTER FOR SCREENING MAMMOGRAM FOR MALIGNANT NEOPLASM OF BREAST: ICD-10-CM

## 2023-04-17 DIAGNOSIS — C25.1 MALIGNANT NEOPLASM OF BODY OF PANCREAS: Primary | ICD-10-CM
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
## 2023-04-17 PROBLEM — J98.11 ATELECTASIS: Status: RESOLVED | Noted: 2019-10-08 | Resolved: 2023-04-17

## 2023-04-17 PROBLEM — S22.31XA CLOSED FRACTURE OF ONE RIB OF RIGHT SIDE: Status: RESOLVED | Noted: 2019-10-06 | Resolved: 2023-04-17

## 2023-04-17 PROBLEM — J96.01 ACUTE RESPIRATORY FAILURE WITH HYPOXIA: Status: RESOLVED | Noted: 2020-10-21 | Resolved: 2023-04-17

## 2023-04-17 PROBLEM — K76.9 LIVER LESION: Status: RESOLVED | Noted: 2019-10-07 | Resolved: 2023-04-17

## 2023-04-17 PROBLEM — S20.02XA TRAUMATIC ECCHYMOSIS OF LEFT FEMALE BREAST: Status: RESOLVED | Noted: 2019-10-08 | Resolved: 2023-04-17

## 2023-04-17 PROBLEM — S20.219A CONTUSION OF CHEST: Status: RESOLVED | Noted: 2019-10-05 | Resolved: 2023-04-17

## 2023-04-17 PROBLEM — J69.0 ASPIRATION PNEUMONITIS: Status: RESOLVED | Noted: 2020-10-21 | Resolved: 2023-04-17
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
## 2023-04-17 PROBLEM — S72.102A: Status: RESOLVED | Noted: 2020-10-20 | Resolved: 2023-04-17

## 2023-04-17 PROBLEM — S80.11XA TRAUMATIC HEMATOMA OF RIGHT LOWER LEG: Status: RESOLVED | Noted: 2019-10-08 | Resolved: 2023-04-17

## 2023-04-17 PROBLEM — S40.811A: Status: RESOLVED | Noted: 2019-10-07 | Resolved: 2023-04-17

## 2023-04-17 RX ORDER — TRAZODONE HYDROCHLORIDE 50 MG/1
50 TABLET ORAL NIGHTLY
Qty: 30 TABLET | Refills: 5 | Status: SHIPPED | OUTPATIENT
Start: 2023-04-17

## 2023-04-17 NOTE — PROGRESS NOTES
Eliseo Sidhuолег VICTOR Chung is a 72 y.o. 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  5 lymph nodes also resected 1 of which was positive.  She is undergoing chemotherapy through our oncology group here.  She has a diagnosis of Parkinson's for which she is on a low-dose of Sinemet.  History of cervical spine radiculopathy status post fusion surgery with improvement in her gait recently has been complaining of difficulty with sleep    The following portions of the patient's history were reviewed and updated as appropriate: allergies, current medications, past family history, past medical history, past social history, past surgical history, and problem list.     Review of Systems   Constitutional: Positive for fatigue. Negative for activity change, appetite change and fever.   HENT: Negative for congestion, ear discharge, ear pain and trouble swallowing.    Eyes: Negative for photophobia and visual disturbance.   Respiratory: Negative for cough and shortness of breath.    Cardiovascular: Negative for chest pain and palpitations.   Gastrointestinal: Negative for abdominal distention, abdominal pain, constipation, diarrhea, nausea and vomiting.   Endocrine: Negative.    Genitourinary: Negative for dysuria, hematuria and urgency.   Musculoskeletal: Positive for gait problem. Negative for arthralgias, back pain, joint swelling and myalgias.   Skin: Negative for color change and rash.   Allergic/Immunologic: Negative.    Neurological: Negative for dizziness, weakness, light-headedness and headaches.   Hematological: Negative for adenopathy. Does not bruise/bleed easily.   Psychiatric/Behavioral: Positive for sleep disturbance. Negative for agitation, confusion and dysphoric mood. The patient is not nervous/anxious.        Visit Vitals  /80   Pulse 101   Temp 97.5 °F (36.4 °C)  "  Ht 172.7 cm (68\")   Wt 68.9 kg (152 lb)   SpO2 96%   BMI 23.11 kg/m²       Objective  Physical Exam  Constitutional:       General: She is not in acute distress.     Appearance: She is well-developed.   HENT:      Nose: Nose normal.   Eyes:      General: No scleral icterus.     Conjunctiva/sclera: Conjunctivae normal.   Neck:      Thyroid: No thyromegaly.      Trachea: No tracheal deviation.   Cardiovascular:      Rate and Rhythm: Normal rate and regular rhythm.      Heart sounds: No murmur heard.    No friction rub.   Pulmonary:      Effort: No respiratory distress.      Breath sounds: No wheezing or rales.   Abdominal:      General: There is no distension.      Palpations: Abdomen is soft. There is no mass.      Tenderness: There is no abdominal tenderness. There is no guarding.   Musculoskeletal:         General: No deformity. Normal range of motion.   Lymphadenopathy:      Cervical: No cervical adenopathy.   Skin:     General: Skin is warm and dry.      Findings: No erythema or rash.   Neurological:      Mental Status: She is alert and oriented to person, place, and time.      Cranial Nerves: No cranial nerve deficit.      Coordination: Coordination normal.      Deep Tendon Reflexes: Reflexes are normal and symmetric.   Psychiatric:         Behavior: Behavior normal.         Thought Content: Thought content normal.         Judgment: Judgment normal.         Diagnoses and all orders for this visit:    Malignant neoplasm of body of pancreas currently stable undergoing chemotherapy.  On Creon no significant recent weight loss    HTN (hypertension), benign continue with low-salt diet low-dose metoprolol follow BP readings at home    Primary insomnia counseled about sleep hygiene trial of trazodone at nighttime        "

## 2023-04-21 ENCOUNTER — PATIENT ROUNDING (BHMG ONLY) (OUTPATIENT)
Dept: INTERNAL MEDICINE | Facility: CLINIC | Age: 73
End: 2023-04-21
Payer: MEDICARE

## 2023-04-21 NOTE — PROGRESS NOTES
A [employer] message has been sent to patient for PATIENT ROUNDING with [facility].

## 2023-04-24 ENCOUNTER — TELEPHONE (OUTPATIENT)
Dept: ONCOLOGY | Facility: CLINIC | Age: 73
End: 2023-04-24
Payer: MEDICARE

## 2023-04-24 NOTE — TELEPHONE ENCOUNTER
PT CALLED WITH CONCERN ABOUT FINDING TICK ON HER AND PROBABLY BEEN HER FOR A COUPLE OF DAYS .    PT WONDERING ABOUT LYME DISEASE .

## 2023-04-24 NOTE — TELEPHONE ENCOUNTER
I talked with patient and she said that she was itching and felt something on her back and her  removed what appeared to be a deer tick.  She asked if the chemo makes her more susceptible to Lyme disease.  I told her we would discuss with Dr. Corrales tomorrow as patient has an appt but I talked with RALEIGH Alexis and we would monitor patient.  Patient denies any rashes and said there was just a bit of redness at the site where the tick was.  I told her that Dr Corrales would assess her tomorrow.  She verbalized understanding.

## 2023-04-25 ENCOUNTER — OFFICE VISIT (OUTPATIENT)
Dept: ONCOLOGY | Facility: CLINIC | Age: 73
End: 2023-04-25
Payer: MEDICARE

## 2023-04-25 ENCOUNTER — INFUSION (OUTPATIENT)
Dept: ONCOLOGY | Facility: HOSPITAL | Age: 73
End: 2023-04-25
Payer: MEDICARE

## 2023-04-25 VITALS
DIASTOLIC BLOOD PRESSURE: 80 MMHG | TEMPERATURE: 98 F | HEART RATE: 125 BPM | BODY MASS INDEX: 23.34 KG/M2 | WEIGHT: 154 LBS | HEIGHT: 68 IN | RESPIRATION RATE: 12 BRPM | OXYGEN SATURATION: 98 % | SYSTOLIC BLOOD PRESSURE: 122 MMHG

## 2023-04-25 DIAGNOSIS — C25.1 MALIGNANT NEOPLASM OF BODY OF PANCREAS: Primary | ICD-10-CM

## 2023-04-25 DIAGNOSIS — K86.89 PANCREATIC MASS: ICD-10-CM

## 2023-04-25 LAB
ALBUMIN SERPL-MCNC: 3.8 G/DL (ref 3.5–5.2)
ALBUMIN/GLOB SERPL: 1.4 G/DL
ALP SERPL-CCNC: 79 U/L (ref 39–117)
ALT SERPL W P-5'-P-CCNC: 14 U/L (ref 1–33)
ANION GAP SERPL CALCULATED.3IONS-SCNC: 12.7 MMOL/L (ref 5–15)
AST SERPL-CCNC: 27 U/L (ref 1–32)
BILIRUB SERPL-MCNC: 0.2 MG/DL (ref 0–1.2)
BUN SERPL-MCNC: 6 MG/DL (ref 8–23)
BUN/CREAT SERPL: 9.2 (ref 7–25)
CALCIUM SPEC-SCNC: 8.3 MG/DL (ref 8.6–10.5)
CANCER AG19-9 SERPL-ACNC: 25.6 U/ML
CHLORIDE SERPL-SCNC: 104 MMOL/L (ref 98–107)
CO2 SERPL-SCNC: 20.3 MMOL/L (ref 22–29)
CREAT SERPL-MCNC: 0.65 MG/DL (ref 0.57–1)
DEPRECATED RDW RBC AUTO: 66.5 FL (ref 37–54)
EGFRCR SERPLBLD CKD-EPI 2021: 94.3 ML/MIN/1.73
EOSINOPHIL # BLD MANUAL: 0.08 10*3/MM3 (ref 0–0.4)
EOSINOPHIL NFR BLD MANUAL: 2 % (ref 0.3–6.2)
ERYTHROCYTE [DISTWIDTH] IN BLOOD BY AUTOMATED COUNT: 18.4 % (ref 12.3–15.4)
GLOBULIN UR ELPH-MCNC: 2.8 GM/DL
GLUCOSE SERPL-MCNC: 104 MG/DL (ref 65–99)
HCT VFR BLD AUTO: 33.4 % (ref 34–46.6)
HGB BLD-MCNC: 11.2 G/DL (ref 12–15.9)
LYMPHOCYTES # BLD MANUAL: 2.15 10*3/MM3 (ref 0.7–3.1)
LYMPHOCYTES NFR BLD MANUAL: 22 % (ref 5–12)
MCH RBC QN AUTO: 33.5 PG (ref 26.6–33)
MCHC RBC AUTO-ENTMCNC: 33.5 G/DL (ref 31.5–35.7)
MCV RBC AUTO: 100 FL (ref 79–97)
MONOCYTES # BLD: 0.83 10*3/MM3 (ref 0.1–0.9)
NEUTROPHILS # BLD AUTO: 0.72 10*3/MM3 (ref 1.7–7)
NEUTROPHILS NFR BLD MANUAL: 18 % (ref 42.7–76)
NEUTS BAND NFR BLD MANUAL: 1 % (ref 0–5)
PLATELET # BLD AUTO: 214 10*3/MM3 (ref 140–450)
PMV BLD AUTO: 9.9 FL (ref 6–12)
POTASSIUM SERPL-SCNC: 3.8 MMOL/L (ref 3.5–5.2)
PROT SERPL-MCNC: 6.6 G/DL (ref 6–8.5)
RBC # BLD AUTO: 3.34 10*6/MM3 (ref 3.77–5.28)
RBC MORPH BLD: NORMAL
SCAN SLIDE: NORMAL
SMALL PLATELETS BLD QL SMEAR: ADEQUATE
SODIUM SERPL-SCNC: 137 MMOL/L (ref 136–145)
VARIANT LYMPHS NFR BLD MANUAL: 57 % (ref 19.6–45.3)
WBC MORPH BLD: NORMAL
WBC NRBC COR # BLD: 3.77 10*3/MM3 (ref 3.4–10.8)

## 2023-04-25 PROCEDURE — 1126F AMNT PAIN NOTED NONE PRSNT: CPT | Performed by: INTERNAL MEDICINE

## 2023-04-25 PROCEDURE — 3079F DIAST BP 80-89 MM HG: CPT | Performed by: INTERNAL MEDICINE

## 2023-04-25 PROCEDURE — 36591 DRAW BLOOD OFF VENOUS DEVICE: CPT

## 2023-04-25 PROCEDURE — 85007 BL SMEAR W/DIFF WBC COUNT: CPT

## 2023-04-25 PROCEDURE — 3074F SYST BP LT 130 MM HG: CPT | Performed by: INTERNAL MEDICINE

## 2023-04-25 PROCEDURE — 86301 IMMUNOASSAY TUMOR CA 19-9: CPT

## 2023-04-25 PROCEDURE — 80053 COMPREHEN METABOLIC PANEL: CPT

## 2023-04-25 PROCEDURE — 85025 COMPLETE CBC W/AUTO DIFF WBC: CPT

## 2023-04-25 PROCEDURE — 99214 OFFICE O/P EST MOD 30 MIN: CPT | Performed by: INTERNAL MEDICINE

## 2023-04-25 PROCEDURE — 96523 IRRIG DRUG DELIVERY DEVICE: CPT

## 2023-04-25 PROCEDURE — 25010000002 HEPARIN LOCK FLUSH PER 10 UNITS: Performed by: INTERNAL MEDICINE

## 2023-04-25 RX ORDER — SODIUM CHLORIDE 0.9 % (FLUSH) 0.9 %
10 SYRINGE (ML) INJECTION AS NEEDED
OUTPATIENT
Start: 2023-04-25

## 2023-04-25 RX ORDER — ATROPINE SULFATE 1 MG/ML
0.25 INJECTION, SOLUTION INTRAMUSCULAR; INTRAVENOUS; SUBCUTANEOUS
Status: CANCELLED | OUTPATIENT
Start: 2023-05-02

## 2023-04-25 RX ORDER — HEPARIN SODIUM (PORCINE) LOCK FLUSH IV SOLN 100 UNIT/ML 100 UNIT/ML
500 SOLUTION INTRAVENOUS AS NEEDED
OUTPATIENT
Start: 2023-04-25

## 2023-04-25 RX ORDER — HEPARIN SODIUM (PORCINE) LOCK FLUSH IV SOLN 100 UNIT/ML 100 UNIT/ML
500 SOLUTION INTRAVENOUS AS NEEDED
Status: DISCONTINUED | OUTPATIENT
Start: 2023-04-25 | End: 2023-04-25 | Stop reason: HOSPADM

## 2023-04-25 RX ORDER — DEXTROSE MONOHYDRATE 50 MG/ML
250 INJECTION, SOLUTION INTRAVENOUS ONCE
Status: CANCELLED | OUTPATIENT
Start: 2023-05-02

## 2023-04-25 RX ORDER — FAMOTIDINE 10 MG/ML
20 INJECTION, SOLUTION INTRAVENOUS AS NEEDED
Status: CANCELLED | OUTPATIENT
Start: 2023-05-02

## 2023-04-25 RX ORDER — SODIUM CHLORIDE 0.9 % (FLUSH) 0.9 %
10 SYRINGE (ML) INJECTION AS NEEDED
Status: DISCONTINUED | OUTPATIENT
Start: 2023-04-25 | End: 2023-04-25 | Stop reason: HOSPADM

## 2023-04-25 RX ORDER — DIPHENHYDRAMINE HYDROCHLORIDE 50 MG/ML
50 INJECTION INTRAMUSCULAR; INTRAVENOUS AS NEEDED
Status: CANCELLED | OUTPATIENT
Start: 2023-05-02

## 2023-04-25 RX ORDER — PALONOSETRON 0.05 MG/ML
0.25 INJECTION, SOLUTION INTRAVENOUS ONCE
Status: CANCELLED | OUTPATIENT
Start: 2023-05-02

## 2023-04-25 RX ADMIN — Medication 500 UNITS: at 11:17

## 2023-04-25 RX ADMIN — Medication 10 ML: at 11:17

## 2023-04-25 NOTE — PROGRESS NOTES
"      PROBLEM LIST:  1. 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  She has had GI side effects with sinemet for her PD so she associated this symptom with that medication.  She has lost about 10 lbs. CT a/p without contrast showed a 6.8 x 3.7 cm mass in the distal pancreatic body and tail with minimal surrounding inflammation.  Multiple borderline enalrged nodes near celiac axis and mesenteric axis.  CT a/p with contrast 12/1/22 showed a 5.6 x 3.6 cm mass in the tail and distal body of the pancreas, no adenopathy.  B) distal pancreatectomy and splenectomy performed on 12/19/2022.  Pathology showed poorly differentiated pancreatic adenosquamous carcinoma with involvement of adrenal gland by direct extension, 1 out of 5 lymph nodes involved.  Surgical margins negative. + lymphovascular invasion, + perineural invasion.  C) adjuvant mFOLFIRINOX started 2/13/23.  2. parkinsons disease  3. Hypertension  4. Hyperlipidemia  5. asthma    Subjective     CHIEF COMPLAINT: pancreas cancer    HISTORY OF PRESENT ILLNESS:   Silvia hCung returns for follow-up.   She has had 5 doses of FOLFIRINOX chemotherapy.      She says she is doing okay.  She feels rundown for about a week after each treatment, and then has a few good days.  She is having some neuropathy in her feet.  It is most noticeable when she first gets up in the morning.  Her sensation is altered but not absent.    She stopped taking her beta-blocker because her blood pressure was still running low.    She had a tick bite over the weekend on her back        Objective      /80   Pulse (!) 125   Temp 98 °F (36.7 °C) (Temporal)   Resp 12   Ht 172.7 cm (68\")   Wt 69.9 kg (154 lb)   SpO2 98%   BMI 23.42 kg/m²      Performance Status:1            General: well appearing female in no acute distress  Neuro: alert and " oriented  HEENT: sclera anicteric, oropharynx clear  Extremeties: no lower extremity edema  Skin: Tick bite on the right back in the posterior axillary line, minimal erythema, no rash  Psych: mood and affect appropriate          RECENT LABS:  Lab Results   Component Value Date    WBC 4.29 04/11/2023    HGB 11.1 (L) 04/11/2023    HCT 33.4 (L) 04/11/2023    MCV 98.8 (H) 04/11/2023     04/11/2023       Lab Results   Component Value Date    GLUCOSE 104 (H) 04/11/2023    BUN 10 04/11/2023    CREATININE 0.74 04/11/2023    EGFRIFNONA 85 10/23/2020    BCR 13.5 04/11/2023    K 4.0 04/11/2023    CO2 22.9 04/11/2023    CALCIUM 8.5 (L) 04/11/2023    ALBUMIN 3.5 04/11/2023    AST 25 04/11/2023    ALT 9 04/11/2023               ASSESSMENT AND PLAN:     Silvia Chung is a 73 y.o. female with a T3N1M0 adenosquamous carcinoma of the pancreas, here for follow up on adjuvant chemotherapy.    She continues to do well with treatment.  We will continue with pancreatic enzymes.  Repeat CA 19-9 with this cycle, and we will repeat imaging studies prior to return.  We are planning on a total of 12 cycles of adjuvant chemotherapy.    Neuropathy: We will further reduce oxaliplatin to 60%.  If progression of symptoms, will discontinue oxaliplatin for the remainder of her treatment.    Diarrhea: Continue Creon    Parkinson's: Continue regular medications.    Post splenectomy vaccines given at .    Low blood pressures: She is now off both Norvasc and metoprolol.  Tachycardia today may be related to recently stopping metoprolol.  We will continue to monitor and consider reintroducing at a low dose if heart rate remains elevated.    F/u 2 weeks.                Janell Corrales MD  [facility]    4/25/2023          CC:

## 2023-05-01 ENCOUNTER — INFUSION (OUTPATIENT)
Dept: ONCOLOGY | Facility: HOSPITAL | Age: 73
End: 2023-05-01
Payer: MEDICARE

## 2023-05-01 VITALS
BODY MASS INDEX: 22.96 KG/M2 | TEMPERATURE: 98.6 F | HEART RATE: 126 BPM | SYSTOLIC BLOOD PRESSURE: 131 MMHG | DIASTOLIC BLOOD PRESSURE: 80 MMHG | RESPIRATION RATE: 18 BRPM | OXYGEN SATURATION: 95 % | WEIGHT: 151 LBS

## 2023-05-01 DIAGNOSIS — C25.1 MALIGNANT NEOPLASM OF BODY OF PANCREAS: Primary | ICD-10-CM

## 2023-05-01 LAB
ALBUMIN SERPL-MCNC: 3.6 G/DL (ref 3.5–5.2)
ALBUMIN/GLOB SERPL: 1.2 G/DL
ALP SERPL-CCNC: 74 U/L (ref 39–117)
ALT SERPL W P-5'-P-CCNC: 11 U/L (ref 1–33)
ANION GAP SERPL CALCULATED.3IONS-SCNC: 10.7 MMOL/L (ref 5–15)
AST SERPL-CCNC: 26 U/L (ref 1–32)
BILIRUB SERPL-MCNC: 0.3 MG/DL (ref 0–1.2)
BUN SERPL-MCNC: 12 MG/DL (ref 8–23)
BUN/CREAT SERPL: 17.1 (ref 7–25)
CALCIUM SPEC-SCNC: 9.2 MG/DL (ref 8.6–10.5)
CHLORIDE SERPL-SCNC: 102 MMOL/L (ref 98–107)
CO2 SERPL-SCNC: 27.3 MMOL/L (ref 22–29)
CREAT SERPL-MCNC: 0.7 MG/DL (ref 0.57–1)
DEPRECATED RDW RBC AUTO: 70.4 FL (ref 37–54)
EGFRCR SERPLBLD CKD-EPI 2021: 92.6 ML/MIN/1.73
EOSINOPHIL # BLD MANUAL: 0.12 10*3/MM3 (ref 0–0.4)
EOSINOPHIL NFR BLD MANUAL: 2 % (ref 0.3–6.2)
ERYTHROCYTE [DISTWIDTH] IN BLOOD BY AUTOMATED COUNT: 19.1 % (ref 12.3–15.4)
GLOBULIN UR ELPH-MCNC: 2.9 GM/DL
GLUCOSE SERPL-MCNC: 146 MG/DL (ref 65–99)
HCT VFR BLD AUTO: 33.9 % (ref 34–46.6)
HGB BLD-MCNC: 11.4 G/DL (ref 12–15.9)
LYMPHOCYTES # BLD MANUAL: 1.38 10*3/MM3 (ref 0.7–3.1)
LYMPHOCYTES NFR BLD MANUAL: 17 % (ref 5–12)
MCH RBC QN AUTO: 33.6 PG (ref 26.6–33)
MCHC RBC AUTO-ENTMCNC: 33.6 G/DL (ref 31.5–35.7)
MCV RBC AUTO: 100 FL (ref 79–97)
MONOCYTES # BLD: 1.02 10*3/MM3 (ref 0.1–0.9)
NEUTROPHILS # BLD AUTO: 3.49 10*3/MM3 (ref 1.7–7)
NEUTROPHILS NFR BLD MANUAL: 54 % (ref 42.7–76)
NEUTS BAND NFR BLD MANUAL: 4 % (ref 0–5)
PLATELET # BLD AUTO: 275 10*3/MM3 (ref 140–450)
PMV BLD AUTO: 9.6 FL (ref 6–12)
POTASSIUM SERPL-SCNC: 4.4 MMOL/L (ref 3.5–5.2)
PROT SERPL-MCNC: 6.5 G/DL (ref 6–8.5)
RBC # BLD AUTO: 3.39 10*6/MM3 (ref 3.77–5.28)
RBC MORPH BLD: NORMAL
SCAN SLIDE: NORMAL
SMALL PLATELETS BLD QL SMEAR: ADEQUATE
SODIUM SERPL-SCNC: 140 MMOL/L (ref 136–145)
VARIANT LYMPHS NFR BLD MANUAL: 23 % (ref 19.6–45.3)
WBC MORPH BLD: NORMAL
WBC NRBC COR # BLD: 6.01 10*3/MM3 (ref 3.4–10.8)

## 2023-05-01 PROCEDURE — 25010000002 IRINOTECAN PER 20 MG: Performed by: INTERNAL MEDICINE

## 2023-05-01 PROCEDURE — 96368 THER/DIAG CONCURRENT INF: CPT

## 2023-05-01 PROCEDURE — 96415 CHEMO IV INFUSION ADDL HR: CPT

## 2023-05-01 PROCEDURE — 80053 COMPREHEN METABOLIC PANEL: CPT | Performed by: INTERNAL MEDICINE

## 2023-05-01 PROCEDURE — 96417 CHEMO IV INFUS EACH ADDL SEQ: CPT

## 2023-05-01 PROCEDURE — 25010000002 FLUOROURACIL PER 500 MG: Performed by: INTERNAL MEDICINE

## 2023-05-01 PROCEDURE — 25010000002 LEUCOVORIN CALCIUM PER 50 MG: Performed by: INTERNAL MEDICINE

## 2023-05-01 PROCEDURE — 96413 CHEMO IV INFUSION 1 HR: CPT

## 2023-05-01 PROCEDURE — 96375 TX/PRO/DX INJ NEW DRUG ADDON: CPT

## 2023-05-01 PROCEDURE — 25010000002 DEXAMETHASONE SODIUM PHOSPHATE 20 MG/5ML SOLUTION: Performed by: INTERNAL MEDICINE

## 2023-05-01 PROCEDURE — 25010000002 LEUCOVORIN 200 MG RECONSTITUTED SOLUTION 200 MG VIAL: Performed by: INTERNAL MEDICINE

## 2023-05-01 PROCEDURE — 85007 BL SMEAR W/DIFF WBC COUNT: CPT | Performed by: INTERNAL MEDICINE

## 2023-05-01 PROCEDURE — 25010000002 PALONOSETRON PER 25 MCG: Performed by: INTERNAL MEDICINE

## 2023-05-01 PROCEDURE — 85025 COMPLETE CBC W/AUTO DIFF WBC: CPT | Performed by: INTERNAL MEDICINE

## 2023-05-01 PROCEDURE — 25010000002 OXALIPLATIN PER 0.5 MG: Performed by: INTERNAL MEDICINE

## 2023-05-01 PROCEDURE — 25010000002 LEUCOVORIN 500 MG RECONSTITUTED SOLUTION 1 EACH VIAL: Performed by: INTERNAL MEDICINE

## 2023-05-01 PROCEDURE — 96549 UNLISTED CHEMOTHERAPY PX: CPT

## 2023-05-01 PROCEDURE — G0498 CHEMO EXTEND IV INFUS W/PUMP: HCPCS

## 2023-05-01 RX ORDER — PALONOSETRON 0.05 MG/ML
0.25 INJECTION, SOLUTION INTRAVENOUS ONCE
Status: COMPLETED | OUTPATIENT
Start: 2023-05-01 | End: 2023-05-01

## 2023-05-01 RX ORDER — DEXTROSE MONOHYDRATE 50 MG/ML
250 INJECTION, SOLUTION INTRAVENOUS ONCE
Status: DISCONTINUED | OUTPATIENT
Start: 2023-05-01 | End: 2023-05-01 | Stop reason: HOSPADM

## 2023-05-01 RX ORDER — FAMOTIDINE 10 MG/ML
20 INJECTION, SOLUTION INTRAVENOUS AS NEEDED
Status: DISCONTINUED | OUTPATIENT
Start: 2023-05-01 | End: 2023-05-01 | Stop reason: HOSPADM

## 2023-05-01 RX ORDER — DIPHENHYDRAMINE HYDROCHLORIDE 50 MG/ML
50 INJECTION INTRAMUSCULAR; INTRAVENOUS AS NEEDED
Status: DISCONTINUED | OUTPATIENT
Start: 2023-05-01 | End: 2023-05-01 | Stop reason: HOSPADM

## 2023-05-01 RX ADMIN — OXALIPLATIN 90 MG: 5 INJECTION, SOLUTION INTRAVENOUS at 11:35

## 2023-05-01 RX ADMIN — ATROPINE SULFATE 0.25 MG: 0.4 INJECTION, SOLUTION INTRAVENOUS at 11:01

## 2023-05-01 RX ADMIN — LEUCOVORIN CALCIUM 720 MG: 500 INJECTION, POWDER, LYOPHILIZED, FOR SOLUTION INTRAMUSCULAR; INTRAVENOUS at 13:34

## 2023-05-01 RX ADMIN — DEXAMETHASONE SODIUM PHOSPHATE 12 MG: 4 INJECTION, SOLUTION INTRAMUSCULAR; INTRAVENOUS at 10:38

## 2023-05-01 RX ADMIN — PALONOSETRON 0.25 MG: 0.25 INJECTION, SOLUTION INTRAVENOUS at 10:36

## 2023-05-01 RX ADMIN — FLUOROURACIL 3890 MG: 50 INJECTION, SOLUTION INTRAVENOUS at 15:07

## 2023-05-01 RX ADMIN — IRINOTECAN HYDROCHLORIDE 240 MG: 20 INJECTION, SOLUTION INTRAVENOUS at 13:34

## 2023-05-03 ENCOUNTER — INFUSION (OUTPATIENT)
Dept: ONCOLOGY | Facility: HOSPITAL | Age: 73
End: 2023-05-03
Payer: MEDICARE

## 2023-05-03 VITALS — DIASTOLIC BLOOD PRESSURE: 73 MMHG | SYSTOLIC BLOOD PRESSURE: 121 MMHG | HEART RATE: 97 BPM | TEMPERATURE: 98.7 F

## 2023-05-03 DIAGNOSIS — K86.89 PANCREATIC MASS: ICD-10-CM

## 2023-05-03 DIAGNOSIS — C25.1 MALIGNANT NEOPLASM OF BODY OF PANCREAS: Primary | ICD-10-CM

## 2023-05-03 PROCEDURE — 96377 APPLICATON ON-BODY INJECTOR: CPT

## 2023-05-03 PROCEDURE — 96523 IRRIG DRUG DELIVERY DEVICE: CPT

## 2023-05-03 PROCEDURE — 25010000002 HEPARIN LOCK FLUSH PER 10 UNITS: Performed by: INTERNAL MEDICINE

## 2023-05-03 PROCEDURE — 25010000002 PEGFILGRASTIM 6 MG/0.6ML PREFILLED SYRINGE KIT: Performed by: INTERNAL MEDICINE

## 2023-05-03 RX ORDER — HEPARIN SODIUM (PORCINE) LOCK FLUSH IV SOLN 100 UNIT/ML 100 UNIT/ML
500 SOLUTION INTRAVENOUS AS NEEDED
OUTPATIENT
Start: 2023-05-03

## 2023-05-03 RX ORDER — HEPARIN SODIUM (PORCINE) LOCK FLUSH IV SOLN 100 UNIT/ML 100 UNIT/ML
500 SOLUTION INTRAVENOUS AS NEEDED
Status: DISCONTINUED | OUTPATIENT
Start: 2023-05-03 | End: 2023-05-03 | Stop reason: HOSPADM

## 2023-05-03 RX ORDER — SODIUM CHLORIDE 0.9 % (FLUSH) 0.9 %
10 SYRINGE (ML) INJECTION AS NEEDED
Status: DISCONTINUED | OUTPATIENT
Start: 2023-05-03 | End: 2023-05-03 | Stop reason: HOSPADM

## 2023-05-03 RX ORDER — SODIUM CHLORIDE 0.9 % (FLUSH) 0.9 %
10 SYRINGE (ML) INJECTION AS NEEDED
OUTPATIENT
Start: 2023-05-03

## 2023-05-03 RX ADMIN — Medication 500 UNITS: at 14:15

## 2023-05-03 RX ADMIN — PEGFILGRASTIM 6 MG: KIT SUBCUTANEOUS at 14:15

## 2023-05-03 RX ADMIN — Medication 10 ML: at 14:15

## 2023-05-04 ENCOUNTER — HOSPITAL ENCOUNTER (OUTPATIENT)
Dept: CT IMAGING | Facility: HOSPITAL | Age: 73
Discharge: HOME | End: 2023-05-04
Payer: MEDICARE

## 2023-05-04 DIAGNOSIS — C25.1 MALIGNANT NEOPLASM OF BODY OF PANCREAS: ICD-10-CM

## 2023-05-04 PROCEDURE — 74177 CT ABD & PELVIS W/CONTRAST: CPT

## 2023-05-04 PROCEDURE — 25510000001 IOPAMIDOL 61 % SOLUTION: Performed by: INTERNAL MEDICINE

## 2023-05-04 PROCEDURE — 71260 CT THORAX DX C+: CPT

## 2023-05-04 RX ADMIN — IOPAMIDOL 100 ML: 612 INJECTION, SOLUTION INTRAVENOUS at 14:05

## 2023-05-05 ENCOUNTER — TELEPHONE (OUTPATIENT)
Dept: ONCOLOGY | Facility: CLINIC | Age: 73
End: 2023-05-05
Payer: MEDICARE

## 2023-05-05 NOTE — TELEPHONE ENCOUNTER
Attempted call to patient yesterday evening and again today re: CT results.  No evidence of malignancy, but changes in right kidney concerning for pyelonephritis.  Left VM x 2.  Asked her to go to ER if fever/chills/flank pain.  Call back if any questions.

## 2023-05-05 NOTE — TELEPHONE ENCOUNTER
Patient called and said that she got a msg about her scans and some hydronephritis.  Dr. Corrales had called patient and left a voicemail about the scan results.  Patient was advised that if she has fevers, chills, flank pain that she report to ED.  Patient was stating that she is asymptomatic at present and said if she developed symptoms, she would go to ED.  She asked about seeing urology but I checked with Dr. Corrales and we can just monitor for now.

## 2023-05-08 ENCOUNTER — TELEPHONE (OUTPATIENT)
Dept: ONCOLOGY | Facility: CLINIC | Age: 73
End: 2023-05-08
Payer: MEDICARE

## 2023-05-08 NOTE — TELEPHONE ENCOUNTER
Patient called she wants Ana to call her about her urine, wants to know if she should go see a urologist?

## 2023-05-08 NOTE — TELEPHONE ENCOUNTER
I called the patient back and per Dr. Corrales, since patient does not have any symptoms, she does not have to follow with urology at this time.  Patient states she has no fevers, flank pain or dysuria..  She said she has been fine over the weekend.  I told her that Dr. Corrales was fine with just monitoring her at this point with the understanding that patient will report to hospital if symptoms develop.  Patient verbalizes understanding.

## 2023-05-15 NOTE — PROGRESS NOTES
PROBLEM LIST:  1. [id number] adenosquamous carcinoma of the pancreatic body/tail  A) presented to the ED on 11/23/22 with LUQ pain, worsening over 3-4 months, associated with nausea and occasional vomiting.  She has had GI side effects with sinemet for her PD so she associated this symptom with that medication.  She has lost about 10 lbs. CT a/p without contrast showed a 6.8 x 3.7 cm mass in the distal pancreatic body and tail with minimal surrounding inflammation.  Multiple borderline enalrged nodes near celiac axis and mesenteric axis.  CT a/p with contrast 12/1/22 showed a 5.6 x 3.6 cm mass in the tail and distal body of the pancreas, no adenopathy.  B) distal pancreatectomy and splenectomy performed on 12/19/2022.  Pathology showed poorly differentiated pancreatic adenosquamous carcinoma with involvement of adrenal gland by direct extension, 1 out of 5 lymph nodes involved.  Surgical margins negative. + lymphovascular invasion, + perineural invasion.  C) adjuvant mFOLFIRINOX started 2/13/23.  2. parkinsons disease  3. Hypertension  4. Hyperlipidemia  5. asthma    Subjective     CHIEF COMPLAINT: pancreas cancer    HISTORY OF PRESENT ILLNESS:   Silvia Chung returns for follow-up.   She has had 6 doses of FOLFIRINOX chemotherapy.      She says she is doing okay.  She feels rundown for about a week after each treatment, and then has a few good days.  She is having some neuropathy in her feet.  It is most noticeable when she first gets up in the morning.  Her sensation is altered but not absent.    She stopped taking her beta-blocker because her blood pressure was still running low.    She had a tick bite over the weekend on her back        Objective      There were no vitals taken for this visit.     Performance Status:1            General: well appearing female in no acute distress  Neuro: alert and oriented  HEENT: sclera anicteric,  Lymphatics: no cervical, supraclavicular, or axillary  adenopathy  Cardiovascular: regular rate and rhythm, no murmurs  Lungs: clear to auscultation bilaterally  Abdomen: soft,  nondistended.   Extremeties: no lower extremity edema  Skin: no rashes, lesions, bruising, or petechiae  Psych: mood and affect appropriate            RECENT LABS:  Lab Results   Component Value Date    WBC 6.01 05/01/2023    HGB 11.4 (L) 05/01/2023    HCT 33.9 (L) 05/01/2023    .0 (H) 05/01/2023     05/01/2023       Lab Results   Component Value Date    GLUCOSE 146 (H) 05/01/2023    BUN 12 05/01/2023    CREATININE 0.70 05/01/2023    EGFRIFNONA 85 10/23/2020    BCR 17.1 05/01/2023    K 4.4 05/01/2023    CO2 27.3 05/01/2023    CALCIUM 9.2 05/01/2023    ALBUMIN 3.6 05/01/2023    AST 26 05/01/2023    ALT 11 05/01/2023               ASSESSMENT AND PLAN:     Silvia Chung is a 73 y.o. female with a T3N1M0 adenosquamous carcinoma of the pancreas, here for follow up on adjuvant chemotherapy.    Overall, she continues to tolerate treatment well.  Previous CT scan showed no evidence of malignancy.  We will continue with her current plan of a total of 12 cycles of adjuvant chemotherapy.  We will continue pancreatic enzymes.    Neuropathy: We will continue continue reduced dose of oxaliplatin at 60%.  If progression of symptoms, will discontinue oxaliplatin for the remainder of her treatment.    Diarrhea: Continue Creon    Parkinson's: Continue regular medications.    Post splenectomy vaccines given at .    Low blood pressures: She is now off both Norvasc and metoprolol.  Unfortunately, tachycardia is worsening.  Metoprolol was stopped due to low blood pressure.  We will restart metoprolol at 12.5 mg daily.  I will also refer to cardiology.  I did encourage the patient to monitor her blood pressure at home since we are restarting metoprolol which did cause a low blood pressure.  We will continue to continue to monitor.      Follow-up 2-week      Addendum.  About [facility] through her  oxaliplatin patient reported some burning and bloating.  She received Pepcid 20 mg IV.  Her blood pressure at that time increased significantly.  She received 25 mg of Benadryl IV.  She also had some nausea.  After that she received atropine.  She did feel better after her atropine dose.  Oxaliplatin was resumed.          Arlene Najera APRN  [facility]    5/15/2023          CC:

## 2023-05-23 ENCOUNTER — OFFICE VISIT (OUTPATIENT)
Dept: ONCOLOGY | Facility: CLINIC | Age: 73
End: 2023-05-23
Payer: MEDICARE

## 2023-05-23 ENCOUNTER — INFUSION (OUTPATIENT)
Dept: ONCOLOGY | Facility: HOSPITAL | Age: 73
End: 2023-05-23
Payer: MEDICARE

## 2023-05-23 VITALS
TEMPERATURE: 98 F | HEART RATE: 133 BPM | BODY MASS INDEX: 23.34 KG/M2 | RESPIRATION RATE: 12 BRPM | WEIGHT: 154 LBS | OXYGEN SATURATION: 95 % | SYSTOLIC BLOOD PRESSURE: 122 MMHG | HEIGHT: 68 IN | DIASTOLIC BLOOD PRESSURE: 75 MMHG

## 2023-05-23 VITALS — RESPIRATION RATE: 14 BRPM | DIASTOLIC BLOOD PRESSURE: 99 MMHG | SYSTOLIC BLOOD PRESSURE: 152 MMHG | HEART RATE: 108 BPM

## 2023-05-23 DIAGNOSIS — R00.0 TACHYCARDIA: ICD-10-CM

## 2023-05-23 DIAGNOSIS — C25.1 MALIGNANT NEOPLASM OF BODY OF PANCREAS: Primary | ICD-10-CM

## 2023-05-23 LAB
ALBUMIN SERPL-MCNC: 3.8 G/DL (ref 3.5–5.2)
ALBUMIN/GLOB SERPL: 1.2 G/DL
ALP SERPL-CCNC: 113 U/L (ref 39–117)
ALT SERPL W P-5'-P-CCNC: 11 U/L (ref 1–33)
ANION GAP SERPL CALCULATED.3IONS-SCNC: 12.8 MMOL/L (ref 5–15)
AST SERPL-CCNC: 21 U/L (ref 1–32)
BILIRUB SERPL-MCNC: 0.4 MG/DL (ref 0–1.2)
BUN SERPL-MCNC: 12 MG/DL (ref 8–23)
BUN/CREAT SERPL: 18.2 (ref 7–25)
CALCIUM SPEC-SCNC: 9.5 MG/DL (ref 8.6–10.5)
CHLORIDE SERPL-SCNC: 104 MMOL/L (ref 98–107)
CO2 SERPL-SCNC: 22.2 MMOL/L (ref 22–29)
CREAT SERPL-MCNC: 0.66 MG/DL (ref 0.57–1)
DEPRECATED RDW RBC AUTO: 70.9 FL (ref 37–54)
EGFRCR SERPLBLD CKD-EPI 2021: 93.9 ML/MIN/1.73
ERYTHROCYTE [DISTWIDTH] IN BLOOD BY AUTOMATED COUNT: 18.5 % (ref 12.3–15.4)
GLOBULIN UR ELPH-MCNC: 3.3 GM/DL
GLUCOSE SERPL-MCNC: 124 MG/DL (ref 65–99)
HCT VFR BLD AUTO: 35.4 % (ref 34–46.6)
HGB BLD-MCNC: 11.8 G/DL (ref 12–15.9)
LYMPHOCYTES # BLD MANUAL: 1.58 10*3/MM3 (ref 0.7–3.1)
LYMPHOCYTES NFR BLD MANUAL: 16 % (ref 5–12)
MCH RBC QN AUTO: 34.7 PG (ref 26.6–33)
MCHC RBC AUTO-ENTMCNC: 33.3 G/DL (ref 31.5–35.7)
MCV RBC AUTO: 104.1 FL (ref 79–97)
MONOCYTES # BLD: 1.1 10*3/MM3 (ref 0.1–0.9)
NEUTROPHILS # BLD AUTO: 4.19 10*3/MM3 (ref 1.7–7)
NEUTROPHILS NFR BLD MANUAL: 59 % (ref 42.7–76)
NEUTS BAND NFR BLD MANUAL: 2 % (ref 0–5)
PLATELET # BLD AUTO: 420 10*3/MM3 (ref 140–450)
PMV BLD AUTO: 9.4 FL (ref 6–12)
POTASSIUM SERPL-SCNC: 4.5 MMOL/L (ref 3.5–5.2)
PROT SERPL-MCNC: 7.1 G/DL (ref 6–8.5)
RBC # BLD AUTO: 3.4 10*6/MM3 (ref 3.77–5.28)
RBC MORPH BLD: NORMAL
SCAN SLIDE: NORMAL
SMALL PLATELETS BLD QL SMEAR: ABNORMAL
SODIUM SERPL-SCNC: 139 MMOL/L (ref 136–145)
VARIANT LYMPHS NFR BLD MANUAL: 23 % (ref 19.6–45.3)
WBC MORPH BLD: NORMAL
WBC NRBC COR # BLD: 6.87 10*3/MM3 (ref 3.4–10.8)

## 2023-05-23 PROCEDURE — 96367 TX/PROPH/DG ADDL SEQ IV INF: CPT

## 2023-05-23 PROCEDURE — 96368 THER/DIAG CONCURRENT INF: CPT

## 2023-05-23 PROCEDURE — 3074F SYST BP LT 130 MM HG: CPT | Performed by: NURSE PRACTITIONER

## 2023-05-23 PROCEDURE — 85007 BL SMEAR W/DIFF WBC COUNT: CPT

## 2023-05-23 PROCEDURE — 96413 CHEMO IV INFUSION 1 HR: CPT

## 2023-05-23 PROCEDURE — 85025 COMPLETE CBC W/AUTO DIFF WBC: CPT

## 2023-05-23 PROCEDURE — G0498 CHEMO EXTEND IV INFUS W/PUMP: HCPCS

## 2023-05-23 PROCEDURE — 80053 COMPREHEN METABOLIC PANEL: CPT

## 2023-05-23 PROCEDURE — 96375 TX/PRO/DX INJ NEW DRUG ADDON: CPT

## 2023-05-23 PROCEDURE — 96417 CHEMO IV INFUS EACH ADDL SEQ: CPT

## 2023-05-23 PROCEDURE — 25010000002 OXALIPLATIN PER 0.5 MG: Performed by: NURSE PRACTITIONER

## 2023-05-23 PROCEDURE — 25010000002 DEXAMETHASONE SODIUM PHOSPHATE 20 MG/5ML SOLUTION: Performed by: NURSE PRACTITIONER

## 2023-05-23 PROCEDURE — 25010000002 LEUCOVORIN CALCIUM PER 50MG: Performed by: NURSE PRACTITIONER

## 2023-05-23 PROCEDURE — 25010000002 FLUOROURACIL PER 500 MG: Performed by: NURSE PRACTITIONER

## 2023-05-23 PROCEDURE — 1160F RVW MEDS BY RX/DR IN RCRD: CPT | Performed by: NURSE PRACTITIONER

## 2023-05-23 PROCEDURE — 1126F AMNT PAIN NOTED NONE PRSNT: CPT | Performed by: NURSE PRACTITIONER

## 2023-05-23 PROCEDURE — 3078F DIAST BP <80 MM HG: CPT | Performed by: NURSE PRACTITIONER

## 2023-05-23 PROCEDURE — 96416 CHEMO PROLONG INFUSE W/PUMP: CPT

## 2023-05-23 PROCEDURE — 99214 OFFICE O/P EST MOD 30 MIN: CPT | Performed by: NURSE PRACTITIONER

## 2023-05-23 PROCEDURE — 25010000002 DIPHENHYDRAMINE PER 50 MG: Performed by: NURSE PRACTITIONER

## 2023-05-23 PROCEDURE — 96415 CHEMO IV INFUSION ADDL HR: CPT

## 2023-05-23 PROCEDURE — 1159F MED LIST DOCD IN RCRD: CPT | Performed by: NURSE PRACTITIONER

## 2023-05-23 PROCEDURE — 25010000002 IRINOTECAN PER 20 MG: Performed by: NURSE PRACTITIONER

## 2023-05-23 PROCEDURE — 25010000002 PALONOSETRON 0.25 MG/5ML SOLUTION PREFILLED SYRINGE: Performed by: NURSE PRACTITIONER

## 2023-05-23 PROCEDURE — 96366 THER/PROPH/DIAG IV INF ADDON: CPT

## 2023-05-23 RX ORDER — PALONOSETRON 0.05 MG/ML
0.25 INJECTION, SOLUTION INTRAVENOUS ONCE
Status: COMPLETED | OUTPATIENT
Start: 2023-05-23 | End: 2023-05-23

## 2023-05-23 RX ORDER — DEXTROSE MONOHYDRATE 50 MG/ML
250 INJECTION, SOLUTION INTRAVENOUS ONCE
Status: DISCONTINUED | OUTPATIENT
Start: 2023-05-23 | End: 2023-05-23 | Stop reason: HOSPADM

## 2023-05-23 RX ORDER — FAMOTIDINE 10 MG/ML
20 INJECTION, SOLUTION INTRAVENOUS AS NEEDED
Status: COMPLETED | OUTPATIENT
Start: 2023-05-23 | End: 2023-05-23

## 2023-05-23 RX ORDER — DIPHENHYDRAMINE HYDROCHLORIDE 50 MG/ML
50 INJECTION INTRAMUSCULAR; INTRAVENOUS AS NEEDED
Status: COMPLETED | OUTPATIENT
Start: 2023-05-23 | End: 2023-05-23

## 2023-05-23 RX ORDER — DIPHENHYDRAMINE HYDROCHLORIDE 50 MG/ML
50 INJECTION INTRAMUSCULAR; INTRAVENOUS AS NEEDED
Status: CANCELLED | OUTPATIENT
Start: 2023-05-23

## 2023-05-23 RX ORDER — DEXTROSE MONOHYDRATE 50 MG/ML
250 INJECTION, SOLUTION INTRAVENOUS ONCE
Status: CANCELLED | OUTPATIENT
Start: 2023-05-23

## 2023-05-23 RX ORDER — ATROPINE SULFATE 1 MG/ML
0.25 INJECTION, SOLUTION INTRAMUSCULAR; INTRAVENOUS; SUBCUTANEOUS
Status: CANCELLED | OUTPATIENT
Start: 2023-05-23

## 2023-05-23 RX ORDER — FAMOTIDINE 10 MG/ML
20 INJECTION, SOLUTION INTRAVENOUS AS NEEDED
Status: CANCELLED | OUTPATIENT
Start: 2023-05-23

## 2023-05-23 RX ORDER — ATROPINE SULFATE 0.4 MG/ML
0.2 INJECTION, SOLUTION INTRAMUSCULAR; INTRAVENOUS; SUBCUTANEOUS ONCE
Status: DISCONTINUED | OUTPATIENT
Start: 2023-05-23 | End: 2023-05-23 | Stop reason: HOSPADM

## 2023-05-23 RX ORDER — PALONOSETRON 0.05 MG/ML
0.25 INJECTION, SOLUTION INTRAVENOUS ONCE
Status: CANCELLED | OUTPATIENT
Start: 2023-05-23

## 2023-05-23 RX ADMIN — DIPHENHYDRAMINE HYDROCHLORIDE 25 MG: 50 INJECTION, SOLUTION INTRAMUSCULAR; INTRAVENOUS at 12:40

## 2023-05-23 RX ADMIN — FLUOROURACIL 3890 MG: 50 INJECTION, SOLUTION INTRAVENOUS at 16:18

## 2023-05-23 RX ADMIN — LEUCOVORIN CALCIUM 720 MG: 10 INJECTION INTRAMUSCULAR; INTRAVENOUS at 14:24

## 2023-05-23 RX ADMIN — IRINOTECAN HYDROCHLORIDE 240 MG: 20 INJECTION, SOLUTION INTRAVENOUS at 14:25

## 2023-05-23 RX ADMIN — DEXAMETHASONE SODIUM PHOSPHATE 12 MG: 4 INJECTION, SOLUTION INTRAMUSCULAR; INTRAVENOUS at 11:17

## 2023-05-23 RX ADMIN — PALONOSETRON 0.25 MG: 0.25 INJECTION, SOLUTION INTRAVENOUS at 11:17

## 2023-05-23 RX ADMIN — OXALIPLATIN 90 MG: 5 INJECTION, SOLUTION INTRAVENOUS at 11:39

## 2023-05-23 RX ADMIN — FAMOTIDINE 20 MG: 10 INJECTION INTRAVENOUS at 12:34

## 2023-05-23 RX ADMIN — ATROPINE SULFATE 0.25 MG: 0.4 INJECTION, SOLUTION INTRAVENOUS at 12:56

## 2023-05-23 RX ADMIN — ATROPINE SULFATE 0.25 MG: 0.4 INJECTION, SOLUTION INTRAVENOUS at 16:13

## 2023-05-25 ENCOUNTER — INFUSION (OUTPATIENT)
Dept: ONCOLOGY | Facility: HOSPITAL | Age: 73
End: 2023-05-25
Payer: MEDICARE

## 2023-05-25 DIAGNOSIS — C25.1 MALIGNANT NEOPLASM OF BODY OF PANCREAS: Primary | ICD-10-CM

## 2023-05-25 DIAGNOSIS — K86.89 PANCREATIC MASS: ICD-10-CM

## 2023-05-25 PROCEDURE — 96377 APPLICATON ON-BODY INJECTOR: CPT

## 2023-05-25 PROCEDURE — 25010000002 PEGFILGRASTIM 6 MG/0.6ML PREFILLED SYRINGE KIT: Performed by: NURSE PRACTITIONER

## 2023-05-25 PROCEDURE — 96523 IRRIG DRUG DELIVERY DEVICE: CPT

## 2023-05-25 PROCEDURE — 25010000002 HEPARIN LOCK FLUSH PER 10 UNITS: Performed by: INTERNAL MEDICINE

## 2023-05-25 RX ORDER — SODIUM CHLORIDE 0.9 % (FLUSH) 0.9 %
10 SYRINGE (ML) INJECTION AS NEEDED
OUTPATIENT
Start: 2023-05-25

## 2023-05-25 RX ORDER — SODIUM CHLORIDE 0.9 % (FLUSH) 0.9 %
10 SYRINGE (ML) INJECTION AS NEEDED
Status: DISCONTINUED | OUTPATIENT
Start: 2023-05-25 | End: 2023-05-25 | Stop reason: HOSPADM

## 2023-05-25 RX ORDER — HEPARIN SODIUM (PORCINE) LOCK FLUSH IV SOLN 100 UNIT/ML 100 UNIT/ML
500 SOLUTION INTRAVENOUS AS NEEDED
Status: DISCONTINUED | OUTPATIENT
Start: 2023-05-25 | End: 2023-05-25 | Stop reason: HOSPADM

## 2023-05-25 RX ORDER — HEPARIN SODIUM (PORCINE) LOCK FLUSH IV SOLN 100 UNIT/ML 100 UNIT/ML
500 SOLUTION INTRAVENOUS AS NEEDED
OUTPATIENT
Start: 2023-05-25

## 2023-05-25 RX ADMIN — PEGFILGRASTIM 6 MG: KIT SUBCUTANEOUS at 15:09

## 2023-05-25 RX ADMIN — HEPARIN 500 UNITS: 100 SYRINGE at 15:09

## 2023-05-25 RX ADMIN — Medication 10 ML: at 15:09

## 2023-06-02 ENCOUNTER — PATIENT ROUNDING (BHMG ONLY) (OUTPATIENT)
Dept: CARDIOLOGY | Facility: CLINIC | Age: 73
End: 2023-06-02

## 2023-06-02 ENCOUNTER — OFFICE VISIT (OUTPATIENT)
Dept: CARDIOLOGY | Facility: CLINIC | Age: 73
End: 2023-06-02

## 2023-06-02 VITALS
SYSTOLIC BLOOD PRESSURE: 110 MMHG | BODY MASS INDEX: 22.36 KG/M2 | DIASTOLIC BLOOD PRESSURE: 72 MMHG | HEART RATE: 87 BPM | WEIGHT: 151 LBS | HEIGHT: 69 IN | OXYGEN SATURATION: 95 %

## 2023-06-02 DIAGNOSIS — I10 ESSENTIAL HYPERTENSION: Primary | ICD-10-CM

## 2023-06-02 DIAGNOSIS — C25.1 MALIGNANT NEOPLASM OF BODY OF PANCREAS: ICD-10-CM

## 2023-06-02 DIAGNOSIS — I42.7 CARDIOTOXICITY: ICD-10-CM

## 2023-06-02 DIAGNOSIS — R00.0 TACHYCARDIA: ICD-10-CM

## 2023-06-02 NOTE — PROGRESS NOTES
Subjective:     Encounter Date:06/02/2023      Patient ID: Silvia Chung is a 73 y.o. female.    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  The patient has had no documented arrhythmia but apparently has been experiencing mild resting sinus tachycardia which is asymptomatic.  Specifically, there is no palpitations dizziness or syncope.  The patient was tried on low-dose beta-blocker therapy empirically which resulted in symptomatic hypotension.  She has a history of stage II pancreatic cancer with prior surgical removal of the body and tail of the pancreas.  She is currently undergoing adjuvant chemotherapy.  She has no prior history of heart disease.  Specifically, there is no history of coronary artery disease, myocardial infarction or coronary revascularization.  She has no personal history of hypertension diabetes or dyslipidemia.  She is a non-smoker.  She has not worn a cardiac monitor.  The following portions of the patient's history were reviewed and updated as appropriate: allergies, current medications, past family history, past medical history, past social history, past surgical history and problem  Review of Systems   Constitutional: Negative for chills, diaphoresis, fever, malaise/fatigue, weight gain and weight loss.   HENT: Negative for ear discharge, hearing loss, hoarse voice and nosebleeds.    Eyes: Negative for discharge, double vision, pain and photophobia.   Cardiovascular: Negative for chest pain, claudication, cyanosis, dyspnea on exertion, irregular heartbeat, leg swelling, near-syncope, orthopnea, palpitations, paroxysmal nocturnal dyspnea and syncope.   Respiratory: Negative for cough, hemoptysis, shortness of breath, sputum production and wheezing.    Endocrine: Negative for cold intolerance, heat intolerance, polydipsia, polyphagia and polyuria.   Hematologic/Lymphatic:  Negative for adenopathy and bleeding problem. Does not bruise/bleed easily.   Skin: Negative for color change, flushing, itching and rash.   Musculoskeletal: Negative for muscle cramps, muscle weakness, myalgias and stiffness.   Gastrointestinal: Negative for abdominal pain, diarrhea, hematemesis, hematochezia, nausea and vomiting.   Genitourinary: Negative for dysuria, frequency and nocturia.   Neurological: Negative for focal weakness, loss of balance, numbness, paresthesias and seizures.   Psychiatric/Behavioral: Negative for altered mental status, hallucinations and suicidal ideas.   Allergic/Immunologic: Negative for HIV exposure, hives and persistent infections.           Current Outpatient Medications:   •  carbidopa-levodopa (SINEMET)  MG per tablet, Take 1 tablet by mouth 2 (Two) Times a Day., Disp: , Rfl:   •  diphenhydrAMINE (BENADRYL) 25 mg capsule, 1 capsule., Disp: , Rfl:   •  esomeprazole (nexIUM) 40 MG capsule, Take 1 capsule by mouth Every Morning Before Breakfast., Disp: , Rfl:   •  metoprolol tartrate (LOPRESSOR) 25 MG tablet, Take 0.5 tablets by mouth Daily., Disp: 30 tablet, Rfl: 1  •  Multiple Vitamins-Minerals (multivitamin with minerals) tablet tablet, Take 1 tablet by mouth Daily., Disp: , Rfl:   •  ondansetron (ZOFRAN) 8 MG tablet, Take 1 tablet by mouth 3 (Three) Times a Day As Needed for Nausea or Vomiting., Disp: 30 tablet, Rfl: 5  •  pancrelipase, Lip-Prot-Amyl, (Creon) 89292-82049 units capsule delayed-release particles capsule, Take 2 with each meal and one with snacks., Disp: 120 capsule, Rfl: 3  •  polyethylene glycol (MIRALAX) 17 GM/SCOOP powder, 17 g., Disp: , Rfl:   •  tiZANidine (ZANAFLEX) 4 MG tablet, Take 1 tablet by mouth Every 8 (Eight) Hours As Needed for Muscle Spasms., Disp: , Rfl:   •  traZODone (DESYREL) 50 MG tablet, Take 1 tablet by mouth Every Night., Disp: 30 tablet, Rfl: 5    Objective:   Vitals and nursing note reviewed.   Constitutional:       Appearance:  "Healthy appearance. Not in distress.   Neck:      Vascular: No JVR. JVD normal.   Pulmonary:      Effort: Pulmonary effort is normal.      Breath sounds: Normal breath sounds. No wheezing. No rhonchi. No rales.   Chest:      Chest wall: Not tender to palpatation.   Cardiovascular:      PMI at left midclavicular line. Normal rate. Regular rhythm. Normal S1. Normal S2.      Murmurs: There is no murmur.      No gallop. No click. No rub.   Pulses:     Intact distal pulses.   Edema:     Peripheral edema absent.   Abdominal:      General: Bowel sounds are normal.      Palpations: Abdomen is soft.      Tenderness: There is no abdominal tenderness.   Musculoskeletal: Normal range of motion.         General: No tenderness. Skin:     General: Skin is warm and dry.   Neurological:      General: No focal deficit present.      Mental Status: Alert and oriented to person, place and time.       Blood pressure 110/72, pulse 87, height 175.3 cm (69\"), weight 68.5 kg (151 lb), SpO2 95 %.   Lab Review:     Assessment:       1. Essential hypertension  Acceptable blood pressure control.  - Adult Transthoracic Echo Complete W/ Cont if Necessary Per Protocol    2. Malignant neoplasm of body of pancreas  Stage 2. Previous surgical excision of two thirds of the pancreas.  Currently undergoing adjuvant chemotherapy.  Surveillance imaging shows no evidence of recurrence.  - Adult Transthoracic Echo Complete W/ Cont if Necessary Per Protocol    3. Tachycardia  No documented arrhythmia.  The patient has not worn a cardiac monitor.  This appears to all be sinus tachycardia.  - Mobile Cardiac Outpatient Telemetry  - Adult Transthoracic Echo Complete W/ Cont if Necessary Per Protocol    4. Cardiotoxicity  Possible cardiotoxicity from chemotherapy.  - Adult Transthoracic Echo Complete W/ Cont if Necessary Per Protocol      ECG 12 Lead    Date/Time: 6/2/2023 11:52 AM  Performed by: Niranjan Alves MD  Authorized by: Niranjan Alves MD "   Previous ECG: no previous ECG available  Rhythm: sinus rhythm  Rate: normal  QRS axis: normal  Other findings: low voltage and poor R wave progression    Clinical impression: abnormal EKG            Plan:     Advance Care Planning   ACP discussion was held with the patient during this visit. Patient has an advance directive (not in EMR), copy requested.     I have recommended an outpatient cardiac monitor.    I have recommended an echocardiogram with global longitudinal strain to assess for possible cardiotoxicity from her current chemotherapy.    I have advised her to avoid beta-blocker therapy.  In the absence of documented arrhythmia, empirically treating an arbitrary heart rate number has no benefit.  She is already experienced symptomatic iatrogenic hypotension from low-dose beta-blocker therapy.    No changes in medications have been made at today's visit.    Further recommendations will be predicated on the results of her outpatient testing.

## 2023-06-02 NOTE — PROGRESS NOTES
[facility] welcome email and rounding message has been sent to patient via [employer].

## 2023-06-05 ENCOUNTER — HOSPITAL ENCOUNTER (OUTPATIENT)
Dept: MAMMOGRAPHY | Facility: HOSPITAL | Age: 73
Discharge: HOME | End: 2023-06-05
Payer: MEDICARE

## 2023-06-05 ENCOUNTER — APPOINTMENT (OUTPATIENT)
Dept: OTHER | Facility: HOSPITAL | Age: 73
End: 2023-06-05
Payer: MEDICARE

## 2023-06-05 DIAGNOSIS — Z12.31 ENCOUNTER FOR SCREENING MAMMOGRAM FOR MALIGNANT NEOPLASM OF BREAST: ICD-10-CM

## 2023-06-05 PROCEDURE — 77067 SCR MAMMO BI INCL CAD: CPT

## 2023-06-05 PROCEDURE — 77063 BREAST TOMOSYNTHESIS BI: CPT

## 2023-06-06 ENCOUNTER — INFUSION (OUTPATIENT)
Dept: ONCOLOGY | Facility: HOSPITAL | Age: 73
End: 2023-06-06
Payer: MEDICARE

## 2023-06-06 ENCOUNTER — OFFICE VISIT (OUTPATIENT)
Dept: ONCOLOGY | Facility: CLINIC | Age: 73
End: 2023-06-06
Payer: MEDICARE

## 2023-06-06 VITALS
SYSTOLIC BLOOD PRESSURE: 129 MMHG | RESPIRATION RATE: 16 BRPM | TEMPERATURE: 97.8 F | HEIGHT: 69 IN | DIASTOLIC BLOOD PRESSURE: 78 MMHG | WEIGHT: 153 LBS | HEART RATE: 105 BPM | BODY MASS INDEX: 22.66 KG/M2 | OXYGEN SATURATION: 96 %

## 2023-06-06 DIAGNOSIS — C25.1 MALIGNANT NEOPLASM OF BODY OF PANCREAS: Primary | ICD-10-CM

## 2023-06-06 LAB
ALBUMIN SERPL-MCNC: 4 G/DL (ref 3.5–5.2)
ALBUMIN/GLOB SERPL: 1.4 G/DL
ALP SERPL-CCNC: 157 U/L (ref 39–117)
ALT SERPL W P-5'-P-CCNC: 15 U/L (ref 1–33)
ANION GAP SERPL CALCULATED.3IONS-SCNC: 15.5 MMOL/L (ref 5–15)
AST SERPL-CCNC: 25 U/L (ref 1–32)
BASOPHILS # BLD AUTO: 0.1 10*3/MM3 (ref 0–0.2)
BASOPHILS NFR BLD AUTO: 0.8 % (ref 0–1.5)
BILIRUB SERPL-MCNC: 0.3 MG/DL (ref 0–1.2)
BUN SERPL-MCNC: 8 MG/DL (ref 8–23)
BUN/CREAT SERPL: 10 (ref 7–25)
CALCIUM SPEC-SCNC: 9.2 MG/DL (ref 8.6–10.5)
CHLORIDE SERPL-SCNC: 101 MMOL/L (ref 98–107)
CO2 SERPL-SCNC: 20.5 MMOL/L (ref 22–29)
CREAT SERPL-MCNC: 0.8 MG/DL (ref 0.57–1)
DEPRECATED RDW RBC AUTO: 65.1 FL (ref 37–54)
EGFRCR SERPLBLD CKD-EPI 2021: 78.9 ML/MIN/1.73
EOSINOPHIL # BLD AUTO: 0.08 10*3/MM3 (ref 0–0.4)
EOSINOPHIL NFR BLD AUTO: 0.7 % (ref 0.3–6.2)
ERYTHROCYTE [DISTWIDTH] IN BLOOD BY AUTOMATED COUNT: 16.3 % (ref 12.3–15.4)
GLOBULIN UR ELPH-MCNC: 2.8 GM/DL
GLUCOSE SERPL-MCNC: 95 MG/DL (ref 65–99)
HCT VFR BLD AUTO: 35.5 % (ref 34–46.6)
HGB BLD-MCNC: 11.6 G/DL (ref 12–15.9)
IMM GRANULOCYTES # BLD AUTO: 0.08 10*3/MM3 (ref 0–0.05)
IMM GRANULOCYTES NFR BLD AUTO: 0.7 % (ref 0–0.5)
LYMPHOCYTES # BLD AUTO: 1.67 10*3/MM3 (ref 0.7–3.1)
LYMPHOCYTES NFR BLD AUTO: 13.9 % (ref 19.6–45.3)
MACROCYTES BLD QL SMEAR: NORMAL
MCH RBC QN AUTO: 35.6 PG (ref 26.6–33)
MCHC RBC AUTO-ENTMCNC: 32.7 G/DL (ref 31.5–35.7)
MCV RBC AUTO: 108.9 FL (ref 79–97)
MONOCYTES # BLD AUTO: 1.24 10*3/MM3 (ref 0.1–0.9)
MONOCYTES NFR BLD AUTO: 10.4 % (ref 5–12)
NEUTROPHILS NFR BLD AUTO: 73.5 % (ref 42.7–76)
NEUTROPHILS NFR BLD AUTO: 8.81 10*3/MM3 (ref 1.7–7)
NRBC BLD AUTO-RTO: 0 /100 WBC (ref 0–0.2)
PLATELET # BLD AUTO: 359 10*3/MM3 (ref 140–450)
PMV BLD AUTO: 10 FL (ref 6–12)
POTASSIUM SERPL-SCNC: 4.2 MMOL/L (ref 3.5–5.2)
PROT SERPL-MCNC: 6.8 G/DL (ref 6–8.5)
RBC # BLD AUTO: 3.26 10*6/MM3 (ref 3.77–5.28)
SMALL PLATELETS BLD QL SMEAR: ADEQUATE
SODIUM SERPL-SCNC: 137 MMOL/L (ref 136–145)
WBC MORPH BLD: NORMAL
WBC NRBC COR # BLD: 11.98 10*3/MM3 (ref 3.4–10.8)

## 2023-06-06 PROCEDURE — 25010000002 LEUCOVORIN CALCIUM PER 50 MG: Performed by: INTERNAL MEDICINE

## 2023-06-06 PROCEDURE — 1126F AMNT PAIN NOTED NONE PRSNT: CPT | Performed by: INTERNAL MEDICINE

## 2023-06-06 PROCEDURE — 96368 THER/DIAG CONCURRENT INF: CPT

## 2023-06-06 PROCEDURE — 3078F DIAST BP <80 MM HG: CPT | Performed by: INTERNAL MEDICINE

## 2023-06-06 PROCEDURE — 99214 OFFICE O/P EST MOD 30 MIN: CPT | Performed by: INTERNAL MEDICINE

## 2023-06-06 PROCEDURE — 25010000002 LEUCOVORIN 200 MG RECONSTITUTED SOLUTION 200 MG VIAL: Performed by: INTERNAL MEDICINE

## 2023-06-06 PROCEDURE — 85025 COMPLETE CBC W/AUTO DIFF WBC: CPT

## 2023-06-06 PROCEDURE — 96375 TX/PRO/DX INJ NEW DRUG ADDON: CPT

## 2023-06-06 PROCEDURE — 25010000002 IRINOTECAN PER 20 MG: Performed by: INTERNAL MEDICINE

## 2023-06-06 PROCEDURE — 96367 TX/PROPH/DG ADDL SEQ IV INF: CPT

## 2023-06-06 PROCEDURE — 3074F SYST BP LT 130 MM HG: CPT | Performed by: INTERNAL MEDICINE

## 2023-06-06 PROCEDURE — 25010000002 FLUOROURACIL PER 500 MG: Performed by: INTERNAL MEDICINE

## 2023-06-06 PROCEDURE — 25010000002 DEXAMETHASONE SODIUM PHOSPHATE 20 MG/5ML SOLUTION: Performed by: INTERNAL MEDICINE

## 2023-06-06 PROCEDURE — 80053 COMPREHEN METABOLIC PANEL: CPT

## 2023-06-06 PROCEDURE — 25010000002 DIPHENHYDRAMINE PER 50 MG: Performed by: INTERNAL MEDICINE

## 2023-06-06 PROCEDURE — 25010000002 PALONOSETRON 0.25 MG/5ML SOLUTION PREFILLED SYRINGE: Performed by: INTERNAL MEDICINE

## 2023-06-06 PROCEDURE — 85007 BL SMEAR W/DIFF WBC COUNT: CPT

## 2023-06-06 PROCEDURE — 96415 CHEMO IV INFUSION ADDL HR: CPT

## 2023-06-06 PROCEDURE — 96416 CHEMO PROLONG INFUSE W/PUMP: CPT

## 2023-06-06 PROCEDURE — G0498 CHEMO EXTEND IV INFUS W/PUMP: HCPCS

## 2023-06-06 PROCEDURE — 25010000002 LEUCOVORIN 500 MG RECONSTITUTED SOLUTION 1 EACH VIAL: Performed by: INTERNAL MEDICINE

## 2023-06-06 PROCEDURE — 96413 CHEMO IV INFUSION 1 HR: CPT

## 2023-06-06 RX ORDER — DIPHENHYDRAMINE HYDROCHLORIDE 50 MG/ML
50 INJECTION INTRAMUSCULAR; INTRAVENOUS AS NEEDED
Status: CANCELLED | OUTPATIENT
Start: 2023-06-06

## 2023-06-06 RX ORDER — FAMOTIDINE 10 MG/ML
20 INJECTION, SOLUTION INTRAVENOUS 2 TIMES DAILY
Status: CANCELLED
Start: 2023-06-06

## 2023-06-06 RX ORDER — PALONOSETRON 0.05 MG/ML
0.25 INJECTION, SOLUTION INTRAVENOUS ONCE
Status: COMPLETED | OUTPATIENT
Start: 2023-06-06 | End: 2023-06-06

## 2023-06-06 RX ORDER — PALONOSETRON 0.05 MG/ML
0.25 INJECTION, SOLUTION INTRAVENOUS ONCE
Status: CANCELLED | OUTPATIENT
Start: 2023-06-06

## 2023-06-06 RX ORDER — DEXTROSE MONOHYDRATE 50 MG/ML
250 INJECTION, SOLUTION INTRAVENOUS ONCE
Status: DISCONTINUED | OUTPATIENT
Start: 2023-06-06 | End: 2023-06-06 | Stop reason: HOSPADM

## 2023-06-06 RX ORDER — FAMOTIDINE 10 MG/ML
20 INJECTION, SOLUTION INTRAVENOUS AS NEEDED
Status: CANCELLED | OUTPATIENT
Start: 2023-06-06

## 2023-06-06 RX ORDER — ATROPINE SULFATE 1 MG/ML
0.25 INJECTION, SOLUTION INTRAMUSCULAR; INTRAVENOUS; SUBCUTANEOUS
Status: CANCELLED | OUTPATIENT
Start: 2023-06-06

## 2023-06-06 RX ORDER — DIPHENHYDRAMINE HYDROCHLORIDE 50 MG/ML
25 INJECTION INTRAMUSCULAR; INTRAVENOUS ONCE
Status: CANCELLED
Start: 2023-06-06 | End: 2023-06-06

## 2023-06-06 RX ORDER — FAMOTIDINE 10 MG/ML
20 INJECTION, SOLUTION INTRAVENOUS 2 TIMES DAILY
Status: DISCONTINUED | OUTPATIENT
Start: 2023-06-06 | End: 2023-06-06 | Stop reason: HOSPADM

## 2023-06-06 RX ORDER — DEXTROSE MONOHYDRATE 50 MG/ML
250 INJECTION, SOLUTION INTRAVENOUS ONCE
Status: CANCELLED | OUTPATIENT
Start: 2023-06-06

## 2023-06-06 RX ORDER — DIPHENHYDRAMINE HYDROCHLORIDE 50 MG/ML
25 INJECTION INTRAMUSCULAR; INTRAVENOUS ONCE
Status: COMPLETED | OUTPATIENT
Start: 2023-06-06 | End: 2023-06-06

## 2023-06-06 RX ADMIN — LEUCOVORIN CALCIUM 720 MG: 200 INJECTION, POWDER, LYOPHILIZED, FOR SOLUTION INTRAMUSCULAR; INTRAVENOUS at 10:20

## 2023-06-06 RX ADMIN — DIPHENHYDRAMINE HYDROCHLORIDE 25 MG: 50 INJECTION, SOLUTION INTRAMUSCULAR; INTRAVENOUS at 09:37

## 2023-06-06 RX ADMIN — FAMOTIDINE 20 MG: 10 INJECTION INTRAVENOUS at 09:37

## 2023-06-06 RX ADMIN — DEXAMETHASONE SODIUM PHOSPHATE 12 MG: 4 INJECTION, SOLUTION INTRAMUSCULAR; INTRAVENOUS at 09:57

## 2023-06-06 RX ADMIN — PALONOSETRON 0.25 MG: 0.25 INJECTION, SOLUTION INTRAVENOUS at 09:37

## 2023-06-06 RX ADMIN — ATROPINE SULFATE 0.25 MG: 0.4 INJECTION, SOLUTION INTRAVENOUS at 10:18

## 2023-06-06 RX ADMIN — FLUOROURACIL 3890 MG: 50 INJECTION, SOLUTION INTRAVENOUS at 11:52

## 2023-06-06 RX ADMIN — IRINOTECAN HYDROCHLORIDE 240 MG: 20 INJECTION, SOLUTION INTRAVENOUS at 10:20

## 2023-06-06 NOTE — PROGRESS NOTES
"      PROBLEM LIST:  1. 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  She has had GI side effects with sinemet for her PD so she associated this symptom with that medication.  She has lost about 10 lbs. CT a/p without contrast showed a 6.8 x 3.7 cm mass in the distal pancreatic body and tail with minimal surrounding inflammation.  Multiple borderline enalrged nodes near celiac axis and mesenteric axis.  CT a/p with contrast 12/1/22 showed a 5.6 x 3.6 cm mass in the tail and distal body of the pancreas, no adenopathy.  B) distal pancreatectomy and splenectomy performed on 12/19/2022.  Pathology showed poorly differentiated pancreatic adenosquamous carcinoma with involvement of adrenal gland by direct extension, 1 out of 5 lymph nodes involved.  Surgical margins negative. + lymphovascular invasion, + perineural invasion.  C) adjuvant mFOLFIRINOX started 2/13/23.  2. parkinsons disease  3. Hypertension  4. Hyperlipidemia  5. asthma    Subjective     CHIEF COMPLAINT: pancreas cancer    HISTORY OF PRESENT ILLNESS:   Silvia Chung returns for follow-up.   She has had 7 doses of FOLFIRINOX chemotherapy.  She is starting to notice more numbness in her feet.  It is noticeable especially when she first gets up she feels like she cannot feel where she is stepping.  She did not have as much fatigue with this last cycle.  She is using MiraLAX occasionally for constipation.                Objective      /78   Pulse 105   Temp 97.8 °F (36.6 °C) (Temporal)   Resp 16   Ht 175.3 cm (69\")   Wt 69.4 kg (153 lb)   SpO2 96%   BMI 22.59 kg/m²      Performance Status:1            General: well appearing female in no acute distress  Neuro: alert and oriented  HEENT: sclera anicteric,  Lymphatics: no cervical, supraclavicular, or axillary adenopathy  Cardiovascular: regular rate and " rhythm, no murmurs  Lungs: clear to auscultation bilaterally  Abdomen: soft,  nondistended.   Extremeties: no lower extremity edema  Skin: no rashes, lesions, bruising, or petechiae  Psych: mood and affect appropriate            RECENT LABS:  Lab Results   Component Value Date    WBC 6.87 05/23/2023    HGB 11.8 (L) 05/23/2023    HCT 35.4 05/23/2023    .1 (H) 05/23/2023     05/23/2023       Lab Results   Component Value Date    GLUCOSE 124 (H) 05/23/2023    BUN 12 05/23/2023    CREATININE 0.66 05/23/2023    EGFRIFNONA 85 10/23/2020    BCR 18.2 05/23/2023    K 4.5 05/23/2023    CO2 22.2 05/23/2023    CALCIUM 9.5 05/23/2023    ALBUMIN 3.8 05/23/2023    AST 21 05/23/2023    ALT 11 05/23/2023               ASSESSMENT AND PLAN:     Silvia Chung is a 73 y.o. female with a T3N1M0 adenosquamous carcinoma of the pancreas, here for follow up on adjuvant chemotherapy.    Overall, she continues to tolerate treatment well.  She is developing progressive neuropathy and had some type of reaction during her oxaliplatin infusion last treatment.  Because of both of these things we will plan to discontinue oxaliplatin.    Neuropathy: This has worsened despite previous dose reduction.  We will discontinue oxaliplatin going forward.  We discussed that this can unfortunately be a permanent side effect but hopefully will improve with more time away from the medication.    Diarrhea: Continue Creon    Parkinson's: Continue regular medications.    Post splenectomy vaccines given at .    Low blood pressures: Improved today.  Continue current medications.    Follow-up 2-week            Janell Corrales MD  [facility] Hematology and Oncology    6/6/2023          CC:

## 2023-06-08 ENCOUNTER — INFUSION (OUTPATIENT)
Dept: ONCOLOGY | Facility: HOSPITAL | Age: 73
End: 2023-06-08
Payer: MEDICARE

## 2023-06-08 DIAGNOSIS — K86.89 PANCREATIC MASS: ICD-10-CM

## 2023-06-08 DIAGNOSIS — C25.1 MALIGNANT NEOPLASM OF BODY OF PANCREAS: Primary | ICD-10-CM

## 2023-06-08 LAB
BH CV ECHO MEAS - AO MAX PG: 3.7 MMHG
BH CV ECHO MEAS - AO MEAN PG: 2 MMHG
BH CV ECHO MEAS - AO ROOT DIAM: 2 CM
BH CV ECHO MEAS - AO V2 MAX: 96.6 CM/SEC
BH CV ECHO MEAS - AO V2 VTI: 13.9 CM
BH CV ECHO MEAS - AVA(I,D): 5.5 CM2
BH CV ECHO MEAS - EDV(CUBED): 45.1 ML
BH CV ECHO MEAS - EDV(MOD-SP4): 92.1 ML
BH CV ECHO MEAS - EF(MOD-SP4): 61.5 %
BH CV ECHO MEAS - ESV(CUBED): 11.5 ML
BH CV ECHO MEAS - ESV(MOD-SP4): 35.5 ML
BH CV ECHO MEAS - FS: 36.5 %
BH CV ECHO MEAS - IVS/LVPW: 0.67 CM
BH CV ECHO MEAS - IVSD: 0.72 CM
BH CV ECHO MEAS - LA DIMENSION: 3.5 CM
BH CV ECHO MEAS - LAT PEAK E' VEL: 9.9 CM/SEC
BH CV ECHO MEAS - LV DIASTOLIC VOL/BSA (35-75): 50.8 CM2
BH CV ECHO MEAS - LV MASS(C)D: 90.2 GRAMS
BH CV ECHO MEAS - LV MAX PG: 3.7 MMHG
BH CV ECHO MEAS - LV MEAN PG: 1 MMHG
BH CV ECHO MEAS - LV SYSTOLIC VOL/BSA (12-30): 19.6 CM2
BH CV ECHO MEAS - LV V1 MAX: 95.8 CM/SEC
BH CV ECHO MEAS - LV V1 VTI: 12.5 CM
BH CV ECHO MEAS - LVIDD: 3.6 CM
BH CV ECHO MEAS - LVIDS: 2.26 CM
BH CV ECHO MEAS - LVOT AREA: 6.2 CM2
BH CV ECHO MEAS - LVOT DIAM: 2.8 CM
BH CV ECHO MEAS - LVPWD: 1.07 CM
BH CV ECHO MEAS - MED PEAK E' VEL: 7.4 CM/SEC
BH CV ECHO MEAS - MV A MAX VEL: 106 CM/SEC
BH CV ECHO MEAS - MV DEC SLOPE: 488 CM/SEC2
BH CV ECHO MEAS - MV DEC TIME: 0.15 MSEC
BH CV ECHO MEAS - MV E MAX VEL: 70.7 CM/SEC
BH CV ECHO MEAS - MV E/A: 0.67
BH CV ECHO MEAS - MV MAX PG: 4.8 MMHG
BH CV ECHO MEAS - MV MEAN PG: 2 MMHG
BH CV ECHO MEAS - MV V2 VTI: 14.8 CM
BH CV ECHO MEAS - MVA(VTI): 5.2 CM2
BH CV ECHO MEAS - PA V2 MAX: 91.7 CM/SEC
BH CV ECHO MEAS - RAP SYSTOLE: 3 MMHG
BH CV ECHO MEAS - RVSP: 20 MMHG
BH CV ECHO MEAS - SI(MOD-SP4): 31.2 ML/M2
BH CV ECHO MEAS - SV(LVOT): 77 ML
BH CV ECHO MEAS - SV(MOD-SP4): 56.6 ML
BH CV ECHO MEAS - TR MAX PG: 17.6 MMHG
BH CV ECHO MEAS - TR MAX VEL: 208.5 CM/SEC
BH CV ECHO MEASUREMENTS AVERAGE E/E' RATIO: 8.17
BH CV XLRA - TDI S': 9.2 CM/SEC
LEFT ATRIUM VOLUME INDEX: 18.3 ML/M2
LV EF 2D ECHO EST: 66 %

## 2023-06-08 PROCEDURE — 96377 APPLICATON ON-BODY INJECTOR: CPT

## 2023-06-08 PROCEDURE — 25010000002 PEGFILGRASTIM 6 MG/0.6ML PREFILLED SYRINGE KIT: Performed by: INTERNAL MEDICINE

## 2023-06-08 PROCEDURE — 25010000002 HEPARIN LOCK FLUSH PER 10 UNITS: Performed by: INTERNAL MEDICINE

## 2023-06-08 PROCEDURE — 96523 IRRIG DRUG DELIVERY DEVICE: CPT

## 2023-06-08 RX ORDER — HEPARIN SODIUM (PORCINE) LOCK FLUSH IV SOLN 100 UNIT/ML 100 UNIT/ML
500 SOLUTION INTRAVENOUS AS NEEDED
OUTPATIENT
Start: 2023-06-08

## 2023-06-08 RX ORDER — HEPARIN SODIUM (PORCINE) LOCK FLUSH IV SOLN 100 UNIT/ML 100 UNIT/ML
500 SOLUTION INTRAVENOUS AS NEEDED
Status: DISCONTINUED | OUTPATIENT
Start: 2023-06-08 | End: 2023-06-08 | Stop reason: HOSPADM

## 2023-06-08 RX ORDER — SODIUM CHLORIDE 0.9 % (FLUSH) 0.9 %
10 SYRINGE (ML) INJECTION AS NEEDED
Status: DISCONTINUED | OUTPATIENT
Start: 2023-06-08 | End: 2023-06-08 | Stop reason: HOSPADM

## 2023-06-08 RX ORDER — SODIUM CHLORIDE 0.9 % (FLUSH) 0.9 %
10 SYRINGE (ML) INJECTION AS NEEDED
OUTPATIENT
Start: 2023-06-08

## 2023-06-08 RX ADMIN — HEPARIN 500 UNITS: 100 SYRINGE at 14:45

## 2023-06-08 RX ADMIN — Medication 10 ML: at 14:45

## 2023-06-08 RX ADMIN — PEGFILGRASTIM 6 MG: KIT SUBCUTANEOUS at 14:45

## 2023-08-01 ENCOUNTER — OFFICE VISIT (OUTPATIENT)
Dept: ONCOLOGY | Facility: CLINIC | Age: 73
End: 2023-08-01
Payer: MEDICARE

## 2023-08-01 ENCOUNTER — INFUSION (OUTPATIENT)
Dept: ONCOLOGY | Facility: HOSPITAL | Age: 73
End: 2023-08-01
Payer: MEDICARE

## 2023-08-01 VITALS
HEART RATE: 101 BPM | BODY MASS INDEX: 22.07 KG/M2 | RESPIRATION RATE: 12 BRPM | OXYGEN SATURATION: 96 % | HEIGHT: 69 IN | DIASTOLIC BLOOD PRESSURE: 60 MMHG | TEMPERATURE: 97.9 F | SYSTOLIC BLOOD PRESSURE: 121 MMHG | WEIGHT: 149 LBS

## 2023-08-01 DIAGNOSIS — C25.1 MALIGNANT NEOPLASM OF BODY OF PANCREAS: ICD-10-CM

## 2023-08-01 DIAGNOSIS — K86.89 PANCREATIC MASS: Primary | ICD-10-CM

## 2023-08-01 DIAGNOSIS — C25.1 MALIGNANT NEOPLASM OF BODY OF PANCREAS: Primary | ICD-10-CM

## 2023-08-01 LAB
ALBUMIN SERPL-MCNC: 4 G/DL (ref 3.5–5.2)
ALBUMIN/GLOB SERPL: 1.3 G/DL
ALP SERPL-CCNC: 172 U/L (ref 39–117)
ALT SERPL W P-5'-P-CCNC: 7 U/L (ref 1–33)
ANION GAP SERPL CALCULATED.3IONS-SCNC: 14.9 MMOL/L (ref 5–15)
ANISOCYTOSIS BLD QL: NORMAL
AST SERPL-CCNC: 22 U/L (ref 1–32)
BASOPHILS # BLD AUTO: 0.07 10*3/MM3 (ref 0–0.2)
BASOPHILS NFR BLD AUTO: 0.6 % (ref 0–1.5)
BILIRUB SERPL-MCNC: 0.3 MG/DL (ref 0–1.2)
BUN SERPL-MCNC: 9 MG/DL (ref 8–23)
BUN/CREAT SERPL: 9.9 (ref 7–25)
CALCIUM SPEC-SCNC: 9.2 MG/DL (ref 8.6–10.5)
CANCER AG19-9 SERPL-ACNC: 18.7 U/ML
CHLORIDE SERPL-SCNC: 100 MMOL/L (ref 98–107)
CO2 SERPL-SCNC: 24.1 MMOL/L (ref 22–29)
CREAT SERPL-MCNC: 0.91 MG/DL (ref 0.57–1)
DEPRECATED RDW RBC AUTO: 59.9 FL (ref 37–54)
EGFRCR SERPLBLD CKD-EPI 2021: 67.6 ML/MIN/1.73
ELLIPTOCYTES BLD QL SMEAR: NORMAL
EOSINOPHIL # BLD AUTO: 0.06 10*3/MM3 (ref 0–0.4)
EOSINOPHIL NFR BLD AUTO: 0.5 % (ref 0.3–6.2)
ERYTHROCYTE [DISTWIDTH] IN BLOOD BY AUTOMATED COUNT: 14.8 % (ref 12.3–15.4)
GLOBULIN UR ELPH-MCNC: 3 GM/DL
GLUCOSE SERPL-MCNC: 92 MG/DL (ref 65–99)
HCT VFR BLD AUTO: 35.3 % (ref 34–46.6)
HGB BLD-MCNC: 12 G/DL (ref 12–15.9)
IMM GRANULOCYTES # BLD AUTO: 0.15 10*3/MM3 (ref 0–0.05)
IMM GRANULOCYTES NFR BLD AUTO: 1.3 % (ref 0–0.5)
LYMPHOCYTES # BLD AUTO: 2.16 10*3/MM3 (ref 0.7–3.1)
LYMPHOCYTES NFR BLD AUTO: 18.9 % (ref 19.6–45.3)
MACROCYTES BLD QL SMEAR: NORMAL
MCH RBC QN AUTO: 37.4 PG (ref 26.6–33)
MCHC RBC AUTO-ENTMCNC: 34 G/DL (ref 31.5–35.7)
MCV RBC AUTO: 110 FL (ref 79–97)
MONOCYTES # BLD AUTO: 1.02 10*3/MM3 (ref 0.1–0.9)
MONOCYTES NFR BLD AUTO: 8.9 % (ref 5–12)
NEUTROPHILS NFR BLD AUTO: 69.8 % (ref 42.7–76)
NEUTROPHILS NFR BLD AUTO: 7.96 10*3/MM3 (ref 1.7–7)
NRBC BLD AUTO-RTO: 0 /100 WBC (ref 0–0.2)
PLATELET # BLD AUTO: 424 10*3/MM3 (ref 140–450)
PMV BLD AUTO: 9.2 FL (ref 6–12)
POIKILOCYTOSIS BLD QL SMEAR: NORMAL
POTASSIUM SERPL-SCNC: 4.4 MMOL/L (ref 3.5–5.2)
PROT SERPL-MCNC: 7 G/DL (ref 6–8.5)
RBC # BLD AUTO: 3.21 10*6/MM3 (ref 3.77–5.28)
SMALL PLATELETS BLD QL SMEAR: NORMAL
SODIUM SERPL-SCNC: 139 MMOL/L (ref 136–145)
WBC MORPH BLD: NORMAL
WBC NRBC COR # BLD: 11.42 10*3/MM3 (ref 3.4–10.8)

## 2023-08-01 PROCEDURE — 96376 TX/PRO/DX INJ SAME DRUG ADON: CPT

## 2023-08-01 PROCEDURE — 96415 CHEMO IV INFUSION ADDL HR: CPT

## 2023-08-01 PROCEDURE — 25010000002 PALONOSETRON 0.25 MG/5ML SOLUTION PREFILLED SYRINGE: Performed by: NURSE PRACTITIONER

## 2023-08-01 PROCEDURE — 96375 TX/PRO/DX INJ NEW DRUG ADDON: CPT

## 2023-08-01 PROCEDURE — 25010000002 IRINOTECAN PER 20 MG: Performed by: NURSE PRACTITIONER

## 2023-08-01 PROCEDURE — 25010000002 DEXAMETHASONE SODIUM PHOSPHATE 20 MG/5ML SOLUTION: Performed by: NURSE PRACTITIONER

## 2023-08-01 PROCEDURE — 96549 UNLISTED CHEMOTHERAPY PX: CPT

## 2023-08-01 PROCEDURE — 99214 OFFICE O/P EST MOD 30 MIN: CPT | Performed by: NURSE PRACTITIONER

## 2023-08-01 PROCEDURE — 25010000002 DIPHENHYDRAMINE PER 50 MG: Performed by: NURSE PRACTITIONER

## 2023-08-01 PROCEDURE — 96413 CHEMO IV INFUSION 1 HR: CPT

## 2023-08-01 PROCEDURE — 86301 IMMUNOASSAY TUMOR CA 19-9: CPT

## 2023-08-01 PROCEDURE — 1126F AMNT PAIN NOTED NONE PRSNT: CPT | Performed by: NURSE PRACTITIONER

## 2023-08-01 PROCEDURE — 80053 COMPREHEN METABOLIC PANEL: CPT

## 2023-08-01 PROCEDURE — 25010000002 LEUCOVORIN CALCIUM PER 50MG: Performed by: NURSE PRACTITIONER

## 2023-08-01 PROCEDURE — 96416 CHEMO PROLONG INFUSE W/PUMP: CPT

## 2023-08-01 PROCEDURE — 25010000002 FLUOROURACIL PER 500 MG: Performed by: NURSE PRACTITIONER

## 2023-08-01 PROCEDURE — 96368 THER/DIAG CONCURRENT INF: CPT

## 2023-08-01 PROCEDURE — 85025 COMPLETE CBC W/AUTO DIFF WBC: CPT

## 2023-08-01 PROCEDURE — 85007 BL SMEAR W/DIFF WBC COUNT: CPT

## 2023-08-01 PROCEDURE — 3078F DIAST BP <80 MM HG: CPT | Performed by: NURSE PRACTITIONER

## 2023-08-01 PROCEDURE — G0498 CHEMO EXTEND IV INFUS W/PUMP: HCPCS

## 2023-08-01 PROCEDURE — 3074F SYST BP LT 130 MM HG: CPT | Performed by: NURSE PRACTITIONER

## 2023-08-01 RX ORDER — FAMOTIDINE 10 MG/ML
20 INJECTION, SOLUTION INTRAVENOUS 2 TIMES DAILY
Status: CANCELLED
Start: 2023-08-01

## 2023-08-01 RX ORDER — DIPHENHYDRAMINE HYDROCHLORIDE 50 MG/ML
25 INJECTION INTRAMUSCULAR; INTRAVENOUS ONCE
Status: CANCELLED
Start: 2023-08-01 | End: 2023-08-01

## 2023-08-01 RX ORDER — SODIUM CHLORIDE 0.9 % (FLUSH) 0.9 %
10 SYRINGE (ML) INJECTION AS NEEDED
Status: DISCONTINUED | OUTPATIENT
Start: 2023-08-01 | End: 2023-08-01 | Stop reason: HOSPADM

## 2023-08-01 RX ORDER — PALONOSETRON 0.05 MG/ML
0.25 INJECTION, SOLUTION INTRAVENOUS ONCE
Status: COMPLETED | OUTPATIENT
Start: 2023-08-01 | End: 2023-08-01

## 2023-08-01 RX ORDER — HEPARIN SODIUM (PORCINE) LOCK FLUSH IV SOLN 100 UNIT/ML 100 UNIT/ML
500 SOLUTION INTRAVENOUS AS NEEDED
Status: CANCELLED | OUTPATIENT
Start: 2023-08-01

## 2023-08-01 RX ORDER — DEXTROSE MONOHYDRATE 50 MG/ML
250 INJECTION, SOLUTION INTRAVENOUS ONCE
Status: CANCELLED | OUTPATIENT
Start: 2023-08-01

## 2023-08-01 RX ORDER — DEXTROSE MONOHYDRATE 50 MG/ML
250 INJECTION, SOLUTION INTRAVENOUS ONCE
Status: DISCONTINUED | OUTPATIENT
Start: 2023-08-01 | End: 2023-08-01 | Stop reason: HOSPADM

## 2023-08-01 RX ORDER — FAMOTIDINE 10 MG/ML
20 INJECTION, SOLUTION INTRAVENOUS AS NEEDED
Status: CANCELLED | OUTPATIENT
Start: 2023-08-01

## 2023-08-01 RX ORDER — DIPHENHYDRAMINE HYDROCHLORIDE 50 MG/ML
50 INJECTION INTRAMUSCULAR; INTRAVENOUS AS NEEDED
Status: CANCELLED | OUTPATIENT
Start: 2023-08-01

## 2023-08-01 RX ORDER — PALONOSETRON 0.05 MG/ML
0.25 INJECTION, SOLUTION INTRAVENOUS ONCE
Status: CANCELLED | OUTPATIENT
Start: 2023-08-01

## 2023-08-01 RX ORDER — DIPHENHYDRAMINE HYDROCHLORIDE 50 MG/ML
25 INJECTION INTRAMUSCULAR; INTRAVENOUS ONCE
Status: COMPLETED | OUTPATIENT
Start: 2023-08-01 | End: 2023-08-01

## 2023-08-01 RX ORDER — SODIUM CHLORIDE 0.9 % (FLUSH) 0.9 %
10 SYRINGE (ML) INJECTION AS NEEDED
Status: CANCELLED | OUTPATIENT
Start: 2023-08-01

## 2023-08-01 RX ORDER — HEPARIN SODIUM (PORCINE) LOCK FLUSH IV SOLN 100 UNIT/ML 100 UNIT/ML
500 SOLUTION INTRAVENOUS AS NEEDED
Status: DISCONTINUED | OUTPATIENT
Start: 2023-08-01 | End: 2023-08-01 | Stop reason: HOSPADM

## 2023-08-01 RX ORDER — FAMOTIDINE 10 MG/ML
20 INJECTION, SOLUTION INTRAVENOUS 2 TIMES DAILY
Status: DISCONTINUED | OUTPATIENT
Start: 2023-08-01 | End: 2023-08-01 | Stop reason: HOSPADM

## 2023-08-01 RX ORDER — ATROPINE SULFATE 1 MG/ML
0.25 INJECTION, SOLUTION INTRAMUSCULAR; INTRAVENOUS; SUBCUTANEOUS
Status: CANCELLED | OUTPATIENT
Start: 2023-08-01

## 2023-08-01 RX ADMIN — ATROPINE SULFATE 0.25 MG: 0.4 INJECTION, SOLUTION INTRAVENOUS at 12:24

## 2023-08-01 RX ADMIN — LEUCOVORIN CALCIUM 720 MG: 10 INJECTION INTRAMUSCULAR; INTRAVENOUS at 10:44

## 2023-08-01 RX ADMIN — DIPHENHYDRAMINE HYDROCHLORIDE 25 MG: 50 INJECTION INTRAMUSCULAR; INTRAVENOUS at 10:10

## 2023-08-01 RX ADMIN — IRINOTECAN HYDROCHLORIDE 240 MG: 20 INJECTION, SOLUTION INTRAVENOUS at 10:44

## 2023-08-01 RX ADMIN — PALONOSETRON 0.25 MG: 0.25 INJECTION, SOLUTION INTRAVENOUS at 10:11

## 2023-08-01 RX ADMIN — FLUOROURACIL 3890 MG: 50 INJECTION, SOLUTION INTRAVENOUS at 12:24

## 2023-08-01 RX ADMIN — DEXAMETHASONE SODIUM PHOSPHATE 12 MG: 4 INJECTION, SOLUTION INTRA-ARTICULAR; INTRALESIONAL; INTRAMUSCULAR; INTRAVENOUS; SOFT TISSUE at 10:22

## 2023-08-01 RX ADMIN — ATROPINE SULFATE 0.25 MG: 0.4 INJECTION, SOLUTION INTRAVENOUS at 10:42

## 2023-08-01 RX ADMIN — FAMOTIDINE 20 MG: 10 INJECTION INTRAVENOUS at 10:08

## 2023-08-03 ENCOUNTER — INFUSION (OUTPATIENT)
Dept: ONCOLOGY | Facility: HOSPITAL | Age: 73
End: 2023-08-03
Payer: MEDICARE

## 2023-08-03 VITALS
DIASTOLIC BLOOD PRESSURE: 68 MMHG | HEART RATE: 68 BPM | OXYGEN SATURATION: 94 % | TEMPERATURE: 98.8 F | SYSTOLIC BLOOD PRESSURE: 130 MMHG | RESPIRATION RATE: 18 BRPM

## 2023-08-03 DIAGNOSIS — C25.1 MALIGNANT NEOPLASM OF BODY OF PANCREAS: Primary | ICD-10-CM

## 2023-08-03 DIAGNOSIS — K86.89 PANCREATIC MASS: ICD-10-CM

## 2023-08-03 PROCEDURE — 25010000002 HEPARIN LOCK FLUSH PER 10 UNITS: Performed by: INTERNAL MEDICINE

## 2023-08-03 PROCEDURE — 96377 APPLICATON ON-BODY INJECTOR: CPT

## 2023-08-03 PROCEDURE — 25010000002 PEGFILGRASTIM 6 MG/0.6ML PREFILLED SYRINGE KIT: Performed by: NURSE PRACTITIONER

## 2023-08-03 RX ORDER — HEPARIN SODIUM (PORCINE) LOCK FLUSH IV SOLN 100 UNIT/ML 100 UNIT/ML
500 SOLUTION INTRAVENOUS AS NEEDED
OUTPATIENT
Start: 2023-08-03

## 2023-08-03 RX ORDER — SODIUM CHLORIDE 0.9 % (FLUSH) 0.9 %
10 SYRINGE (ML) INJECTION AS NEEDED
Status: DISCONTINUED | OUTPATIENT
Start: 2023-08-03 | End: 2023-08-03 | Stop reason: HOSPADM

## 2023-08-03 RX ORDER — SODIUM CHLORIDE 0.9 % (FLUSH) 0.9 %
10 SYRINGE (ML) INJECTION AS NEEDED
OUTPATIENT
Start: 2023-08-03

## 2023-08-03 RX ORDER — HEPARIN SODIUM (PORCINE) LOCK FLUSH IV SOLN 100 UNIT/ML 100 UNIT/ML
500 SOLUTION INTRAVENOUS AS NEEDED
Status: DISCONTINUED | OUTPATIENT
Start: 2023-08-03 | End: 2023-08-03 | Stop reason: HOSPADM

## 2023-08-03 RX ADMIN — PEGFILGRASTIM 6 MG: KIT SUBCUTANEOUS at 15:07

## 2023-08-03 RX ADMIN — HEPARIN 500 UNITS: 100 SYRINGE at 15:07

## 2023-08-03 RX ADMIN — Medication 10 ML: at 15:07

## 2023-08-21 ENCOUNTER — HOSPITAL ENCOUNTER (OUTPATIENT)
Dept: CT IMAGING | Facility: HOSPITAL | Age: 73
Discharge: HOME | End: 2023-08-21
Payer: MEDICARE

## 2023-08-21 DIAGNOSIS — C25.1 MALIGNANT NEOPLASM OF BODY OF PANCREAS: ICD-10-CM

## 2023-08-21 PROCEDURE — 25010000002 HEPARIN LOCK FLUSH PER 10 UNITS: Performed by: RADIOLOGY

## 2023-08-21 PROCEDURE — 74177 CT ABD & PELVIS W/CONTRAST: CPT

## 2023-08-21 PROCEDURE — 25510000001 IOPAMIDOL 61 % SOLUTION: Performed by: NURSE PRACTITIONER

## 2023-08-21 PROCEDURE — 71260 CT THORAX DX C+: CPT

## 2023-08-21 RX ORDER — HEPARIN SODIUM (PORCINE) LOCK FLUSH IV SOLN 100 UNIT/ML 100 UNIT/ML
500 SOLUTION INTRAVENOUS ONCE
Status: COMPLETED | OUTPATIENT
Start: 2023-08-21 | End: 2023-08-21

## 2023-08-21 RX ADMIN — HEPARIN 500 UNITS: 100 SYRINGE at 16:22

## 2023-08-21 RX ADMIN — IOPAMIDOL 100 ML: 612 INJECTION, SOLUTION INTRAVENOUS at 16:24

## 2023-08-22 ENCOUNTER — INFUSION (OUTPATIENT)
Dept: ONCOLOGY | Facility: HOSPITAL | Age: 73
End: 2023-08-22
Payer: MEDICARE

## 2023-08-22 ENCOUNTER — OFFICE VISIT (OUTPATIENT)
Dept: ONCOLOGY | Facility: CLINIC | Age: 73
End: 2023-08-22
Payer: MEDICARE

## 2023-08-22 VITALS
BODY MASS INDEX: 22.36 KG/M2 | HEIGHT: 69 IN | SYSTOLIC BLOOD PRESSURE: 127 MMHG | DIASTOLIC BLOOD PRESSURE: 80 MMHG | TEMPERATURE: 98 F | RESPIRATION RATE: 16 BRPM | WEIGHT: 151 LBS | HEART RATE: 122 BPM | OXYGEN SATURATION: 98 %

## 2023-08-22 DIAGNOSIS — K86.89 PANCREATIC MASS: Primary | ICD-10-CM

## 2023-08-22 DIAGNOSIS — C25.1 MALIGNANT NEOPLASM OF BODY OF PANCREAS: Primary | ICD-10-CM

## 2023-08-22 PROCEDURE — 3074F SYST BP LT 130 MM HG: CPT | Performed by: INTERNAL MEDICINE

## 2023-08-22 PROCEDURE — 25010000002 HEPARIN LOCK FLUSH PER 10 UNITS: Performed by: INTERNAL MEDICINE

## 2023-08-22 PROCEDURE — 3079F DIAST BP 80-89 MM HG: CPT | Performed by: INTERNAL MEDICINE

## 2023-08-22 PROCEDURE — 1126F AMNT PAIN NOTED NONE PRSNT: CPT | Performed by: INTERNAL MEDICINE

## 2023-08-22 PROCEDURE — 99214 OFFICE O/P EST MOD 30 MIN: CPT | Performed by: INTERNAL MEDICINE

## 2023-08-22 PROCEDURE — 96523 IRRIG DRUG DELIVERY DEVICE: CPT

## 2023-08-22 RX ORDER — SODIUM CHLORIDE 0.9 % (FLUSH) 0.9 %
10 SYRINGE (ML) INJECTION AS NEEDED
Status: DISCONTINUED | OUTPATIENT
Start: 2023-08-22 | End: 2023-08-22 | Stop reason: HOSPADM

## 2023-08-22 RX ORDER — SODIUM CHLORIDE 0.9 % (FLUSH) 0.9 %
10 SYRINGE (ML) INJECTION AS NEEDED
OUTPATIENT
Start: 2023-08-22

## 2023-08-22 RX ORDER — HEPARIN SODIUM (PORCINE) LOCK FLUSH IV SOLN 100 UNIT/ML 100 UNIT/ML
500 SOLUTION INTRAVENOUS AS NEEDED
OUTPATIENT
Start: 2023-08-22

## 2023-08-22 RX ORDER — HEPARIN SODIUM (PORCINE) LOCK FLUSH IV SOLN 100 UNIT/ML 100 UNIT/ML
500 SOLUTION INTRAVENOUS AS NEEDED
Status: DISCONTINUED | OUTPATIENT
Start: 2023-08-22 | End: 2023-08-22 | Stop reason: HOSPADM

## 2023-08-22 RX ADMIN — HEPARIN 500 UNITS: 100 SYRINGE at 15:15

## 2023-08-22 RX ADMIN — Medication 10 ML: at 15:15

## 2023-08-22 NOTE — PROGRESS NOTES
"      PROBLEM LIST:  1. [id number] adenosquamous carcinoma of the pancreatic body/tail  A) presented to the ED on 11/23/22 with LUQ pain, worsening over 3-4 months, associated with nausea and occasional vomiting.  She has had GI side effects with sinemet for her PD so she associated this symptom with that medication.  She has lost about 10 lbs. CT a/p without contrast showed a 6.8 x 3.7 cm mass in the distal pancreatic body and tail with minimal surrounding inflammation.  Multiple borderline enalrged nodes near celiac axis and mesenteric axis.  CT a/p with contrast 12/1/22 showed a 5.6 x 3.6 cm mass in the tail and distal body of the pancreas, no adenopathy.  B) distal pancreatectomy and splenectomy performed on 12/19/2022.  Pathology showed poorly differentiated pancreatic adenosquamous carcinoma with involvement of adrenal gland by direct extension, 1 out of 5 lymph nodes involved.  Surgical margins negative. + lymphovascular invasion, + perineural invasion.  C) adjuvant mFOLFIRINOX started 2/13/23.  2. parkinsons disease  3. Hypertension  4. Hyperlipidemia  5. asthma    Subjective     CHIEF COMPLAINT: pancreas cancer    HISTORY OF PRESENT ILLNESS:   Silvia Chung returns for follow-up.  She is starting to feel little bit better.  She still has neuropathy in her feet.  It may be a little bit worse.  The numbness is the main issue.  She feels like her brain fog is starting to clear.               Objective      /80   Pulse (!) 122   Temp 98 øF (36.7 øC) (Temporal)   Resp 16   Ht 175.3 cm (69\")   Wt 68.5 kg (151 lb)   SpO2 98%   BMI 22.30 kg/mý      Performance Status:1          General: well appearing female in no acute distress  Neuro: alert and oriented  HEENT: sclera anicteric, oropharynx clear  Skin: no rashes, lesions, bruising, or petechiae  Psych: mood and affect appropriate                RECENT LABS:  Lab Results   Component Value Date    WBC 11.42 (H) 08/01/2023    HGB 12.0 08/01/2023    " HCT 35.3 08/01/2023    .0 (H) 08/01/2023     08/01/2023       Lab Results   Component Value Date    GLUCOSE 92 08/01/2023    BUN 9 08/01/2023    CREATININE 0.91 08/01/2023    EGFRIFNONA 85 10/23/2020    BCR 9.9 08/01/2023    K 4.4 08/01/2023    CO2 24.1 08/01/2023    CALCIUM 9.2 08/01/2023    ALBUMIN 4.0 08/01/2023    AST 22 08/01/2023    ALT 7 08/01/2023       CT Chest With Contrast Diagnostic  Narrative: PROCEDURE: CT CHEST W CONTRAST DIAGNOSTIC-     HISTORY: Follow up adenosquamous carcinoma of the pancreas;  C25.1-Malignant neoplasm of body of pancreas     COMPARISON: 05/04/2023.     TECHNIQUE: Axial CT with IV contrast administration.     FINDINGS:     No acute lung disease is present. No suspicious pulmonary nodules  present. Interstitial scarring is noted.     No pleural or pericardial effusion is seen. No adenopathy or mass lesion  is present.     Impression: 1. Stable exam without evidence of metastatic disease to the thorax.        This study was performed with techniques to keep radiation doses as low  as reasonably achievable (ALARA). Individualized dose reduction  techniques using automated exposure control or adjustment of vA and/or  kV according to the patient size were employed.      This report was signed and finalized on 8/21/2023 8:50 PM by Devon Jack MD.     CT Abdomen Pelvis With Contrast  Narrative: PROCEDURE: CT ABDOMEN PELVIS W CONTRAST-     TECHNIQUE: IV contrast enhanced exam     HISTORY: Follow up adenosquamous carcinoma of the pancreas;  C25.1-Malignant neoplasm of body of pancreas     COMPARISON: 05/04/2023.     FINDINGS:     ABDOMEN: Postoperative changes of resection of the pancreatic body and  tail with splenectomy are noted. No mass is seen of the left upper  quadrant. Right hepatic lobe cyst is stable. No new liver lesion is  seen.     Pancreatic head is normal. Adrenal glands are normal. Kidneys show no  obstructive changes.     There is no adenopathy or  ascites.     Fecal impaction of the colon is noted. There is no bowel obstruction.     PELVIS: Distal colonic fecal impaction is seen. Uterus is normal for  age. No pelvic mass or adenopathy is seen. There is no free fluid.     Impression: Stable exam without evidence of recurrent or metastatic  disease.        This study was performed with techniques to keep radiation doses as low  as reasonably achievable (ALARA). Individualized dose reduction  techniques using automated exposure control or adjustment of vA and/or  kV according to the patient size were employed.     This report was signed and finalized on 8/21/2023 8:50 PM by Devon Jack MD.               ASSESSMENT AND PLAN:     Silvia Chung is a 73 y.o. female with a T3N1M0 adenosquamous carcinoma of the pancreas, here for follow up on adjuvant chemotherapy.    She has completed 12 cycles of adjuvant chemotherapy.  Recent imaging shows no evidence of disease recurrence.  Her CA 19-9 is normal.  We will monitor with scans and labs every 3 months.    Neuropathy: Continue gabapentin.  We discussed that this can improve but it may take several months.    Diarrhea: Stable.  Continue Creon    Parkinson's: Continue regular medications.    Post splenectomy vaccines given at .    Follow-up 3 months.            Janell Corrales MD  [facility] Hematology and Oncology    8/22/2023          CC:

## 2023-09-14 ENCOUNTER — HOSPITAL ENCOUNTER (OUTPATIENT)
Dept: ULTRASOUND IMAGING | Facility: HOSPITAL | Age: 73
Discharge: HOME | End: 2023-09-14
Payer: MEDICARE

## 2023-09-14 ENCOUNTER — HOSPITAL ENCOUNTER (OUTPATIENT)
Dept: MAMMOGRAPHY | Facility: HOSPITAL | Age: 73
Discharge: HOME | End: 2023-09-14
Payer: MEDICARE

## 2023-09-14 ENCOUNTER — TRANSCRIBE ORDERS (OUTPATIENT)
Dept: ADMINISTRATIVE | Facility: HOSPITAL | Age: 73
End: 2023-09-14
Payer: MEDICARE

## 2023-09-14 DIAGNOSIS — R92.8 ABNORMAL MAMMOGRAM OF BOTH BREASTS: ICD-10-CM

## 2023-09-14 DIAGNOSIS — R92.8 ABNORMAL MAMMOGRAM: Primary | ICD-10-CM

## 2023-09-14 PROCEDURE — G0279 TOMOSYNTHESIS, MAMMO: HCPCS

## 2023-09-14 PROCEDURE — 76642 ULTRASOUND BREAST LIMITED: CPT

## 2023-09-14 PROCEDURE — 77066 DX MAMMO INCL CAD BI: CPT

## 2023-09-19 ENCOUNTER — TELEPHONE (OUTPATIENT)
Dept: ONCOLOGY | Facility: CLINIC | Age: 73
End: 2023-09-19
Payer: MEDICARE

## 2023-09-19 DIAGNOSIS — C25.1 MALIGNANT NEOPLASM OF BODY OF PANCREAS: Primary | ICD-10-CM

## 2023-09-19 NOTE — TELEPHONE ENCOUNTER
"I called the patient back and she said the pain is over her pancreas.  She said that over the weekend she was almost \"doubled over\" but then it would ease up.  She said that she took tylenol scheduled but no relief.  She said that yesterday was better but today it is a 4-5 on a 10 pain scale.  She thinks it may be worse when she eats but she is not certain. She denies fevers, denies any respiratory symptoms, states bowels are moving.  I talked with Dr. Corrales and she is ordering cbc, cmp and lipase and ct abd/pelvis with iv contrast (no oral).  I put orders in and called patient back.  Patient will get labs done tomorrow and I told her someone from scheduling would contact her about the scan.  She verbalized understanding.    "

## 2023-09-19 NOTE — TELEPHONE ENCOUNTER
Patient called and stated she had surgery back in December on her pancreas and she stated she is having pain in the area of her pancreas where the tumor was she said over the weekend it was a 8 out of 10 she said this afternoon the pain is 5 out of 10. Requesting a return phone to discuss.

## 2023-09-20 ENCOUNTER — TELEPHONE (OUTPATIENT)
Dept: ONCOLOGY | Facility: CLINIC | Age: 73
End: 2023-09-20
Payer: MEDICARE

## 2023-09-20 ENCOUNTER — LAB (OUTPATIENT)
Dept: LAB | Facility: HOSPITAL | Age: 73
End: 2023-09-20
Payer: MEDICARE

## 2023-09-20 DIAGNOSIS — C25.1 MALIGNANT NEOPLASM OF BODY OF PANCREAS: Primary | ICD-10-CM

## 2023-09-20 DIAGNOSIS — C25.1 MALIGNANT NEOPLASM OF BODY OF PANCREAS: ICD-10-CM

## 2023-09-20 LAB
ALBUMIN SERPL-MCNC: 4.7 G/DL (ref 3.5–5.2)
ALBUMIN/GLOB SERPL: 1.5 G/DL
ALP SERPL-CCNC: 91 U/L (ref 39–117)
ALT SERPL W P-5'-P-CCNC: 12 U/L (ref 1–33)
ANION GAP SERPL CALCULATED.3IONS-SCNC: 13.9 MMOL/L (ref 5–15)
AST SERPL-CCNC: 22 U/L (ref 1–32)
BASOPHILS # BLD AUTO: 0.05 10*3/MM3 (ref 0–0.2)
BASOPHILS NFR BLD AUTO: 0.8 % (ref 0–1.5)
BILIRUB SERPL-MCNC: 0.6 MG/DL (ref 0–1.2)
BUN SERPL-MCNC: 37 MG/DL (ref 8–23)
BUN/CREAT SERPL: 22.7 (ref 7–25)
CALCIUM SPEC-SCNC: 10.6 MG/DL (ref 8.6–10.5)
CANCER AG19-9 SERPL-ACNC: 17.8 U/ML
CHLORIDE SERPL-SCNC: 94 MMOL/L (ref 98–107)
CO2 SERPL-SCNC: 28.1 MMOL/L (ref 22–29)
CREAT SERPL-MCNC: 1.63 MG/DL (ref 0.57–1)
DEPRECATED RDW RBC AUTO: 50.5 FL (ref 37–54)
EGFRCR SERPLBLD CKD-EPI 2021: 33.6 ML/MIN/1.73
EOSINOPHIL # BLD AUTO: 0.08 10*3/MM3 (ref 0–0.4)
EOSINOPHIL NFR BLD AUTO: 1.2 % (ref 0.3–6.2)
ERYTHROCYTE [DISTWIDTH] IN BLOOD BY AUTOMATED COUNT: 12.6 % (ref 12.3–15.4)
GLOBULIN UR ELPH-MCNC: 3.1 GM/DL
GLUCOSE SERPL-MCNC: 103 MG/DL (ref 65–99)
HCT VFR BLD AUTO: 37.5 % (ref 34–46.6)
HGB BLD-MCNC: 12.6 G/DL (ref 12–15.9)
IMM GRANULOCYTES # BLD AUTO: 0.02 10*3/MM3 (ref 0–0.05)
IMM GRANULOCYTES NFR BLD AUTO: 0.3 % (ref 0–0.5)
LIPASE SERPL-CCNC: 17 U/L (ref 13–60)
LYMPHOCYTES # BLD AUTO: 2.23 10*3/MM3 (ref 0.7–3.1)
LYMPHOCYTES NFR BLD AUTO: 33.5 % (ref 19.6–45.3)
MACROCYTES BLD QL SMEAR: NORMAL
MCH RBC QN AUTO: 36.3 PG (ref 26.6–33)
MCHC RBC AUTO-ENTMCNC: 33.6 G/DL (ref 31.5–35.7)
MCV RBC AUTO: 108.1 FL (ref 79–97)
MONOCYTES # BLD AUTO: 1.03 10*3/MM3 (ref 0.1–0.9)
MONOCYTES NFR BLD AUTO: 15.5 % (ref 5–12)
NEUTROPHILS NFR BLD AUTO: 3.24 10*3/MM3 (ref 1.7–7)
NEUTROPHILS NFR BLD AUTO: 48.7 % (ref 42.7–76)
NRBC BLD AUTO-RTO: 0 /100 WBC (ref 0–0.2)
PLATELET # BLD AUTO: 331 10*3/MM3 (ref 140–450)
PMV BLD AUTO: 9.9 FL (ref 6–12)
POTASSIUM SERPL-SCNC: 4.1 MMOL/L (ref 3.5–5.2)
PROT SERPL-MCNC: 7.8 G/DL (ref 6–8.5)
RBC # BLD AUTO: 3.47 10*6/MM3 (ref 3.77–5.28)
SMALL PLATELETS BLD QL SMEAR: ADEQUATE
SODIUM SERPL-SCNC: 136 MMOL/L (ref 136–145)
WBC MORPH BLD: NORMAL
WBC NRBC COR # BLD: 6.65 10*3/MM3 (ref 3.4–10.8)

## 2023-09-20 PROCEDURE — 85025 COMPLETE CBC W/AUTO DIFF WBC: CPT

## 2023-09-20 PROCEDURE — 36415 COLL VENOUS BLD VENIPUNCTURE: CPT

## 2023-09-20 PROCEDURE — 80053 COMPREHEN METABOLIC PANEL: CPT

## 2023-09-20 PROCEDURE — 83690 ASSAY OF LIPASE: CPT

## 2023-09-20 PROCEDURE — 86301 IMMUNOASSAY TUMOR CA 19-9: CPT

## 2023-09-20 PROCEDURE — 85007 BL SMEAR W/DIFF WBC COUNT: CPT

## 2023-09-20 NOTE — TELEPHONE ENCOUNTER
I called patient per Dr. Corrales to tell her that the labs were stable except for creatinine of 1.63.  CA 19.9 is still pending.  Dr. Corrales would like patient to have some IV fluids before her CT scan to see if they can give the IV contrast with an improved creatinine.  I did advise patient to push oral fluids tonight.  Patient said she is still uncomfortable and rates pain as 5 but tolerable today.  She wonders if it could be scar tissue.  She is scheduled for IV fluids in [place] at 10am tomorrow and her scans are at 1:30.  We will attempt to get a minimum of one liter of NS and maybe 2.  Patient verbalized understanding.

## 2023-09-21 ENCOUNTER — HOSPITAL ENCOUNTER (EMERGENCY)
Facility: HOSPITAL | Age: 73
Discharge: HOME | End: 2023-09-21
Attending: EMERGENCY MEDICINE
Payer: MEDICARE

## 2023-09-21 ENCOUNTER — HOSPITAL ENCOUNTER (OUTPATIENT)
Dept: INFUSION THERAPY | Facility: HOSPITAL | Age: 73
Discharge: HOME | End: 2023-09-21
Admitting: INTERNAL MEDICINE
Payer: MEDICARE

## 2023-09-21 ENCOUNTER — APPOINTMENT (OUTPATIENT)
Dept: CT IMAGING | Facility: HOSPITAL | Age: 73
End: 2023-09-21
Payer: MEDICARE

## 2023-09-21 VITALS
HEART RATE: 100 BPM | BODY MASS INDEX: 21.77 KG/M2 | TEMPERATURE: 98.4 F | OXYGEN SATURATION: 97 % | RESPIRATION RATE: 16 BRPM | DIASTOLIC BLOOD PRESSURE: 83 MMHG | WEIGHT: 147 LBS | SYSTOLIC BLOOD PRESSURE: 148 MMHG | HEIGHT: 69 IN

## 2023-09-21 DIAGNOSIS — R10.9 ABDOMINAL PAIN, UNSPECIFIED ABDOMINAL LOCATION: Primary | ICD-10-CM

## 2023-09-21 DIAGNOSIS — K86.89 PANCREATIC MASS: ICD-10-CM

## 2023-09-21 DIAGNOSIS — C25.1 MALIGNANT NEOPLASM OF BODY OF PANCREAS: Primary | ICD-10-CM

## 2023-09-21 LAB
ALBUMIN SERPL-MCNC: 4.6 G/DL (ref 3.5–5.2)
ALBUMIN/GLOB SERPL: 1.5 G/DL
ALP SERPL-CCNC: 87 U/L (ref 39–117)
ALT SERPL W P-5'-P-CCNC: 10 U/L (ref 1–33)
ANION GAP SERPL CALCULATED.3IONS-SCNC: 14.5 MMOL/L (ref 5–15)
AST SERPL-CCNC: 20 U/L (ref 1–32)
BACTERIA UR QL AUTO: ABNORMAL /HPF
BASOPHILS # BLD AUTO: 0.05 10*3/MM3 (ref 0–0.2)
BASOPHILS NFR BLD AUTO: 0.6 % (ref 0–1.5)
BILIRUB SERPL-MCNC: 0.4 MG/DL (ref 0–1.2)
BILIRUB UR QL STRIP: NEGATIVE
BUN SERPL-MCNC: 30 MG/DL (ref 8–23)
BUN/CREAT SERPL: 27.5 (ref 7–25)
CALCIUM SPEC-SCNC: 10 MG/DL (ref 8.6–10.5)
CHLORIDE SERPL-SCNC: 99 MMOL/L (ref 98–107)
CLARITY UR: CLEAR
CO2 SERPL-SCNC: 23.5 MMOL/L (ref 22–29)
COLOR UR: YELLOW
CREAT SERPL-MCNC: 1.09 MG/DL (ref 0.57–1)
DEPRECATED RDW RBC AUTO: 49.1 FL (ref 37–54)
EGFRCR SERPLBLD CKD-EPI 2021: 54.4 ML/MIN/1.73
EOSINOPHIL # BLD AUTO: 0.1 10*3/MM3 (ref 0–0.4)
EOSINOPHIL NFR BLD AUTO: 1.2 % (ref 0.3–6.2)
ERYTHROCYTE [DISTWIDTH] IN BLOOD BY AUTOMATED COUNT: 12.3 % (ref 12.3–15.4)
GLOBULIN UR ELPH-MCNC: 3 GM/DL
GLUCOSE SERPL-MCNC: 102 MG/DL (ref 65–99)
GLUCOSE UR STRIP-MCNC: NEGATIVE MG/DL
HCT VFR BLD AUTO: 35.8 % (ref 34–46.6)
HGB BLD-MCNC: 12.2 G/DL (ref 12–15.9)
HGB UR QL STRIP.AUTO: NEGATIVE
HOLD SPECIMEN: NORMAL
HOLD SPECIMEN: NORMAL
HYALINE CASTS UR QL AUTO: ABNORMAL /LPF
IMM GRANULOCYTES # BLD AUTO: 0.02 10*3/MM3 (ref 0–0.05)
IMM GRANULOCYTES NFR BLD AUTO: 0.2 % (ref 0–0.5)
KETONES UR QL STRIP: NEGATIVE
LEUKOCYTE ESTERASE UR QL STRIP.AUTO: ABNORMAL
LIPASE SERPL-CCNC: 19 U/L (ref 13–60)
LYMPHOCYTES # BLD AUTO: 2.59 10*3/MM3 (ref 0.7–3.1)
LYMPHOCYTES NFR BLD AUTO: 32.1 % (ref 19.6–45.3)
MACROCYTES BLD QL SMEAR: NORMAL
MCH RBC QN AUTO: 37 PG (ref 26.6–33)
MCHC RBC AUTO-ENTMCNC: 34.1 G/DL (ref 31.5–35.7)
MCV RBC AUTO: 108.5 FL (ref 79–97)
MONOCYTES # BLD AUTO: 1.1 10*3/MM3 (ref 0.1–0.9)
MONOCYTES NFR BLD AUTO: 13.6 % (ref 5–12)
MUCOUS THREADS URNS QL MICRO: ABNORMAL /HPF
NEUTROPHILS NFR BLD AUTO: 4.22 10*3/MM3 (ref 1.7–7)
NEUTROPHILS NFR BLD AUTO: 52.3 % (ref 42.7–76)
NITRITE UR QL STRIP: NEGATIVE
NRBC BLD AUTO-RTO: 0.2 /100 WBC (ref 0–0.2)
PH UR STRIP.AUTO: 7.5 [PH] (ref 5–8)
PLAT MORPH BLD: NORMAL
PLATELET # BLD AUTO: 297 10*3/MM3 (ref 140–450)
PMV BLD AUTO: 9.5 FL (ref 6–12)
POTASSIUM SERPL-SCNC: 4.5 MMOL/L (ref 3.5–5.2)
PROT SERPL-MCNC: 7.6 G/DL (ref 6–8.5)
PROT UR QL STRIP: NEGATIVE
RBC # BLD AUTO: 3.3 10*6/MM3 (ref 3.77–5.28)
RBC # UR STRIP: ABNORMAL /HPF
REF LAB TEST METHOD: ABNORMAL
SODIUM SERPL-SCNC: 137 MMOL/L (ref 136–145)
SP GR UR STRIP: <=1.005 (ref 1–1.03)
SQUAMOUS #/AREA URNS HPF: ABNORMAL /HPF
UROBILINOGEN UR QL STRIP: ABNORMAL
WBC # UR STRIP: ABNORMAL /HPF
WBC MORPH BLD: NORMAL
WBC NRBC COR # BLD: 8.08 10*3/MM3 (ref 3.4–10.8)
WHOLE BLOOD HOLD COAG: NORMAL
WHOLE BLOOD HOLD SPECIMEN: NORMAL

## 2023-09-21 PROCEDURE — 25010000002 ONDANSETRON PER 1 MG: Performed by: INTERNAL MEDICINE

## 2023-09-21 PROCEDURE — 96361 HYDRATE IV INFUSION ADD-ON: CPT

## 2023-09-21 PROCEDURE — 25010000002 KETOROLAC TROMETHAMINE PER 15 MG: Performed by: EMERGENCY MEDICINE

## 2023-09-21 PROCEDURE — 25010000002 HEPARIN LOCK FLUSH PER 10 UNITS: Performed by: EMERGENCY MEDICINE

## 2023-09-21 PROCEDURE — 25010000002 HYDROMORPHONE 1 MG/ML SOLUTION: Performed by: EMERGENCY MEDICINE

## 2023-09-21 PROCEDURE — 81001 URINALYSIS AUTO W/SCOPE: CPT

## 2023-09-21 PROCEDURE — 96374 THER/PROPH/DIAG INJ IV PUSH: CPT

## 2023-09-21 PROCEDURE — 96375 TX/PRO/DX INJ NEW DRUG ADDON: CPT

## 2023-09-21 PROCEDURE — 96360 HYDRATION IV INFUSION INIT: CPT

## 2023-09-21 PROCEDURE — 80053 COMPREHEN METABOLIC PANEL: CPT

## 2023-09-21 PROCEDURE — 96368 THER/DIAG CONCURRENT INF: CPT

## 2023-09-21 PROCEDURE — 85025 COMPLETE CBC W/AUTO DIFF WBC: CPT

## 2023-09-21 PROCEDURE — 83690 ASSAY OF LIPASE: CPT

## 2023-09-21 PROCEDURE — 99285 EMERGENCY DEPT VISIT HI MDM: CPT

## 2023-09-21 PROCEDURE — 25510000001 IOPAMIDOL 61 % SOLUTION: Performed by: EMERGENCY MEDICINE

## 2023-09-21 PROCEDURE — 74177 CT ABD & PELVIS W/CONTRAST: CPT

## 2023-09-21 PROCEDURE — 85007 BL SMEAR W/DIFF WBC COUNT: CPT

## 2023-09-21 PROCEDURE — 25010000002 MORPHINE PER 10 MG: Performed by: EMERGENCY MEDICINE

## 2023-09-21 RX ORDER — POLYETHYLENE GLYCOL 3350 17 G/17G
17 POWDER, FOR SOLUTION ORAL DAILY
Qty: 510 G | Refills: 0 | Status: SHIPPED | OUTPATIENT
Start: 2023-09-21

## 2023-09-21 RX ORDER — OXYCODONE HYDROCHLORIDE AND ACETAMINOPHEN 5; 325 MG/1; MG/1
1 TABLET ORAL EVERY 4 HOURS PRN
Qty: 10 TABLET | Refills: 0 | Status: SHIPPED | OUTPATIENT
Start: 2023-09-21

## 2023-09-21 RX ORDER — HEPARIN SODIUM (PORCINE) LOCK FLUSH IV SOLN 100 UNIT/ML 100 UNIT/ML
500 SOLUTION INTRAVENOUS AS NEEDED
Status: DISCONTINUED | OUTPATIENT
Start: 2023-09-21 | End: 2023-09-23 | Stop reason: HOSPADM

## 2023-09-21 RX ORDER — OXYCODONE HYDROCHLORIDE AND ACETAMINOPHEN 5; 325 MG/1; MG/1
1 TABLET ORAL EVERY 4 HOURS PRN
Qty: 10 TABLET | Refills: 0 | Status: SHIPPED | OUTPATIENT
Start: 2023-09-21 | End: 2023-09-21 | Stop reason: SDUPTHER

## 2023-09-21 RX ORDER — SODIUM CHLORIDE 0.9 % (FLUSH) 0.9 %
10 SYRINGE (ML) INJECTION AS NEEDED
Status: DISCONTINUED | OUTPATIENT
Start: 2023-09-21 | End: 2023-09-23 | Stop reason: HOSPADM

## 2023-09-21 RX ORDER — SODIUM CHLORIDE 0.9 % (FLUSH) 0.9 %
10 SYRINGE (ML) INJECTION AS NEEDED
Status: DISCONTINUED | OUTPATIENT
Start: 2023-09-21 | End: 2023-09-21 | Stop reason: HOSPADM

## 2023-09-21 RX ORDER — HEPARIN SODIUM (PORCINE) LOCK FLUSH IV SOLN 100 UNIT/ML 100 UNIT/ML
500 SOLUTION INTRAVENOUS AS NEEDED
OUTPATIENT
Start: 2023-09-21

## 2023-09-21 RX ORDER — POLYETHYLENE GLYCOL 3350 17 G/17G
17 POWDER, FOR SOLUTION ORAL DAILY
Qty: 30 EACH | Refills: 0 | Status: SHIPPED | OUTPATIENT
Start: 2023-09-21 | End: 2023-09-21 | Stop reason: SDUPTHER

## 2023-09-21 RX ORDER — KETOROLAC TROMETHAMINE 30 MG/ML
10 INJECTION, SOLUTION INTRAMUSCULAR; INTRAVENOUS ONCE
Status: COMPLETED | OUTPATIENT
Start: 2023-09-21 | End: 2023-09-21

## 2023-09-21 RX ORDER — NALOXONE HYDROCHLORIDE 4 MG/.1ML
SPRAY NASAL
Qty: 2 EACH | Refills: 0 | Status: SHIPPED | OUTPATIENT
Start: 2023-09-21

## 2023-09-21 RX ORDER — CEPHALEXIN 500 MG/1
500 CAPSULE ORAL 2 TIMES DAILY
Qty: 14 CAPSULE | Refills: 0 | Status: SHIPPED | OUTPATIENT
Start: 2023-09-21

## 2023-09-21 RX ORDER — SODIUM CHLORIDE 0.9 % (FLUSH) 0.9 %
10 SYRINGE (ML) INJECTION AS NEEDED
OUTPATIENT
Start: 2023-09-21

## 2023-09-21 RX ORDER — HEPARIN SODIUM (PORCINE) LOCK FLUSH IV SOLN 100 UNIT/ML 100 UNIT/ML
300 SOLUTION INTRAVENOUS ONCE
Status: COMPLETED | OUTPATIENT
Start: 2023-09-21 | End: 2023-09-21

## 2023-09-21 RX ORDER — CEPHALEXIN 500 MG/1
500 CAPSULE ORAL 2 TIMES DAILY
Qty: 14 CAPSULE | Refills: 0 | Status: SHIPPED | OUTPATIENT
Start: 2023-09-21 | End: 2023-09-21 | Stop reason: SDUPTHER

## 2023-09-21 RX ADMIN — MORPHINE SULFATE 4 MG: 4 INJECTION, SOLUTION INTRAMUSCULAR; INTRAVENOUS at 12:27

## 2023-09-21 RX ADMIN — KETOROLAC TROMETHAMINE 10 MG: 30 INJECTION, SOLUTION INTRAMUSCULAR; INTRAVENOUS at 13:20

## 2023-09-21 RX ADMIN — SODIUM CHLORIDE 2000 ML: 9 INJECTION, SOLUTION INTRAVENOUS at 10:17

## 2023-09-21 RX ADMIN — HEPARIN 300 UNITS: 100 SYRINGE at 16:36

## 2023-09-21 RX ADMIN — ONDANSETRON 8 MG: 2 INJECTION INTRAMUSCULAR; INTRAVENOUS at 10:20

## 2023-09-21 RX ADMIN — IOPAMIDOL 100 ML: 612 INJECTION, SOLUTION INTRAVENOUS at 15:14

## 2023-09-21 RX ADMIN — HYDROMORPHONE HYDROCHLORIDE 1 MG: 1 INJECTION, SOLUTION INTRAMUSCULAR; INTRAVENOUS; SUBCUTANEOUS at 13:22

## 2023-09-21 NOTE — ED PROVIDER NOTES
TRIAGE CHIEF COMPLAINT:     Nursing and triage notes reviewed    Chief Complaint   Patient presents with    Flank Pain      HPI: Silvia Chung is a 73 y.o. female who presents to the emergency department complaining of left-sided abdominal discomfort.  Patient states symptoms have been ongoing for the past few weeks so that symptoms have worsened over the past few days.  Patient has a history of pancreatic cancer and states that the pain is in a similar spot as to where it was before.  She denies any pain in her chest, she denies shortness of breath, cough, or pain with breathing.  She has had some nausea but denies vomiting.  Patient sent over from the infusion center for ongoing discomfort.    REVIEW OF SYSTEMS: All other systems reviewed and are negative     PAST MEDICAL HISTORY:   Past Medical History:   Diagnosis Date    Asthma     Hyperlipidemia     Hypertension     Impaired functional mobility, balance, gait, and endurance     Pancreatic cancer 2022    Parkinson disease     PONV (postoperative nausea and vomiting)     Scoliosis     Seasonal allergies         FAMILY HISTORY:   Family History   Problem Relation Age of Onset    Heart failure Father     Lung cancer Father     Cancer Brother     Breast cancer Maternal Grandmother     Breast cancer Paternal Grandmother     Breast cancer Cousin     Ovarian cancer Neg Hx         SOCIAL HISTORY:   Social History     Socioeconomic History    Marital status:    Tobacco Use    Smoking status: Never    Smokeless tobacco: Never   Vaping Use    Vaping Use: Never used   Substance and Sexual Activity    Alcohol use: Not Currently     Comment: rare    Drug use: Defer    Sexual activity: Defer        SURGICAL HISTORY:   Past Surgical History:   Procedure Laterality Date    ADENOIDECTOMY      BREAST BIOPSY Right 2019    Needle biopsy    CATARACT EXTRACTION Bilateral     CHOLECYSTECTOMY OPEN  12/19/2022    done at     COLONOSCOPY      FOOT SURGERY Left     torn  ligament, screws placed.    HIP HEMIARTHROPLASTY Left 10/21/2020    Procedure: HIP HEMIARTHROPLASTY LEFT;  Surgeon: Humza Winters MD;  Location: [facility] OR;  Service: Orthopedics;  Laterality: Left;    LYMPHADENECTOMY  12/19/2022    partial, done at     NECK SURGERY      four fusions    OMENTECTOMY  12/19/2022    done at     PANCREATECTOMY  12/19/2022    done at , left adrenolectomy also performed    PORTACATH PLACEMENT Right 02/09/2023    Procedure: INSERTION OF PORTACATH;  Surgeon: Marcio Garcia MD;  Location: [facility] OR;  Service: General;  Laterality: Right;    SPLENECTOMY  12/19/2022    done at     TONSILLECTOMY          CURRENT MEDICATIONS:      Medication List        ASK your doctor about these medications      carbidopa-levodopa  MG per tablet  Commonly known as: SINEMET     Creon 65513-42015 units capsule delayed-release particles capsule  Generic drug: pancrelipase (Lip-Prot-Amyl)     diphenhydrAMINE 25 mg capsule  Commonly known as: BENADRYL     docusate sodium 100 MG capsule  Commonly known as: COLACE     esomeprazole 40 MG capsule  Commonly known as: nexIUM     gabapentin 300 MG capsule  Commonly known as: NEURONTIN  Take 1 capsule by mouth 3 (Three) Times a Day.     lidocaine-prilocaine 2.5-2.5 % cream  Commonly known as: EMLA     metoprolol tartrate 25 MG tablet  Commonly known as: LOPRESSOR  Take 0.5 tablets by mouth Daily.     multivitamin with minerals tablet tablet     ondansetron 8 MG tablet  Commonly known as: ZOFRAN  Take 1 tablet by mouth 3 (Three) Times a Day As Needed for Nausea or Vomiting.     polyethylene glycol 17 GM/SCOOP powder  Commonly known as: MIRALAX     prochlorperazine 10 MG tablet  Commonly known as: COMPAZINE     Prolia 60 MG/ML solution prefilled syringe syringe  Generic drug: denosumab     tiZANidine 4 MG tablet  Commonly known as: ZANAFLEX     traZODone 50 MG tablet  Commonly known as: DESYREL  Take 1 tablet by mouth Every Night.               ALLERGIES:  Lovastatin     PHYSICAL EXAM:   VITAL SIGNS:   Vitals:    09/21/23 1246   BP:    Pulse:    Resp:    Temp:    SpO2: 99%      CONSTITUTIONAL: Awake, oriented, appears nontoxic but uncomfortable  HENT: Atraumatic, normocephalic, oral mucosa pink and moist, airway patent. Nares patent without drainage. External ears normal.   EYES: Conjunctivae clear  NECK: Trachea midline   CARDIOVASCULAR: Normal heart rate, Normal rhythm, No murmurs, rubs, gallops   PULMONARY/CHEST: Clear to auscultation, no rhonchi, wheezes, or rales. Symmetrical breath sounds.  ABDOMINAL: Nondistended, soft, focal tenderness in the left mid abdomen- no rebound or guarding.   NEUROLOGIC: Nonfocal, moving all four extremities, no gross sensory or motor deficits.   EXTREMITIES: No clubbing, cyanosis, or edema   SKIN: Warm, Dry, No erythema, No rash     ED COURSE / MEDICAL DECISION MAKING:   Silvia Chung is a 73 y.o. female who presents to the emergency department for evaluation of abdominal pain.  Patient does appear uncomfortable on arrival but is nontoxic.  Vital signs are stable.  Physical exam does reveal focal tenderness in the abdomen.    Differential diagnosis includes cancer related pain, infection, kidney stone among other etiologies.    A CT scan of the abdomen and pelvis, CBC, CMP, urinalysis was ordered for further evaluation of the patient's presentation.    Diagnostic information from other sources: Review    Interventions: Morphine, Dilaudid, Toradol    Narrative: Patient presents with abdominal discomfort.  Laboratory does reveal a slight urinary infection but otherwise are largely unremarkable.  Lipase within the normal range.  Electrolytes are largely unremarkable.  CT scan of the abdomen pelvis per radiology interpretation reveals some evidence of constipation in well-healing postsurgical findings but no new acute process.  Discussed all this with patient.  She did feel improved after treatment in the emergency department.   Will discharge with symptomatic management and return precautions.    Re-evaluation: Patient is more comfortable    Plan for disposition is discharge    DECISION TO DISCHARGE/ADMIT: see ED care timeline     FINAL IMPRESSION:   1 --abdominal pain  2 --   3 --     Electronically signed by: Lizzie Lea MD, 9/21/2023 13:26 EDT       Lizzie Lea MD  09/21/23 1996

## 2023-09-22 ENCOUNTER — TELEPHONE (OUTPATIENT)
Dept: ONCOLOGY | Facility: CLINIC | Age: 73
End: 2023-09-22
Payer: MEDICARE

## 2023-09-22 NOTE — TELEPHONE ENCOUNTER
I called patient per Dr. Corrales to tell her the scans were reviewed and no acute findings but a large amount of stool. Dr. Corrales looked at the images herself and no disease progression noted and stable compared to prior exam except for the stool burden.  I left voicemail for patient with the above information and advised that she use stool softeners, laxatives and push fluids to clean out bowel.  ED had prescribed percocet prn for pain but I also advised that percocets will cause constipation.  I asked her to call back with any questions.

## 2023-09-25 ENCOUNTER — TELEPHONE (OUTPATIENT)
Dept: ONCOLOGY | Facility: CLINIC | Age: 73
End: 2023-09-25

## 2023-09-25 NOTE — TELEPHONE ENCOUNTER
I called patient again at 1040 and got voicemail.  I then called her spouses' number and she was there but had not seen or heard my calls. She also did not get my voicemail from Friday.  I advised that Dr Corrales had reviewed her imaging and there was no recurrent disease but patient had a large volume of stool and I asked patient if she had had a good bowel movement but she had not.  She is using miralax but I told her that she would need to increase otc bowel regimen to include senekot twice a day along with stool softeners and to add miralax as well until she had a good evacuation of bowel.  She is unsure that it is the source of her pain but I advised per Dr. Corrales , we would like her to be emptied of stool and then we can see what the pain is doing.  We will consider changing pain meds as ED gave percocet but we want to be sure that constipation is ruled out as a cause for pain.  I told patient to also increase fluids as much as possible for the constipation.  I said I would contact her tomorrow morning to see how she was doing.  She verbalized understanding.

## 2023-09-25 NOTE — TELEPHONE ENCOUNTER
Attempted to call patient back and got voicemail.  Asked her to call me back to tell me more specifics about her pain, location, duration.

## 2023-09-26 ENCOUNTER — TELEPHONE (OUTPATIENT)
Dept: ONCOLOGY | Facility: CLINIC | Age: 73
End: 2023-09-26
Payer: MEDICARE

## 2023-09-26 DIAGNOSIS — C25.1 MALIGNANT NEOPLASM OF BODY OF PANCREAS: Primary | ICD-10-CM

## 2023-09-26 DIAGNOSIS — K59.09 OTHER CONSTIPATION: ICD-10-CM

## 2023-09-26 RX ORDER — LACTULOSE 10 G/15ML
20 SOLUTION ORAL 3 TIMES DAILY
Qty: 473 ML | Refills: 1 | Status: SHIPPED | OUTPATIENT
Start: 2023-09-26

## 2023-09-26 NOTE — H&P
[facility]   HISTORY AND PHYSICAL      Name:  Silvia Chung   Age:  70 y.o.  Sex:  female  :  1950  MRN:  [id number]   Visit Number:  [id number]  Admission Date:  10/20/2020  Date Of Service:  10/20/20  Primary Care Physician:  Janell Almazan MD    Chief Complaint:     Fall, pain    History Of Presenting Illness:      70 female history of hypertension and previous rib fracture who presented to the ED after a mechanical fall and found to have a hip fracture.  Patient says that she was pushed sideways onto her left hip by her big dog.  She experienced immediate severe pain currently rated 8 out of 10.  She denies any medical symptoms, dizziness, weakness leading to the fall.  She takes gabapentin, baclofen, amlodipine at home and is requesting these now.  In the ED, she was given morphine, fentanyl, and started on normal saline.  Dr. Winters was consulted from the ED.    Review Of Systems:     A full 10 point review of systems was performed and all pertinent positives and negatives are noted in the HPI.     Past Medical History:    Past Medical History:   Diagnosis Date   • Asthma    • Hyperlipidemia    • Hypertension    • Scoliosis        Past Surgical history:    Past Surgical History:   Procedure Laterality Date   • ADENOIDECTOMY     • CATARACT EXTRACTION     • KNEE SURGERY     • NECK SURGERY     • TONSILLECTOMY         Social History:    Social History     Socioeconomic History   • Marital status:      Spouse name: Not on file   • Number of children: Not on file   • Years of education: Not on file   • Highest education level: Not on file   Tobacco Use   • Smoking status: Never Smoker   Substance and Sexual Activity   • Alcohol use: Yes     Comment: socially       Family History:    No family history on file.    Allergies:      Lovastatin    Home Medications:    Prior to Admission Medications     Prescriptions Last Dose Informant Patient Reported? Taking?     amLODIPine (NORVASC) 10 MG tablet   Yes Yes    Take 10 mg by mouth Every Night.    baclofen (LIORESAL) 10 MG tablet   Yes Yes    Take 10 mg by mouth 3 (Three) Times a Day.    benazepril (LOTENSIN) 20 MG tablet   Yes Yes    Take 20 mg by mouth Daily.    fluticasone-salmeterol (ADVAIR) 250-50 MCG/DOSE DISKUS   Yes Yes    Inhale 1 puff Every Morning.    gabapentin (NEURONTIN) 300 MG capsule   Yes Yes    Take 300 mg by mouth 2 (Two) Times a Day.    montelukast (SINGULAIR) 10 MG tablet   Yes Yes    Take 10 mg by mouth Daily.    bacitracin 500 UNIT/GM ointment   No No    Apply topically to the appropriate area as directed 2 (Two) Times a Day As Needed for Wound Care.    docusate sodium (COLACE) 100 MG capsule   No No    Take 1 capsule by mouth 2 (Two) Times a Day.    ezetimibe (ZETIA) 10 MG tablet   Yes No    Take 10 mg by mouth Daily.    ondansetron ODT (ZOFRAN-ODT) 4 MG disintegrating tablet   No No    Take 1 tablet by mouth Every 6 (Six) Hours As Needed for Nausea.    tiZANidine (ZANAFLEX) 4 MG tablet   Yes No    Take 4 mg by mouth Every 8 (Eight) Hours As Needed for Muscle Spasms.             ED Medications:    Medications   fentaNYL citrate (PF) (SUBLIMAZE) injection 50 mcg (50 mcg Intravenous Given 10/20/20 1904)   sodium chloride 0.9 % flush 10 mL (has no administration in time range)   sodium chloride 0.9 % infusion (125 mL/hr Intravenous New Bag 10/20/20 1944)   Morphine sulfate (PF) injection 4 mg (4 mg Intravenous Given 10/20/20 2159)   Morphine sulfate (PF) injection 4 mg (4 mg Intravenous Given 10/20/20 1937)   Morphine sulfate (PF) injection 4 mg (4 mg Intravenous Given 10/20/20 2010)   ondansetron (ZOFRAN) injection 4 mg (4 mg Intravenous Given 10/20/20 2010)       Vital Signs:    Temp:  [98.7 °F (37.1 °C)] 98.7 °F (37.1 °C)  Heart Rate:  [110] 110  Resp:  [22] 22  BP: (154-166)/(94-99) 154/94        10/20/20  6108   Weight: 78 kg (172 lb)       Body mass index is 26.15 kg/m².    Physical Exam:  General:  Distressed, pain, resting in bed  HEENT: EOMI, NC/AT, MMM  Heart: RRR  Lungs: CTAB  Abdomen: Soft, nondistended, nontender  Extremities: No edema, or cyanosis  MSK: Left hip tenderness  Neurological: A&O x3, moves all extremities, sensation and motor function intact  Psychological: Mood and affect appropriate, speech is coherent  Skin: warm, dry    Laboratory data:    I have reviewed the labs done in the emergency room.    Results from last 7 days   Lab Units 10/20/20  1941   SODIUM mmol/L 141   POTASSIUM mmol/L 3.7   CHLORIDE mmol/L 102   CO2 mmol/L 21.8*   BUN mg/dL 20   CREATININE mg/dL 1.12*   CALCIUM mg/dL 9.7   BILIRUBIN mg/dL 0.4   ALK PHOS U/L 96   ALT (SGPT) U/L 12   AST (SGOT) U/L 22   GLUCOSE mg/dL 91     Results from last 7 days   Lab Units 10/20/20  1941   WBC 10*3/mm3 6.46   HEMOGLOBIN g/dL 12.4   HEMATOCRIT % 36.0   PLATELETS 10*3/mm3 206                             Results from last 7 days   Lab Units 10/20/20  2021   COLOR UA  Yellow   GLUCOSE UA  Negative   KETONES UA  Trace*   LEUKOCYTES UA  Negative   PH, URINE  7.0   BILIRUBIN UA  Negative   UROBILINOGEN UA  1.0 E.U./dL     Pain Management Panel     There is no flowsheet data to display.              EKG:      Ordered and pending    Radiology:    Imaging Results (Last 72 Hours)     Procedure Component Value Units Date/Time    XR Hip With or Without Pelvis 2 - 3 View Left [[id number]] Resulted: 10/20/20 1934     Updated: 10/20/20 1936    XR Chest 1 View [[id number]] Resulted: 10/20/20 1935     Updated: 10/20/20 1935            Unspecified trochanteric fracture of left femur, initial encounter for closed fracture (CMS/HCC)    Essential hypertension    Closed fracture of one rib of right side    Closed nondisplaced fracture of acromial end of left clavicle with routine healing    Fall    I personally reviewed the hip x-ray which reveals a fracture of the left hip  I personally reviewed the CXR which reveals no acute infiltrate or consolidation; there  is a cervical fusion plate    Assessment:    Acute left intertrochanteric fracture of the femur, POA  Mechanical fall  Chronic: HTN, HLD    Plan:    -Pain control, bowel regimen, supportive care, surgical evaluation in the morning  -PT OT, DVT prophylaxis postop  -I have reviewed the meds, labs, imaging, and discussed case with nursing staff      Advance Care Planning   ACP discussion was held with the patient during this visit. Patient has an advance directive in EMR which is still valid.  Patient has an advance directive (not in EMR), copy requested.      Master Chan DO  10/20/20  22:06 EDT    Dictated utilizing Dragon dictation.   patient patient

## 2023-09-29 ENCOUNTER — PATIENT OUTREACH (OUTPATIENT)
Dept: CASE MANAGEMENT | Facility: OTHER | Age: 73
End: 2023-09-29
Payer: MEDICARE

## 2023-09-29 NOTE — OUTREACH NOTE
AMBULATORY CASE MANAGEMENT NOTE    Name and Relationship of Patient/Support Person: ChungFranciscaSilvia D - Self    Patient Outreach    [facility] ED visit 09/21/2023: Abdominal pain, unspecified location. Received morphine, dilaudid and Toradol while in ED with good results. Patient reports today, pain ongoing.  Pain is increased with movement, present but better when lying down. Denies fever. Oxycodone 5-325 mg tablets provided relief but has run out. Taking Tylenol sparingly. Using heating pad PRN. Reports decreased appetite, some nausea relieved with ondansetron.  Patient scheduled for infusion 10/03/2023 and primary care 10/19/2023.  Discussed patient returning to ED if pain becomes unbearable, patient states she will avoid ED as long as possible. Role of ACM explained, agreeable to service.       Education Documentation  Unresolved/Worsening Symptoms - Pain taught by Sharon Mora, RN at 9/29/2023  3:19 PM.  Learner: Patient  Readiness: Acceptance  Method: Explanation  Response: Verbalizes Understanding          Sharon FALCON  Ambulatory Case Management    9/29/2023, 15:20 EDT

## 2023-10-01 ENCOUNTER — HOSPITAL ENCOUNTER (EMERGENCY)
Facility: HOSPITAL | Age: 73
Discharge: HOME | End: 2023-10-01
Attending: EMERGENCY MEDICINE | Admitting: EMERGENCY MEDICINE
Payer: MEDICARE

## 2023-10-01 ENCOUNTER — APPOINTMENT (OUTPATIENT)
Dept: CT IMAGING | Facility: HOSPITAL | Age: 73
End: 2023-10-01
Payer: MEDICARE

## 2023-10-01 VITALS
HEIGHT: 69 IN | RESPIRATION RATE: 18 BRPM | TEMPERATURE: 97.7 F | DIASTOLIC BLOOD PRESSURE: 101 MMHG | OXYGEN SATURATION: 100 % | WEIGHT: 147 LBS | SYSTOLIC BLOOD PRESSURE: 112 MMHG | HEART RATE: 99 BPM | BODY MASS INDEX: 21.77 KG/M2

## 2023-10-01 DIAGNOSIS — R10.12 LEFT UPPER QUADRANT ABDOMINAL PAIN: Primary | ICD-10-CM

## 2023-10-01 LAB
ALBUMIN SERPL-MCNC: 4.9 G/DL (ref 3.5–5.2)
ALBUMIN/GLOB SERPL: 1.4 G/DL
ALP SERPL-CCNC: 85 U/L (ref 39–117)
ALT SERPL W P-5'-P-CCNC: 10 U/L (ref 1–33)
ANION GAP SERPL CALCULATED.3IONS-SCNC: 15.4 MMOL/L (ref 5–15)
AST SERPL-CCNC: 25 U/L (ref 1–32)
BACTERIA UR QL AUTO: ABNORMAL /HPF
BASOPHILS # BLD AUTO: 0.03 10*3/MM3 (ref 0–0.2)
BASOPHILS NFR BLD AUTO: 0.3 % (ref 0–1.5)
BILIRUB SERPL-MCNC: 0.6 MG/DL (ref 0–1.2)
BILIRUB UR QL STRIP: NEGATIVE
BUN SERPL-MCNC: 16 MG/DL (ref 8–23)
BUN/CREAT SERPL: 25.4 (ref 7–25)
CALCIUM SPEC-SCNC: 9.8 MG/DL (ref 8.6–10.5)
CHLORIDE SERPL-SCNC: 96 MMOL/L (ref 98–107)
CLARITY UR: CLEAR
CO2 SERPL-SCNC: 23.6 MMOL/L (ref 22–29)
COLOR UR: YELLOW
CREAT SERPL-MCNC: 0.63 MG/DL (ref 0.57–1)
DEPRECATED RDW RBC AUTO: 46.5 FL (ref 37–54)
EGFRCR SERPLBLD CKD-EPI 2021: 95 ML/MIN/1.73
EOSINOPHIL # BLD AUTO: 0.05 10*3/MM3 (ref 0–0.4)
EOSINOPHIL NFR BLD AUTO: 0.5 % (ref 0.3–6.2)
ERYTHROCYTE [DISTWIDTH] IN BLOOD BY AUTOMATED COUNT: 11.9 % (ref 12.3–15.4)
ERYTHROCYTE [SEDIMENTATION RATE] IN BLOOD: 18 MM/HR (ref 0–30)
GLOBULIN UR ELPH-MCNC: 3.4 GM/DL
GLUCOSE SERPL-MCNC: 131 MG/DL (ref 65–99)
GLUCOSE UR STRIP-MCNC: NEGATIVE MG/DL
HCT VFR BLD AUTO: 41.3 % (ref 34–46.6)
HGB BLD-MCNC: 14.1 G/DL (ref 12–15.9)
HGB UR QL STRIP.AUTO: NEGATIVE
HOLD SPECIMEN: NORMAL
HOLD SPECIMEN: NORMAL
HYALINE CASTS UR QL AUTO: ABNORMAL /LPF
IMM GRANULOCYTES # BLD AUTO: 0.03 10*3/MM3 (ref 0–0.05)
IMM GRANULOCYTES NFR BLD AUTO: 0.3 % (ref 0–0.5)
KETONES UR QL STRIP: ABNORMAL
LEUKOCYTE ESTERASE UR QL STRIP.AUTO: ABNORMAL
LIPASE SERPL-CCNC: 16 U/L (ref 13–60)
LYMPHOCYTES # BLD AUTO: 1.45 10*3/MM3 (ref 0.7–3.1)
LYMPHOCYTES NFR BLD AUTO: 14 % (ref 19.6–45.3)
MACROCYTES BLD QL SMEAR: NORMAL
MCH RBC QN AUTO: 36.2 PG (ref 26.6–33)
MCHC RBC AUTO-ENTMCNC: 34.1 G/DL (ref 31.5–35.7)
MCV RBC AUTO: 106.2 FL (ref 79–97)
MONOCYTES # BLD AUTO: 0.87 10*3/MM3 (ref 0.1–0.9)
MONOCYTES NFR BLD AUTO: 8.4 % (ref 5–12)
MUCOUS THREADS URNS QL MICRO: ABNORMAL /HPF
NEUTROPHILS NFR BLD AUTO: 7.93 10*3/MM3 (ref 1.7–7)
NEUTROPHILS NFR BLD AUTO: 76.5 % (ref 42.7–76)
NITRITE UR QL STRIP: NEGATIVE
NRBC BLD AUTO-RTO: 0 /100 WBC (ref 0–0.2)
PH UR STRIP.AUTO: 6.5 [PH] (ref 5–8)
PLATELET # BLD AUTO: 376 10*3/MM3 (ref 140–450)
PMV BLD AUTO: 10.6 FL (ref 6–12)
POTASSIUM SERPL-SCNC: 4.3 MMOL/L (ref 3.5–5.2)
PROT SERPL-MCNC: 8.3 G/DL (ref 6–8.5)
PROT UR QL STRIP: NEGATIVE
RBC # BLD AUTO: 3.89 10*6/MM3 (ref 3.77–5.28)
RBC # UR STRIP: ABNORMAL /HPF
REF LAB TEST METHOD: ABNORMAL
SMALL PLATELETS BLD QL SMEAR: ADEQUATE
SODIUM SERPL-SCNC: 135 MMOL/L (ref 136–145)
SP GR UR STRIP: 1.03 (ref 1–1.03)
SQUAMOUS #/AREA URNS HPF: ABNORMAL /HPF
UROBILINOGEN UR QL STRIP: ABNORMAL
WBC # UR STRIP: ABNORMAL /HPF
WBC MORPH BLD: NORMAL
WBC NRBC COR # BLD: 10.36 10*3/MM3 (ref 3.4–10.8)
WHOLE BLOOD HOLD COAG: NORMAL
WHOLE BLOOD HOLD SPECIMEN: NORMAL

## 2023-10-01 PROCEDURE — 85007 BL SMEAR W/DIFF WBC COUNT: CPT | Performed by: EMERGENCY MEDICINE

## 2023-10-01 PROCEDURE — 81001 URINALYSIS AUTO W/SCOPE: CPT | Performed by: EMERGENCY MEDICINE

## 2023-10-01 PROCEDURE — 93005 ELECTROCARDIOGRAM TRACING: CPT | Performed by: EMERGENCY MEDICINE

## 2023-10-01 PROCEDURE — 85652 RBC SED RATE AUTOMATED: CPT | Performed by: EMERGENCY MEDICINE

## 2023-10-01 PROCEDURE — 96374 THER/PROPH/DIAG INJ IV PUSH: CPT

## 2023-10-01 PROCEDURE — 25010000002 HYDROMORPHONE 1 MG/ML SOLUTION: Performed by: EMERGENCY MEDICINE

## 2023-10-01 PROCEDURE — 85025 COMPLETE CBC W/AUTO DIFF WBC: CPT | Performed by: EMERGENCY MEDICINE

## 2023-10-01 PROCEDURE — 80053 COMPREHEN METABOLIC PANEL: CPT | Performed by: EMERGENCY MEDICINE

## 2023-10-01 PROCEDURE — 25010000002 KETOROLAC TROMETHAMINE PER 15 MG: Performed by: EMERGENCY MEDICINE

## 2023-10-01 PROCEDURE — 74177 CT ABD & PELVIS W/CONTRAST: CPT

## 2023-10-01 PROCEDURE — 25510000001 IOPAMIDOL 61 % SOLUTION: Performed by: EMERGENCY MEDICINE

## 2023-10-01 PROCEDURE — 71275 CT ANGIOGRAPHY CHEST: CPT

## 2023-10-01 PROCEDURE — 36415 COLL VENOUS BLD VENIPUNCTURE: CPT

## 2023-10-01 PROCEDURE — 96375 TX/PRO/DX INJ NEW DRUG ADDON: CPT

## 2023-10-01 PROCEDURE — 83690 ASSAY OF LIPASE: CPT | Performed by: EMERGENCY MEDICINE

## 2023-10-01 PROCEDURE — 99285 EMERGENCY DEPT VISIT HI MDM: CPT

## 2023-10-01 RX ORDER — SODIUM CHLORIDE 0.9 % (FLUSH) 0.9 %
10 SYRINGE (ML) INJECTION AS NEEDED
Status: DISCONTINUED | OUTPATIENT
Start: 2023-10-01 | End: 2023-10-01 | Stop reason: HOSPADM

## 2023-10-01 RX ORDER — KETOROLAC TROMETHAMINE 30 MG/ML
10 INJECTION, SOLUTION INTRAMUSCULAR; INTRAVENOUS ONCE
Status: COMPLETED | OUTPATIENT
Start: 2023-10-01 | End: 2023-10-01

## 2023-10-01 RX ORDER — HYDROCODONE BITARTRATE AND ACETAMINOPHEN 5; 325 MG/1; MG/1
1 TABLET ORAL EVERY 6 HOURS PRN
Qty: 10 TABLET | Refills: 0 | Status: SHIPPED | OUTPATIENT
Start: 2023-10-01 | End: 2023-10-05 | Stop reason: SDUPTHER

## 2023-10-01 RX ADMIN — IOPAMIDOL 90 ML: 612 INJECTION, SOLUTION INTRAVENOUS at 06:36

## 2023-10-01 RX ADMIN — HYDROMORPHONE HYDROCHLORIDE 1 MG: 1 INJECTION, SOLUTION INTRAMUSCULAR; INTRAVENOUS; SUBCUTANEOUS at 04:55

## 2023-10-01 RX ADMIN — KETOROLAC TROMETHAMINE 10 MG: 30 INJECTION, SOLUTION INTRAMUSCULAR; INTRAVENOUS at 04:55

## 2023-10-01 RX ADMIN — SODIUM CHLORIDE 1000 ML: 9 INJECTION, SOLUTION INTRAVENOUS at 04:54

## 2023-10-01 NOTE — ED PROVIDER NOTES
TRIAGE CHIEF COMPLAINT:     Nursing and triage notes reviewed    Chief Complaint   Patient presents with    Abdominal Pain      HPI: Silvia Chung is a 73 y.o. female who presents to the emergency department complaining of abdominal pain.  Patient complains of left-sided abdominal discomfort.  Patient describes the pain has been ongoing for approximately the past month.  Patient was seen in the emergency department around 10 days ago for similar symptoms.  Work-up was unrevealing at that time.  Patient just describes a severe left-sided abdominal discomfort.  She denies shortness of breath.  She states she had run out of her pain medication from previous visit.  She had called her oncologist who said she needed to be seen by a doctor to get more medication.  Patient states she needs a better plan for pain control.    REVIEW OF SYSTEMS: All other systems reviewed and are negative     PAST MEDICAL HISTORY:   Past Medical History:   Diagnosis Date    Asthma     Hyperlipidemia     Hypertension     Impaired functional mobility, balance, gait, and endurance     Pancreatic cancer 2022    Parkinson disease     PONV (postoperative nausea and vomiting)     Scoliosis     Seasonal allergies         FAMILY HISTORY:   Family History   Problem Relation Age of Onset    Heart failure Father     Lung cancer Father     Cancer Brother     Breast cancer Maternal Grandmother     Breast cancer Paternal Grandmother     Breast cancer Cousin     Ovarian cancer Neg Hx         SOCIAL HISTORY:   Social History     Socioeconomic History    Marital status:    Tobacco Use    Smoking status: Never    Smokeless tobacco: Never   Vaping Use    Vaping Use: Never used   Substance and Sexual Activity    Alcohol use: Not Currently     Comment: rare    Drug use: Defer    Sexual activity: Defer        SURGICAL HISTORY:   Past Surgical History:   Procedure Laterality Date    ADENOIDECTOMY      BREAST BIOPSY Right 2019    Needle biopsy    CATARACT  EXTRACTION Bilateral     CHOLECYSTECTOMY OPEN  12/19/2022    done at     COLONOSCOPY      FOOT SURGERY Left     torn ligament, screws placed.    HIP HEMIARTHROPLASTY Left 10/21/2020    Procedure: HIP HEMIARTHROPLASTY LEFT;  Surgeon: Humza Winters MD;  Location: [facility];  Service: Orthopedics;  Laterality: Left;    LYMPHADENECTOMY  12/19/2022    partial, done at     NECK SURGERY      four fusions    OMENTECTOMY  12/19/2022    done at     PANCREATECTOMY  12/19/2022    done at , left adrenolectomy also performed    PORTACATH PLACEMENT Right 02/09/2023    Procedure: INSERTION OF PORTACATH;  Surgeon: Marcio Garcia MD;  Location: [facility] OR;  Service: General;  Laterality: Right;    SPLENECTOMY  12/19/2022    done at     TONSILLECTOMY          CURRENT MEDICATIONS:      Medication List        START taking these medications      HYDROcodone-acetaminophen 5-325 MG per tablet  Commonly known as: NORCO  Take 1 tablet by mouth Every 6 (Six) Hours As Needed for Severe Pain.            CONTINUE taking these medications      carbidopa-levodopa  MG per tablet  Commonly known as: SINEMET     cephalexin 500 MG capsule  Commonly known as: KEFLEX  Take 1 capsule by mouth 2 (Two) Times a Day.     Creon 05191-59752 units capsule delayed-release particles capsule  Generic drug: pancrelipase (Lip-Prot-Amyl)     diphenhydrAMINE 25 mg capsule  Commonly known as: BENADRYL     docusate sodium 100 MG capsule  Commonly known as: COLACE     esomeprazole 40 MG capsule  Commonly known as: nexIUM     gabapentin 300 MG capsule  Commonly known as: NEURONTIN  Take 1 capsule by mouth 3 (Three) Times a Day.     lactulose 10 GM/15ML solution  Commonly known as: CHRONULAC  Take 30 mL by mouth 3 (Three) Times a Day.     lidocaine-prilocaine 2.5-2.5 % cream  Commonly known as: EMLA     metoprolol tartrate 25 MG tablet  Commonly known as: LOPRESSOR  Take 0.5 tablets by mouth Daily.     multivitamin with minerals tablet tablet      naloxone 4 MG/0.1ML nasal spray  Commonly known as: NARCAN  Call 911. Don't prime. [place] in 1 nostril for overdose. Repeat in 2-3 minutes in other nostril if no or minimal breathing/responsiveness.     ondansetron 8 MG tablet  Commonly known as: ZOFRAN  Take 1 tablet by mouth 3 (Three) Times a Day As Needed for Nausea or Vomiting.     oxyCODONE-acetaminophen 5-325 MG per tablet  Commonly known as: PERCOCET  Take 1 tablet by mouth Every 4 (Four) Hours As Needed for Severe Pain.     * polyethylene glycol 17 GM/SCOOP powder  Commonly known as: MIRALAX     * polyethylene glycol 17 GM/SCOOP powder  Commonly known as: MIRALAX  Take 17 g (1 capful) dissolved in 4 to 8 ounces of beverage by mouth once daily     prochlorperazine 10 MG tablet  Commonly known as: COMPAZINE     Prolia 60 MG/ML solution prefilled syringe syringe  Generic drug: denosumab     tiZANidine 4 MG tablet  Commonly known as: ZANAFLEX     traZODone 50 MG tablet  Commonly known as: DESYREL  Take 1 tablet by mouth Every Night.           * This list has 2 medication(s) that are the same as other medications prescribed for you. Read the directions carefully, and ask your doctor or other care provider to review them with you.                   Where to Get Your Medications        These medications were sent to [id number] - [place], [place] - [address] - [phone]  - [phone] FX [address] [place] [address]      Phone: [phone]   HYDROcodone-acetaminophen 5-325 MG per tablet          ALLERGIES: Patient has no known allergies.     PHYSICAL EXAM:   VITAL SIGNS:   Vitals:    10/01/23 0830   BP: (!) 112/101   Pulse: 99   Resp:    Temp:    SpO2: 100%      CONSTITUTIONAL: Awake, oriented, appears uncomfortable  HENT: Atraumatic, normocephalic, oral mucosa pink and moist, airway patent. Nares patent without drainage. External ears normal.   EYES: Conjunctivae clear   NECK: Trachea midline   CARDIOVASCULAR: Tachycardic with  a regular rhythm, No murmurs, rubs, gallops   PULMONARY/CHEST: Clear to auscultation, no rhonchi, wheezes, or rales. Symmetrical breath sounds.   ABDOMINAL: Nondistended, soft, tenderness in the left upper and mid abdomen  NEUROLOGIC: Nonfocal, moving all four extremities, no gross sensory or motor deficits.   EXTREMITIES: No clubbing, cyanosis, or edema   SKIN: Warm, Dry, No erythema, No rash     ED COURSE / MEDICAL DECISION MAKING:   Silvia Chung is a 73 y.o. female who presents to the emergency department for evaluation of abdominal pain.  Patient does appear uncomfortable on arrival.  Exam does reveal tenderness in the left abdomen.  Patient is tachycardic.    Differential diagnosis includes chronic abdominal pain, cancer related pain, bowel obstruction among other etiologies.    [facility] labs was ordered for further evaluation of the patient's presentation.    Diagnostic information from other sources: Chart review, EMS    Interventions: IV fluids, Dilaudid, Toradol    Narrative: Presents with abdominal discomfort.    Re-evaluation: 09:10 EDT  I received care from Dr. Banks regards to follow-up of CT.  Patient has reproducible left mid/upper abdomen tenderness.    CT abdomen pelvis reveals postsurgical changes, stable lymph node, nonobstructing hernia.  CT chest reveals no PE and no acute surgical findings, does reveal some coronary artery disease.  EKG without ischemic changes  Plan for disposition is discharge, short course refill pain medications, [place] reviewed.    DECISION TO DISCHARGE/ADMIT: see ED care timeline     FINAL IMPRESSION:   1 --abdominal pain  2 --history of pancreatic cancer  3 --     Electronically signed by: Lizzie Lea MD, 10/1/2023 09:10 EDT       Shaen Gomez DO  10/01/23 0913

## 2023-10-03 ENCOUNTER — TELEPHONE (OUTPATIENT)
Dept: ONCOLOGY | Facility: CLINIC | Age: 73
End: 2023-10-03
Payer: MEDICARE

## 2023-10-03 ENCOUNTER — HOSPITAL ENCOUNTER (OUTPATIENT)
Dept: INFUSION THERAPY | Facility: HOSPITAL | Age: 73
Discharge: HOME | End: 2023-10-03
Admitting: INTERNAL MEDICINE
Payer: MEDICARE

## 2023-10-03 VITALS
HEART RATE: 104 BPM | RESPIRATION RATE: 20 BRPM | DIASTOLIC BLOOD PRESSURE: 88 MMHG | OXYGEN SATURATION: 97 % | TEMPERATURE: 98.3 F | SYSTOLIC BLOOD PRESSURE: 152 MMHG

## 2023-10-03 DIAGNOSIS — C25.1 MALIGNANT NEOPLASM OF BODY OF PANCREAS: Primary | ICD-10-CM

## 2023-10-03 DIAGNOSIS — C25.1 MALIGNANT NEOPLASM OF BODY OF PANCREAS: ICD-10-CM

## 2023-10-03 DIAGNOSIS — K86.89 PANCREATIC MASS: Primary | ICD-10-CM

## 2023-10-03 PROCEDURE — 25810000003 SODIUM CHLORIDE 0.9 % SOLUTION: Performed by: INTERNAL MEDICINE

## 2023-10-03 PROCEDURE — 96523 IRRIG DRUG DELIVERY DEVICE: CPT

## 2023-10-03 PROCEDURE — 25010000002 HEPARIN LOCK FLUSH PER 10 UNITS: Performed by: INTERNAL MEDICINE

## 2023-10-03 PROCEDURE — 96360 HYDRATION IV INFUSION INIT: CPT

## 2023-10-03 RX ORDER — HEPARIN SODIUM (PORCINE) LOCK FLUSH IV SOLN 100 UNIT/ML 100 UNIT/ML
500 SOLUTION INTRAVENOUS AS NEEDED
Status: CANCELLED | OUTPATIENT
Start: 2023-10-03

## 2023-10-03 RX ORDER — HEPARIN SODIUM (PORCINE) LOCK FLUSH IV SOLN 100 UNIT/ML 100 UNIT/ML
500 SOLUTION INTRAVENOUS AS NEEDED
OUTPATIENT
Start: 2023-10-03

## 2023-10-03 RX ORDER — HEPARIN SODIUM (PORCINE) LOCK FLUSH IV SOLN 100 UNIT/ML 100 UNIT/ML
500 SOLUTION INTRAVENOUS AS NEEDED
Status: DISCONTINUED | OUTPATIENT
Start: 2023-10-03 | End: 2023-10-05 | Stop reason: HOSPADM

## 2023-10-03 RX ORDER — SODIUM CHLORIDE 0.9 % (FLUSH) 0.9 %
10 SYRINGE (ML) INJECTION AS NEEDED
Status: CANCELLED | OUTPATIENT
Start: 2023-10-03

## 2023-10-03 RX ORDER — SODIUM CHLORIDE 0.9 % (FLUSH) 0.9 %
10 SYRINGE (ML) INJECTION AS NEEDED
OUTPATIENT
Start: 2023-10-03

## 2023-10-03 RX ORDER — SODIUM CHLORIDE 0.9 % (FLUSH) 0.9 %
10 SYRINGE (ML) INJECTION AS NEEDED
Status: DISCONTINUED | OUTPATIENT
Start: 2023-10-03 | End: 2023-10-05 | Stop reason: HOSPADM

## 2023-10-03 RX ADMIN — Medication 10 ML: at 16:00

## 2023-10-03 RX ADMIN — SODIUM CHLORIDE 1000 ML: 9 INJECTION, SOLUTION INTRAVENOUS at 14:52

## 2023-10-03 RX ADMIN — HEPARIN 500 UNITS: 100 SYRINGE at 15:59

## 2023-10-03 NOTE — CODE DOCUMENTATION
"1434-patient's blood pressure and heart rate are noted to be elevated. Phoned to speak with Ana (Dr Corrales's nurse) and notified her of abnormal vital signs and patient's report of \"not feeling well\" and recent visits to ED. I was advised per Ana to offer to administer NS x 1 liter bolus if patient is agreeable for the additional treatment and reassess/closely monitor vital signs.   1455-patient agrees to administration of NS x 1 liter bolus per orders. IV NS infusing per orders via infusion pump. Dr Corrales's nurse, Ana is at bedside speaking with patient.   "
1600-patient reports that she is calmer and feeling much better now.   
Home

## 2023-10-03 NOTE — TELEPHONE ENCOUNTER
"I received a call from the infusion nurse upstairs [facility] as patient was there for routine port flush and was complaining of discomfort and also had HR of 122 and /92.  Patient was noted to have been in the ED on Lev 10/1 for abdominal pain.  This is the second ED visit in two weeks.  Patient was rescanned and Dr. Corrales reviewed those images again and patient still has large amount of constipation in her bowel.  Patient was ordered one liter IV normal saline today and I wrote out bowel regimen including taking the lactulose three times, miralax and docusate until bowels are emptied.  She stated she took a dose of lactulose last Tuesday and had a \"good bowel movement\".  I told her per Dr. Corrales that she needed to have more than one bowel movement as the repeat imaging she had done Sunday showed significant constipation.  I told her that she should have three to four bowel movements today when she gets home and then once she is cleaned out, she should use senekot two tablets daily, colace two tablets and miralax at night.  Patient verbalized understanding.    "

## 2023-10-04 ENCOUNTER — TELEPHONE (OUTPATIENT)
Dept: ONCOLOGY | Facility: CLINIC | Age: 73
End: 2023-10-04
Payer: MEDICARE

## 2023-10-04 NOTE — TELEPHONE ENCOUNTER
I called patient and she still hasn't had bowel movement even with the laxative instructions that we gave to her yesterday.  I talked with pharmacy and patient can have up to 100gm of miralax for fecal impaction.  I advised patient to use 50gm in juice now and again later tonight.  She has been using the lactulose and docusate.  I will check in on her again tomorrow.  She verbalized understanding.

## 2023-10-05 ENCOUNTER — TELEPHONE (OUTPATIENT)
Dept: ONCOLOGY | Facility: CLINIC | Age: 73
End: 2023-10-05
Payer: MEDICARE

## 2023-10-05 DIAGNOSIS — R10.12 LEFT UPPER QUADRANT ABDOMINAL PAIN: ICD-10-CM

## 2023-10-05 RX ORDER — HYDROCODONE BITARTRATE AND ACETAMINOPHEN 5; 325 MG/1; MG/1
1 TABLET ORAL EVERY 8 HOURS PRN
Qty: 20 TABLET | Refills: 0 | Status: SHIPPED | OUTPATIENT
Start: 2023-10-05

## 2023-10-05 NOTE — TELEPHONE ENCOUNTER
Patient called she has severe pain in the left upper quadrant abdominal area. This has been going on for a few days. Please call.

## 2023-10-05 NOTE — TELEPHONE ENCOUNTER
"Patient is finally having bowel movements but she is now experiencing pain and is out of hydrocodone.  I talked with on call MD and we can do a short course of hydrocodone, same dosing as ED had given last week.  I told her again about the bowel regimen as narcotics would slow things down and increase constipation  She has an \"arsenal\" of laxatives and stool softeners.  She verbalized understanding.    "

## 2023-10-13 ENCOUNTER — PATIENT OUTREACH (OUTPATIENT)
Dept: CASE MANAGEMENT | Facility: OTHER | Age: 73
End: 2023-10-13
Payer: MEDICARE

## 2023-10-13 ENCOUNTER — TELEPHONE (OUTPATIENT)
Dept: ONCOLOGY | Facility: CLINIC | Age: 73
End: 2023-10-13
Payer: MEDICARE

## 2023-10-13 DIAGNOSIS — R10.12 LEFT UPPER QUADRANT ABDOMINAL PAIN: ICD-10-CM

## 2023-10-13 RX ORDER — HYDROCODONE BITARTRATE AND ACETAMINOPHEN 5; 325 MG/1; MG/1
1 TABLET ORAL EVERY 8 HOURS PRN
Qty: 40 TABLET | Refills: 0 | Status: SHIPPED | OUTPATIENT
Start: 2023-10-13

## 2023-10-13 NOTE — OUTREACH NOTE
AMBULATORY CASE MANAGEMENT NOTE    Name and Relationship of Patient/Support Person: Silvia Chung - Self    Patient Outreach    [facility] ED visit on 10/09/2023: Back pain. Patient to follow-up with PCP regarding chronic degenerative changes seen on CT and referred to [facility] pain management.  Patient reports pain has not resolved, radiates from surgical site to back. Pain medication requested, prescription sent to pharmacy.  Education provided on monitoring for constipation.  Patient has multiple OTC medications on hand. Denies needs or concerns at this time.    Sharon FALCON  Ambulatory Case Management    10/13/2023, 15:18 EDT

## 2023-10-13 NOTE — TELEPHONE ENCOUNTER
I talked with patient to see if this was the same pain or something new.  She reports that this is the pain that she had two weeks ago.  She said when she was at  it was more low back pain.  Now it is left upper quadrant.  She states her  bowels are moving.  She has not heard from pain management.  I spoke with Dr. Corrales who conversed with Dr. Abhilash Robles and he took a look at imaging and stated patient had a lot of colonic gas which may be causing discomfort.  He said he would see the patient in his office.  Dr Corrales will send in a week of pain med to get the patient through till she has appts.  I told patient all the information and she verbalized understanding.

## 2023-10-16 ENCOUNTER — TELEPHONE (OUTPATIENT)
Dept: ONCOLOGY | Facility: CLINIC | Age: 73
End: 2023-10-16
Payer: MEDICARE

## 2023-10-16 NOTE — TELEPHONE ENCOUNTER
I called patient back and she said that this is the same pain that she has been feeling.  She does not feel that she is constipated as she had been using multiple laxatives and stool softeners.  She has had three ED visits in the past three weeks.  Two to [place] and one to .  She has been scanned each visit with only results of constipation and gas.  Dr. Corrales consulted Dr. Robles (surgeon) Friday, Oct. 13 and asked him if he would see the patient to evaluate for all this pain she is having.  She is done with her adjuvant chemo and she had her surgery 10 months ago.  I called Dr. Robles's office today and they said they had left a voicemail earlier this morning.  I gave them patient home number as alternate number and while I was talking with patient, they called and set an appt for Wednesday 10/18 at 2:30.  I asked how many norco she had used over the weekend and she said she had enough but was having trouble sleeping due to the pain.  She is going to try taking benadryl tonight along with a norco to see if she can get some rest.

## 2023-10-16 NOTE — TELEPHONE ENCOUNTER
PT STATES SHE'S BEEN HAVING INCREASED PAIN OVER THE LAST MONTH IN THE AREA WHERE SHE HAD SURGERY.  PT STATED SURGERY WAS 10 MTHS AGO.    PT HAS A COUPLE OF LORTAB LEFT.

## 2023-10-17 ENCOUNTER — TELEPHONE (OUTPATIENT)
Dept: ONCOLOGY | Facility: CLINIC | Age: 73
End: 2023-10-17
Payer: MEDICARE

## 2023-10-17 NOTE — TELEPHONE ENCOUNTER
I called patient back this morning and she said that her pain is very real and she rates it at 10.  She is frustrated that the scans she has had do not show a reason for the pain.  I talked with Dr. Corrales and the only way to get the patient relief is to send her to ED for pain meds.  She has been using the norco 5s more than scheduled to try and get relief.  She does not want to go to ED but understands that we cannot fix the pain in clinic outpatient setting.  I had set patient up to see surgeon Dr. Robles and she has appt with him tomorrow afternoon.  I advised that if she goes to  ED, they could contact Dr. Robles  or could do some more specific imaging to try and address the source of the pain.  Patient will consider this but is hesitant to go to ED.  I again explained that ED could give pain meds.  She verbalized understanding.

## 2023-10-17 NOTE — TELEPHONE ENCOUNTER
Patient called she is having severe pain in the left upper part of her abdomen the pain is 10 out of 10. Please call.

## 2023-10-23 ENCOUNTER — TELEPHONE (OUTPATIENT)
Dept: ONCOLOGY | Facility: CLINIC | Age: 73
End: 2023-10-23
Payer: MEDICARE

## 2023-10-23 DIAGNOSIS — C25.1 MALIGNANT NEOPLASM OF BODY OF PANCREAS: Primary | ICD-10-CM

## 2023-10-23 DIAGNOSIS — R10.12 LEFT UPPER QUADRANT ABDOMINAL PAIN: ICD-10-CM

## 2023-10-23 RX ORDER — HYDROCODONE BITARTRATE AND ACETAMINOPHEN 7.5; 325 MG/1; MG/1
1 TABLET ORAL EVERY 6 HOURS PRN
Qty: 40 TABLET | Refills: 0 | Status: SHIPPED | OUTPATIENT
Start: 2023-10-23

## 2023-10-23 NOTE — TELEPHONE ENCOUNTER
Patient left a voicemail she is still having a lot of pain in the left upper quadrant. Please call.

## 2023-10-23 NOTE — TELEPHONE ENCOUNTER
I talked with Patient and she  did see Dr. Robles on Wednesday and he sent her to ED and she got scans done again.  She does have an ileus and they did give her multiple meds and enemas to clean out bowel. She insists that she is having bowel movements and is not constipated.  She is out of pain medication.  I asked once she had her bowel cleaned out, if she felt better and she said that she really did not.  I told her that I would talk with Dr. Corrales and I did consult with Dr. Corrales.  Dr. Corrales spoke with Dr. Robles and they both agree that patient should follow with pain management.  Patient had been referred from her original visit to  ED a couple of weeks ago but she cancelled that appt.  I called pain management at [phone] and they can see patient Wednesday at 8am.  Dr. Perfecto Hunt and he is at [facility] for now.  I called and gave patient directions.  Dr. Corrales said she would send in more pain meds and she will send norco 7.5 mg tablets.  I again explained to patient about the constipation effect of narcotics and she verbalized understanding of all the information.

## 2023-10-24 ENCOUNTER — TELEPHONE (OUTPATIENT)
Dept: ONCOLOGY | Facility: CLINIC | Age: 73
End: 2023-10-24
Payer: MEDICARE

## 2023-10-24 NOTE — TELEPHONE ENCOUNTER
Called patient this morning to remind her of pain management appt with Dr. Perfecto Hunt tomorrow at 8am.  He is with  and located at [facility].  Called [facility].  Patient wrote everything down and said she will be there.

## 2023-11-02 DIAGNOSIS — C25.1 MALIGNANT NEOPLASM OF BODY OF PANCREAS: ICD-10-CM

## 2023-11-02 DIAGNOSIS — R10.12 LEFT UPPER QUADRANT ABDOMINAL PAIN: ICD-10-CM

## 2023-11-02 NOTE — TELEPHONE ENCOUNTER
Returned Silvia's call. She saw pain management 2 days ago at  she said, but called today about refills on pain meds.  She said they told her to contact us about refills which I confirmed with chart review looking at  note.  She rates her pain in her back as a 9/10 and advised what we are giving her has been helping enough. Discussed with Dr. Corrales and since she has an appointment next week with that physician again, we will give her a two week supply at this time. She verbalized understanding.

## 2023-11-02 NOTE — TELEPHONE ENCOUNTER
Patient called she is in extreme pain with her spine wants to know if Dr. Corrales will write a prescription for a few loritabs? She has just seen the pain management doctor once, and he won't write for any narcotics yet. Please call.

## 2023-11-03 RX ORDER — HYDROCODONE BITARTRATE AND ACETAMINOPHEN 7.5; 325 MG/1; MG/1
1 TABLET ORAL EVERY 6 HOURS PRN
Qty: 56 TABLET | Refills: 0 | Status: SHIPPED | OUTPATIENT
Start: 2023-11-03

## 2023-11-09 ENCOUNTER — TELEPHONE (OUTPATIENT)
Dept: ONCOLOGY | Facility: CLINIC | Age: 73
End: 2023-11-09
Payer: MEDICARE

## 2023-11-09 NOTE — TELEPHONE ENCOUNTER
Patient called she is having extreme lower back pain, wants to get a referral to Ortho, but she can't wait a month to get in. She needs something now. Please call.

## 2023-11-09 NOTE — TELEPHONE ENCOUNTER
I called the patient back and asked her if she had reached out to Dr. Hunt that she is seeing for interventional pain mgmt.  She had not but she is hurting today and says she may go to the ED.  I asked her how many hydrocodone she had left and she does not have any.  I told her that the new script for hydrocodone 7.5 was sent to pharmacy and signed on 11/3. Patient said she was not aware and she did not pick that script up.  I called  [facility] to verify that and they said that patient never picked it up.  I also called Dr. Hunt office and left  a message for the nurse and asked them to reach out to patient.  She is interested in a surgical consult to see if ortho can help with her pain. She said that the block that patient had only lasted a few days.  I did received call back from Gifty at Dr. Hunt and she said she would speak to him and reach out to patient.  I told Gifty that I had verified with patient that the 56 hydrocodone tablets would need to last 2 full weeks.  She verbalized understanding.

## 2023-11-14 ENCOUNTER — PATIENT OUTREACH (OUTPATIENT)
Dept: CASE MANAGEMENT | Facility: OTHER | Age: 73
End: 2023-11-14
Payer: MEDICARE

## 2023-11-14 ENCOUNTER — TELEPHONE (OUTPATIENT)
Dept: INTERNAL MEDICINE | Facility: CLINIC | Age: 73
End: 2023-11-14
Payer: MEDICARE

## 2023-11-14 NOTE — TELEPHONE ENCOUNTER
Caller: Silvia Chung    Relationship: Self    Best call back number: [phone]     What is the medical concern/diagnosis: IMPINGED NERVE    What specialty or service is being requested: NEUROSURGEON    What is the provider, practice or medical service name: ANY    What is the office location:     What is the office phone number:     Any additional details: PATIENT WAS SEEN IN ER AND CT SHOWS IMPINGED NERVE.  WOULD LIKE REFERRAL FOR THIS.  PLEASE CALL PATIENT.

## 2023-11-14 NOTE — OUTREACH NOTE
AMBULATORY CASE MANAGEMENT NOTE    Name and Relationship of Patient/Support Person: Silvia Chung D - Self  Self    Patient Outreach    Patient reports her pain remains level 9/10 most of the time.  Taking hydrocodone 7.5-435 mg. 1 every 6 hours prn. No longer taking gabapentin.  Had follow up with pain management, no changes made.  Patient continues with physical therapy, states it has not helped.  Patient questions need to see surgeon or neurologist.  Patient agreeable to follow-up with primary care provider for possible referrals.    Sharon FALCON  Ambulatory Case Management    11/14/2023, 14:54 EST

## 2023-11-16 ENCOUNTER — HOSPITAL ENCOUNTER (OUTPATIENT)
Dept: GENERAL RADIOLOGY | Facility: HOSPITAL | Age: 73
Discharge: HOME | End: 2023-11-16
Admitting: INTERNAL MEDICINE
Payer: MEDICARE

## 2023-11-16 ENCOUNTER — OFFICE VISIT (OUTPATIENT)
Dept: INTERNAL MEDICINE | Facility: CLINIC | Age: 73
End: 2023-11-16
Payer: MEDICARE

## 2023-11-16 VITALS
HEIGHT: 69 IN | TEMPERATURE: 98.2 F | DIASTOLIC BLOOD PRESSURE: 93 MMHG | OXYGEN SATURATION: 95 % | BODY MASS INDEX: 20.11 KG/M2 | HEART RATE: 126 BPM | WEIGHT: 135.8 LBS | SYSTOLIC BLOOD PRESSURE: 134 MMHG

## 2023-11-16 DIAGNOSIS — R07.81 RIB PAIN ON LEFT SIDE: ICD-10-CM

## 2023-11-16 DIAGNOSIS — C25.1 MALIGNANT NEOPLASM OF BODY OF PANCREAS: ICD-10-CM

## 2023-11-16 DIAGNOSIS — R07.81 RIB PAIN ON LEFT SIDE: Primary | ICD-10-CM

## 2023-11-16 PROCEDURE — 71100 X-RAY EXAM RIBS UNI 2 VIEWS: CPT

## 2023-11-16 NOTE — PROGRESS NOTES
The ABCs of the Annual Wellness Visit  Subsequent Medicare Wellness Visit    Subjective    Silvia Chung is a 73 y.o. female who presents for a Subsequent Medicare Wellness Visit.    The following portions of the patient's history were reviewed and   updated as appropriate: allergies, current medications, past family history, past medical history, past social history, past surgical history, and problem list.    Compared to one year ago, the patient feels her physical   health is worse.    Compared to one year ago, the patient feels her mental   health is the same.    Recent Hospitalizations:  She was not admitted to the hospital during the last year.       Current Medical Providers:  Patient Care Team:  Johnathan Aguilera MD as PCP - General (Internal Medicine)  Jailene Chaudhary RN as Registered Nurse (Oncology)  Sharon Mora RN as Ambulatory  (Oncology) ([facility])    Outpatient Medications Prior to Visit   Medication Sig Dispense Refill    carbidopa-levodopa (SINEMET)  MG per tablet Take 1 tablet by mouth 2 (Two) Times a Day.      denosumab (Prolia) 60 MG/ML solution prefilled syringe syringe INJECT 1 MILLILITER (60 MG) BY SUBCUTANEOUS ROUTE EVERY 6 MONTHS IN THE UPPER ARM, UPPER THIGH OR ABDOMEN      diphenhydrAMINE (BENADRYL) 25 mg capsule 1 capsule.      docusate sodium (COLACE) 100 MG capsule Take 1 capsule every day by oral route.      esomeprazole (nexIUM) 40 MG capsule Take 1 capsule by mouth Every Morning Before Breakfast.      HYDROcodone-acetaminophen (NORCO) 7.5-325 MG per tablet Take 1 tablet by mouth Every 6 (Six) Hours As Needed for Moderate Pain. (Patient taking differently: Take 1 tablet by mouth 2 (Two) Times a Day.) 56 tablet 0    lactulose (CHRONULAC) 10 GM/15ML solution Take 30 mL by mouth 3 (Three) Times a Day. 473 mL 1    lidocaine-prilocaine (EMLA) 2.5-2.5 % cream       Multiple Vitamins-Minerals (multivitamin with minerals) tablet tablet Take 1 tablet by mouth  Daily.      ondansetron (ZOFRAN) 8 MG tablet Take 1 tablet by mouth 3 (Three) Times a Day As Needed for Nausea or Vomiting. (Patient taking differently: Take 1 tablet by mouth As Needed for Nausea or Vomiting.) 30 tablet 5    polyethylene glycol (MIRALAX) 17 GM/SCOOP powder 17 g.      cephalexin (KEFLEX) 500 MG capsule Take 1 capsule by mouth 2 (Two) Times a Day. 14 capsule 0    gabapentin (NEURONTIN) 300 MG capsule Take 1 capsule by mouth 3 (Three) Times a Day. 90 capsule 3    metoprolol tartrate (LOPRESSOR) 25 MG tablet Take 0.5 tablets by mouth Daily. 30 tablet 1    naloxone (NARCAN) 4 MG/0.1ML nasal spray Call 911. Don't prime. [place] in 1 nostril for overdose. Repeat in 2-3 minutes in other nostril if no or minimal breathing/responsiveness. 2 each 0    pancrelipase, Lip-Prot-Amyl, (Creon) 08499-00629 units capsule delayed-release particles capsule take 2 capsules by mouth with each meal and 1 capsule by mouth with snacks      polyethylene glycol (MIRALAX) 17 GM/SCOOP powder Take 17 g (1 capful) dissolved in 4 to 8 ounces of beverage by mouth once daily 510 g 0    prochlorperazine (COMPAZINE) 10 MG tablet Take 1 tablet by mouth Every 6 (Six) Hours As Needed.      tiZANidine (ZANAFLEX) 4 MG tablet Take 1 tablet by mouth Every 8 (Eight) Hours As Needed for Muscle Spasms.      traZODone (DESYREL) 50 MG tablet Take 1 tablet by mouth Every Night. 30 tablet 5     No facility-administered medications prior to visit.       Opioid medication/s are on active medication list.  and I have evaluated her active treatment plan and pain score trends (see table).  There were no vitals filed for this visit.  I have reviewed the chart for potential of high risk medication and harmful drug interactions in the elderly.          Aspirin is not on active medication list.  Aspirin use is not indicated based on review of current medical condition/s. Risk of harm outweighs potential benefits.  .    Patient Active Problem List   Diagnosis  "   Essential hypertension    Closed nondisplaced fracture of acromial end of left clavicle with routine healing    Displaced fracture of greater trochanter of left femur, initial encounter for closed fracture    Malignant neoplasm of body of pancreas    Pancreatic mass    Tachycardia     Advance Care Planning   Advance Care Planning     Advance Directive is not on file.  ACP discussion was held with the patient during this visit. Patient has an advance directive (not in EMR), copy requested.     Objective    Vitals:    23 1457 23 1507   BP: (!) 142/102 134/93   Pulse: (!) 126    Temp: 98.2 °F (36.8 °C)    SpO2: 95%    Weight: 61.6 kg (135 lb 12.8 oz)    Height: 175.3 cm (69.02\")      Estimated body mass index is 20.04 kg/m² as calculated from the following:    Height as of this encounter: 175.3 cm (69.02\").    Weight as of this encounter: 61.6 kg (135 lb 12.8 oz).    BMI is within normal parameters. No other follow-up for BMI required.      Does the patient have evidence of cognitive impairment? No          HEALTH RISK ASSESSMENT    Smoking Status:  Social History     Tobacco Use   Smoking Status Never   Smokeless Tobacco Never     Alcohol Consumption:  Social History     Substance and Sexual Activity   Alcohol Use Not Currently    Comment: rare     Fall Risk Screen:    STEADI Fall Risk Assessment was completed, and patient is at MODERATE risk for falls. Assessment completed on:2023    Depression Screenin/16/2023     3:03 PM   PHQ-2/PHQ-9 Depression Screening   Little Interest or Pleasure in Doing Things 0-->not at all   Feeling Down, Depressed or Hopeless 1-->several days   PHQ-9: Brief Depression Severity Measure Score 1       Health Habits and Functional and Cognitive Screenin/16/2023     3:03 PM   Functional & Cognitive Status   Do you have difficulty preparing food and eating? Yes   Do you have difficulty bathing yourself, getting dressed or grooming yourself? Yes   Do you " have difficulty using the toilet? No   Do you have difficulty moving around from place to place? Yes   Do you have trouble with steps or getting out of a bed or a chair? No   Current Diet Unhealthy Diet        Current Diet Comment No appetite.   Dental Exam Not up to date   Eye Exam Not up to date   Exercise (times per week) 0 times per week   Current Exercises Include No Regular Exercise   Do you need help using the phone?  No   Are you deaf or do you have serious difficulty hearing?  Yes   Do you need help to go to places out of walking distance? Yes   Do you need help shopping? No   Do you need help preparing meals?  No   Do you need help with housework?  Yes   Do you need help with laundry? Yes   Do you need help taking your medications? No   Do you need help managing money? No   Do you ever drive or ride in a car without wearing a seat belt? No   Have you felt unusual stress, anger or loneliness in the last month? No   Who do you live with? Spouse   If you need help, do you have trouble finding someone available to you? No   Have you been bothered in the last four weeks by sexual problems? No   Do you have difficulty concentrating, remembering or making decisions? Yes       Age-appropriate Screening Schedule:  Refer to the list below for future screening recommendations based on patient's age, sex and/or medical conditions. Orders for these recommended tests are listed in the plan section. The patient has been provided with a written plan.    Health Maintenance   Topic Date Due    HEPATITIS C SCREENING  Never done    ZOSTER VACCINE (2 of 2) 03/24/2021    ANNUAL WELLNESS VISIT  05/18/2022    INFLUENZA VACCINE  08/01/2023    COVID-19 Vaccine (7 - 2023-24 season) 09/01/2023    LIPID PANEL  09/02/2023    DXA SCAN  06/13/2025    MAMMOGRAM  09/14/2025    TDAP/TD VACCINES (2 - Td or Tdap) 11/25/2026    COLORECTAL CANCER SCREENING  04/03/2027    Pneumococcal Vaccine 65+  Completed                  CMS Preventative  Services Quick Reference  Risk Factors Identified During Encounter  Chronic Pain:  following with pain management  Polypharmacy: Medication List reviewed and Medications are appropriate for patient  The above risks/problems have been discussed with the patient.  Pertinent information has been shared with the patient in the After Visit Summary.  An After Visit Summary and PPPS were made available to the patient.    Follow Up:   Next Medicare Wellness visit to be scheduled in 1 year.       Additional E&M Note during same encounter follows:  Patient has multiple medical problems which are significant and separately identifiable that require additional work above and beyond the Medicare Wellness Visit.      Chief Complaint  Medicare Wellness-subsequent and Rib Pain (Rib pain that radiates to the back. )    Subjective        HPI  Silvia Chung is also being seen today for new onset of severe lt lower rib pain.  She has had a history of pancreatic CA symptoms were similar at that time however recently underwent CT of her lung and abdomen without evidence of new lesions.  She denies any direct trauma no rash    Review of Systems   Constitutional:  Positive for fatigue. Negative for activity change, appetite change and fever.   HENT:  Negative for congestion, ear discharge, ear pain and trouble swallowing.    Eyes:  Negative for photophobia and visual disturbance.   Respiratory:  Negative for cough and shortness of breath.    Cardiovascular:  Negative for chest pain and palpitations.   Gastrointestinal:  Negative for abdominal distention, constipation, diarrhea, nausea and vomiting.   Genitourinary:  Negative for dysuria, hematuria and urgency.   Musculoskeletal:  Positive for arthralgias and gait problem. Negative for back pain, joint swelling and myalgias.   Skin:  Negative for color change and rash.   Neurological:  Negative for dizziness, weakness, light-headedness and confusion.   Hematological:  Negative for  "adenopathy. Does not bruise/bleed easily.   Psychiatric/Behavioral:  Negative for agitation and dysphoric mood. The patient is not nervous/anxious.        Objective   Vital Signs:  /93   Pulse (!) 126   Temp 98.2 °F (36.8 °C)   Ht 175.3 cm (69.02\")   Wt 61.6 kg (135 lb 12.8 oz)   SpO2 95%   BMI 20.04 kg/m²     Physical Exam  Constitutional:       General: She is not in acute distress.     Appearance: She is well-developed.   HENT:      Nose: Nose normal.   Eyes:      General: No scleral icterus.     Conjunctiva/sclera: Conjunctivae normal.   Neck:      Thyroid: No thyromegaly.      Trachea: No tracheal deviation.   Cardiovascular:      Rate and Rhythm: Normal rate and regular rhythm.      Heart sounds: No murmur heard.     No friction rub.   Pulmonary:      Effort: No respiratory distress.      Breath sounds: No wheezing or rales.   Abdominal:      General: There is no distension.      Palpations: Abdomen is soft. There is no mass.      Tenderness: There is no abdominal tenderness. There is no guarding.   Musculoskeletal:         General: No deformity. Normal range of motion.        Arms:       Comments: Tender area   Lymphadenopathy:      Cervical: No cervical adenopathy.   Skin:     General: Skin is warm and dry.      Findings: No erythema or rash.   Neurological:      Mental Status: She is alert and oriented to person, place, and time.      Cranial Nerves: No cranial nerve deficit.      Coordination: Coordination normal.      Deep Tendon Reflexes: Reflexes are normal and symmetric.   Psychiatric:         Behavior: Behavior normal.         Thought Content: Thought content normal.         Judgment: Judgment normal.                       Assessment and Plan   Diagnoses and all orders for this visit:    1. Rib pain on left side (Primary) rule out fracture or metastatic lesion although the CT scan was okay check rib series with x-ray gabapentin does not help she is following up with pain management on " narcotic analgesics    Pancreatic CA status post resection currently stable following up with oncology         Follow Up   No follow-ups on file.  Patient was given instructions and counseling regarding her condition or for health maintenance advice. Please see specific information pulled into the AVS if appropriate.

## 2023-11-16 NOTE — TELEPHONE ENCOUNTER
"Relay     \"It is time to schedule your annual wellness visit and discuss referral to neurosurgery.\"                  "

## 2023-11-18 ENCOUNTER — APPOINTMENT (OUTPATIENT)
Dept: GENERAL RADIOLOGY | Facility: HOSPITAL | Age: 73
End: 2023-11-18
Payer: MEDICARE

## 2023-11-18 ENCOUNTER — APPOINTMENT (OUTPATIENT)
Dept: CT IMAGING | Facility: HOSPITAL | Age: 73
End: 2023-11-18
Payer: MEDICARE

## 2023-11-18 ENCOUNTER — HOSPITAL ENCOUNTER (EMERGENCY)
Facility: HOSPITAL | Age: 73
Discharge: HOME | End: 2023-11-18
Attending: EMERGENCY MEDICINE
Payer: MEDICARE

## 2023-11-18 VITALS
SYSTOLIC BLOOD PRESSURE: 151 MMHG | OXYGEN SATURATION: 97 % | HEART RATE: 106 BPM | TEMPERATURE: 98.2 F | WEIGHT: 138 LBS | HEIGHT: 69 IN | RESPIRATION RATE: 18 BRPM | DIASTOLIC BLOOD PRESSURE: 92 MMHG | BODY MASS INDEX: 20.44 KG/M2

## 2023-11-18 DIAGNOSIS — R07.81 RIB PAIN ON LEFT SIDE: Primary | ICD-10-CM

## 2023-11-18 LAB
ALBUMIN SERPL-MCNC: 4.3 G/DL (ref 3.5–5.2)
ALBUMIN/GLOB SERPL: 1.2 G/DL
ALP SERPL-CCNC: 93 U/L (ref 39–117)
ALT SERPL W P-5'-P-CCNC: <5 U/L (ref 1–33)
ANION GAP SERPL CALCULATED.3IONS-SCNC: 17.2 MMOL/L (ref 5–15)
AST SERPL-CCNC: 21 U/L (ref 1–32)
BASOPHILS # BLD AUTO: 0.05 10*3/MM3 (ref 0–0.2)
BASOPHILS NFR BLD AUTO: 0.4 % (ref 0–1.5)
BILIRUB SERPL-MCNC: 0.7 MG/DL (ref 0–1.2)
BUN SERPL-MCNC: 11 MG/DL (ref 8–23)
BUN/CREAT SERPL: 15.5 (ref 7–25)
CALCIUM SPEC-SCNC: 9.7 MG/DL (ref 8.6–10.5)
CHLORIDE SERPL-SCNC: 94 MMOL/L (ref 98–107)
CO2 SERPL-SCNC: 23.8 MMOL/L (ref 22–29)
CREAT SERPL-MCNC: 0.71 MG/DL (ref 0.57–1)
DEPRECATED RDW RBC AUTO: 48 FL (ref 37–54)
EGFRCR SERPLBLD CKD-EPI 2021: 91 ML/MIN/1.73
EOSINOPHIL # BLD AUTO: 0.03 10*3/MM3 (ref 0–0.4)
EOSINOPHIL NFR BLD AUTO: 0.3 % (ref 0.3–6.2)
ERYTHROCYTE [DISTWIDTH] IN BLOOD BY AUTOMATED COUNT: 13.3 % (ref 12.3–15.4)
GLOBULIN UR ELPH-MCNC: 3.7 GM/DL
GLUCOSE SERPL-MCNC: 100 MG/DL (ref 65–99)
HCT VFR BLD AUTO: 37.1 % (ref 34–46.6)
HGB BLD-MCNC: 13 G/DL (ref 12–15.9)
IMM GRANULOCYTES # BLD AUTO: 0.05 10*3/MM3 (ref 0–0.05)
IMM GRANULOCYTES NFR BLD AUTO: 0.4 % (ref 0–0.5)
LYMPHOCYTES # BLD AUTO: 2.61 10*3/MM3 (ref 0.7–3.1)
LYMPHOCYTES NFR BLD AUTO: 23.3 % (ref 19.6–45.3)
MCH RBC QN AUTO: 34.8 PG (ref 26.6–33)
MCHC RBC AUTO-ENTMCNC: 35 G/DL (ref 31.5–35.7)
MCV RBC AUTO: 99.2 FL (ref 79–97)
MONOCYTES # BLD AUTO: 0.92 10*3/MM3 (ref 0.1–0.9)
MONOCYTES NFR BLD AUTO: 8.2 % (ref 5–12)
NEUTROPHILS NFR BLD AUTO: 67.4 % (ref 42.7–76)
NEUTROPHILS NFR BLD AUTO: 7.55 10*3/MM3 (ref 1.7–7)
NRBC BLD AUTO-RTO: 0 /100 WBC (ref 0–0.2)
PLATELET # BLD AUTO: 337 10*3/MM3 (ref 140–450)
PMV BLD AUTO: 9.6 FL (ref 6–12)
POTASSIUM SERPL-SCNC: 4 MMOL/L (ref 3.5–5.2)
PROT SERPL-MCNC: 8 G/DL (ref 6–8.5)
RBC # BLD AUTO: 3.74 10*6/MM3 (ref 3.77–5.28)
SODIUM SERPL-SCNC: 135 MMOL/L (ref 136–145)
TROPONIN T SERPL HS-MCNC: 28 NG/L
TROPONIN T SERPL HS-MCNC: 29 NG/L
WBC NRBC COR # BLD AUTO: 11.21 10*3/MM3 (ref 3.4–10.8)

## 2023-11-18 PROCEDURE — 25010000002 KETOROLAC TROMETHAMINE PER 15 MG: Performed by: EMERGENCY MEDICINE

## 2023-11-18 PROCEDURE — 80053 COMPREHEN METABOLIC PANEL: CPT | Performed by: EMERGENCY MEDICINE

## 2023-11-18 PROCEDURE — 96374 THER/PROPH/DIAG INJ IV PUSH: CPT

## 2023-11-18 PROCEDURE — 93005 ELECTROCARDIOGRAM TRACING: CPT | Performed by: EMERGENCY MEDICINE

## 2023-11-18 PROCEDURE — 25010000002 HYDROMORPHONE 1 MG/ML SOLUTION: Performed by: EMERGENCY MEDICINE

## 2023-11-18 PROCEDURE — 84484 ASSAY OF TROPONIN QUANT: CPT | Performed by: EMERGENCY MEDICINE

## 2023-11-18 PROCEDURE — 71275 CT ANGIOGRAPHY CHEST: CPT

## 2023-11-18 PROCEDURE — 99285 EMERGENCY DEPT VISIT HI MDM: CPT

## 2023-11-18 PROCEDURE — 85025 COMPLETE CBC W/AUTO DIFF WBC: CPT | Performed by: EMERGENCY MEDICINE

## 2023-11-18 PROCEDURE — 84484 ASSAY OF TROPONIN QUANT: CPT | Performed by: PHYSICIAN ASSISTANT

## 2023-11-18 PROCEDURE — 36415 COLL VENOUS BLD VENIPUNCTURE: CPT

## 2023-11-18 PROCEDURE — 25510000001 IOPAMIDOL 61 % SOLUTION: Performed by: EMERGENCY MEDICINE

## 2023-11-18 PROCEDURE — 96375 TX/PRO/DX INJ NEW DRUG ADDON: CPT

## 2023-11-18 PROCEDURE — 96376 TX/PRO/DX INJ SAME DRUG ADON: CPT

## 2023-11-18 PROCEDURE — 71045 X-RAY EXAM CHEST 1 VIEW: CPT

## 2023-11-18 RX ORDER — OXYCODONE HYDROCHLORIDE AND ACETAMINOPHEN 5; 325 MG/1; MG/1
1 TABLET ORAL EVERY 6 HOURS PRN
Qty: 14 TABLET | Refills: 0 | Status: SHIPPED | OUTPATIENT
Start: 2023-11-18

## 2023-11-18 RX ORDER — KETOROLAC TROMETHAMINE 30 MG/ML
15 INJECTION, SOLUTION INTRAMUSCULAR; INTRAVENOUS ONCE
Status: COMPLETED | OUTPATIENT
Start: 2023-11-18 | End: 2023-11-18

## 2023-11-18 RX ADMIN — HYDROMORPHONE HYDROCHLORIDE 1 MG: 1 INJECTION, SOLUTION INTRAMUSCULAR; INTRAVENOUS; SUBCUTANEOUS at 18:45

## 2023-11-18 RX ADMIN — KETOROLAC TROMETHAMINE 15 MG: 30 INJECTION, SOLUTION INTRAMUSCULAR; INTRAVENOUS at 18:46

## 2023-11-18 RX ADMIN — IOPAMIDOL 80 ML: 612 INJECTION, SOLUTION INTRAVENOUS at 20:55

## 2023-11-18 RX ADMIN — HYDROMORPHONE HYDROCHLORIDE 1 MG: 1 INJECTION, SOLUTION INTRAMUSCULAR; INTRAVENOUS; SUBCUTANEOUS at 22:50

## 2023-11-18 NOTE — ED PROVIDER NOTES
Subjective  History of Present Illness:    Chief Complaint:   Chief Complaint   Patient presents with    Rib Pain      History of Present Illness: Silvia Chung is a 73 y.o. female who presents to the emergency department complaining of left rib pain.  Patient states she has had left rib pain for months worse the past few weeks.  Here 10 1 for the same.  History of creatinine cancer had surgical removal of the tail of the pancreas and just finished chemotherapy August of this year.  Has been told she will need surgery for a pinched nerve under her rib on the left side but is awaiting surgical consult.  She was prescribed Lortab 7.5 mg tablets for pain and states has had been having to double and triple up on them for pain control and ran out taking her last dose this morning.  No rash to the skin  Onset: Several months ago, worsening over the past couple weeks  Duration: Ongoing  Exacerbating / Alleviating factors: Worse with palpation of the left lateral lower ribs  Associated symptoms: No abdominal pain      Nurses Notes reviewed and agree, including vitals, allergies, social history and prior medical history.     Review of Systems   Constitutional: Negative.    HENT: Negative.     Eyes: Negative.    Respiratory: Negative.     Cardiovascular: Negative.    Gastrointestinal: Negative.    Genitourinary: Negative.    Musculoskeletal:         Left lower rib pain   Skin: Negative.    Neurological: Negative.    Psychiatric/Behavioral: Negative.         Past Medical History:   Diagnosis Date    Asthma     Hyperlipidemia     Hypertension     Impaired functional mobility, balance, gait, and endurance     Pancreatic cancer 2022    Parkinson disease     PONV (postoperative nausea and vomiting)     Scoliosis     Seasonal allergies        Allergies:    Patient has no known allergies.      Past Surgical History:   Procedure Laterality Date    ADENOIDECTOMY      BREAST BIOPSY Right 2019    Needle biopsy    CATARACT  "EXTRACTION Bilateral     CHOLECYSTECTOMY OPEN  12/19/2022    done at     COLONOSCOPY      FOOT SURGERY Left     torn ligament, screws placed.    HIP HEMIARTHROPLASTY Left 10/21/2020    Procedure: HIP HEMIARTHROPLASTY LEFT;  Surgeon: Humza Winters MD;  Location: [facility];  Service: Orthopedics;  Laterality: Left;    LYMPHADENECTOMY  12/19/2022    partial, done at     NECK SURGERY      four fusions    OMENTECTOMY  12/19/2022    done at     PANCREATECTOMY  12/19/2022    done at , left adrenolectomy also performed    PORTACATH PLACEMENT Right 02/09/2023    Procedure: INSERTION OF PORTACATH;  Surgeon: Marcio Garcia MD;  Location: [facility];  Service: General;  Laterality: Right;    SPLENECTOMY  12/19/2022    done at     TONSILLECTOMY           Social History     Socioeconomic History    Marital status:    Tobacco Use    Smoking status: Never    Smokeless tobacco: Never   Vaping Use    Vaping Use: Never used   Substance and Sexual Activity    Alcohol use: Not Currently     Comment: rare    Drug use: Defer    Sexual activity: Defer         Family History   Problem Relation Age of Onset    Heart failure Father     Lung cancer Father     Cancer Brother     Breast cancer Maternal Grandmother     Breast cancer Paternal Grandmother     Breast cancer Cousin     Ovarian cancer Neg Hx        Objective  Physical Exam:  /99   Pulse 102   Temp 98.4 °F (36.9 °C)   Resp 18   Ht 175.3 cm (69\")   Wt 62.6 kg (138 lb)   SpO2 96%   BMI 20.38 kg/m²      Physical Exam  Vitals and nursing note reviewed.   Constitutional:       General: She is not in acute distress.     Appearance: Normal appearance. She is normal weight. She is not ill-appearing, toxic-appearing or diaphoretic.   HENT:      Head: Normocephalic and atraumatic.      Nose: Nose normal.   Eyes:      Extraocular Movements: Extraocular movements intact.   Cardiovascular:      Rate and Rhythm: Regular rhythm. Tachycardia present.   Pulmonary:      " Effort: Pulmonary effort is normal.      Breath sounds: Normal breath sounds.   Abdominal:      General: Abdomen is flat.      Palpations: Abdomen is soft.      Tenderness: There is no abdominal tenderness. There is no guarding or rebound.   Musculoskeletal:      Cervical back: Normal range of motion.      Comments: Tenderness to the left anterolateral ribs.  No rash overlying the skin.   Skin:     General: Skin is warm.   Neurological:      General: No focal deficit present.      Mental Status: She is alert and oriented to person, place, and time.   Psychiatric:         Mood and Affect: Mood normal.         Behavior: Behavior normal.           Procedures    ED Course:    ED Course as of 11/18/23 2231   Sat Nov 18, 2023 1910 EKG interpreted by me: sinus rhythm, normal rate, no acute ST changes, poor R wave progression, this is an abnormal EKG [MP]      ED Course User Index  [MP] Mehdi Méndez MD       Lab Results (last 24 hours)       Procedure Component Value Units Date/Time    CBC & Differential [[id number]]  (Abnormal) Collected: 11/18/23 1925    Specimen: Blood Updated: 11/18/23 1932    Narrative:      The following orders were created for panel order CBC & Differential.  Procedure                               Abnormality         Status                     ---------                               -----------         ------                     CBC Auto Differential[[id number]]        Abnormal            Final result                 Please view results for these tests on the individual orders.    Comprehensive Metabolic Panel [[id number]]  (Abnormal) Collected: 11/18/23 1925    Specimen: Blood Updated: 11/18/23 1953     Glucose 100 mg/dL      BUN 11 mg/dL      Creatinine 0.71 mg/dL      Sodium 135 mmol/L      Potassium 4.0 mmol/L      Comment: Slight hemolysis detected by analyzer. Result may be falsely elevated.        Chloride 94 mmol/L      CO2 23.8 mmol/L      Calcium 9.7 mg/dL      Total Protein 8.0  g/dL      Albumin 4.3 g/dL      ALT (SGPT) <5 U/L      AST (SGOT) 21 U/L      Comment: Slight hemolysis detected by analyzer. Result may be falsely elevated.        Alkaline Phosphatase 93 U/L      Total Bilirubin 0.7 mg/dL      Globulin 3.7 gm/dL      A/G Ratio 1.2 g/dL      BUN/Creatinine Ratio 15.5     Anion Gap 17.2 mmol/L      eGFR 91.0 mL/min/1.73     Narrative:      GFR Normal >60  Chronic Kidney Disease <60  Kidney Failure <15    The GFR formula is only valid for adults with stable renal function between ages 18 and 70.    Single High Sensitivity Troponin T [[id number]]  (Abnormal) Collected: 11/18/23 1925    Specimen: Blood Updated: 11/18/23 1955     HS Troponin T 28 ng/L     Narrative:      High Sensitive Troponin T Reference Range:  <14.0 ng/L- Negative Female for AMI  <22.0 ng/L- Negative Male for AMI  >=14 - Abnormal Female indicating possible myocardial injury.  >=22 - Abnormal Male indicating possible myocardial injury.   Clinicians would have to utilize clinical acumen, EKG, Troponin, and serial changes to determine if it is an Acute Myocardial Infarction or myocardial injury due to an underlying chronic condition.         CBC Auto Differential [[id number]]  (Abnormal) Collected: 11/18/23 1925    Specimen: Blood Updated: 11/18/23 1932     WBC 11.21 10*3/mm3      RBC 3.74 10*6/mm3      Hemoglobin 13.0 g/dL      Hematocrit 37.1 %      MCV 99.2 fL      MCH 34.8 pg      MCHC 35.0 g/dL      RDW 13.3 %      RDW-SD 48.0 fl      MPV 9.6 fL      Platelets 337 10*3/mm3      Neutrophil % 67.4 %      Lymphocyte % 23.3 %      Monocyte % 8.2 %      Eosinophil % 0.3 %      Basophil % 0.4 %      Immature Grans % 0.4 %      Neutrophils, Absolute 7.55 10*3/mm3      Lymphocytes, Absolute 2.61 10*3/mm3      Monocytes, Absolute 0.92 10*3/mm3      Eosinophils, Absolute 0.03 10*3/mm3      Basophils, Absolute 0.05 10*3/mm3      Immature Grans, Absolute 0.05 10*3/mm3      nRBC 0.0 /100 WBC     Single High Sensitivity Troponin  T [[id number]]  (Abnormal) Collected: 11/18/23 2125    Specimen: Blood Updated: 11/18/23 2149     HS Troponin T 29 ng/L     Narrative:      High Sensitive Troponin T Reference Range:  <14.0 ng/L- Negative Female for AMI  <22.0 ng/L- Negative Male for AMI  >=14 - Abnormal Female indicating possible myocardial injury.  >=22 - Abnormal Male indicating possible myocardial injury.   Clinicians would have to utilize clinical acumen, EKG, Troponin, and serial changes to determine if it is an Acute Myocardial Infarction or myocardial injury due to an underlying chronic condition.                  CT Angiogram Chest Pulmonary Embolism    Result Date: 11/18/2023  FINAL REPORT TECHNIQUE: Multiple axial CT images were obtained through the chest following IV contrast using a CTA/PE protocol.  3D/MIP reconstruction images were also performed. This study was performed with techniques to keep radiation doses as low as reasonably achievable (ALARA). Individualized dose reduction techniques using automated exposure control or adjustment of mA and/or kV according to the patient's size were employed. CLINICAL HISTORY: elevated troponin, left chest wall pain, hx of pancreatic cancer FINDINGS: PAs and aorta: No filling defects are evident to suggest pulmonary embolus.  Thoracic aorta is unremarkable.  No significant coronary artery disease.  Heart/mediastinum: No evidence for right heart strain.  No pericardial effusion.  The heart is normal in size.  Lungs: There are mild to moderate subpleural reticular opacities seen in all lung fields.  Mild subsegmental atelectasis is noted in the lung bases.   Otherwise the lungs are clear.  Lymph nodes: No pathologically enlarged thoracic lymph nodes.  Pleura: No pneumothorax or pleural effusion.  Chest Wall: No chest wall contusion.  Bones: No acute fracture.  Upper abdomen: Postsurgical changes to the pancreas are noted.  The patient is status post cholecystectomy. Incidental hypoenhancing lesion  "of the right hepatic lobe (see series 4 image 81).  The remainder of the upper abdomen is normal.     Impression: 1.  No acute findings in the chest.  No evidence of pulmonary embolism.  Mild chronic changes in the lung fields.  2. Incidental hypoenhancing lesion of the right hepatic lobe. Consider further evaluation with ultrasound performed on an OUTPATIENT basis or comparison studies. Authenticated and Electronically Signed by MD Alston Richard on 11/18/2023 10:07:38 PM        Medical Decision Making  Problems Addressed:  Rib pain on left side: complicated acute illness or injury    Amount and/or Complexity of Data Reviewed  Labs: ordered.  Radiology: ordered.  ECG/medicine tests: ordered.    Risk  Prescription drug management.        Silvia Chung is a 73 y.o. female who presents to the emergency department for evaluation of left chest wall pain    Differential diagnosis includes ACS, neuropathic pain, pleurisy, PE, pneumonia among other etiologies.    Troponin, CBC, CMP, chest x-ray, CT PE protocol ordered for further evaluation of the patient's presentation.    Chart review if available included outside testing, previous visits, prior labs, prior imaging, available notes from prior evaluations or visits with specialists, medication list, allergies, past medical history, past surgical history when applicable.    Patient was treated with toradol and dilaudid    Patient remains tachycardic at 113 normal sinus rhythm.  CT scan from 10/1 shows \"small scattered new solid metastasis which are stable from the prior study.\"  Patient states that she was told at last visit this was not an accurate read and was actually another patient's inadvertently documented on her record.  She states she was told this was to be changed however it does not appear that it has been changed.  Given elevated troponin will repeat troponin and will repeat CT PE protocol of the chest given this troponin elevation.  CT chest reveals " no acute findings.  Repeat troponin flat.    Plan for disposition is home.  Patient/family comfortable with and understanding of the plan.      Final diagnoses:   Rib pain on left side          Mehdi éMndez MD  11/18/23 7892

## 2023-11-28 ENCOUNTER — OFFICE VISIT (OUTPATIENT)
Dept: ONCOLOGY | Facility: CLINIC | Age: 73
End: 2023-11-28
Payer: MEDICARE

## 2023-11-28 ENCOUNTER — INFUSION (OUTPATIENT)
Dept: ONCOLOGY | Facility: HOSPITAL | Age: 73
End: 2023-11-28
Payer: MEDICARE

## 2023-11-28 VITALS
TEMPERATURE: 97.7 F | HEART RATE: 115 BPM | OXYGEN SATURATION: 98 % | SYSTOLIC BLOOD PRESSURE: 170 MMHG | DIASTOLIC BLOOD PRESSURE: 103 MMHG | HEIGHT: 69 IN | BODY MASS INDEX: 19.55 KG/M2 | WEIGHT: 132 LBS | RESPIRATION RATE: 16 BRPM

## 2023-11-28 DIAGNOSIS — K86.89 PANCREATIC MASS: Primary | ICD-10-CM

## 2023-11-28 DIAGNOSIS — C25.1 MALIGNANT NEOPLASM OF BODY OF PANCREAS: Primary | ICD-10-CM

## 2023-11-28 LAB
ALBUMIN SERPL-MCNC: 4.4 G/DL (ref 3.5–5.2)
ALBUMIN/GLOB SERPL: 1.1 G/DL
ALP SERPL-CCNC: 92 U/L (ref 39–117)
ALT SERPL W P-5'-P-CCNC: 7 U/L (ref 1–33)
ANION GAP SERPL CALCULATED.3IONS-SCNC: 13 MMOL/L (ref 5–15)
AST SERPL-CCNC: 17 U/L (ref 1–32)
BASOPHILS # BLD AUTO: 0.05 10*3/MM3 (ref 0–0.2)
BASOPHILS NFR BLD AUTO: 0.6 % (ref 0–1.5)
BILIRUB SERPL-MCNC: 0.4 MG/DL (ref 0–1.2)
BUN SERPL-MCNC: 16 MG/DL (ref 8–23)
BUN/CREAT SERPL: 21.3 (ref 7–25)
CALCIUM SPEC-SCNC: 9.8 MG/DL (ref 8.6–10.5)
CANCER AG19-9 SERPL-ACNC: 82.4 U/ML
CHLORIDE SERPL-SCNC: 93 MMOL/L (ref 98–107)
CO2 SERPL-SCNC: 28 MMOL/L (ref 22–29)
CREAT SERPL-MCNC: 0.75 MG/DL (ref 0.57–1)
DEPRECATED RDW RBC AUTO: 53.9 FL (ref 37–54)
EGFRCR SERPLBLD CKD-EPI 2021: 85.2 ML/MIN/1.73
EOSINOPHIL # BLD AUTO: 0.08 10*3/MM3 (ref 0–0.4)
EOSINOPHIL NFR BLD AUTO: 1 % (ref 0.3–6.2)
ERYTHROCYTE [DISTWIDTH] IN BLOOD BY AUTOMATED COUNT: 14.4 % (ref 12.3–15.4)
GLOBULIN UR ELPH-MCNC: 4 GM/DL
GLUCOSE SERPL-MCNC: 128 MG/DL (ref 65–99)
HCT VFR BLD AUTO: 39.8 % (ref 34–46.6)
HGB BLD-MCNC: 13.4 G/DL (ref 12–15.9)
IMM GRANULOCYTES # BLD AUTO: 0.03 10*3/MM3 (ref 0–0.05)
IMM GRANULOCYTES NFR BLD AUTO: 0.4 % (ref 0–0.5)
LYMPHOCYTES # BLD AUTO: 1.53 10*3/MM3 (ref 0.7–3.1)
LYMPHOCYTES NFR BLD AUTO: 18.9 % (ref 19.6–45.3)
MCH RBC QN AUTO: 34.4 PG (ref 26.6–33)
MCHC RBC AUTO-ENTMCNC: 33.7 G/DL (ref 31.5–35.7)
MCV RBC AUTO: 102.1 FL (ref 79–97)
MONOCYTES # BLD AUTO: 1.07 10*3/MM3 (ref 0.1–0.9)
MONOCYTES NFR BLD AUTO: 13.2 % (ref 5–12)
NEUTROPHILS NFR BLD AUTO: 5.34 10*3/MM3 (ref 1.7–7)
NEUTROPHILS NFR BLD AUTO: 65.9 % (ref 42.7–76)
NRBC BLD AUTO-RTO: 0 /100 WBC (ref 0–0.2)
PLATELET # BLD AUTO: 538 10*3/MM3 (ref 140–450)
PMV BLD AUTO: 9.4 FL (ref 6–12)
POTASSIUM SERPL-SCNC: 3.6 MMOL/L (ref 3.5–5.2)
PROT SERPL-MCNC: 8.4 G/DL (ref 6–8.5)
RBC # BLD AUTO: 3.9 10*6/MM3 (ref 3.77–5.28)
SODIUM SERPL-SCNC: 134 MMOL/L (ref 136–145)
WBC NRBC COR # BLD AUTO: 8.1 10*3/MM3 (ref 3.4–10.8)

## 2023-11-28 PROCEDURE — 36591 DRAW BLOOD OFF VENOUS DEVICE: CPT

## 2023-11-28 PROCEDURE — 1159F MED LIST DOCD IN RCRD: CPT | Performed by: NURSE PRACTITIONER

## 2023-11-28 PROCEDURE — 1160F RVW MEDS BY RX/DR IN RCRD: CPT | Performed by: NURSE PRACTITIONER

## 2023-11-28 PROCEDURE — 1125F AMNT PAIN NOTED PAIN PRSNT: CPT | Performed by: NURSE PRACTITIONER

## 2023-11-28 PROCEDURE — 3080F DIAST BP >= 90 MM HG: CPT | Performed by: NURSE PRACTITIONER

## 2023-11-28 PROCEDURE — 86301 IMMUNOASSAY TUMOR CA 19-9: CPT | Performed by: NURSE PRACTITIONER

## 2023-11-28 PROCEDURE — 3077F SYST BP >= 140 MM HG: CPT | Performed by: NURSE PRACTITIONER

## 2023-11-28 PROCEDURE — 85025 COMPLETE CBC W/AUTO DIFF WBC: CPT | Performed by: NURSE PRACTITIONER

## 2023-11-28 PROCEDURE — 80053 COMPREHEN METABOLIC PANEL: CPT | Performed by: NURSE PRACTITIONER

## 2023-11-28 PROCEDURE — 99214 OFFICE O/P EST MOD 30 MIN: CPT | Performed by: NURSE PRACTITIONER

## 2023-11-28 RX ORDER — SODIUM CHLORIDE 0.9 % (FLUSH) 0.9 %
10 SYRINGE (ML) INJECTION AS NEEDED
Status: DISCONTINUED | OUTPATIENT
Start: 2023-11-28 | End: 2023-11-28 | Stop reason: HOSPADM

## 2023-11-28 RX ORDER — SODIUM CHLORIDE 0.9 % (FLUSH) 0.9 %
10 SYRINGE (ML) INJECTION AS NEEDED
OUTPATIENT
Start: 2023-11-28

## 2023-11-28 RX ORDER — HYDROCODONE BITARTRATE AND ACETAMINOPHEN 10; 325 MG/1; MG/1
1 TABLET ORAL 2 TIMES DAILY
COMMUNITY

## 2023-11-28 RX ORDER — HEPARIN SODIUM (PORCINE) LOCK FLUSH IV SOLN 100 UNIT/ML 100 UNIT/ML
500 SOLUTION INTRAVENOUS AS NEEDED
OUTPATIENT
Start: 2023-11-28

## 2023-11-28 RX ORDER — SENNOSIDES A AND B 8.6 MG/1
8.6 TABLET, FILM COATED ORAL DAILY
COMMUNITY

## 2023-11-28 RX ORDER — HEPARIN SODIUM (PORCINE) LOCK FLUSH IV SOLN 100 UNIT/ML 100 UNIT/ML
500 SOLUTION INTRAVENOUS AS NEEDED
Status: DISCONTINUED | OUTPATIENT
Start: 2023-11-28 | End: 2023-11-28 | Stop reason: HOSPADM

## 2023-11-28 NOTE — PROGRESS NOTES
PROBLEM LIST:  1. 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  She has had GI side effects with sinemet for her PD so she associated this symptom with that medication.  She has lost about 10 lbs. CT a/p without contrast showed a 6.8 x 3.7 cm mass in the distal pancreatic body and tail with minimal surrounding inflammation.  Multiple borderline enalrged nodes near celiac axis and mesenteric axis.  CT a/p with contrast 12/1/22 showed a 5.6 x 3.6 cm mass in the tail and distal body of the pancreas, no adenopathy.  B) distal pancreatectomy and splenectomy performed on 12/19/2022.  Pathology showed poorly differentiated pancreatic adenosquamous carcinoma with involvement of adrenal gland by direct extension, 1 out of 5 lymph nodes involved.  Surgical margins negative. + lymphovascular invasion, + perineural invasion.  C) adjuvant mFOLFIRINOX started 2/13/23.  2. parkinsons disease  3. Hypertension  4. Hyperlipidemia  5. asthma    Subjective     CHIEF COMPLAINT: pancreas cancer    HISTORY OF PRESENT ILLNESS:   Silvia Chung returns for follow-up.  Overall she says she is doing okay as far as a cancer standpoint goes.  She says she has had multiple scans that showed no evidence of recurrent disease.  She continues to have back pain with some abdomen pain.  She saw pain management who found a pinched nerve from her floating rib.  She is due to see the thoracic surgeon and potential surgery in the next 2 weeks.  She says she has support at home with visiting [employer] as well as planning to hire someone after surgery.  Neuropathy in her feet stable overall.  She has lost about 20 pounds since her last visit with us.  She is started boost 2-3 times a day.  She says her pain is so bad she just does not feel like eating.               Objective      There were no vitals taken for  this visit.     Performance Status:1          General: frail appearing female in no acute distress  Neuro: alert and oriented  HEENT: sclera anicteric, oropharynx clear  Skin: no rashes, lesions, bruising, or petechiae  Psych: mood and affect appropriate                RECENT LABS:  Lab Results   Component Value Date    WBC 11.21 (H) 11/18/2023    HGB 13.0 11/18/2023    HCT 37.1 11/18/2023    MCV 99.2 (H) 11/18/2023     11/18/2023       Lab Results   Component Value Date    GLUCOSE 100 (H) 11/18/2023    BUN 11 11/18/2023    CREATININE 0.71 11/18/2023    EGFRIFNONA 85 10/23/2020    BCR 15.5 11/18/2023    K 4.0 11/18/2023    CO2 23.8 11/18/2023    CALCIUM 9.7 11/18/2023    ALBUMIN 4.3 11/18/2023    AST 21 11/18/2023    ALT <5 11/18/2023       XR Chest 1 View  Narrative: PORTABLE CHEST  11/19/2023 5:51 AM     HISTORY: Chest pain protocol     COMPARISON:   None     FINDINGS: Right subclavian Mediport. The cardiac silhouette is normal in  size. The mediastinal and hilar contours are unremarkable. Bilateral  bronchial wall thickening. No consolidation. There is no pneumothorax.  The visualized osseous structures demonstrate no acute abnormalities.     Impression: Bronchitis. No focal pneumonia.                 This report was signed and finalized on 11/19/2023 7:37 AM by Adam Hensley MD.               ASSESSMENT AND PLAN:     Silvia Chung is a 73 y.o. female with a T3N1M0 adenosquamous carcinoma of the pancreas, here for follow up on adjuvant chemotherapy.    She has completed 12 cycles of adjuvant chemotherapy.  Recent imaging shows no evidence of disease recurrence.  No recent labs.  We will check labs today and I will call her with results.  We will plan for her to follow-up in 2 months with scans and labs.      Neuropathy: Continue gabapentin.  Continues to improve.      Diarrhea: Stable.  Continue Creon    Parkinson's: Continue regular medications.    Post splenectomy vaccines given at  .    Abdominal/back pain.  It was determined this is due to a pinched nerve related to her floating ribs.  They are planning to remove those ribs in the next couple weeks.  She will follow-up with cardiac thoracic surgeon for management.    Port care.  We will flush port with lab draw today.  Continue port care every 3 months with visits.    Weight loss.  I encouraged the patient to increase her boost or Ensure to at least 3 times daily.  We also discussed snacking.  She feels that once her pain is under better control she can start gaining weight again.    Follow-up 2 months.            Arlene Najera, [place]  [facility]    11/28/2023          CC:

## 2023-11-29 DIAGNOSIS — C25.1 MALIGNANT NEOPLASM OF BODY OF PANCREAS: Primary | ICD-10-CM

## 2023-11-29 DIAGNOSIS — R10.12 LEFT UPPER QUADRANT ABDOMINAL PAIN: ICD-10-CM

## 2023-11-29 DIAGNOSIS — R63.4 WEIGHT LOSS: ICD-10-CM

## 2023-11-29 NOTE — PROGRESS NOTES
Discussed with patient that CA 19-9 has increased to 82.4.  We discussed previous number was 17.  I discussed the case with Dr. Corrales and we will plan to do a PET scan in the next couple weeks.  Patient is agreeable to plan.  We will plan to see her back after the PET scan for results.

## 2023-12-02 ENCOUNTER — HOSPITAL ENCOUNTER (EMERGENCY)
Facility: HOSPITAL | Age: 73
Discharge: HOME | End: 2023-12-02
Attending: STUDENT IN AN ORGANIZED HEALTH CARE EDUCATION/TRAINING PROGRAM
Payer: MEDICARE

## 2023-12-02 ENCOUNTER — APPOINTMENT (OUTPATIENT)
Dept: CT IMAGING | Facility: HOSPITAL | Age: 73
End: 2023-12-02
Payer: MEDICARE

## 2023-12-02 VITALS
SYSTOLIC BLOOD PRESSURE: 166 MMHG | WEIGHT: 132 LBS | HEIGHT: 68 IN | OXYGEN SATURATION: 93 % | HEART RATE: 104 BPM | TEMPERATURE: 99.3 F | RESPIRATION RATE: 20 BRPM | BODY MASS INDEX: 20 KG/M2 | DIASTOLIC BLOOD PRESSURE: 104 MMHG

## 2023-12-02 DIAGNOSIS — R10.12 LEFT UPPER QUADRANT ABDOMINAL PAIN: ICD-10-CM

## 2023-12-02 DIAGNOSIS — N28.9 RENAL LESION: ICD-10-CM

## 2023-12-02 DIAGNOSIS — K76.9 HEPATIC LESION: ICD-10-CM

## 2023-12-02 DIAGNOSIS — K59.00 CONSTIPATION, UNSPECIFIED CONSTIPATION TYPE: Primary | ICD-10-CM

## 2023-12-02 LAB
ALBUMIN SERPL-MCNC: 4.1 G/DL (ref 3.5–5.2)
ALBUMIN/GLOB SERPL: 1 G/DL
ALP SERPL-CCNC: 102 U/L (ref 39–117)
ALT SERPL W P-5'-P-CCNC: 8 U/L (ref 1–33)
ANION GAP SERPL CALCULATED.3IONS-SCNC: 13.6 MMOL/L (ref 5–15)
AST SERPL-CCNC: 18 U/L (ref 1–32)
BACTERIA UR QL AUTO: ABNORMAL /HPF
BASOPHILS # BLD AUTO: 0.1 10*3/MM3 (ref 0–0.2)
BASOPHILS NFR BLD AUTO: 1 % (ref 0–1.5)
BILIRUB SERPL-MCNC: 0.5 MG/DL (ref 0–1.2)
BILIRUB UR QL STRIP: NEGATIVE
BUN SERPL-MCNC: 10 MG/DL (ref 8–23)
BUN/CREAT SERPL: 13.5 (ref 7–25)
CALCIUM SPEC-SCNC: 9.8 MG/DL (ref 8.6–10.5)
CHLORIDE SERPL-SCNC: 94 MMOL/L (ref 98–107)
CLARITY UR: CLEAR
CO2 SERPL-SCNC: 26.4 MMOL/L (ref 22–29)
COLOR UR: YELLOW
CREAT SERPL-MCNC: 0.74 MG/DL (ref 0.57–1)
D-LACTATE SERPL-SCNC: 1.3 MMOL/L (ref 0.5–2)
D-LACTATE SERPL-SCNC: 2.1 MMOL/L (ref 0.5–2)
DEPRECATED RDW RBC AUTO: 52.3 FL (ref 37–54)
EGFRCR SERPLBLD CKD-EPI 2021: 86.6 ML/MIN/1.73
EOSINOPHIL # BLD AUTO: 0.22 10*3/MM3 (ref 0–0.4)
EOSINOPHIL NFR BLD AUTO: 2.1 % (ref 0.3–6.2)
ERYTHROCYTE [DISTWIDTH] IN BLOOD BY AUTOMATED COUNT: 14.4 % (ref 12.3–15.4)
GEN 5 2HR TROPONIN T REFLEX: 25 NG/L
GLOBULIN UR ELPH-MCNC: 4 GM/DL
GLUCOSE SERPL-MCNC: 169 MG/DL (ref 65–99)
GLUCOSE UR STRIP-MCNC: NEGATIVE MG/DL
HCT VFR BLD AUTO: 37.6 % (ref 34–46.6)
HGB BLD-MCNC: 13.1 G/DL (ref 12–15.9)
HGB UR QL STRIP.AUTO: NEGATIVE
HOLD SPECIMEN: NORMAL
HOLD SPECIMEN: NORMAL
HYALINE CASTS UR QL AUTO: ABNORMAL /LPF
IMM GRANULOCYTES # BLD AUTO: 0.05 10*3/MM3 (ref 0–0.05)
IMM GRANULOCYTES NFR BLD AUTO: 0.5 % (ref 0–0.5)
KETONES UR QL STRIP: NEGATIVE
LEUKOCYTE ESTERASE UR QL STRIP.AUTO: ABNORMAL
LIPASE SERPL-CCNC: 9 U/L (ref 13–60)
LYMPHOCYTES # BLD AUTO: 2.03 10*3/MM3 (ref 0.7–3.1)
LYMPHOCYTES NFR BLD AUTO: 19.7 % (ref 19.6–45.3)
MCH RBC QN AUTO: 34.9 PG (ref 26.6–33)
MCHC RBC AUTO-ENTMCNC: 34.8 G/DL (ref 31.5–35.7)
MCV RBC AUTO: 100.3 FL (ref 79–97)
MONOCYTES # BLD AUTO: 1.3 10*3/MM3 (ref 0.1–0.9)
MONOCYTES NFR BLD AUTO: 12.6 % (ref 5–12)
NEUTROPHILS NFR BLD AUTO: 6.59 10*3/MM3 (ref 1.7–7)
NEUTROPHILS NFR BLD AUTO: 64.1 % (ref 42.7–76)
NITRITE UR QL STRIP: NEGATIVE
NRBC BLD AUTO-RTO: 0 /100 WBC (ref 0–0.2)
PH UR STRIP.AUTO: 6.5 [PH] (ref 5–8)
PLATELET # BLD AUTO: 479 10*3/MM3 (ref 140–450)
PMV BLD AUTO: 9.5 FL (ref 6–12)
POTASSIUM SERPL-SCNC: 3.6 MMOL/L (ref 3.5–5.2)
PROT SERPL-MCNC: 8.1 G/DL (ref 6–8.5)
PROT UR QL STRIP: NEGATIVE
RBC # BLD AUTO: 3.75 10*6/MM3 (ref 3.77–5.28)
RBC # UR STRIP: ABNORMAL /HPF
REF LAB TEST METHOD: ABNORMAL
SODIUM SERPL-SCNC: 134 MMOL/L (ref 136–145)
SP GR UR STRIP: 1.01 (ref 1–1.03)
SQUAMOUS #/AREA URNS HPF: ABNORMAL /HPF
TROPONIN T DELTA: -2 NG/L
TROPONIN T SERPL HS-MCNC: 27 NG/L
UROBILINOGEN UR QL STRIP: ABNORMAL
WBC # UR STRIP: ABNORMAL /HPF
WBC NRBC COR # BLD AUTO: 10.29 10*3/MM3 (ref 3.4–10.8)
WHOLE BLOOD HOLD COAG: NORMAL
WHOLE BLOOD HOLD SPECIMEN: NORMAL

## 2023-12-02 PROCEDURE — 85025 COMPLETE CBC W/AUTO DIFF WBC: CPT

## 2023-12-02 PROCEDURE — 84484 ASSAY OF TROPONIN QUANT: CPT

## 2023-12-02 PROCEDURE — 81001 URINALYSIS AUTO W/SCOPE: CPT

## 2023-12-02 PROCEDURE — 25010000002 MORPHINE PER 10 MG: Performed by: STUDENT IN AN ORGANIZED HEALTH CARE EDUCATION/TRAINING PROGRAM

## 2023-12-02 PROCEDURE — 93005 ELECTROCARDIOGRAM TRACING: CPT

## 2023-12-02 PROCEDURE — 25010000002 HYDROMORPHONE 1 MG/ML SOLUTION: Performed by: STUDENT IN AN ORGANIZED HEALTH CARE EDUCATION/TRAINING PROGRAM

## 2023-12-02 PROCEDURE — 83605 ASSAY OF LACTIC ACID: CPT

## 2023-12-02 PROCEDURE — 36415 COLL VENOUS BLD VENIPUNCTURE: CPT

## 2023-12-02 PROCEDURE — 25810000003 SODIUM CHLORIDE 0.9 % SOLUTION

## 2023-12-02 PROCEDURE — 80053 COMPREHEN METABOLIC PANEL: CPT

## 2023-12-02 PROCEDURE — 96376 TX/PRO/DX INJ SAME DRUG ADON: CPT

## 2023-12-02 PROCEDURE — 96375 TX/PRO/DX INJ NEW DRUG ADDON: CPT

## 2023-12-02 PROCEDURE — 25010000002 ONDANSETRON PER 1 MG

## 2023-12-02 PROCEDURE — 25510000001 IOPAMIDOL 61 % SOLUTION: Performed by: STUDENT IN AN ORGANIZED HEALTH CARE EDUCATION/TRAINING PROGRAM

## 2023-12-02 PROCEDURE — 96361 HYDRATE IV INFUSION ADD-ON: CPT

## 2023-12-02 PROCEDURE — 83690 ASSAY OF LIPASE: CPT

## 2023-12-02 PROCEDURE — 96374 THER/PROPH/DIAG INJ IV PUSH: CPT

## 2023-12-02 PROCEDURE — 87086 URINE CULTURE/COLONY COUNT: CPT

## 2023-12-02 PROCEDURE — 99285 EMERGENCY DEPT VISIT HI MDM: CPT

## 2023-12-02 PROCEDURE — 74177 CT ABD & PELVIS W/CONTRAST: CPT

## 2023-12-02 RX ORDER — SODIUM CHLORIDE 0.9 % (FLUSH) 0.9 %
10 SYRINGE (ML) INJECTION AS NEEDED
Status: DISCONTINUED | OUTPATIENT
Start: 2023-12-02 | End: 2023-12-02 | Stop reason: HOSPADM

## 2023-12-02 RX ORDER — POLYETHYLENE GLYCOL 3350 17 G/17G
17 POWDER, FOR SOLUTION ORAL DAILY
Qty: 6 PACKET | Refills: 0 | Status: SHIPPED | OUTPATIENT
Start: 2023-12-02 | End: 2023-12-08

## 2023-12-02 RX ORDER — ONDANSETRON 4 MG/1
4 TABLET, ORALLY DISINTEGRATING ORAL EVERY 8 HOURS PRN
Qty: 12 TABLET | Refills: 0 | Status: SHIPPED | OUTPATIENT
Start: 2023-12-02

## 2023-12-02 RX ORDER — CEPHALEXIN 500 MG/1
500 CAPSULE ORAL 2 TIMES DAILY
Qty: 14 CAPSULE | Refills: 0 | Status: SHIPPED | OUTPATIENT
Start: 2023-12-02 | End: 2023-12-09

## 2023-12-02 RX ORDER — ONDANSETRON 2 MG/ML
4 INJECTION INTRAMUSCULAR; INTRAVENOUS ONCE
Status: COMPLETED | OUTPATIENT
Start: 2023-12-02 | End: 2023-12-02

## 2023-12-02 RX ORDER — CEPHALEXIN 250 MG/1
500 CAPSULE ORAL ONCE
Status: COMPLETED | OUTPATIENT
Start: 2023-12-02 | End: 2023-12-02

## 2023-12-02 RX ADMIN — HYDROMORPHONE HYDROCHLORIDE 1 MG: 1 INJECTION, SOLUTION INTRAMUSCULAR; INTRAVENOUS; SUBCUTANEOUS at 19:09

## 2023-12-02 RX ADMIN — ONDANSETRON 4 MG: 2 INJECTION INTRAMUSCULAR; INTRAVENOUS at 16:19

## 2023-12-02 RX ADMIN — MORPHINE SULFATE 4 MG: 4 INJECTION, SOLUTION INTRAMUSCULAR; INTRAVENOUS at 16:20

## 2023-12-02 RX ADMIN — IOPAMIDOL 100 ML: 612 INJECTION, SOLUTION INTRAVENOUS at 18:01

## 2023-12-02 RX ADMIN — CEPHALEXIN 500 MG: 250 CAPSULE ORAL at 19:09

## 2023-12-02 RX ADMIN — SODIUM CHLORIDE 1000 ML: 9 INJECTION, SOLUTION INTRAVENOUS at 16:23

## 2023-12-02 RX ADMIN — HYDROMORPHONE HYDROCHLORIDE 1 MG: 1 INJECTION, SOLUTION INTRAMUSCULAR; INTRAVENOUS; SUBCUTANEOUS at 17:15

## 2023-12-02 NOTE — ED PROVIDER NOTES
Subjective  History of Present Illness:    This is a 73-year-old female, history of pancreatic cancer, status post partial pancreectomy present emergency room today for evaluation of left upper quadrant abdominal pain under the ribs.  Patient reports she has had pain here for months, she is on Lortab 10 daily.  Has been sent back and forth from pain clinic to her oncologist to try to deal with her pain.  Reports that over the last several days her pain has been significantly worsening.  She denies any chest pain or shortness of air.  No unilateral leg pain or swelling.  She reports that she recently saw her oncologist and her cancer antigen was up again which concerned her that her cancer may be coming back.  She follows with Dr. Corrales here at [facility].  She denies any fevers.  She finished chemo back in August and is no longer on chemotherapy.  Pain is described as a sharp constant pain left upper quadrant area.  Reports decreased appetite and decreased sleep secondary to her pain.  Some nausea, no current vomiting.  Denies fevers.      Nurses Notes reviewed and agree, including vitals, allergies, social history and prior medical history.     REVIEW OF SYSTEMS: All systems reviewed and not pertinent unless noted.  Review of Systems   Constitutional:  Negative for fever.   Respiratory:  Negative for shortness of breath.    Cardiovascular:  Negative for chest pain.   Gastrointestinal:  Positive for abdominal pain and nausea. Negative for vomiting.   Genitourinary:  Negative for dysuria, frequency, hematuria and urgency.   All other systems reviewed and are negative.      Past Medical History:   Diagnosis Date    Asthma     Hyperlipidemia     Hypertension     Impaired functional mobility, balance, gait, and endurance     Pancreatic cancer 2022    Parkinson disease     PONV (postoperative nausea and vomiting)     Scoliosis     Seasonal allergies        Allergies:    Patient has no known  "allergies.      Past Surgical History:   Procedure Laterality Date    ADENOIDECTOMY      BREAST BIOPSY Right 2019    Needle biopsy    CATARACT EXTRACTION Bilateral     CHOLECYSTECTOMY OPEN  12/19/2022    done at     COLONOSCOPY      FOOT SURGERY Left     torn ligament, screws placed.    HIP HEMIARTHROPLASTY Left 10/21/2020    Procedure: HIP HEMIARTHROPLASTY LEFT;  Surgeon: Humza Winters MD;  Location: [facility];  Service: Orthopedics;  Laterality: Left;    LYMPHADENECTOMY  12/19/2022    partial, done at     NECK SURGERY      four fusions    OMENTECTOMY  12/19/2022    done at     PANCREATECTOMY  12/19/2022    done at , left adrenolectomy also performed    PORTACATH PLACEMENT Right 02/09/2023    Procedure: INSERTION OF PORTACATH;  Surgeon: Marcio Garcia MD;  Location: [facility];  Service: General;  Laterality: Right;    SPLENECTOMY  12/19/2022    done at     TONSILLECTOMY           Social History     Socioeconomic History    Marital status:    Tobacco Use    Smoking status: Never    Smokeless tobacco: Never   Vaping Use    Vaping Use: Never used   Substance and Sexual Activity    Alcohol use: Not Currently     Comment: rare    Drug use: Defer    Sexual activity: Defer         Family History   Problem Relation Age of Onset    Heart failure Father     Lung cancer Father     Cancer Brother     Breast cancer Maternal Grandmother     Breast cancer Paternal Grandmother     Breast cancer Cousin     Ovarian cancer Neg Hx        Objective  Physical Exam:  BP (!) 166/104   Pulse 104   Temp 99.3 °F (37.4 °C) (Oral)   Resp 20   Ht 172.7 cm (68\")   Wt 59.9 kg (132 lb)   SpO2 93%   BMI 20.07 kg/m²      Physical Exam  Vitals and nursing note reviewed.   Constitutional:       General: She is not in acute distress.     Appearance: She is well-developed. She is not ill-appearing, toxic-appearing or diaphoretic.   HENT:      Head: Normocephalic and atraumatic.      Mouth/Throat:      Pharynx: No pharyngeal " swelling or oropharyngeal exudate.      Comments: Mildly dry mucous membranes  Eyes:      Extraocular Movements: Extraocular movements intact.   Cardiovascular:      Rate and Rhythm: Normal rate and regular rhythm.      Heart sounds: Normal heart sounds.   Pulmonary:      Effort: Pulmonary effort is normal. No respiratory distress.      Breath sounds: Normal breath sounds. No wheezing or rales.   Abdominal:      General: Abdomen is flat. A surgical scar is present.      Palpations: Abdomen is soft.      Tenderness: There is abdominal tenderness in the left upper quadrant. There is no guarding.   Skin:     General: Skin is warm and dry.      Capillary Refill: Capillary refill takes less than 2 seconds.   Neurological:      General: No focal deficit present.      Mental Status: She is alert and oriented to person, place, and time.   Psychiatric:         Mood and Affect: Mood is anxious. Mood is not depressed.         Behavior: Behavior normal.               Procedures    ED Course:    ED Course as of 12/02/23 1910   Sat Dec 02, 2023   1643 EKG interpreted by me, sinus tachycardia with no concerning ST changes noted, rate of 106, no concerning ST changes noted, abnormal EKG [JE]      ED Course User Index  [JE] Christ Serrato MD       Lab Results (last 24 hours)       Procedure Component Value Units Date/Time    CBC Auto Differential [[id number]]  (Abnormal) Collected: 12/02/23 1604    Specimen: Blood Updated: 12/02/23 1618     WBC 10.29 10*3/mm3      RBC 3.75 10*6/mm3      Hemoglobin 13.1 g/dL      Hematocrit 37.6 %      .3 fL      MCH 34.9 pg      MCHC 34.8 g/dL      RDW 14.4 %      RDW-SD 52.3 fl      MPV 9.5 fL      Platelets 479 10*3/mm3      Neutrophil % 64.1 %      Lymphocyte % 19.7 %      Monocyte % 12.6 %      Eosinophil % 2.1 %      Basophil % 1.0 %      Immature Grans % 0.5 %      Neutrophils, Absolute 6.59 10*3/mm3      Lymphocytes, Absolute 2.03 10*3/mm3      Monocytes, Absolute 1.30 10*3/mm3       Eosinophils, Absolute 0.22 10*3/mm3      Basophils, Absolute 0.10 10*3/mm3      Immature Grans, Absolute 0.05 10*3/mm3      nRBC 0.0 /100 WBC     Comprehensive Metabolic Panel [[id number]]  (Abnormal) Collected: 12/02/23 1604    Specimen: Blood Updated: 12/02/23 1628     Glucose 169 mg/dL      BUN 10 mg/dL      Creatinine 0.74 mg/dL      Sodium 134 mmol/L      Potassium 3.6 mmol/L      Chloride 94 mmol/L      CO2 26.4 mmol/L      Calcium 9.8 mg/dL      Total Protein 8.1 g/dL      Albumin 4.1 g/dL      ALT (SGPT) 8 U/L      AST (SGOT) 18 U/L      Alkaline Phosphatase 102 U/L      Total Bilirubin 0.5 mg/dL      Globulin 4.0 gm/dL      A/G Ratio 1.0 g/dL      BUN/Creatinine Ratio 13.5     Anion Gap 13.6 mmol/L      eGFR 86.6 mL/min/1.73     Narrative:      GFR Normal >60  Chronic Kidney Disease <60  Kidney Failure <15    The GFR formula is only valid for adults with stable renal function between ages 18 and 70.    Lipase [[id number]]  (Abnormal) Collected: 12/02/23 1604    Specimen: Blood Updated: 12/02/23 1628     Lipase 9 U/L     High Sensitivity Troponin T [[id number]]  (Abnormal) Collected: 12/02/23 1604    Specimen: Blood Updated: 12/02/23 1630     HS Troponin T 27 ng/L     Narrative:      High Sensitive Troponin T Reference Range:  <14.0 ng/L- Negative Female for AMI  <22.0 ng/L- Negative Male for AMI  >=14 - Abnormal Female indicating possible myocardial injury.  >=22 - Abnormal Male indicating possible myocardial injury.   Clinicians would have to utilize clinical acumen, EKG, Troponin, and serial changes to determine if it is an Acute Myocardial Infarction or myocardial injury due to an underlying chronic condition.         Lactic Acid, Plasma [[id number]]  (Abnormal) Collected: 12/02/23 1604    Specimen: Blood Updated: 12/02/23 1638     Lactate 2.1 mmol/L     Urinalysis With Culture If Indicated - Urine, Clean Catch [[id number]]  (Abnormal) Collected: 12/02/23 1649    Specimen: Urine, Clean Catch Updated:  12/02/23 1659     Color, UA Yellow     Appearance, UA Clear     pH, UA 6.5     Specific Gravity, UA 1.014     Glucose, UA Negative     Ketones, UA Negative     Bilirubin, UA Negative     Blood, UA Negative     Protein, UA Negative     Leuk Esterase, UA Moderate (2+)     Nitrite, UA Negative     Urobilinogen, UA 1.0 E.U./dL    Narrative:      In absence of clinical symptoms, the presence of pyuria, bacteria, and/or nitrites on the urinalysis result does not correlate with infection.    Urinalysis, Microscopic Only - Urine, Clean Catch [[id number]]  (Abnormal) Collected: 12/02/23 1649    Specimen: Urine, Clean Catch Updated: 12/02/23 1732     RBC, UA 0-2 /HPF      WBC, UA 11-20 /HPF      Bacteria, UA None Seen /HPF      Squamous Epithelial Cells, UA 0-2 /HPF      Hyaline Casts, UA None Seen /LPF      Methodology Manual Light Microscopy    Urine Culture - Urine, Urine, Clean Catch [[id number]] Collected: 12/02/23 1649    Specimen: Urine, Clean Catch Updated: 12/02/23 1905    High Sensitivity Troponin T 2Hr [[id number]] Collected: 12/02/23 1852    Specimen: Blood Updated: 12/02/23 1854    STAT Lactic Acid, Reflex [[id number]] Collected: 12/02/23 1853    Specimen: Blood Updated: 12/02/23 1854             CT Abdomen Pelvis With Contrast    Result Date: 12/2/2023  FINAL REPORT TECHNIQUE: The patient was injected with intravenous contrast.   Axial images were obtained from the lung bases to the pubic symphysis by computed tomography.    This study was performed with techniques to keep radiation doses as low as reasonably achievable, (ALARA). Individualized dose reduction techniques using automated exposure control or adjustment of mA and/or kV according to the patient's size were employed. CLINICAL HISTORY: left upper quadrant pain hx of pancreatic cancer FINDINGS: ABDOMEN: There are borderline interstitial changes in the lung bases.  The heart is normal in size.  There is a 10 mm hypodense lesion in the dome of the liver,  probably representing a cyst. There is a 15 mm hyperdense lesion anterior and near the dome of liver with central decreased attenuation.  The spleen is not identified.  No adrenal mass is present.  The pancreas is diminutive and may have been partially removed.  There is unusual decreased attenuation along the inferior aspect left kidney.  The kidneys are otherwise normal.  The aorta is normal in caliber.  There is no free fluid or adenopathy.  There is increased stool throughout the colon. There is severe degenerative disc disease and scoliosis.  PELVIS: Left hip hardware limits detail.  The appendix is not identified. Urinary bladder is distended.  There is no significant free fluid or adenopathy.     Impression: Constipation.  Hepatic lesion near the liver dome could represent hemangioma, three-month follow-up CT is recommended. Decreased attenuation of the kidney could represent neoplasm or infarction, three-month follow-up CT is recommended.  Marked distention of urinary bladder. Authenticated and Electronically Signed by Wale Padron MD on 12/02/2023 06:53:38 PM        MDM     Amount and/or Complexity of Data Reviewed  Decide to obtain previous medical records or to obtain history from someone other than the patient: yes        Initial impression of presenting illness: This is a 73-year-old female present emergency room today for evaluation of left upper quadrant abdominal pain for several months, worsening.  History of pancreas cancer    DDX: includes but is not limited to: Pancreatic cancer, pancreatitis, colitis, enteritis, others    Patient arrives hemodynamically stable afebrile tachycardic at a rate of 122 nonhypoxic and nontoxic with vitals interpreted by myself.     Pertinent features from physical exam: She does have left upper quadrant tenderness to palpation, abdomen is soft, surgical scars noted, no rigidity.  Cardiac auscultation with regular rate and rhythm on my assessment lungs were clear  bilaterally.  Moves all extremities without difficulty.  Appears in pain but nontoxic-appearing.  Oropharynx with mildly dry mucous membranes    Initial diagnostic plan: CBC CMP troponin lipase urinalysis CT abdomen pelvis with contrast EKG    Results from initial plan were reviewed and interpreted by me revealing lipase of 9, troponin of 27 initial.  Will obtain a delta troponin.  CMP with a glucose of 169 sodium of 134, CBC with a red blood cell count of 3.75, platelets of 479.  CT abdomen pelvis reveals constipation hepatic lesion near the liver dome that could represent hemangioma, decreased attenuation of the kidney that could represent a neoplasm or infarction and recommended 3-month follow-up, and marked distention of the urinary bladder which likely is in correlation with her urinary tract infection.  Urinalysis with infectious pattern with moderate leukocytes and 1120 white blood cells.  Actiq acid of 2.1 which is not significantly concerning.  Urine culture pending.  Repeat lactic of 1.3 after fluid rehydration, troponin with a delta of -2.  Patient has had elevated troponins in the past.  Do not suspect ACS.    Diagnostic information from other sources: Old record reviewed, patient recently seen here at this facility for similar complaint    Interventions / Re-evaluation: Morphine Zofran fluids.  Dilaudid for further pain control.  First dose of Keflex for UTI.  Culture pending.  Additionally given another milligram of Dilaudid for further pain control.    Results/clinical rationale were discussed with patient at bedside.  Pardeep all findings of CT scan with her.    Consultations/Discussion of results with other physicians: N/A    Disposition plan: Discharge.  Will send more Zofran as needed.  Recommended continuing her pain medication regimen and following up for further adjustments with her primary care and pain control clinic.  Will send Keflex and Zofran.  Urine culture pending.  -----    Final  diagnoses:   Constipation, unspecified constipation type   Hepatic lesion   Renal lesion   Left upper quadrant abdominal pain          Ritchie Fuentes PA-C  12/02/23 1919

## 2023-12-02 NOTE — ED NOTES
Pt requesting more pain medication. Pt was unable to lay flat in ct due to pain. THAO Fuentes, notified at this time.

## 2023-12-03 LAB — BACTERIA SPEC AEROBE CULT: NO GROWTH

## 2023-12-03 NOTE — DISCHARGE INSTRUCTIONS
Follow-up closely with your oncologist and your primary care physician.  I have sent an antibiotic to your pharmacy for urinary tract infection take this as directed.  Additionally have sent more Zofran and MiraLAX to aid in your constipation and nausea.  Continue your pain medications at home as prescribed.  Additionally included the number for a GI specialist and a urology specialist to further evaluate the lesions identified on your kidney and your liver.  Recommend close follow-up with your oncologist.

## 2023-12-05 ENCOUNTER — PATIENT OUTREACH (OUTPATIENT)
Dept: CASE MANAGEMENT | Facility: OTHER | Age: 73
End: 2023-12-05
Payer: MEDICARE

## 2023-12-05 ENCOUNTER — TELEPHONE (OUTPATIENT)
Dept: ONCOLOGY | Facility: CLINIC | Age: 73
End: 2023-12-05
Payer: MEDICARE

## 2023-12-05 NOTE — TELEPHONE ENCOUNTER
I called the patient and talked with her about the ct that the ED had done and her pain.  She received 120 Norco 10mg from Dr. Hunt at  on 11/21.  I talked with dr. Corrales and she said that Dr. Hunt should be managing the pain medications.  Patient does have elevated CA 19.9 and the CT from the weekend showed a lesion at dome of liver that could be a hemangioma.  RALEIGH Alexis has ordered a PET scan but that is not scheduled yet.  WE are hoping to get it done within the week.  I advised patient per Dr. Corrales that she would need to contact Dr. Hunt for additional pain meds.  I also advised that she work on constipation as the CT did show that as well.  She said she is working on that.  She also stated there was no way to do the PET without some kind of sedation as the CT scan  hurt when she had to lay on her back.  Dr Corrales is willing to write for xanax when the scan is scheduled but I explained that it is outpatient so they cannot do versed as she requests. Patient did verbalize understanding.

## 2023-12-05 NOTE — TELEPHONE ENCOUNTER
Patient left a voicemail she is in extreme pain she went to the ER Saturday, and they did a CT scan which showed some progression. She is almost out of Lortab. Please call.

## 2023-12-05 NOTE — OUTREACH NOTE
AMBULATORY CASE MANAGEMENT NOTE    Name and Relationship of Patient/Support Person: ChungFranciscaSilvia D - Self  Self    Patient Outreach  [facility] ED visit 12/02/2023: Constipation, unspecified, hepatic lesion, renal lesion,left upper quadrant pain. Patient complaints of pain and nausea. Found to have UTI. Prescribed cephalexin 500 mg BID X 7 days and ondansetron 4 mg every 8 hours as needed.  Patient states pain remains an issue, taking hydrocodone  mg tablets TID with some relief.  Patient working on regulating bowels, reviewed bowel regimen with patient, patient taking lactulose BID, advised her prescription was written for TID.  Patient states she will increase dose.  Regimen also includes Miralax and colace. Patient encouraged to increase fluid intake.  Has PET scheduled for 12/18/2023, patient anxious for results.        Education Documentation  Medication Management - Pain medication, bowel regimen taught by Sharon Mora, LOREN at 12/5/2023  2:44 PM.  Learner: Patient  Readiness: Acceptance  Method: Explanation  Response: Verbalizes Understanding      Unresolved/Worsening Symptoms - pain taught by Sharon Mora, LOREN at 12/5/2023  2:41 PM.  Learner: Patient  Readiness: Acceptance  Method: Explanation  Response: Verbalizes Understanding          Sharon FALCON  Ambulatory Case Management    12/5/2023, 14:44 EST  
Cardiac

## 2023-12-12 ENCOUNTER — READMISSION MANAGEMENT (OUTPATIENT)
Dept: CALL CENTER | Facility: HOSPITAL | Age: 73
End: 2023-12-12
Payer: MEDICARE

## 2023-12-12 NOTE — OUTREACH NOTE
Prep Survey      Flowsheet Row Responses   [facility] facility patient discharged from? Non-BH   Is LACE score < 7 ? Non-BH Discharge   Eligibility [facility]   Date of Admission 12/07/23   Date of Discharge 12/11/23   Discharge Disposition Home or Self Care   Discharge diagnosis Generalized abdominal pain   Does the patient have one of the following disease processes/diagnoses(primary or secondary)? Other   Prep survey completed? Yes            Mariana HORTON - Registered Nurse

## 2023-12-13 ENCOUNTER — TRANSITIONAL CARE MANAGEMENT TELEPHONE ENCOUNTER (OUTPATIENT)
Dept: CALL CENTER | Facility: HOSPITAL | Age: 73
End: 2023-12-13
Payer: MEDICARE

## 2023-12-13 NOTE — OUTREACH NOTE
Call Center TCM Note      Flowsheet Row Responses   [facility] patient discharged from? Non-   Does the patient have one of the following disease processes/diagnoses(primary or secondary)? Other   TCM attempt successful? Yes   Call start time 1508   Call end time 1509   Discharge diagnosis Generalized abdominal pain   Meds reviewed with patient/caregiver? Yes   Prescription comments Patient states she is going to call the pain clinic to see if she can get a prescription for pain medication.   Is the patient taking all medications as directed (includes completed medication regime)? Yes   Does the patient have an appointment with their PCP within 7-14 days of discharge? No   Nursing Interventions Patient declined scheduling/rescheduling appointment at this time   Has home health visited the patient within 72 hours of discharge? N/A   Psychosocial issues? No   What is the patient's perception of their health status since discharge? Same   Is the patient/caregiver able to teach back signs and symptoms related to disease process for when to call PCP? Yes   Is the patient/caregiver able to teach back signs and symptoms related to disease process for when to call 911? Yes   Is the patient/caregiver able to teach back the hierarchy of who to call/visit for symptoms/problems? PCP, Specialist, Home health nurse, Urgent Care, ED, 911 Yes   If the patient is a current smoker, are they able to teach back resources for cessation? Not a smoker   TCM call completed? Yes   Call end time 1509   Would this patient benefit from a Referral to Amb Social Work? No   Is the patient interested in additional calls from an ambulatory ? No            Kaitlynn Horner LPN    12/13/2023, 15:12 EST

## 2023-12-13 NOTE — OUTREACH NOTE
Call Center TCM Note      Flowsheet Row Responses   [facility] facility patient discharged from? Non-BH   Does the patient have one of the following disease processes/diagnoses(primary or secondary)? Other   TCM attempt successful? No   Unsuccessful attempts Attempt 1            Kaitlynn Horner LPN    12/13/2023, 12:05 EST

## 2023-12-26 ENCOUNTER — PATIENT OUTREACH (OUTPATIENT)
Dept: CASE MANAGEMENT | Facility: OTHER | Age: 73
End: 2023-12-26
Payer: MEDICARE

## 2023-12-26 NOTE — OUTREACH NOTE
"AMBULATORY CASE MANAGEMENT NOTE    Name and Relationship of Patient/Support Person: Silvia Chung D - Self  Self    Patient Outreach    Patient reports she remains tired. Denies pain other than her \"normal\" back ache, denies use of pain medication.  Ambulating without difficulty.  Reports she is eating and drinking well. Is experiencing some constipation, states she is \"working on it\" - taking lactulose as directed.  Denies needs or concerns.    Sharon FALCON  Ambulatory Case Management    12/26/2023, 12:30 EST  "

## 2023-12-30 ENCOUNTER — APPOINTMENT (OUTPATIENT)
Dept: CT IMAGING | Facility: HOSPITAL | Age: 73
End: 2023-12-30
Payer: MEDICARE

## 2023-12-30 ENCOUNTER — HOSPITAL ENCOUNTER (EMERGENCY)
Facility: HOSPITAL | Age: 73
Discharge: HOME | End: 2023-12-30
Attending: EMERGENCY MEDICINE
Payer: MEDICARE

## 2023-12-30 VITALS
SYSTOLIC BLOOD PRESSURE: 149 MMHG | WEIGHT: 132 LBS | HEART RATE: 101 BPM | HEIGHT: 68 IN | RESPIRATION RATE: 18 BRPM | OXYGEN SATURATION: 97 % | TEMPERATURE: 99.6 F | DIASTOLIC BLOOD PRESSURE: 91 MMHG | BODY MASS INDEX: 20 KG/M2

## 2023-12-30 DIAGNOSIS — R22.2 PARASPINAL MASS: Primary | ICD-10-CM

## 2023-12-30 DIAGNOSIS — D18.02 BRAIN HEMANGIOMA: ICD-10-CM

## 2023-12-30 LAB
ALBUMIN SERPL-MCNC: 4 G/DL (ref 3.5–5.2)
ALBUMIN/GLOB SERPL: 1.2 G/DL
ALP SERPL-CCNC: 117 U/L (ref 39–117)
ALT SERPL W P-5'-P-CCNC: 8 U/L (ref 1–33)
ANION GAP SERPL CALCULATED.3IONS-SCNC: 12 MMOL/L (ref 5–15)
AST SERPL-CCNC: 20 U/L (ref 1–32)
BACTERIA UR QL AUTO: ABNORMAL /HPF
BASOPHILS # BLD AUTO: 0.07 10*3/MM3 (ref 0–0.2)
BASOPHILS NFR BLD AUTO: 0.6 % (ref 0–1.5)
BILIRUB SERPL-MCNC: 0.3 MG/DL (ref 0–1.2)
BILIRUB UR QL STRIP: NEGATIVE
BUN SERPL-MCNC: 7 MG/DL (ref 8–23)
BUN/CREAT SERPL: 9.1 (ref 7–25)
CALCIUM SPEC-SCNC: 9.6 MG/DL (ref 8.6–10.5)
CHLORIDE SERPL-SCNC: 93 MMOL/L (ref 98–107)
CLARITY UR: ABNORMAL
CO2 SERPL-SCNC: 29 MMOL/L (ref 22–29)
COLOR UR: YELLOW
CREAT SERPL-MCNC: 0.77 MG/DL (ref 0.57–1)
DEPRECATED RDW RBC AUTO: 60.6 FL (ref 37–54)
EGFRCR SERPLBLD CKD-EPI 2021: 82.6 ML/MIN/1.73
EOSINOPHIL # BLD AUTO: 0.2 10*3/MM3 (ref 0–0.4)
EOSINOPHIL NFR BLD AUTO: 1.8 % (ref 0.3–6.2)
ERYTHROCYTE [DISTWIDTH] IN BLOOD BY AUTOMATED COUNT: 16 % (ref 12.3–15.4)
GLOBULIN UR ELPH-MCNC: 3.4 GM/DL
GLUCOSE SERPL-MCNC: 149 MG/DL (ref 65–99)
GLUCOSE UR STRIP-MCNC: NEGATIVE MG/DL
HCT VFR BLD AUTO: 33.5 % (ref 34–46.6)
HGB BLD-MCNC: 11.5 G/DL (ref 12–15.9)
HGB UR QL STRIP.AUTO: NEGATIVE
HOLD SPECIMEN: NORMAL
HYALINE CASTS UR QL AUTO: ABNORMAL /LPF
IMM GRANULOCYTES # BLD AUTO: 0.14 10*3/MM3 (ref 0–0.05)
IMM GRANULOCYTES NFR BLD AUTO: 1.3 % (ref 0–0.5)
INR PPP: 0.92 (ref 0.9–1.1)
KETONES UR QL STRIP: NEGATIVE
LEUKOCYTE ESTERASE UR QL STRIP.AUTO: ABNORMAL
LYMPHOCYTES # BLD AUTO: 1.84 10*3/MM3 (ref 0.7–3.1)
LYMPHOCYTES NFR BLD AUTO: 16.5 % (ref 19.6–45.3)
MCH RBC QN AUTO: 35.6 PG (ref 26.6–33)
MCHC RBC AUTO-ENTMCNC: 34.3 G/DL (ref 31.5–35.7)
MCV RBC AUTO: 103.7 FL (ref 79–97)
MONOCYTES # BLD AUTO: 1.61 10*3/MM3 (ref 0.1–0.9)
MONOCYTES NFR BLD AUTO: 14.4 % (ref 5–12)
NEUTROPHILS NFR BLD AUTO: 65.4 % (ref 42.7–76)
NEUTROPHILS NFR BLD AUTO: 7.3 10*3/MM3 (ref 1.7–7)
NITRITE UR QL STRIP: NEGATIVE
NRBC BLD AUTO-RTO: 0 /100 WBC (ref 0–0.2)
PH UR STRIP.AUTO: 6 [PH] (ref 5–8)
PLATELET # BLD AUTO: 421 10*3/MM3 (ref 140–450)
PMV BLD AUTO: 9.3 FL (ref 6–12)
POTASSIUM SERPL-SCNC: 3.9 MMOL/L (ref 3.5–5.2)
PROT SERPL-MCNC: 7.4 G/DL (ref 6–8.5)
PROT UR QL STRIP: NEGATIVE
PROTHROMBIN TIME: 12.8 SECONDS (ref 12.3–15.1)
RBC # BLD AUTO: 3.23 10*6/MM3 (ref 3.77–5.28)
RBC # UR STRIP: ABNORMAL /HPF
REF LAB TEST METHOD: ABNORMAL
SODIUM SERPL-SCNC: 134 MMOL/L (ref 136–145)
SP GR UR STRIP: 1.01 (ref 1–1.03)
SQUAMOUS #/AREA URNS HPF: ABNORMAL /HPF
TRANS CELLS #/AREA URNS HPF: ABNORMAL /HPF
TROPONIN T SERPL HS-MCNC: 29 NG/L
UROBILINOGEN UR QL STRIP: ABNORMAL
WBC # UR STRIP: ABNORMAL /HPF
WBC NRBC COR # BLD AUTO: 11.16 10*3/MM3 (ref 3.4–10.8)

## 2023-12-30 PROCEDURE — 85025 COMPLETE CBC W/AUTO DIFF WBC: CPT | Performed by: NURSE PRACTITIONER

## 2023-12-30 PROCEDURE — 80053 COMPREHEN METABOLIC PANEL: CPT | Performed by: NURSE PRACTITIONER

## 2023-12-30 PROCEDURE — 93005 ELECTROCARDIOGRAM TRACING: CPT | Performed by: EMERGENCY MEDICINE

## 2023-12-30 PROCEDURE — 99284 EMERGENCY DEPT VISIT MOD MDM: CPT

## 2023-12-30 PROCEDURE — 85610 PROTHROMBIN TIME: CPT | Performed by: NURSE PRACTITIONER

## 2023-12-30 PROCEDURE — 72128 CT CHEST SPINE W/O DYE: CPT

## 2023-12-30 PROCEDURE — 25010000002 MORPHINE PER 10 MG: Performed by: EMERGENCY MEDICINE

## 2023-12-30 PROCEDURE — 84484 ASSAY OF TROPONIN QUANT: CPT | Performed by: NURSE PRACTITIONER

## 2023-12-30 PROCEDURE — 72131 CT LUMBAR SPINE W/O DYE: CPT

## 2023-12-30 PROCEDURE — 36415 COLL VENOUS BLD VENIPUNCTURE: CPT

## 2023-12-30 PROCEDURE — 96376 TX/PRO/DX INJ SAME DRUG ADON: CPT

## 2023-12-30 PROCEDURE — 96374 THER/PROPH/DIAG INJ IV PUSH: CPT

## 2023-12-30 PROCEDURE — 81001 URINALYSIS AUTO W/SCOPE: CPT | Performed by: NURSE PRACTITIONER

## 2023-12-30 PROCEDURE — 70450 CT HEAD/BRAIN W/O DYE: CPT

## 2023-12-30 RX ORDER — MORPHINE SULFATE 2 MG/ML
2 INJECTION, SOLUTION INTRAMUSCULAR; INTRAVENOUS ONCE
Status: COMPLETED | OUTPATIENT
Start: 2023-12-30 | End: 2023-12-30

## 2023-12-30 RX ORDER — HYDROCODONE BITARTRATE AND ACETAMINOPHEN 10; 325 MG/1; MG/1
1 TABLET ORAL ONCE
Status: COMPLETED | OUTPATIENT
Start: 2023-12-30 | End: 2023-12-30

## 2023-12-30 RX ORDER — HYDROCODONE BITARTRATE AND ACETAMINOPHEN 10; 325 MG/1; MG/1
1 TABLET ORAL EVERY 6 HOURS PRN
Qty: 12 TABLET | Refills: 0 | Status: SHIPPED | OUTPATIENT
Start: 2023-12-30 | End: 2024-01-02

## 2023-12-30 RX ADMIN — MORPHINE SULFATE 2 MG: 2 INJECTION, SOLUTION INTRAMUSCULAR; INTRAVENOUS at 13:37

## 2023-12-30 RX ADMIN — MORPHINE SULFATE 2 MG: 2 INJECTION, SOLUTION INTRAMUSCULAR; INTRAVENOUS at 10:40

## 2023-12-30 RX ADMIN — HYDROCODONE BITARTRATE AND ACETAMINOPHEN 1 TABLET: 10; 325 TABLET ORAL at 11:32

## 2023-12-30 NOTE — ED PROVIDER NOTES
Subjective:  History of Present Illness:    Patient is a 73-year-old female with history of pancreatic cancer.  Presents to the ER today status post fall.  Patient reports pain to the thoracic lumbar spine and head.  Reports that she did have positive LOC.  Denies nausea vomiting.  Denies confusion at this time.  She is alert and oriented x 4.  Reports that she did have recent elevated cancer markers.  He is working with Dr. Phelan.  Denies OTC medication home remedy.  Denies alleviating or exacerbating factors.    Nurses Notes reviewed and agree, including vitals, allergies, social history and prior medical history.     REVIEW OF SYSTEMS: All systems reviewed and not pertinent unless noted.  Review of Systems   Musculoskeletal:  Positive for back pain.   All other systems reviewed and are negative.      Past Medical History:   Diagnosis Date    Asthma     Hyperlipidemia     Hypertension     Impaired functional mobility, balance, gait, and endurance     Pancreatic cancer 2022    Parkinson disease     PONV (postoperative nausea and vomiting)     Scoliosis     Seasonal allergies        Allergies:    Patient has no known allergies.      Past Surgical History:   Procedure Laterality Date    ADENOIDECTOMY      BREAST BIOPSY Right 2019    Needle biopsy    CATARACT EXTRACTION Bilateral     CHOLECYSTECTOMY OPEN  12/19/2022    done at     COLONOSCOPY      FOOT SURGERY Left     torn ligament, screws placed.    HIP HEMIARTHROPLASTY Left 10/21/2020    Procedure: HIP HEMIARTHROPLASTY LEFT;  Surgeon: Humza Winters MD;  Location: [facility] OR;  Service: Orthopedics;  Laterality: Left;    LYMPHADENECTOMY  12/19/2022    partial, done at     NECK SURGERY      four fusions    OMENTECTOMY  12/19/2022    done at     PANCREATECTOMY  12/19/2022    done at , left adrenolectomy also performed    PORTACATH PLACEMENT Right 02/09/2023    Procedure: INSERTION OF PORTACATH;  Surgeon: Marcio Garcia MD;  Location: [facility] OR;  Service:  "General;  Laterality: Right;    SPLENECTOMY  12/19/2022    done at     TONSILLECTOMY           Social History     Socioeconomic History    Marital status:    Tobacco Use    Smoking status: Never    Smokeless tobacco: Never   Vaping Use    Vaping Use: Never used   Substance and Sexual Activity    Alcohol use: Not Currently     Comment: rare    Drug use: Defer    Sexual activity: Defer         Family History   Problem Relation Age of Onset    Heart failure Father     Lung cancer Father     Cancer Brother     Breast cancer Maternal Grandmother     Breast cancer Paternal Grandmother     Breast cancer Cousin     Ovarian cancer Neg Hx        Objective  Physical Exam:  /91   Pulse 105   Temp 99.6 °F (37.6 °C) (Oral)   Resp 18   Ht 172.7 cm (68\")   Wt 59.9 kg (132 lb)   SpO2 95%   BMI 20.07 kg/m²      Physical Exam  Vitals and nursing note reviewed.   Constitutional:       Appearance: Normal appearance. She is normal weight.   HENT:      Head: Normocephalic and atraumatic.      Nose: Nose normal.      Mouth/Throat:      Mouth: Mucous membranes are moist.      Pharynx: Oropharynx is clear.   Eyes:      Extraocular Movements: Extraocular movements intact.      Conjunctiva/sclera: Conjunctivae normal.      Pupils: Pupils are equal, round, and reactive to light.   Cardiovascular:      Rate and Rhythm: Normal rate and regular rhythm.   Pulmonary:      Effort: Pulmonary effort is normal.      Breath sounds: Normal breath sounds.   Abdominal:      General: Abdomen is flat. Bowel sounds are normal.      Palpations: Abdomen is soft.   Musculoskeletal:         General: Normal range of motion.      Cervical back: Normal range of motion and neck supple.      Comments: There is tenderness noted with palpation along the lumbar and thoracic spine.   Skin:     General: Skin is warm and dry.      Capillary Refill: Capillary refill takes less than 2 seconds.   Neurological:      General: No focal deficit present.      " Mental Status: She is alert and oriented to person, place, and time. Mental status is at baseline.   Psychiatric:         Mood and Affect: Mood normal.         Behavior: Behavior normal.         Thought Content: Thought content normal.         Judgment: Judgment normal.         Procedures    ED Course:    ED Course as of 12/30/23 1338   Sat Dec 30, 2023   1030 EKG interpreted by me reveals sinus tachycardia low voltage EKG rate of 120 nonspecific T wave changes no ectopy no ischemic changes [PF]      ED Course User Index  [PF] Shane Gomez,        Lab Results (last 24 hours)       Procedure Component Value Units Date/Time    CBC Auto Differential [[id number]]  (Abnormal) Collected: 12/30/23 1016    Specimen: Blood Updated: 12/30/23 1023     WBC 11.16 10*3/mm3      RBC 3.23 10*6/mm3      Hemoglobin 11.5 g/dL      Hematocrit 33.5 %      .7 fL      MCH 35.6 pg      MCHC 34.3 g/dL      RDW 16.0 %      RDW-SD 60.6 fl      MPV 9.3 fL      Platelets 421 10*3/mm3      Neutrophil % 65.4 %      Lymphocyte % 16.5 %      Monocyte % 14.4 %      Eosinophil % 1.8 %      Basophil % 0.6 %      Immature Grans % 1.3 %      Neutrophils, Absolute 7.30 10*3/mm3      Lymphocytes, Absolute 1.84 10*3/mm3      Monocytes, Absolute 1.61 10*3/mm3      Eosinophils, Absolute 0.20 10*3/mm3      Basophils, Absolute 0.07 10*3/mm3      Immature Grans, Absolute 0.14 10*3/mm3      nRBC 0.0 /100 WBC     Comprehensive Metabolic Panel [[id number]]  (Abnormal) Collected: 12/30/23 1016    Specimen: Blood Updated: 12/30/23 1041     Glucose 149 mg/dL      BUN 7 mg/dL      Creatinine 0.77 mg/dL      Sodium 134 mmol/L      Potassium 3.9 mmol/L      Chloride 93 mmol/L      CO2 29.0 mmol/L      Calcium 9.6 mg/dL      Total Protein 7.4 g/dL      Albumin 4.0 g/dL      ALT (SGPT) 8 U/L      AST (SGOT) 20 U/L      Alkaline Phosphatase 117 U/L      Total Bilirubin 0.3 mg/dL      Globulin 3.4 gm/dL      A/G Ratio 1.2 g/dL      BUN/Creatinine Ratio 9.1      Anion Gap 12.0 mmol/L      eGFR 82.6 mL/min/1.73     Narrative:      GFR Normal >60  Chronic Kidney Disease <60  Kidney Failure <15    The GFR formula is only valid for adults with stable renal function between ages 18 and 70.    Protime-INR [[id number]]  (Normal) Collected: 12/30/23 1016    Specimen: Blood from Arm, Left Updated: 12/30/23 1038     Protime 12.8 Seconds      INR 0.92    Narrative:      Suggested INR therapeutic range for stable oral anticoagulant therapy:    Low Intensity therapy:   1.5-2.0  Moderate Intensity therapy:   2.0-3.0  High Intensity therapy:   2.5-4.0    Single High Sensitivity Troponin T [[id number]]  (Abnormal) Collected: 12/30/23 1016    Specimen: Blood Updated: 12/30/23 1246     HS Troponin T 29 ng/L     Narrative:      High Sensitive Troponin T Reference Range:  <14.0 ng/L- Negative Female for AMI  <22.0 ng/L- Negative Male for AMI  >=14 - Abnormal Female indicating possible myocardial injury.  >=22 - Abnormal Male indicating possible myocardial injury.   Clinicians would have to utilize clinical acumen, EKG, Troponin, and serial changes to determine if it is an Acute Myocardial Infarction or myocardial injury due to an underlying chronic condition.         Urinalysis With Microscopic If Indicated (No Culture) - Urine, Clean Catch [[id number]]  (Abnormal) Collected: 12/30/23 1034    Specimen: Urine, Clean Catch Updated: 12/30/23 1044     Color, UA Yellow     Appearance, UA Cloudy     pH, UA 6.0     Specific Gravity, UA 1.010     Glucose, UA Negative     Ketones, UA Negative     Bilirubin, UA Negative     Blood, UA Negative     Protein, UA Negative     Leuk Esterase, UA Large (3+)     Nitrite, UA Negative     Urobilinogen, UA 0.2 E.U./dL    Urinalysis, Microscopic Only - Urine, Clean Catch [[id number]]  (Abnormal) Collected: 12/30/23 1034    Specimen: Urine, Clean Catch Updated: 12/30/23 1046     RBC, UA None Seen /HPF      WBC, UA 21-50 /HPF      Bacteria, UA None Seen /HPF      Squamous  Epithelial Cells, UA 0-2 /HPF      Transitional Epithelial Cells, UA 0-2 /HPF      Hyaline Casts, UA None Seen /LPF      Methodology Manual Light Microscopy             CT Thoracic Spine Without Contrast    Result Date: 12/30/2023  PROCEDURE: CT THORACIC SPINE WO CONTRAST-  HISTORY: Back pain  FINDINGS: Axial CT images of the thoracic spine were obtained without contrast. Sagittal and Coronal reformatted images were also obtained. This study was performed with techniques to keep radiation doses as low as reasonably achievable, (ALARA). Individualized dose reduction techniques using automated exposure control or adjustment of mA and/or kV according to the patient size were employed.  There is no evidence of fracture. The vertebral alignment is normal. Mild and moderate multilevel degenerative changes are noted. A Schmorl's node is seen involving the inferior endplate of T11. A left paraspinous mass is seen centered at T9 which measures 4.4 x 2.6 cm in maximum axial dimension. This extends into the left T9-T10 neural foramen and causes erosion of the left T9 spinous process. This is consistent with neoplastic involvement. Left retrocrural adenopathy is noted likely representing metastatic adenopathy.      Impression: No fracture or acute bony abnormality.  4.4 cm left paraspinous mass centered at T9 consistent with neoplastic involvement.  Left retrocrural adenopathy, likely metastatic adenopathy.     CTDI: 23.53 mGy,21.98 mGy DLP:598.56 mGy.cm  This report was signed and finalized on 12/30/2023 11:57 AM by José Luis Guy MD.      CT Lumbar Spine Without Contrast    Result Date: 12/30/2023  PROCEDURE: CT LUMBAR SPINE WO CONTRAST-  HISTORY: Low back pain   COMPARISON: none  TECHNIQUE: Axial imaging of the lumbar spine was obtained without contrast. Sagittal and coronal reformatted images were also obtained and reviewed. This study was performed with techniques to keep radiation doses as low as reasonably achievable,  (ALARA). Individualized dose reduction techniques using automated exposure control or adjustment of mA and/or kV according to the patient size were employed.   FINDINGS: There is no fracture. Severe dextroscoliosis is seen. There is right lateral subluxation of L2 in relation to L1 and L3 in relation to L4 and L4 in relation to L5.. Severe disc space narrowing is seen at multiple levels. There is no evidence of significant central canal stenosis..  L1-2: An annular bulge is present with vertebral osteophytes and bilateral facet arthropathy. Moderate right and severe left neural foraminal narrowing is seen..  L2-3: An annular bulge is present with vertebral osteophytes and bilateral facet arthropathy. Mild right and severe left neural foraminal narrowing is present..  L3-4: An annular bulge is present with vertebral osteophytes and bilateral facet arthropathy. Mild right and severe left neural foraminal narrowing is seen.  L4-5: An annular bulge is present with vertebral osteophytes and bilateral facet arthropathy. Severe right and moderate left neural foraminal narrowing is noted..  L5-S1: An annular bulge is present with bilateral facet arthropathy. Severe right and mild left neural foraminal narrowing is noted..      Impression: Multilevel severe degenerative disc disease and spondylosis with multilevel neural foraminal narrowing left greater than right.  No fracture or acute bony abnormality identified.       CTDI: 23.53 mGy,21.98 mGy DLP:598.56 mGy.cm  This report was signed and finalized on 12/30/2023 11:50 AM by José Luis Guy MD.      CT Head Without Contrast    Result Date: 12/30/2023  PROCEDURE: CT HEAD WO CONTRAST-  HISTORY: Head injury   COMPARISON: None  TECHNIQUE: Multiple axial CT images were performed from the foramen magnum to the vertex without contrast. Coronal reformatted images were also obtained.This study was performed with techniques to keep radiation doses as low as reasonably achievable,  (ALARA). Individualized dose reduction techniques using automated exposure control or adjustment of mA and/or kV according to the patient size were employed.   FINDINGS: There is generalized age appropriate atrophy. Areas of low attenuation are seen in the cerebral white matter consistent with mild chronic ischemic changes. Chronic calcifications are seen in the bilateral basal ganglia and bilateral cerebellar hemispheres. The ventricles are normal in size. There is no evidence of hemorrhage . A 7 mm right parafalcine mass is seen likely representing a small incidental meningioma..  No abnormal extra-axial fluid collection is seen.  There is no evidence of shift of the midline structures. Images of the sinuses reveal no evidence of mucosal thickening. No bony abnormality is seen on the bone window images.      Impression: No acute intracranial abnormality.  7 mm probable right parafalcine meningioma. If indicated, MRI head with contrast may be helpful for further evaluation.    CTDI: 23.53 mGy,21.98 mGy DLP:598.56 mGy.cm  This report was signed and finalized on 12/30/2023 11:45 AM by José Luis Guy MD.          MDM      Initial impression of presenting illness: Patient is a 73-year-old female with history of pancreatic cancer.  Presents to the ER today status post fall.  Patient reports pain to the thoracic lumbar spine and head.  Reports that she did have positive LOC.  Denies nausea vomiting.  Denies confusion at this time.  She is alert and oriented x 4.  Reports that she did have recent elevated cancer markers.  He is working with Dr. Phelan.  Denies OTC medication home remedy.  Denies alleviating or exacerbating factors.    DDX: includes but is not limited to: Strain, sprain, fracture or other    Patient arrives stable with vitals interpreted by myself.     Pertinent features from physical exam: There was tenderness noted along thoracic and lumbar spine.  Patient is alert and oriented x 4.  Otherwise assessment  was negative.    Initial diagnostic plan: CBC, CMP, troponin, urinalysis, CT of head, CT thoracic spine, CT lumbar spine    Results from initial plan were reviewed and interpreted by me revealing CBC with mild anemia.  CMP with decreased sodium and chloride.  Urinalysis with some leukocyte.  Troponin was mildly elevated but baseline for this patient.  CT of head with the following impression 7 mm probable right parafalcine meningioma angioma.  CT of thoracic spine with the following impression no fracture or bony abnormality.  4.4 cm left paraspinous mass centered at T9 consistent with neoplastic involvement.  Left retrocrural adenopathy, likely metastic adenopathy.  CT of lumbar spine with the following impression Multi level severe degenerative disc disease and spondylosis with multilevel neuroforaminal narrowing left greater than right.  No fracture or acute bony abnormality identified    Diagnostic information from other sources: Chart review    Interventions / Re-evaluation: Vital signs stable throughout encounter.  Patient received 2 mg of morphine initially.  Was still having pain.  Received 10 mg of hydrocodone.  Received 2 mg of morphine.  Patient reports improvement pain level    Results/clinical rationale were discussed with patient    Consultations/Discussion of results with other physicians: N/A    Disposition plan: Patient is hemodynamically stable nontoxic-appearing appropriate for discharge.  Will have patient follow-up with oncologist Dr. Phelan.  Will send home with pain management.  Patient to follow-up with PCP in 1 week.  Follow-up with ER for new or worsening symptoms.  -----        Final diagnoses:   Paraspinal mass   Brain hemangioma          Darrion Murguia, APRN  12/30/23 1757

## 2024-01-02 ENCOUNTER — TELEPHONE (OUTPATIENT)
Dept: ONCOLOGY | Facility: CLINIC | Age: 74
End: 2024-01-02
Payer: MEDICARE

## 2024-01-02 DIAGNOSIS — C25.1 MALIGNANT NEOPLASM OF BODY OF PANCREAS: Primary | ICD-10-CM

## 2024-01-02 RX ORDER — PACLITAXEL 100 MG/20ML
125 INJECTION, POWDER, LYOPHILIZED, FOR SUSPENSION INTRAVENOUS ONCE
OUTPATIENT
Start: 2024-01-09

## 2024-01-02 RX ORDER — SODIUM CHLORIDE 9 MG/ML
20 INJECTION, SOLUTION INTRAVENOUS ONCE
OUTPATIENT
Start: 2024-01-23

## 2024-01-02 RX ORDER — PACLITAXEL 100 MG/20ML
125 INJECTION, POWDER, LYOPHILIZED, FOR SUSPENSION INTRAVENOUS ONCE
OUTPATIENT
Start: 2024-01-23

## 2024-01-02 RX ORDER — SODIUM CHLORIDE 9 MG/ML
20 INJECTION, SOLUTION INTRAVENOUS ONCE
OUTPATIENT
Start: 2024-01-09

## 2024-01-02 NOTE — PROGRESS NOTES
Called patient regarding her recent ER visit and CT scan findings.  There is a 4 cm paraspinal mass at T9, presumably the cause of some of the pain she has been having.  There is also suggestion of retroperitoneal nodes.  I think this is almost certainly related to her pancreas cancer.  We discussed options for second line chemotherapy with Gemzar and Abraxane.  We also discussed the option of no chemotherapy and just symptom focused management.  She says she would like to at least try the chemo and see how she tolerates it.    Her PET scan has been rescheduled for January 15.  We will keep this as is.

## 2024-01-02 NOTE — TELEPHONE ENCOUNTER
Patient left a voicemail she fell and went to the ER they did a CT scan, and told her it showed a new tumor by her pancreas. She wants to know if Dr. Corrales has reviewed this? Please call.

## 2024-01-03 DIAGNOSIS — C25.1 MALIGNANT NEOPLASM OF BODY OF PANCREAS: Primary | ICD-10-CM

## 2024-01-03 RX ORDER — HYDROCODONE BITARTRATE AND ACETAMINOPHEN 10; 325 MG/1; MG/1
1 TABLET ORAL 2 TIMES DAILY
Qty: 60 TABLET | Refills: 0 | Status: SHIPPED | OUTPATIENT
Start: 2024-01-03

## 2024-01-03 NOTE — TELEPHONE ENCOUNTER
Caller: Silvia Chung    Relationship: Self    Best call back number: [phone]    Requested Prescriptions:   Requested Prescriptions     Pending Prescriptions Disp Refills    HYDROcodone-acetaminophen (NORCO)  MG per tablet       Sig: Take 1 tablet by mouth 2 (Two) Times a Day.        Pharmacy where request should be sent: [id number], [place] - [address] - [phone] [facility] [phone]      Last office visit with prescribing clinician: 8/22/2023   Last telemedicine visit with prescribing clinician: Visit date not found   Next office visit with prescribing clinician: 1/30/2024     Additional details provided by patient: SHE IS COMPLETELY OUT.    Does the patient have less than a 3 day supply:  [x] Yes  [] No    Would you like a call back once the refill request has been completed: [] Yes [x] No    If the office needs to give you a call back, can they leave a voicemail: [] Yes [x] No    Mayi Reyes Rep   01/03/24 08:32 EST

## 2024-01-04 DIAGNOSIS — C25.1 MALIGNANT NEOPLASM OF BODY OF PANCREAS: Primary | ICD-10-CM

## 2024-01-04 DIAGNOSIS — G89.3 CANCER RELATED PAIN: ICD-10-CM

## 2024-01-08 ENCOUNTER — TELEPHONE (OUTPATIENT)
Dept: ONCOLOGY | Facility: CLINIC | Age: 74
End: 2024-01-08
Payer: MEDICARE

## 2024-01-08 NOTE — TELEPHONE ENCOUNTER
I talked with Dr. Corrales and she said we could offer patient a televisit appt Tuesday Jan 9 at 11:45.  Also, per Dr. Corrales, if patient is ready to talk about hospiice, we can do that.  I had to leave voicemail as patient did not answer.    Patient called back right after I had left voicemail and we discussed how she was doing and she said it was a big hardship for her to try and get out of the house and that her  was not doing well either.  I told her that I had cancelled her chemo education visit today and wanted to see if she could have a mychart visit with dr. Corrales tomorrow about what patient wants moving forward.  She wants to wait and talk with Dr. Corrales before doing a hospice referral.  She appreciated the offer of a televisit appt and said she would try and figure that out with her phone.  I told her we would contact her at 11:45 if it looked like she was not connected.  She verbalized understanding.

## 2024-01-08 NOTE — TELEPHONE ENCOUNTER
Patient left a voicemail she is too weak to come to the appointment today, wants to know when it will be time to talk Palliative or hospice care? Please call.

## 2024-01-09 ENCOUNTER — TELEMEDICINE (OUTPATIENT)
Dept: ONCOLOGY | Facility: CLINIC | Age: 74
End: 2024-01-09
Payer: MEDICARE

## 2024-01-09 DIAGNOSIS — C25.1 MALIGNANT NEOPLASM OF BODY OF PANCREAS: Primary | ICD-10-CM

## 2024-01-09 PROCEDURE — 99214 OFFICE O/P EST MOD 30 MIN: CPT | Performed by: INTERNAL MEDICINE

## 2024-01-09 PROCEDURE — 1125F AMNT PAIN NOTED PAIN PRSNT: CPT | Performed by: INTERNAL MEDICINE

## 2024-01-09 NOTE — PROGRESS NOTES
You have chosen to receive care through a telehealth visit.  Do you consent to use a video/audio connection for your medical care today? Yes        PROBLEM LIST:  1. 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  She has had GI side effects with sinemet for her PD so she associated this symptom with that medication.  She has lost about 10 lbs. CT a/p without contrast showed a 6.8 x 3.7 cm mass in the distal pancreatic body and tail with minimal surrounding inflammation.  Multiple borderline enalrged nodes near celiac axis and mesenteric axis.  CT a/p with contrast 12/1/22 showed a 5.6 x 3.6 cm mass in the tail and distal body of the pancreas, no adenopathy.  B) distal pancreatectomy and splenectomy performed on 12/19/2022.  Pathology showed poorly differentiated pancreatic adenosquamous carcinoma with involvement of adrenal gland by direct extension, 1 out of 5 lymph nodes involved.  Surgical margins negative. + lymphovascular invasion, + perineural invasion.  C) adjuvant mFOLFIRINOX started 2/13/23.  Completed 12 cycles.  D) disease recurrence noted on imaging - CT thoracic spine on 12/30/23 showed a 4.4 cm left paraspinous mass at T9 with erosion of the spinous process.  CA 19-9 increased to 82.  2. parkinsons disease  3. Hypertension  4. Hyperlipidemia  5. asthma    Subjective     CHIEF COMPLAINT: pancreas cancer    HISTORY OF PRESENT ILLNESS:   Silvia Chung returns for follow-up.  Yesterday she cancelled her appointments because she was feeling too weak and tired to come in.  Today she is feeling better.  She isn't sure what she wants to do in regards to treatment.  She is also wondering about radiotherapy.  She is taking the hydrocodone but sometimes needs it more than twice a day.          Objective      There were no vitals taken for this visit.     Performance  Status:1          General: frail appearing female in no acute distress  Neuro: alert and oriented  Psych: mood and affect appropriate                RECENT LABS:  Lab Results   Component Value Date    WBC 11.16 (H) 12/30/2023    HGB 11.5 (L) 12/30/2023    HCT 33.5 (L) 12/30/2023    .7 (H) 12/30/2023     12/30/2023       Lab Results   Component Value Date    GLUCOSE 149 (H) 12/30/2023    BUN 7 (L) 12/30/2023    CREATININE 0.77 12/30/2023    EGFRIFNONA 85 10/23/2020    EGFRIFAFRI 92 12/07/2023    BCR 9.1 12/30/2023    K 3.9 12/30/2023    CO2 29.0 12/30/2023    CALCIUM 9.6 12/30/2023    ALBUMIN 4.0 12/30/2023    AST 20 12/30/2023    ALT 8 12/30/2023       CT Thoracic Spine Without Contrast  Narrative: PROCEDURE: CT THORACIC SPINE WO CONTRAST-     HISTORY: Back pain     FINDINGS: Axial CT images of the thoracic spine were obtained without  contrast. Sagittal and Coronal reformatted images were also obtained.  This study was performed with techniques to keep radiation doses as low  as reasonably achievable, (ALARA). Individualized dose reduction  techniques using automated exposure control or adjustment of mA and/or  kV according to the patient size were employed.     There is no evidence of fracture. The vertebral alignment is normal.  Mild and moderate multilevel degenerative changes are noted. A Schmorl's  node is seen involving the inferior endplate of T11. A left paraspinous  mass is seen centered at T9 which measures 4.4 x 2.6 cm in maximum axial  dimension. This extends into the left T9-T10 neural foramen and causes  erosion of the left T9 spinous process. This is consistent with  neoplastic involvement. Left retrocrural adenopathy is noted likely  representing metastatic adenopathy.     Impression: No fracture or acute bony abnormality.     4.4 cm left paraspinous mass centered at T9 consistent with neoplastic  involvement.     Left retrocrural adenopathy, likely metastatic adenopathy.               CTDI: 23.53 mGy,21.98 mGy  DLP:598.56 mGy.cm     This report was signed and finalized on 12/30/2023 11:57 AM by José Luis Guy MD.     CT Lumbar Spine Without Contrast  Narrative: PROCEDURE: CT LUMBAR SPINE WO CONTRAST-     HISTORY: Low back pain        COMPARISON: none     TECHNIQUE: Axial imaging of the lumbar spine was obtained without  contrast. Sagittal and coronal reformatted images were also obtained and  reviewed. This study was performed with techniques to keep radiation  doses as low as reasonably achievable, (ALARA). Individualized dose  reduction techniques using automated exposure control or adjustment of  mA and/or kV according to the patient size were employed.        FINDINGS: There is no fracture. Severe dextroscoliosis is seen. There is  right lateral subluxation of L2 in relation to L1 and L3 in relation to  L4 and L4 in relation to L5.. Severe disc space narrowing is seen at  multiple levels. There is no evidence of significant central canal  stenosis..     L1-2: An annular bulge is present with vertebral osteophytes and  bilateral facet arthropathy. Moderate right and severe left neural  foraminal narrowing is seen..     L2-3: An annular bulge is present with vertebral osteophytes and  bilateral facet arthropathy. Mild right and severe left neural foraminal  narrowing is present..     L3-4: An annular bulge is present with vertebral osteophytes and  bilateral facet arthropathy. Mild right and severe left neural foraminal  narrowing is seen.     L4-5: An annular bulge is present with vertebral osteophytes and  bilateral facet arthropathy. Severe right and moderate left neural  foraminal narrowing is noted..     L5-S1: An annular bulge is present with bilateral facet arthropathy.  Severe right and mild left neural foraminal narrowing is noted..     Impression: Multilevel severe degenerative disc disease and spondylosis  with multilevel neural foraminal narrowing left greater than right.     No  fracture or acute bony abnormality identified.                    CTDI: 23.53 mGy,21.98 mGy  DLP:598.56 mGy.cm     This report was signed and finalized on 12/30/2023 11:50 AM by José Luis Guy MD.     CT Head Without Contrast  Narrative: PROCEDURE: CT HEAD WO CONTRAST-     HISTORY: Head injury        COMPARISON: None     TECHNIQUE: Multiple axial CT images were performed from the foramen  magnum to the vertex without contrast. Coronal reformatted images were  also obtained.This study was performed with techniques to keep radiation  doses as low as reasonably achievable, (ALARA). Individualized dose  reduction techniques using automated exposure control or adjustment of  mA and/or kV according to the patient size were employed.        FINDINGS: There is generalized age appropriate atrophy. Areas of low  attenuation are seen in the cerebral white matter consistent with mild  chronic ischemic changes. Chronic calcifications are seen in the  bilateral basal ganglia and bilateral cerebellar hemispheres. The  ventricles are normal in size. There is no evidence of hemorrhage . A 7  mm right parafalcine mass is seen likely representing a small incidental  meningioma..  No abnormal extra-axial fluid collection is seen.  There  is no evidence of shift of the midline structures. Images of the sinuses  reveal no evidence of mucosal thickening. No bony abnormality is seen on  the bone window images.     Impression: No acute intracranial abnormality.     7 mm probable right parafalcine meningioma. If indicated, MRI head with  contrast may be helpful for further evaluation.           CTDI: 23.53 mGy,21.98 mGy  DLP:598.56 mGy.cm     This report was signed and finalized on 12/30/2023 11:45 AM by José Luis Guy MD.               ASSESSMENT AND PLAN:     Silvia Chung is a 73 y.o. female with a T3N1M0 adenosquamous carcinoma of the pancreas, now with recurrent disease, who presents for discussion of treatment plan.    She  completed 12 cycles of adjuvant chemotherapy.   She now has disease recurrence with involvement of T9 causing pain.  PET/CT scheduled for next week for staging.    We discussed palliative chemotherapy versus supportive care.  After discussion she would like to try to chemotherapy to see how she tolerates it.    We will proceed with gemzar and abraxane.    Neuropathy: Continue gabapentin.  Discussed that there is some neuropathy risk with abraxane.    Diarrhea:  Continue Creon    Parkinson's: Continue regular medications.    Post splenectomy vaccines given at .    Cancer related pain: continue hydrocodone 10 mg up to 4 times a day as needed.  I will also refer her to rad onc for palliative RT.    F/u with second dose of chemotherapy.            Janell Corrales MD  [facility] On[facility]    1/9/2024          CC:

## 2024-01-10 ENCOUNTER — TELEPHONE (OUTPATIENT)
Dept: ONCOLOGY | Facility: CLINIC | Age: 74
End: 2024-01-10
Payer: MEDICARE

## 2024-01-10 DIAGNOSIS — C25.1 MALIGNANT NEOPLASM OF BODY OF PANCREAS: ICD-10-CM

## 2024-01-10 DIAGNOSIS — G89.3 CANCER RELATED PAIN: Primary | ICD-10-CM

## 2024-01-10 DIAGNOSIS — K59.09 OTHER CONSTIPATION: ICD-10-CM

## 2024-01-10 RX ORDER — LACTULOSE 10 G/15ML
SOLUTION ORAL
Qty: 473 ML | Refills: 1 | Status: SHIPPED | OUTPATIENT
Start: 2024-01-10

## 2024-01-10 RX ORDER — HYDROCODONE BITARTRATE AND ACETAMINOPHEN 10; 325 MG/1; MG/1
1 TABLET ORAL EVERY 4 HOURS PRN
Qty: 150 TABLET | Refills: 0 | Status: SHIPPED | OUTPATIENT
Start: 2024-01-10

## 2024-01-10 NOTE — TELEPHONE ENCOUNTER
I called patient back and she said that taking the norco twice a day is not holding her pain.  She had a televisit with Dr. Corrales yesterday and I advised her that they discussed increasing the times that she takes the norco up to 4 times a day.  She said if she does that she will run out of medication and she asked about a long acting pain control med.  I told her that I had talked with Dr. Corrales and we would like to see if the norco four times a day will be adequate for pain control before we add a long acting.  I told patient we would send a new script in with the increased dosing instructions and she verbalized understanding.

## 2024-01-10 NOTE — TELEPHONE ENCOUNTER
Patient left a voicemail she states the Loritab is wearing off, wants to know if another pain medication that would last longer could be called in? Please call.

## 2024-01-15 ENCOUNTER — TELEPHONE (OUTPATIENT)
Dept: ONCOLOGY | Facility: CLINIC | Age: 74
End: 2024-01-15
Payer: MEDICARE

## 2024-01-15 ENCOUNTER — HOSPITAL ENCOUNTER (OUTPATIENT)
Dept: PET IMAGING | Facility: HOSPITAL | Age: 74
Discharge: HOME | End: 2024-01-15
Payer: MEDICARE

## 2024-01-15 DIAGNOSIS — C25.1 MALIGNANT NEOPLASM OF BODY OF PANCREAS: ICD-10-CM

## 2024-01-15 DIAGNOSIS — R10.12 LEFT UPPER QUADRANT ABDOMINAL PAIN: ICD-10-CM

## 2024-01-15 DIAGNOSIS — R63.4 WEIGHT LOSS: ICD-10-CM

## 2024-01-15 LAB — GLUCOSE BLDC GLUCOMTR-MCNC: 117 MG/DL (ref 70–130)

## 2024-01-15 PROCEDURE — A9552 F18 FDG: HCPCS | Performed by: NURSE PRACTITIONER

## 2024-01-15 PROCEDURE — 78815 PET IMAGE W/CT SKULL-THIGH: CPT

## 2024-01-15 PROCEDURE — 82948 REAGENT STRIP/BLOOD GLUCOSE: CPT

## 2024-01-15 PROCEDURE — 0 FLUDEOXYGLUCOSE F18 SOLUTION: Performed by: NURSE PRACTITIONER

## 2024-01-15 RX ADMIN — FLUDEOXYGLUCOSE F 18 1 DOSE: 200 INJECTION, SOLUTION INTRAVENOUS at 14:14

## 2024-01-15 NOTE — PROGRESS NOTES
PROBLEM LIST:  1. [id number] adenosquamous carcinoma of the pancreatic body/tail  A) presented to the ED on 11/23/22 with LUQ pain, worsening over 3-4 months, associated with nausea and occasional vomiting.  She has had GI side effects with sinemet for her PD so she associated this symptom with that medication.  She has lost about 10 lbs. CT a/p without contrast showed a 6.8 x 3.7 cm mass in the distal pancreatic body and tail with minimal surrounding inflammation.  Multiple borderline enalrged nodes near celiac axis and mesenteric axis.  CT a/p with contrast 12/1/22 showed a 5.6 x 3.6 cm mass in the tail and distal body of the pancreas, no adenopathy.  B) distal pancreatectomy and splenectomy performed on 12/19/2022.  Pathology showed poorly differentiated pancreatic adenosquamous carcinoma with involvement of adrenal gland by direct extension, 1 out of 5 lymph nodes involved.  Surgical margins negative. + lymphovascular invasion, + perineural invasion.  C) adjuvant mFOLFIRINOX started 2/13/23.  Completed 12 cycles.  D) disease recurrence noted on imaging - CT thoracic spine on 12/30/23 showed a 4.4 cm left paraspinous mass at T9 with erosion of the spinous process.  CA 19-9 increased to 82.  2. parkinsons disease  3. Hypertension  4. Hyperlipidemia  5. asthma    Subjective     CHIEF COMPLAINT: pancreas cancer    HISTORY OF PRESENT ILLNESS:   Silvia Chung returns for follow-up.  She saw Dr. Triana on this past Friday.  She is due to start radiation tentatively set up for 1/23/2024 to her thoracic lesion.  Overall, she is feeling fairly well.  She was placed on Decadron and that has helped with her appetite as well as her general wellbeing.  She is eating much better.  She says she is feeling stronger.  She is having some trouble with constipation.  She continues taking hydrocodone.  She is concerned about her PET scan.          Objective      /100   Pulse 114   Temp 98 °F (36.7 °C)   Resp 16   Ht 172.7  "cm (68\")   Wt 62.1 kg (137 lb)   SpO2 98%   BMI 20.83 kg/m²      Performance Status:1          General: frail appearing female in no acute distress  Neuro: alert and oriented  HEENT: sclera anicteric, oropharynx clear  Lymphatics: no cervical, supraclavicular, or axillary adenopathy  Cardiovascular: regular rate and rhythm, no murmurs  Lungs: clear to auscultation bilaterally  Abdomen: soft, nontender, nondistended.  No palpable organomegaly  Extremeties: no lower extremity edema  Skin: no rashes, lesions, bruising, or petechiae  Psych: mood and affect appropriate                  RECENT LABS:  Lab Results   Component Value Date    WBC 11.16 (H) 12/30/2023    HGB 11.5 (L) 12/30/2023    HCT 33.5 (L) 12/30/2023    .7 (H) 12/30/2023     12/30/2023       Lab Results   Component Value Date    GLUCOSE 149 (H) 12/30/2023    BUN 7 (L) 12/30/2023    CREATININE 0.77 12/30/2023    EGFRIFNONA 85 10/23/2020    EGFRIFAFRI 92 12/07/2023    BCR 9.1 12/30/2023    K 3.9 12/30/2023    CO2 29.0 12/30/2023    CALCIUM 9.6 12/30/2023    ALBUMIN 4.0 12/30/2023    AST 20 12/30/2023    ALT 8 12/30/2023       CT Thoracic Spine Without Contrast  Narrative: PROCEDURE: CT THORACIC SPINE WO CONTRAST-     HISTORY: Back pain     FINDINGS: Axial CT images of the thoracic spine were obtained without  contrast. Sagittal and Coronal reformatted images were also obtained.  This study was performed with techniques to keep radiation doses as low  as reasonably achievable, (ALARA). Individualized dose reduction  techniques using automated exposure control or adjustment of mA and/or  kV according to the patient size were employed.     There is no evidence of fracture. The vertebral alignment is normal.  Mild and moderate multilevel degenerative changes are noted. A Schmorl's  node is seen involving the inferior endplate of T11. A left paraspinous  mass is seen centered at T9 which measures 4.4 x 2.6 cm in maximum axial  dimension. This " extends into the left T9-T10 neural foramen and causes  erosion of the left T9 spinous process. This is consistent with  neoplastic involvement. Left retrocrural adenopathy is noted likely  representing metastatic adenopathy.     Impression: No fracture or acute bony abnormality.     4.4 cm left paraspinous mass centered at T9 consistent with neoplastic  involvement.     Left retrocrural adenopathy, likely metastatic adenopathy.              CTDI: 23.53 mGy,21.98 mGy  DLP:598.56 mGy.cm     This report was signed and finalized on 12/30/2023 11:57 AM by José Luis Guy MD.     CT Lumbar Spine Without Contrast  Narrative: PROCEDURE: CT LUMBAR SPINE WO CONTRAST-     HISTORY: Low back pain        COMPARISON: none     TECHNIQUE: Axial imaging of the lumbar spine was obtained without  contrast. Sagittal and coronal reformatted images were also obtained and  reviewed. This study was performed with techniques to keep radiation  doses as low as reasonably achievable, (ALARA). Individualized dose  reduction techniques using automated exposure control or adjustment of  mA and/or kV according to the patient size were employed.        FINDINGS: There is no fracture. Severe dextroscoliosis is seen. There is  right lateral subluxation of L2 in relation to L1 and L3 in relation to  L4 and L4 in relation to L5.. Severe disc space narrowing is seen at  multiple levels. There is no evidence of significant central canal  stenosis..     L1-2: An annular bulge is present with vertebral osteophytes and  bilateral facet arthropathy. Moderate right and severe left neural  foraminal narrowing is seen..     L2-3: An annular bulge is present with vertebral osteophytes and  bilateral facet arthropathy. Mild right and severe left neural foraminal  narrowing is present..     L3-4: An annular bulge is present with vertebral osteophytes and  bilateral facet arthropathy. Mild right and severe left neural foraminal  narrowing is seen.     L4-5: An  annular bulge is present with vertebral osteophytes and  bilateral facet arthropathy. Severe right and moderate left neural  foraminal narrowing is noted..     L5-S1: An annular bulge is present with bilateral facet arthropathy.  Severe right and mild left neural foraminal narrowing is noted..     Impression: Multilevel severe degenerative disc disease and spondylosis  with multilevel neural foraminal narrowing left greater than right.     No fracture or acute bony abnormality identified.                    CTDI: 23.53 mGy,21.98 mGy  DLP:598.56 mGy.cm     This report was signed and finalized on 12/30/2023 11:50 AM by José Luis Guy MD.     CT Head Without Contrast  Narrative: PROCEDURE: CT HEAD WO CONTRAST-     HISTORY: Head injury        COMPARISON: None     TECHNIQUE: Multiple axial CT images were performed from the foramen  magnum to the vertex without contrast. Coronal reformatted images were  also obtained.This study was performed with techniques to keep radiation  doses as low as reasonably achievable, (ALARA). Individualized dose  reduction techniques using automated exposure control or adjustment of  mA and/or kV according to the patient size were employed.        FINDINGS: There is generalized age appropriate atrophy. Areas of low  attenuation are seen in the cerebral white matter consistent with mild  chronic ischemic changes. Chronic calcifications are seen in the  bilateral basal ganglia and bilateral cerebellar hemispheres. The  ventricles are normal in size. There is no evidence of hemorrhage . A 7  mm right parafalcine mass is seen likely representing a small incidental  meningioma..  No abnormal extra-axial fluid collection is seen.  There  is no evidence of shift of the midline structures. Images of the sinuses  reveal no evidence of mucosal thickening. No bony abnormality is seen on  the bone window images.     Impression: No acute intracranial abnormality.     7 mm probable right parafalcine  meningioma. If indicated, MRI head with  contrast may be helpful for further evaluation.           CTDI: 23.53 mGy,21.98 mGy  DLP:598.56 mGy.cm     This report was signed and finalized on 12/30/2023 11:45 AM by José Luis Guy MD.           TOPICS EDUCATION PROVIDED   ANEMIA:  role of RBC, cause, s/s, ways to manage, role of transfusion [x]   THROMBOCYTOPENIA:  role of platelet, cause, s/s, ways to prevent bleeding, things to avoid, when to seek help [x]   NEUTROPENIA:  role of WBC, cause, infection precautions, s/s of infection, when to call MD [x]   NUTRITION & APPETITE CHANGES:  importance of maintaining healthy diet & weight, ways to manage to improve intake, dietary consult, exercise regimen [x]   DIARRHEA:  causes, s/s of dehydration, ways to manage, dietary changes, when to call MD [x]   CONSTIPATION:  causes, ways to manage, dietary changes, when to call MD [x]   NAUSEA & VOMITING:  cause, use of antiemetics, dietary changes, when to call MD [x]   MOUTH SORES:  causes, oral care, ways to manage [x]   ALOPECIA:  cause, ways to manage, resources [x]   INFERTILITY & SEXUALITY:  causes, fertility preservation options, sexuality changes, ways to manage, importance of birth control [x]   NERVOUS SYSTEM CHANGES:  causes, s/s, neuropathies, cognitive changes, ways to manage [x]   PAIN:  causes, ways to manage [x]   SKIN & NAIL CHANGES:  cause, s/s, ways to manage [x]   ORGAN TOXICITIES:  cause, s/s, need for diagnostic tests, labs, when to notify MD [x]   SURVIVORSHIP:  distress, distress assessment, secondary malignancies, early/late effects, follow-up, social issues, social support [x]   HOME CARE:  use of spill kits, storing of PO chemo, how to manage bodily fluids [x]   MISCELLANEOUS:  drug interactions, administration, vesicant, et [x]             ASSESSMENT AND PLAN:     Silvia Chung is a 73 y.o. female with a T3N1M0 adenosquamous carcinoma of the pancreas, now with recurrent disease, who presents for  discussion of treatment plan.    She completed 12 cycles of adjuvant chemotherapy.   She now has disease recurrence with involvement of T9 causing pain. Dr. Corrales and I reviewed her PET/CT from 1/15/2024.  I will follow up on the final read of her PET scan and call her with results.     We discussed palliative chemotherapy versus supportive care.  After discussion she would like to try to chemotherapy to see how she tolerates it.    We will proceed with gemzar and abraxane once radiation is complete.  We will tentatively plan to start on 1/30/2024.    The patient and I have reviewed their cancer diagnosis and scheduled treatment plan. Chemotherapy teaching was also completed today as documented above. Adequate time was given to answer all questions to her satisfaction. Patient and family are aware of their care team members and contact information if they have questions or problems throughout the treatment course    She is due to start palliative radiation to her thoracic lesion on 1/23/2024.  Tentatively plan for 10 treatments.    Neuropathy: Continue gabapentin.  Discussed that there is some neuropathy risk with abraxane.    Diarrhea:  Continue Creon    Parkinson's: Continue regular medications.    Post splenectomy vaccines given at .    Cancer related pain: continue hydrocodone 10 mg 4 times a day as needed.  Palliative radiation due to start 1/23/2024.    She has a scheduled breast biopsy in January. We will plan to hold off on any biopsies for now. PET scan did not show any areas of concern in her breast.     Follow-up in 3 weeks with cycle #1.    Addendum.  Had discussed with the patient that CT scan showed increasing soft tissue mass centered at the level of T10 consistent with metastatic disease.  This has increased in size and extent compared to prior thoracic spine CT.  I also discussed with the patient that multiple bilateral pulmonary nodules are likely pulmonary metastasis.  While only one of the  lesions is hypermetabolic this is likely due to the small size of the additional lesions as they are significantly increased in size and number compared to the prior chest CT.  We also discussed that there are 2 hypermetabolic liver lesions that are consistent with hepatic metastasis.  This is increased compared to prior CT of abdomen and pelvis.  We will continue with her current plan of completing radiation and then moving forward with with chemotherapy.    Total time of patient care including time prior to, face to face with patient, and following visit spent in reviewing records, lab results, imaging studies, discussion with patient, and documentation/charting was > 45 minutes              Arlene Najera APRN  [facility] Hematology and Oncology    1/16/2024          CC:

## 2024-01-16 ENCOUNTER — OFFICE VISIT (OUTPATIENT)
Dept: ONCOLOGY | Facility: CLINIC | Age: 74
End: 2024-01-16
Payer: MEDICARE

## 2024-01-16 VITALS
HEART RATE: 114 BPM | DIASTOLIC BLOOD PRESSURE: 100 MMHG | SYSTOLIC BLOOD PRESSURE: 162 MMHG | OXYGEN SATURATION: 98 % | RESPIRATION RATE: 16 BRPM | BODY MASS INDEX: 20.76 KG/M2 | TEMPERATURE: 98 F | HEIGHT: 68 IN | WEIGHT: 137 LBS

## 2024-01-16 DIAGNOSIS — C25.1 MALIGNANT NEOPLASM OF BODY OF PANCREAS: Primary | ICD-10-CM

## 2024-01-16 RX ORDER — DEXAMETHASONE 2 MG/1
4 TABLET ORAL DAILY
Status: ON HOLD | COMMUNITY
Start: 2024-01-12

## 2024-01-16 RX ORDER — CARVEDILOL 6.25 MG/1
6.25 TABLET ORAL
Status: ON HOLD | COMMUNITY
Start: 2023-12-11

## 2024-01-22 ENCOUNTER — HOSPITAL ENCOUNTER (INPATIENT)
Facility: HOSPITAL | Age: 74
LOS: 1 days | Discharge: HOME | DRG: 948 | End: 2024-01-25
Attending: STUDENT IN AN ORGANIZED HEALTH CARE EDUCATION/TRAINING PROGRAM | Admitting: INTERNAL MEDICINE
Payer: MEDICARE

## 2024-01-22 DIAGNOSIS — C25.9 MALIGNANT NEOPLASM OF PANCREAS, UNSPECIFIED LOCATION OF MALIGNANCY: ICD-10-CM

## 2024-01-22 DIAGNOSIS — R52 INTRACTABLE PAIN: Primary | ICD-10-CM

## 2024-01-22 DIAGNOSIS — C80.0 WIDESPREAD METASTATIC MALIGNANT NEOPLASTIC DISEASE: ICD-10-CM

## 2024-01-22 LAB
ALBUMIN SERPL-MCNC: 3.9 G/DL (ref 3.5–5.2)
ALBUMIN/GLOB SERPL: 1.1 G/DL
ALP SERPL-CCNC: 106 U/L (ref 39–117)
ALT SERPL W P-5'-P-CCNC: 7 U/L (ref 1–33)
ANION GAP SERPL CALCULATED.3IONS-SCNC: 15.9 MMOL/L (ref 5–15)
AST SERPL-CCNC: 15 U/L (ref 1–32)
BACTERIA UR QL AUTO: ABNORMAL /HPF
BILIRUB SERPL-MCNC: 0.3 MG/DL (ref 0–1.2)
BILIRUB UR QL STRIP: NEGATIVE
BUN SERPL-MCNC: 10 MG/DL (ref 8–23)
BUN/CREAT SERPL: 17.9 (ref 7–25)
CALCIUM SPEC-SCNC: 9.1 MG/DL (ref 8.6–10.5)
CHLORIDE SERPL-SCNC: 93 MMOL/L (ref 98–107)
CLARITY UR: CLEAR
CO2 SERPL-SCNC: 24.1 MMOL/L (ref 22–29)
COLOR UR: YELLOW
CREAT SERPL-MCNC: 0.56 MG/DL (ref 0.57–1)
D-LACTATE SERPL-SCNC: 1.7 MMOL/L (ref 0.5–2)
D-LACTATE SERPL-SCNC: 2.5 MMOL/L (ref 0.5–2)
DEPRECATED RDW RBC AUTO: 59.8 FL (ref 37–54)
EGFRCR SERPLBLD CKD-EPI 2021: 97.7 ML/MIN/1.73
ERYTHROCYTE [DISTWIDTH] IN BLOOD BY AUTOMATED COUNT: 15.1 % (ref 12.3–15.4)
GLOBULIN UR ELPH-MCNC: 3.5 GM/DL
GLUCOSE SERPL-MCNC: 96 MG/DL (ref 65–99)
GLUCOSE UR STRIP-MCNC: NEGATIVE MG/DL
HCT VFR BLD AUTO: 30.6 % (ref 34–46.6)
HGB BLD-MCNC: 10.7 G/DL (ref 12–15.9)
HGB UR QL STRIP.AUTO: NEGATIVE
HYALINE CASTS UR QL AUTO: ABNORMAL /LPF
KETONES UR QL STRIP: NEGATIVE
LEUKOCYTE ESTERASE UR QL STRIP.AUTO: ABNORMAL
LIPASE SERPL-CCNC: 9 U/L (ref 13–60)
LYMPHOCYTES # BLD MANUAL: 1.72 10*3/MM3 (ref 0.7–3.1)
LYMPHOCYTES NFR BLD MANUAL: 5 % (ref 5–12)
MACROCYTES BLD QL SMEAR: NORMAL
MCH RBC QN AUTO: 37.3 PG (ref 26.6–33)
MCHC RBC AUTO-ENTMCNC: 35 G/DL (ref 31.5–35.7)
MCV RBC AUTO: 106.6 FL (ref 79–97)
MONOCYTES # BLD: 0.66 10*3/MM3 (ref 0.1–0.9)
NEUTROPHILS # BLD AUTO: 10.83 10*3/MM3 (ref 1.7–7)
NEUTROPHILS NFR BLD MANUAL: 82 % (ref 42.7–76)
NITRITE UR QL STRIP: NEGATIVE
PH UR STRIP.AUTO: 6.5 [PH] (ref 5–8)
PLAT MORPH BLD: NORMAL
PLATELET # BLD AUTO: 490 10*3/MM3 (ref 140–450)
PMV BLD AUTO: 9.2 FL (ref 6–12)
POTASSIUM SERPL-SCNC: 3.7 MMOL/L (ref 3.5–5.2)
PROCALCITONIN SERPL-MCNC: 0.08 NG/ML (ref 0–0.25)
PROT SERPL-MCNC: 7.4 G/DL (ref 6–8.5)
PROT UR QL STRIP: NEGATIVE
RBC # BLD AUTO: 2.87 10*6/MM3 (ref 3.77–5.28)
RBC # UR STRIP: ABNORMAL /HPF
RBC MORPH BLD: NORMAL
REF LAB TEST METHOD: ABNORMAL
SCAN SLIDE: NORMAL
SODIUM SERPL-SCNC: 133 MMOL/L (ref 136–145)
SP GR UR STRIP: 1.01 (ref 1–1.03)
SQUAMOUS #/AREA URNS HPF: ABNORMAL /HPF
UROBILINOGEN UR QL STRIP: ABNORMAL
VARIANT LYMPHS NFR BLD MANUAL: 13 % (ref 19.6–45.3)
WBC # UR STRIP: ABNORMAL /HPF
WBC MORPH BLD: NORMAL
WBC NRBC COR # BLD AUTO: 13.21 10*3/MM3 (ref 3.4–10.8)

## 2024-01-22 PROCEDURE — 99222 1ST HOSP IP/OBS MODERATE 55: CPT | Performed by: INTERNAL MEDICINE

## 2024-01-22 PROCEDURE — 87086 URINE CULTURE/COLONY COUNT: CPT | Performed by: NURSE PRACTITIONER

## 2024-01-22 PROCEDURE — 83690 ASSAY OF LIPASE: CPT | Performed by: NURSE PRACTITIONER

## 2024-01-22 PROCEDURE — 36415 COLL VENOUS BLD VENIPUNCTURE: CPT

## 2024-01-22 PROCEDURE — 99285 EMERGENCY DEPT VISIT HI MDM: CPT

## 2024-01-22 PROCEDURE — 85007 BL SMEAR W/DIFF WBC COUNT: CPT | Performed by: NURSE PRACTITIONER

## 2024-01-22 PROCEDURE — G0378 HOSPITAL OBSERVATION PER HR: HCPCS

## 2024-01-22 PROCEDURE — 25010000002 MORPHINE PER 10 MG: Performed by: STUDENT IN AN ORGANIZED HEALTH CARE EDUCATION/TRAINING PROGRAM

## 2024-01-22 PROCEDURE — 25810000003 SODIUM CHLORIDE 0.9 % SOLUTION: Performed by: NURSE PRACTITIONER

## 2024-01-22 PROCEDURE — 25010000002 ENOXAPARIN PER 10 MG: Performed by: INTERNAL MEDICINE

## 2024-01-22 PROCEDURE — 80053 COMPREHEN METABOLIC PANEL: CPT | Performed by: NURSE PRACTITIONER

## 2024-01-22 PROCEDURE — 85025 COMPLETE CBC W/AUTO DIFF WBC: CPT | Performed by: NURSE PRACTITIONER

## 2024-01-22 PROCEDURE — 83605 ASSAY OF LACTIC ACID: CPT | Performed by: NURSE PRACTITIONER

## 2024-01-22 PROCEDURE — 25010000002 MORPHINE PER 10 MG: Performed by: NURSE PRACTITIONER

## 2024-01-22 PROCEDURE — 84145 PROCALCITONIN (PCT): CPT | Performed by: NURSE PRACTITIONER

## 2024-01-22 PROCEDURE — 63710000001 DEXAMETHASONE PER 0.25 MG: Performed by: INTERNAL MEDICINE

## 2024-01-22 PROCEDURE — 25010000002 ONDANSETRON PER 1 MG: Performed by: STUDENT IN AN ORGANIZED HEALTH CARE EDUCATION/TRAINING PROGRAM

## 2024-01-22 PROCEDURE — 81001 URINALYSIS AUTO W/SCOPE: CPT | Performed by: NURSE PRACTITIONER

## 2024-01-22 RX ORDER — CARBIDOPA/LEVODOPA 25MG-250MG
1 TABLET ORAL 2 TIMES DAILY
Status: DISCONTINUED | OUTPATIENT
Start: 2024-01-22 | End: 2024-01-25 | Stop reason: HOSPADM

## 2024-01-22 RX ORDER — ONDANSETRON 2 MG/ML
4 INJECTION INTRAMUSCULAR; INTRAVENOUS EVERY 6 HOURS PRN
Status: DISCONTINUED | OUTPATIENT
Start: 2024-01-22 | End: 2024-01-25 | Stop reason: HOSPADM

## 2024-01-22 RX ORDER — DIPHENHYDRAMINE HCL 25 MG
25 CAPSULE ORAL EVERY 6 HOURS PRN
Status: DISCONTINUED | OUTPATIENT
Start: 2024-01-22 | End: 2024-01-25 | Stop reason: HOSPADM

## 2024-01-22 RX ORDER — SODIUM CHLORIDE 9 MG/ML
40 INJECTION, SOLUTION INTRAVENOUS AS NEEDED
Status: DISCONTINUED | OUTPATIENT
Start: 2024-01-22 | End: 2024-01-25 | Stop reason: HOSPADM

## 2024-01-22 RX ORDER — MULTIPLE VITAMINS W/ MINERALS TAB 9MG-400MCG
1 TAB ORAL DAILY
Status: DISCONTINUED | OUTPATIENT
Start: 2024-01-22 | End: 2024-01-25 | Stop reason: HOSPADM

## 2024-01-22 RX ORDER — BISACODYL 5 MG/1
5 TABLET, DELAYED RELEASE ORAL DAILY PRN
Status: DISCONTINUED | OUTPATIENT
Start: 2024-01-22 | End: 2024-01-25 | Stop reason: HOSPADM

## 2024-01-22 RX ORDER — CALCIUM CARBONATE 500 MG/1
2 TABLET, CHEWABLE ORAL 2 TIMES DAILY PRN
Status: DISCONTINUED | OUTPATIENT
Start: 2024-01-22 | End: 2024-01-23

## 2024-01-22 RX ORDER — MORPHINE SULFATE 2 MG/ML
2 INJECTION, SOLUTION INTRAMUSCULAR; INTRAVENOUS ONCE
Status: COMPLETED | OUTPATIENT
Start: 2024-01-22 | End: 2024-01-22

## 2024-01-22 RX ORDER — DEXAMETHASONE 4 MG/1
4 TABLET ORAL DAILY
Status: DISCONTINUED | OUTPATIENT
Start: 2024-01-22 | End: 2024-01-25 | Stop reason: HOSPADM

## 2024-01-22 RX ORDER — SODIUM CHLORIDE 0.9 % (FLUSH) 0.9 %
10 SYRINGE (ML) INJECTION AS NEEDED
Status: DISCONTINUED | OUTPATIENT
Start: 2024-01-22 | End: 2024-01-25 | Stop reason: HOSPADM

## 2024-01-22 RX ORDER — OXYCODONE HYDROCHLORIDE 5 MG/1
5 TABLET ORAL ONCE
Status: COMPLETED | OUTPATIENT
Start: 2024-01-23 | End: 2024-01-22

## 2024-01-22 RX ORDER — POLYETHYLENE GLYCOL 3350 17 G/17G
17 POWDER, FOR SOLUTION ORAL DAILY PRN
Status: DISCONTINUED | OUTPATIENT
Start: 2024-01-22 | End: 2024-01-25 | Stop reason: HOSPADM

## 2024-01-22 RX ORDER — ENOXAPARIN SODIUM 100 MG/ML
40 INJECTION SUBCUTANEOUS DAILY
Status: DISCONTINUED | OUTPATIENT
Start: 2024-01-22 | End: 2024-01-25 | Stop reason: HOSPADM

## 2024-01-22 RX ORDER — OXYCODONE HYDROCHLORIDE 5 MG/1
5 TABLET ORAL EVERY 4 HOURS PRN
Status: DISCONTINUED | OUTPATIENT
Start: 2024-01-22 | End: 2024-01-22

## 2024-01-22 RX ORDER — CHOLECALCIFEROL (VITAMIN D3) 125 MCG
5 CAPSULE ORAL NIGHTLY
Status: DISCONTINUED | OUTPATIENT
Start: 2024-01-22 | End: 2024-01-25 | Stop reason: HOSPADM

## 2024-01-22 RX ORDER — OXYCODONE HYDROCHLORIDE 5 MG/1
10 TABLET ORAL EVERY 4 HOURS PRN
Status: DISCONTINUED | OUTPATIENT
Start: 2024-01-22 | End: 2024-01-25 | Stop reason: HOSPADM

## 2024-01-22 RX ORDER — OXYCODONE HCL 10 MG/1
20 TABLET, FILM COATED, EXTENDED RELEASE ORAL EVERY 12 HOURS SCHEDULED
Status: DISCONTINUED | OUTPATIENT
Start: 2024-01-22 | End: 2024-01-25 | Stop reason: HOSPADM

## 2024-01-22 RX ORDER — CARVEDILOL 6.25 MG/1
6.25 TABLET ORAL 2 TIMES DAILY WITH MEALS
Status: DISCONTINUED | OUTPATIENT
Start: 2024-01-22 | End: 2024-01-25 | Stop reason: HOSPADM

## 2024-01-22 RX ORDER — ONDANSETRON 2 MG/ML
4 INJECTION INTRAMUSCULAR; INTRAVENOUS ONCE
Status: COMPLETED | OUTPATIENT
Start: 2024-01-22 | End: 2024-01-22

## 2024-01-22 RX ORDER — DEXTROSE MONOHYDRATE, SODIUM CHLORIDE, AND POTASSIUM CHLORIDE 50; 1.49; 4.5 G/1000ML; G/1000ML; G/1000ML
50 INJECTION, SOLUTION INTRAVENOUS CONTINUOUS
Status: DISCONTINUED | OUTPATIENT
Start: 2024-01-22 | End: 2024-01-23

## 2024-01-22 RX ORDER — PANTOPRAZOLE SODIUM 40 MG/1
40 TABLET, DELAYED RELEASE ORAL
Status: DISCONTINUED | OUTPATIENT
Start: 2024-01-23 | End: 2024-01-23

## 2024-01-22 RX ORDER — OXYCODONE HYDROCHLORIDE AND ACETAMINOPHEN 5; 325 MG/1; MG/1
1 TABLET ORAL ONCE
Status: COMPLETED | OUTPATIENT
Start: 2024-01-22 | End: 2024-01-22

## 2024-01-22 RX ORDER — LACTULOSE 10 G/15ML
20 SOLUTION ORAL 3 TIMES DAILY
Status: DISCONTINUED | OUTPATIENT
Start: 2024-01-22 | End: 2024-01-25 | Stop reason: HOSPADM

## 2024-01-22 RX ORDER — ONDANSETRON 4 MG/1
4 TABLET, ORALLY DISINTEGRATING ORAL EVERY 8 HOURS PRN
Status: DISCONTINUED | OUTPATIENT
Start: 2024-01-22 | End: 2024-01-25 | Stop reason: HOSPADM

## 2024-01-22 RX ORDER — ACETAMINOPHEN 325 MG/1
650 TABLET ORAL EVERY 4 HOURS PRN
Status: DISCONTINUED | OUTPATIENT
Start: 2024-01-22 | End: 2024-01-25 | Stop reason: HOSPADM

## 2024-01-22 RX ORDER — AMOXICILLIN 250 MG
2 CAPSULE ORAL 2 TIMES DAILY
Status: DISCONTINUED | OUTPATIENT
Start: 2024-01-22 | End: 2024-01-25 | Stop reason: HOSPADM

## 2024-01-22 RX ORDER — BISACODYL 10 MG
10 SUPPOSITORY, RECTAL RECTAL DAILY PRN
Status: DISCONTINUED | OUTPATIENT
Start: 2024-01-22 | End: 2024-01-25 | Stop reason: HOSPADM

## 2024-01-22 RX ORDER — ONDANSETRON 4 MG/1
4 TABLET, ORALLY DISINTEGRATING ORAL EVERY 6 HOURS PRN
Status: DISCONTINUED | OUTPATIENT
Start: 2024-01-22 | End: 2024-01-25 | Stop reason: HOSPADM

## 2024-01-22 RX ORDER — ONDANSETRON 4 MG/1
8 TABLET, FILM COATED ORAL AS NEEDED
Status: DISCONTINUED | OUTPATIENT
Start: 2024-01-22 | End: 2024-01-25 | Stop reason: HOSPADM

## 2024-01-22 RX ORDER — SODIUM CHLORIDE 0.9 % (FLUSH) 0.9 %
10 SYRINGE (ML) INJECTION EVERY 12 HOURS SCHEDULED
Status: DISCONTINUED | OUTPATIENT
Start: 2024-01-22 | End: 2024-01-25 | Stop reason: HOSPADM

## 2024-01-22 RX ADMIN — SODIUM CHLORIDE 1000 ML: 9 INJECTION, SOLUTION INTRAVENOUS at 16:34

## 2024-01-22 RX ADMIN — Medication 5 MG: at 21:12

## 2024-01-22 RX ADMIN — LACTULOSE 20 G: 20 SOLUTION ORAL at 21:13

## 2024-01-22 RX ADMIN — OXYCODONE HYDROCHLORIDE AND ACETAMINOPHEN 1 TABLET: 5; 325 TABLET ORAL at 18:04

## 2024-01-22 RX ADMIN — CARBIDOPA AND LEVODOPA 1 TABLET: 25; 250 TABLET ORAL at 21:12

## 2024-01-22 RX ADMIN — MULTIPLE VITAMINS W/ MINERALS TAB 1 TABLET: TAB at 21:12

## 2024-01-22 RX ADMIN — OXYCODONE HYDROCHLORIDE 20 MG: 10 TABLET, FILM COATED, EXTENDED RELEASE ORAL at 21:11

## 2024-01-22 RX ADMIN — MORPHINE SULFATE 2 MG: 2 INJECTION, SOLUTION INTRAMUSCULAR; INTRAVENOUS at 16:39

## 2024-01-22 RX ADMIN — DEXAMETHASONE 4 MG: 4 TABLET ORAL at 21:12

## 2024-01-22 RX ADMIN — CARVEDILOL 6.25 MG: 6.25 TABLET, FILM COATED ORAL at 21:21

## 2024-01-22 RX ADMIN — ONDANSETRON 4 MG: 2 INJECTION INTRAMUSCULAR; INTRAVENOUS at 16:38

## 2024-01-22 RX ADMIN — OXYCODONE HYDROCHLORIDE 5 MG: 5 TABLET ORAL at 23:47

## 2024-01-22 RX ADMIN — ENOXAPARIN SODIUM 40 MG: 100 INJECTION SUBCUTANEOUS at 21:13

## 2024-01-22 RX ADMIN — DOCUSATE SODIUM 50MG AND SENNOSIDES 8.6MG 2 TABLET: 8.6; 5 TABLET, FILM COATED ORAL at 21:13

## 2024-01-22 RX ADMIN — POTASSIUM CHLORIDE, DEXTROSE MONOHYDRATE AND SODIUM CHLORIDE 50 ML/HR: 150; 5; 450 INJECTION, SOLUTION INTRAVENOUS at 22:01

## 2024-01-22 RX ADMIN — MORPHINE SULFATE 2 MG: 2 INJECTION, SOLUTION INTRAMUSCULAR; INTRAVENOUS at 17:30

## 2024-01-22 RX ADMIN — OXYCODONE HYDROCHLORIDE 5 MG: 5 TABLET ORAL at 22:21

## 2024-01-22 NOTE — ED PROVIDER NOTES
Subjective  History of Present Illness:    Chief Complaint: Abdominal pain  History of Present Illness: This is a 73-year-old female patient comes into the ED today complaining of abdominal pain left quadrant.  Patient has history of pancreatic cancer with multiple metastasis.  Patient was supposed to start radiation today for a mass that is pressing on a nerve in her abdomen.  Patient rates her pain as 10 out of 10 states it is unbearable has been given Lortab at home that does not help.  Patient is requesting some relief from her pain due to ongoing cancer      Nurses Notes reviewed and agree, including vitals, allergies, social history and prior medical history.       Allergies:    Patient has no known allergies.      Past Surgical History:   Procedure Laterality Date    ADENOIDECTOMY      BREAST BIOPSY Right 2019    Needle biopsy    CATARACT EXTRACTION Bilateral     CHOLECYSTECTOMY OPEN  12/19/2022    done at     COLONOSCOPY      FOOT SURGERY Left     torn ligament, screws placed.    HIP HEMIARTHROPLASTY Left 10/21/2020    Procedure: HIP HEMIARTHROPLASTY LEFT;  Surgeon: Humza Winters MD;  Location: [facility];  Service: Orthopedics;  Laterality: Left;    LYMPHADENECTOMY  12/19/2022    partial, done at     NECK SURGERY      four fusions    OMENTECTOMY  12/19/2022    done at     PANCREATECTOMY  12/19/2022    done at , left adrenolectomy also performed    PORTACATH PLACEMENT Right 02/09/2023    Procedure: INSERTION OF PORTACATH;  Surgeon: Marcio Garcia MD;  Location: [facility];  Service: General;  Laterality: Right;    SPLENECTOMY  12/19/2022    done at     TONSILLECTOMY           Social History     Socioeconomic History    Marital status:    Tobacco Use    Smoking status: Never    Smokeless tobacco: Never   Vaping Use    Vaping Use: Never used   Substance and Sexual Activity    Alcohol use: Not Currently     Comment: rare    Drug use: Defer    Sexual activity: Defer         Family History    Problem Relation Age of Onset    Heart failure Father     Lung cancer Father     Cancer Brother     Breast cancer Maternal Grandmother     Breast cancer Paternal Grandmother     Breast cancer Cousin     Ovarian cancer Neg Hx        REVIEW OF SYSTEMS: All systems reviewed and not pertinent unless noted.    Review of Systems   Gastrointestinal:  Positive for abdominal pain.   All other systems reviewed and are negative.      Objective    Physical Exam  Vitals and nursing note reviewed.   Constitutional:       Appearance: Normal appearance. She is ill-appearing.   HENT:      Head: Normocephalic and atraumatic.   Eyes:      Extraocular Movements: Extraocular movements intact.      Pupils: Pupils are equal, round, and reactive to light.   Cardiovascular:      Rate and Rhythm: Normal rate and regular rhythm.      Pulses: Normal pulses.      Heart sounds: Normal heart sounds.   Pulmonary:      Effort: Pulmonary effort is normal.      Breath sounds: Normal breath sounds.   Abdominal:      General: Abdomen is flat. Bowel sounds are normal.      Palpations: Abdomen is soft.      Tenderness:  in the left upper quadrant and left lower quadrant   Musculoskeletal:      Cervical back: Normal range of motion and neck supple.   Skin:     Capillary Refill: Capillary refill takes less than 2 seconds.   Neurological:      General: No focal deficit present.      Mental Status: She is alert and oriented to person, place, and time. Mental status is at baseline.      GCS: GCS eye subscore is 4. GCS verbal subscore is 5. GCS motor subscore is 6.      Sensory: Sensation is intact.      Motor: Motor function is intact.      Gait: Gait is intact.   Psychiatric:         Attention and Perception: Attention and perception normal.         Mood and Affect: Mood and affect normal.         Speech: Speech normal.         Behavior: Behavior normal. Behavior is cooperative.           Procedures    ED Course:         Lab Results (last 24 hours)        Procedure Component Value Units Date/Time    CBC & Differential [[id number]]  (Abnormal) Collected: 01/22/24 1634    Specimen: Blood Updated: 01/22/24 1711    Narrative:      The following orders were created for panel order CBC & Differential.  Procedure                               Abnormality         Status                     ---------                               -----------         ------                     CBC Auto Differential[[id number]]        Abnormal            Final result               Scan Slide[[id number]]                                       Final result                 Please view results for these tests on the individual orders.    Comprehensive Metabolic Panel [[id number]]  (Abnormal) Collected: 01/22/24 1634    Specimen: Blood Updated: 01/22/24 1714     Glucose 96 mg/dL      BUN 10 mg/dL      Creatinine 0.56 mg/dL      Sodium 133 mmol/L      Potassium 3.7 mmol/L      Chloride 93 mmol/L      CO2 24.1 mmol/L      Calcium 9.1 mg/dL      Total Protein 7.4 g/dL      Albumin 3.9 g/dL      ALT (SGPT) 7 U/L      AST (SGOT) 15 U/L      Alkaline Phosphatase 106 U/L      Total Bilirubin 0.3 mg/dL      Globulin 3.5 gm/dL      A/G Ratio 1.1 g/dL      BUN/Creatinine Ratio 17.9     Anion Gap 15.9 mmol/L      eGFR 97.7 mL/min/1.73     Narrative:      GFR Normal >60  Chronic Kidney Disease <60  Kidney Failure <15    The GFR formula is only valid for adults with stable renal function between ages 18 and 70.    Lipase [[id number]]  (Abnormal) Collected: 01/22/24 1634    Specimen: Blood Updated: 01/22/24 1714     Lipase 9 U/L     Lactic Acid, Plasma [[id number]]  (Abnormal) Collected: 01/22/24 1634    Specimen: Blood Updated: 01/22/24 1731     Lactate 2.5 mmol/L     Procalcitonin [[id number]]  (Normal) Collected: 01/22/24 1634    Specimen: Blood Updated: 01/22/24 1721     Procalcitonin 0.08 ng/mL     Narrative:      As a Marker for Sepsis (Non-Neonates):    1. <0.5 ng/mL represents a low risk of severe sepsis and/or  "septic shock.  2. >2 ng/mL represents a high risk of severe sepsis and/or septic shock.    As a Marker for Lower Respiratory Tract Infections that require antibiotic therapy:    PCT on Admission    Antibiotic Therapy       6-12 Hrs later    >0.5                Strongly Recommended  >0.25 - <0.5        Recommended   0.1 - 0.25          Discouraged              Remeasure/reassess PCT  <0.1                Strongly Discouraged     Remeasure/reassess PCT    As 28 day mortality risk marker: \"Change in Procalcitonin Result\" (>80% or <=80%) if Day 0 (or Day 1) and Day 4 values are available. Refer to [email or web address]    Change in PCT <=80%  A decrease of PCT levels below or equal to 80% defines a positive change in PCT test result representing a higher risk for 28-day all-cause mortality of patients diagnosed with severe sepsis for septic shock.    Change in PCT >80%  A decrease of PCT levels of more than 80% defines a negative change in PCT result representing a lower risk for 28-day all-cause mortality of patients diagnosed with severe sepsis or septic shock.       CBC Auto Differential [[id number]]  (Abnormal) Collected: 01/22/24 1634    Specimen: Blood Updated: 01/22/24 1655     WBC 13.21 10*3/mm3      RBC 2.87 10*6/mm3      Hemoglobin 10.7 g/dL      Hematocrit 30.6 %      .6 fL      MCH 37.3 pg      MCHC 35.0 g/dL      RDW 15.1 %      RDW-SD 59.8 fl      MPV 9.2 fL      Platelets 490 10*3/mm3     Scan Slide [[id number]] Collected: 01/22/24 1634    Specimen: Blood Updated: 01/22/24 1711     Macrocytes Slight/1+     Scan Slide --     Comment: See Manual Differential Results       Manual Differential [[id number]]  (Abnormal) Collected: 01/22/24 1634    Specimen: Blood Updated: 01/22/24 1731     Neutrophil % 82.0 %      Lymphocyte % 13.0 %      Monocyte % 5.0 %      Neutrophils Absolute 10.83 10*3/mm3      Lymphocytes Absolute 1.72 10*3/mm3      Monocytes Absolute 0.66 10*3/mm3      RBC Morphology " Normal     WBC Morphology Normal     Platelet Morphology Normal    Urinalysis With Culture If Indicated - Urine, Clean Catch [[id number]]  (Abnormal) Collected: 01/22/24 1748    Specimen: Urine, Clean Catch Updated: 01/22/24 1756     Color, UA Yellow     Appearance, UA Clear     pH, UA 6.5     Specific Gravity, UA 1.010     Glucose, UA Negative     Ketones, UA Negative     Bilirubin, UA Negative     Blood, UA Negative     Protein, UA Negative     Leuk Esterase, UA Moderate (2+)     Nitrite, UA Negative     Urobilinogen, UA 1.0 E.U./dL    Narrative:      In absence of clinical symptoms, the presence of pyuria, bacteria, and/or nitrites on the urinalysis result does not correlate with infection.    Urinalysis, Microscopic Only - Urine, Clean Catch [[id number]]  (Abnormal) Collected: 01/22/24 1748    Specimen: Urine, Clean Catch Updated: 01/22/24 1810     RBC, UA None Seen /HPF      WBC, UA 6-10 /HPF      Bacteria, UA None Seen /HPF      Squamous Epithelial Cells, UA 0-2 /HPF      Hyaline Casts, UA None Seen /LPF      Methodology Manual Light Microscopy    Urine Culture - Urine, Urine, Clean Catch [[id number]] Collected: 01/22/24 1748    Specimen: Urine, Clean Catch Updated: 01/22/24 1810             No radiology results from the last 24 hrs     Medical Decision Making  This is a 73-year-old female patient comes into the ED today complaining of abdominal pain left quadrant.  Patient has history of pancreatic cancer with multiple metastasis.  Patient was supposed to start radiation today for a mass that is pressing on a nerve in her abdomen.  Patient rates her pain as 10 out of 10 states it is unbearable has been given Lortab at home that does not help.  Patient is requesting some relief from her pain due to ongoing cancer    DDX: includes but is not limited to: Intractable pain, pancreatic cancer, other    Problems Addressed:  Intractable pain: complicated acute illness or injury  Malignant neoplasm of pancreas,  unspecified location of malignancy: complicated acute illness or injury    Amount and/or Complexity of Data Reviewed  External Data Reviewed:      Details: I have personally reviewed labs, radiology EKG and notes from patient's chart  Labs: ordered. Decision-making details documented in ED Course.     Details: I have personally reviewed and documented all results  Discussion of management or test interpretation with external provider(s): Discussed assessment, treatment and plan with ER attending, case management, hospitalist    Risk  Prescription drug management.  Decision regarding hospitalization.  Risk Details: Hospitalist has agreed to admit the patient MedSurg observation for intro tractable pain, pancreatic cancer.                  Final diagnoses:   Intractable pain   Malignant neoplasm of pancreas, unspecified location of malignancy          Jimmy Sullivan, APRN  01/22/24 7632

## 2024-01-23 ENCOUNTER — APPOINTMENT (OUTPATIENT)
Dept: GENERAL RADIOLOGY | Facility: HOSPITAL | Age: 74
DRG: 948 | End: 2024-01-23
Payer: MEDICARE

## 2024-01-23 LAB
ALBUMIN SERPL-MCNC: 3.1 G/DL (ref 3.5–5.2)
ALBUMIN SERPL-MCNC: 3.9 G/DL (ref 3.5–5.2)
ALBUMIN/GLOB SERPL: 1.3 G/DL
ALBUMIN/GLOB SERPL: 1.3 G/DL
ALP SERPL-CCNC: 100 U/L (ref 39–117)
ALP SERPL-CCNC: 76 U/L (ref 39–117)
ALT SERPL W P-5'-P-CCNC: <5 U/L (ref 1–33)
ALT SERPL W P-5'-P-CCNC: <5 U/L (ref 1–33)
ANION GAP SERPL CALCULATED.3IONS-SCNC: 13.3 MMOL/L (ref 5–15)
ANION GAP SERPL CALCULATED.3IONS-SCNC: 8 MMOL/L (ref 5–15)
AST SERPL-CCNC: 11 U/L (ref 1–32)
AST SERPL-CCNC: 15 U/L (ref 1–32)
BACTERIA SPEC AEROBE CULT: NO GROWTH
BILIRUB SERPL-MCNC: 0.4 MG/DL (ref 0–1.2)
BILIRUB SERPL-MCNC: 0.4 MG/DL (ref 0–1.2)
BUN SERPL-MCNC: 6 MG/DL (ref 8–23)
BUN SERPL-MCNC: 7 MG/DL (ref 8–23)
BUN/CREAT SERPL: 11.8 (ref 7–25)
BUN/CREAT SERPL: 12.3 (ref 7–25)
CALCIUM SPEC-SCNC: 6.6 MG/DL (ref 8.6–10.5)
CALCIUM SPEC-SCNC: 8.2 MG/DL (ref 8.6–10.5)
CHLORIDE SERPL-SCNC: 92 MMOL/L (ref 98–107)
CHLORIDE SERPL-SCNC: 95 MMOL/L (ref 98–107)
CO2 SERPL-SCNC: 22 MMOL/L (ref 22–29)
CO2 SERPL-SCNC: 23.7 MMOL/L (ref 22–29)
CREAT SERPL-MCNC: 0.51 MG/DL (ref 0.57–1)
CREAT SERPL-MCNC: 0.57 MG/DL (ref 0.57–1)
DEPRECATED RDW RBC AUTO: 62.7 FL (ref 37–54)
EGFRCR SERPLBLD CKD-EPI 2021: 97.3 ML/MIN/1.73
EGFRCR SERPLBLD CKD-EPI 2021: 99.9 ML/MIN/1.73
ERYTHROCYTE [DISTWIDTH] IN BLOOD BY AUTOMATED COUNT: 15.6 % (ref 12.3–15.4)
GLOBULIN UR ELPH-MCNC: 2.4 GM/DL
GLOBULIN UR ELPH-MCNC: 3 GM/DL
GLUCOSE SERPL-MCNC: 176 MG/DL (ref 65–99)
GLUCOSE SERPL-MCNC: 762 MG/DL (ref 65–99)
HCT VFR BLD AUTO: 30.3 % (ref 34–46.6)
HGB BLD-MCNC: 10.3 G/DL (ref 12–15.9)
MCH RBC QN AUTO: 36.9 PG (ref 26.6–33)
MCHC RBC AUTO-ENTMCNC: 34 G/DL (ref 31.5–35.7)
MCV RBC AUTO: 108.6 FL (ref 79–97)
PLATELET # BLD AUTO: 461 10*3/MM3 (ref 140–450)
PMV BLD AUTO: 9.9 FL (ref 6–12)
POTASSIUM SERPL-SCNC: 4 MMOL/L (ref 3.5–5.2)
POTASSIUM SERPL-SCNC: 6.9 MMOL/L (ref 3.5–5.2)
PROT SERPL-MCNC: 5.5 G/DL (ref 6–8.5)
PROT SERPL-MCNC: 6.9 G/DL (ref 6–8.5)
RBC # BLD AUTO: 2.79 10*6/MM3 (ref 3.77–5.28)
SODIUM SERPL-SCNC: 125 MMOL/L (ref 136–145)
SODIUM SERPL-SCNC: 129 MMOL/L (ref 136–145)
WBC NRBC COR # BLD AUTO: 11.76 10*3/MM3 (ref 3.4–10.8)

## 2024-01-23 PROCEDURE — 25010000002 ENOXAPARIN PER 10 MG: Performed by: INTERNAL MEDICINE

## 2024-01-23 PROCEDURE — G0378 HOSPITAL OBSERVATION PER HR: HCPCS

## 2024-01-23 PROCEDURE — 63710000001 DEXAMETHASONE PER 0.25 MG: Performed by: INTERNAL MEDICINE

## 2024-01-23 PROCEDURE — 74018 RADEX ABDOMEN 1 VIEW: CPT

## 2024-01-23 PROCEDURE — 63710000001 ONDANSETRON PER 8 MG: Performed by: INTERNAL MEDICINE

## 2024-01-23 PROCEDURE — 25010000002 HYDRALAZINE PER 20 MG: Performed by: NURSE PRACTITIONER

## 2024-01-23 PROCEDURE — 80053 COMPREHEN METABOLIC PANEL: CPT | Performed by: NURSE PRACTITIONER

## 2024-01-23 PROCEDURE — 80053 COMPREHEN METABOLIC PANEL: CPT | Performed by: INTERNAL MEDICINE

## 2024-01-23 PROCEDURE — 99232 SBSQ HOSP IP/OBS MODERATE 35: CPT | Performed by: NURSE PRACTITIONER

## 2024-01-23 PROCEDURE — 25010000002 ONDANSETRON PER 1 MG: Performed by: INTERNAL MEDICINE

## 2024-01-23 PROCEDURE — 85027 COMPLETE CBC AUTOMATED: CPT | Performed by: INTERNAL MEDICINE

## 2024-01-23 PROCEDURE — 63710000001 DIPHENHYDRAMINE PER 50 MG: Performed by: INTERNAL MEDICINE

## 2024-01-23 RX ORDER — LIDOCAINE 50 MG/G
2 PATCH TOPICAL
Status: DISCONTINUED | OUTPATIENT
Start: 2024-01-23 | End: 2024-01-25 | Stop reason: HOSPADM

## 2024-01-23 RX ORDER — NALOXONE HYDROCHLORIDE 4 MG/.1ML
1 SPRAY NASAL AS NEEDED
Qty: 2 EACH | Refills: 0 | Status: SHIPPED | OUTPATIENT
Start: 2024-01-23

## 2024-01-23 RX ORDER — CARVEDILOL 6.25 MG/1
6.25 TABLET ORAL 2 TIMES DAILY WITH MEALS
Qty: 60 TABLET | Refills: 0 | Status: SHIPPED | OUTPATIENT
Start: 2024-01-23

## 2024-01-23 RX ORDER — HYDRALAZINE HYDROCHLORIDE 20 MG/ML
10 INJECTION INTRAMUSCULAR; INTRAVENOUS EVERY 6 HOURS PRN
Status: DISCONTINUED | OUTPATIENT
Start: 2024-01-23 | End: 2024-01-25 | Stop reason: HOSPADM

## 2024-01-23 RX ORDER — OXYCODONE HYDROCHLORIDE 10 MG/1
10 TABLET ORAL EVERY 4 HOURS PRN
Qty: 30 TABLET | Refills: 0 | Status: SHIPPED | OUTPATIENT
Start: 2024-01-23 | End: 2024-01-26 | Stop reason: SDUPTHER

## 2024-01-23 RX ORDER — OXYCODONE HCL 20 MG/1
20 TABLET, FILM COATED, EXTENDED RELEASE ORAL EVERY 12 HOURS SCHEDULED
Qty: 10 TABLET | Refills: 0 | Status: SHIPPED | OUTPATIENT
Start: 2024-01-23 | End: 2024-01-26 | Stop reason: SDUPTHER

## 2024-01-23 RX ORDER — ALPRAZOLAM 0.5 MG/1
0.5 TABLET ORAL 2 TIMES DAILY PRN
Status: DISCONTINUED | OUTPATIENT
Start: 2024-01-23 | End: 2024-01-24

## 2024-01-23 RX ORDER — AMOXICILLIN 250 MG
2 CAPSULE ORAL 2 TIMES DAILY
Qty: 60 TABLET | Refills: 0 | Status: SHIPPED | OUTPATIENT
Start: 2024-01-23

## 2024-01-23 RX ORDER — ONDANSETRON 4 MG/1
4 TABLET, ORALLY DISINTEGRATING ORAL EVERY 6 HOURS PRN
Qty: 10 TABLET | Refills: 0 | Status: SHIPPED | OUTPATIENT
Start: 2024-01-23 | End: 2024-01-26 | Stop reason: SDUPTHER

## 2024-01-23 RX ORDER — OXYCODONE HCL 20 MG/1
20 TABLET, FILM COATED, EXTENDED RELEASE ORAL EVERY 12 HOURS SCHEDULED
Qty: 10 TABLET | Refills: 0 | Status: SHIPPED | OUTPATIENT
Start: 2024-01-23 | End: 2024-01-23 | Stop reason: SDUPTHER

## 2024-01-23 RX ORDER — PANTOPRAZOLE SODIUM 40 MG/1
40 TABLET, DELAYED RELEASE ORAL EVERY 24 HOURS
Status: DISCONTINUED | OUTPATIENT
Start: 2024-01-23 | End: 2024-01-24

## 2024-01-23 RX ADMIN — OXYCODONE HYDROCHLORIDE 20 MG: 10 TABLET, FILM COATED, EXTENDED RELEASE ORAL at 08:37

## 2024-01-23 RX ADMIN — CARBIDOPA AND LEVODOPA 1 TABLET: 25; 250 TABLET ORAL at 09:09

## 2024-01-23 RX ADMIN — DOCUSATE SODIUM 50MG AND SENNOSIDES 8.6MG 2 TABLET: 8.6; 5 TABLET, FILM COATED ORAL at 20:45

## 2024-01-23 RX ADMIN — Medication 10 ML: at 21:00

## 2024-01-23 RX ADMIN — OXYCODONE HYDROCHLORIDE 10 MG: 5 TABLET ORAL at 13:22

## 2024-01-23 RX ADMIN — ALPRAZOLAM 0.5 MG: 0.5 TABLET ORAL at 16:58

## 2024-01-23 RX ADMIN — OXYCODONE HYDROCHLORIDE 10 MG: 5 TABLET ORAL at 07:19

## 2024-01-23 RX ADMIN — DOCUSATE SODIUM 50MG AND SENNOSIDES 8.6MG 2 TABLET: 8.6; 5 TABLET, FILM COATED ORAL at 08:38

## 2024-01-23 RX ADMIN — OXYCODONE HYDROCHLORIDE 10 MG: 5 TABLET ORAL at 23:09

## 2024-01-23 RX ADMIN — CARVEDILOL 6.25 MG: 6.25 TABLET, FILM COATED ORAL at 17:00

## 2024-01-23 RX ADMIN — CARVEDILOL 6.25 MG: 6.25 TABLET, FILM COATED ORAL at 08:38

## 2024-01-23 RX ADMIN — ONDANSETRON HYDROCHLORIDE 8 MG: 4 TABLET, FILM COATED ORAL at 10:22

## 2024-01-23 RX ADMIN — DIPHENHYDRAMINE HYDROCHLORIDE 25 MG: 25 CAPSULE ORAL at 01:12

## 2024-01-23 RX ADMIN — OXYCODONE HYDROCHLORIDE 10 MG: 5 TABLET ORAL at 17:00

## 2024-01-23 RX ADMIN — ONDANSETRON 4 MG: 2 INJECTION INTRAMUSCULAR; INTRAVENOUS at 00:42

## 2024-01-23 RX ADMIN — MULTIPLE VITAMINS W/ MINERALS TAB 1 TABLET: TAB at 09:00

## 2024-01-23 RX ADMIN — CALCIUM CARBONATE (ANTACID) CHEW TAB 500 MG 2 TABLET: 500 CHEW TAB at 10:34

## 2024-01-23 RX ADMIN — HYDRALAZINE HYDROCHLORIDE 10 MG: 20 INJECTION, SOLUTION INTRAMUSCULAR; INTRAVENOUS at 16:58

## 2024-01-23 RX ADMIN — BISACODYL 10 MG: 10 SUPPOSITORY RECTAL at 17:01

## 2024-01-23 RX ADMIN — LACTULOSE 20 G: 20 SOLUTION ORAL at 08:37

## 2024-01-23 RX ADMIN — CARBIDOPA AND LEVODOPA 1 TABLET: 25; 250 TABLET ORAL at 20:45

## 2024-01-23 RX ADMIN — LACTULOSE 20 G: 20 SOLUTION ORAL at 20:44

## 2024-01-23 RX ADMIN — LACTULOSE 20 G: 20 SOLUTION ORAL at 16:59

## 2024-01-23 RX ADMIN — DEXAMETHASONE 4 MG: 4 TABLET ORAL at 09:09

## 2024-01-23 RX ADMIN — LIDOCAINE 2 PATCH: 700 PATCH TOPICAL at 20:53

## 2024-01-23 RX ADMIN — DIPHENHYDRAMINE HYDROCHLORIDE 25 MG: 25 CAPSULE ORAL at 20:45

## 2024-01-23 RX ADMIN — OXYCODONE HYDROCHLORIDE 20 MG: 10 TABLET, FILM COATED, EXTENDED RELEASE ORAL at 20:45

## 2024-01-23 RX ADMIN — ENOXAPARIN SODIUM 40 MG: 100 INJECTION SUBCUTANEOUS at 08:36

## 2024-01-23 RX ADMIN — PANTOPRAZOLE SODIUM 40 MG: 40 TABLET, DELAYED RELEASE ORAL at 00:59

## 2024-01-23 RX ADMIN — Medication 5 MG: at 20:45

## 2024-01-23 NOTE — CASE MANAGEMENT/SOCIAL WORK
Case Management/Social Work    Patient Name:  Silvia Chung  YOB: 1950  MRN: [id number]  Admit Date:  1/22/2024        SW received message from PC SAMUEL Lentz regarding pt needing assistance with organizing some appointments. RISHABH spoke with [facility] location to confirm radiation appointments for this week. Pt comes daily to facility. RISHABH also spoke with Mary Carmen BAKER regarding palliative appointment that is usually in [place] through [facility]. Mary Carmen spoke with Sheri (provider) who will see pt virtually in [place] office in Oncology. SW made a schedule list of upcoming appointments this week for pt to remember easier. SW also asked pt to arrive to Friday's appointment here a little early to get virtual appointment set up with office. Pt understood. Pt reports no issues for transportation as her  transports her. Pt denied any other needs at this time. RISHABH also put appointment information in AVS.       Electronically signed by:  KEYLA Najera  01/23/24 14:37 EST

## 2024-01-23 NOTE — DISCHARGE INSTR - APPOINTMENTS
Upcoming Appointments for this week:   Wednesday 1/24/24 start: 3:50 [facility] (Radiation)    Thursday 1/25/24 start: 3:50 [facility] [place] [facility] (Radiation)    [facility] [place]  [address] [place] [address] ·   [phone]      Friday 1/26/2024 11:00 AM Sheri Saul PA-C - you can complete a video call from the [facility] oncology office. You do not have to drive to [place] for this appointment.  Please come to office 20 minutes before appointment so they can arrange for video call. (Palliative)    [facility]  [address]  [address]  [place] [address]  [phone]    Friday 1/26/24 start: 4:10pm [facility] (Radiation)

## 2024-01-23 NOTE — CASE MANAGEMENT/SOCIAL WORK
Discharge Planning Assessment  [facility]     Patient Name: Silvia Chung  MRN: [id number]  Today's Date: 1/22/2024    Admit Date: 1/22/2024    Plan: The patient is awake and able to answer questions.  Her  is at bedside and she consents for him to be included in her DC planning.  She is a current patient of Dr. Aguilera and gets her medications from [facility].  She is also followed by Oncology here at [facility].  The patient uses a walker, wheelchair, and bedside commode at home.  She denies the need for addtional DME at this time.  Currently she is having uncontrolled pain due to cancer.  Her ultimate goal is to return home with a pain medication regimen that allows her to have her pain at a managable level.  At the time of DC the patient would like Hospice to be included in her care team to assist with palliative care.  She would also like to consult with [facility] ([phone]) to aid in meal preparation and ADL care.  Questions and concerns were addressed at the time of this conversation.  Will provide additional resources and information upon patient request.   Discharge Needs Assessment       Row Name 01/22/24 1858       Living Environment    People in Home significant other    Name(s) of People in Home [place] Chung,     Current Living Arrangements home    Duration at Residence Since 2011    Potentially Unsafe Housing Conditions none    In the past 12 months has the electric, gas, oil, or water company threatened to shut off services in your home? No    Primary Care Provided by self;spouse/significant other    Provides Primary Care For no one    Family Caregiver if Needed none    Quality of Family Relationships helpful;involved;supportive    Able to Return to Prior Arrangements yes       Resource/Environmental Concerns    Resource/Environmental Concerns none       Transportation Needs    In the past 12 months, has lack of transportation kept you from medical appointments or from  getting medications? no    In the past 12 months, has lack of transportation kept you from meetings, work, or from getting things needed for daily living? No       Food Insecurity    Within the past 12 months, you worried that your food would run out before you got the money to buy more. Never true    Within the past 12 months, the food you bought just didn't last and you didn't have money to get more. Never true       Transition Planning    Patient/Family Anticipates Transition to home with help/services    Patient/Family Anticipated Services at Transition hospice care       Discharge Needs Assessment    Readmission Within the Last 30 Days no previous admission in last 30 days    Equipment Currently Used at Home wheelchair;walker, rolling;commode    Concerns to be Addressed discharge planning;medication    Concerns Comments Patient wants to return home and live with a comfortable pain level for as long as she can    Anticipated Changes Related to Illness inability to care for self;other (see comments)  Uncontrolled pain related to cancer    Equipment Needed After Discharge none    Outpatient/Agency/Support Group Needs home hospice  Patient would like Hospice services for Palliative Care    Patient's Choice of Community Agency(s) [facility] for assistance with day to day care and Hospice for Palliative Services    Current Discharge Risk terminally ill                   Discharge Plan       Row Name 01/22/24 1903       Plan    Plan The patient is awake and able to answer questions.  Her  is at bedside and she consents for him to be included in her DC planning.  She is a current patient of Dr. Aguilera and gets her medications from [facility].  She is also followed by Oncology here at [facility] [place].  The patient uses a walker, wheelchair, and bedside commode at home.  She denies the need for addtional DME at this time.  Currently she is having uncontrolled pain due to cancer.  Her ultimate goal is to return home  with a pain medication regimen that allows her to have her pain at a managable level.  At the time of DC the patient would like Hospice to be included in her care team to assist with palliative care.  She would also like to consult with [facility] ([phone]) to aid in meal preparation and ADL care.  Questions and concerns were addressed at the time of this conversation.  Will provide additional resources and information upon patient request.    Roadmap to Recovery Yes    Patient/Family in Agreement with Plan yes    Plan Comments Patient states that she knows she won't live for the next 10 years, but wants to make the time she has left as comfortable as possible.    Final Discharge Disposition Code 50 - home with hospice    Final Note Priority is pain control to return home with managable symptoms so she can spend time with her , dogs, and cats.                  Continued Care and Services - Admitted Since 1/22/2024    Coordination has not been started for this encounter.       Selected Continued Care - Episodes Includes continued care and service providers with selected services from the active episodes listed below      High Risk Care Management Episode start date: 9/22/2023   There are no active outsourced providers for this episode.                    Demographic Summary       Row Name 01/22/24 3516       General Information    Admission Type observation    Arrived From emergency department    Required Notices Provided Observation Status Notice    Referral Source admission list    Reason for Consult discharge planning    Preferred Language English       Contact Information    Permission Granted to Share Info With                    Functional Status       Row Name 01/22/24 1936       Functional Status    Usual Activity Tolerance moderate    Current Activity Tolerance fair    Functional Status Comments Patient has cancer with mets and is experiencing uncontrolled pain        Physical Activity    On average, how many days per week do you engage in moderate to strenuous exercise (like a brisk walk)? 0 days    On average, how many minutes do you engage in exercise at this level? 0 min    Number of minutes of exercise per week 0       Assessment of Health Literacy    How often do you have someone help you read hospital materials? Never    How often do you have problems learning about your medical condition because of difficulty understanding written information? Never    How often do you have a problem understanding what is told to you about your medical condition? Never    How confident are you filling out medical forms by yourself? Extremely    Health Literacy Excellent       Functional Status, IADL    Medications independent    Meal Preparation assistive person    Housekeeping assistive person    Laundry assistive person    Shopping assistive person       Mental Status    General Appearance WDL WDL       Mental Status Summary    Recent Changes in Mental Status/Cognitive Functioning no changes                   Psychosocial       Row Name 01/22/24 1858       Values/Beliefs    Spiritual, Cultural Beliefs, [employer] Practices, Values that Affect Care no       Behavior WDL    Behavior WDL WDL       Emotion Mood WDL    Emotion/Mood/Affect WDL WDL       Mental Health    Little Interest or Pleasure in Doing Things 0-->not at all    Feeling Down, Depressed or Hopeless 0-->not at all       Speech WDL    Speech WDL WDL       Perceptual State WDL    Perceptual State WDL WDL       Thought Process WDL    Thought Process WDL WDL       Intellectual Performance WDL    Intellectual Performance WDL WDL                   Abuse/Neglect       Row Name 01/22/24 1172       Personal Safety    Feels Unsafe at Home or Work/School no    Feels Threatened by Someone no    Does Anyone Try to Keep You From Having Contact with Others or Doing Things Outside Your Home? no    Physical Signs of Abuse Present no                    Legal       Row Name 01/22/24 1858       Financial Resource Strain    How hard is it for you to pay for the very basics like food, housing, medical care, and heating? Not hard                   Substance Abuse       Row Name 01/22/24 1858       Substance Use    Substance Use Status never used                   Patient Forms       Row Name 01/22/24 1914       Patient Forms    Patient Observation Letter Delivered    Delivered to Patient    Method of delivery In person                      Magda Foster RN

## 2024-01-23 NOTE — DISCHARGE SUMMARY
[facility]   DISCHARGE SUMMARY      Name:  Silvia Chung   Age:  73 y.o.  Sex:  female  :  1950  MRN:  [id number]   Visit Number:  [id number]    Admission Date:  2024  Date of Discharge:  2024  Primary Care Physician:  Johnathan Aguilera MD    Important issues to note:    -Patient admitted due to intractable pain due to to metastatic pancreatic cancer.  Norco discontinued with added OxyContin/Oxycodone with overall improvement.  Bowel regimen encouraged with enema given prior to discharge.  Patient will follow-up with usual oncologist in 3 days.  -Strict return precautions given.    Discharge Diagnoses:     1.  Intractable pain due to metastatic pancreatic cancer  2.  Constipation  3.  Hypertension    Problem List:     Active Hospital Problems    Diagnosis  POA    **Intractable pain [R52]  Yes    Widespread metastatic malignant neoplastic disease [C80.0]  Yes    Malignant neoplasm of body of pancreas [C25.1]  Yes      Resolved Hospital Problems   No resolved problems to display.     Presenting Problem:    Chief Complaint   Patient presents with    Abdominal Pain      Consults:     Consulting Physician(s)                     None              Procedures Performed:        History of presenting illness/Hospital Course:    Ms. Chung is a pleasant 73-year-old female with pancreatic cancer status postresection with recurrent metastatic disease.  Unfortunately patient presented to the emergency department due to worsening left-sided abdominal pain which was not controlled on her usual Norco.  Patient currently pending radiation treatment palliatively for symptom control.  Upon ED presentation patient hemodynamically stable.Pertinent findings: Sodium 133, potassium 3.7, creatinine 0.56, lactic acid 2.5, WBC 13.21, hemoglobin 10.7, platelets 490, urinalysis showing 6-10 WBCs moderate leukocytes with urine culture pending, PET/CT showing paraspinal mass at T10 as well as  pulmonary metastases multifocal and at least 2 hypermetabolic liver lesions.  Patient was initiated on Oxycodone/OxyContin with improved pain.  Patient also continued on bowel regimen with enema given history of constipation.  Patient encouraged to continue bowel regimen at home due to chronic opioid dependence.  Otherwise, patient will follow with Dr. Corrales her oncologist in 3 days.  Strict return precautions given.  Otherwise reassuring labs and vitals noted.  Patient will also be followed by palliative care.    Vital Signs:    Temp:  [97.5 °F (36.4 °C)-98.8 °F (37.1 °C)] 97.5 °F (36.4 °C)  Heart Rate:  [] 90  Resp:  [16-18] 16  BP: (136-185)/() 160/101    Physical Exam:    General Appearance:  Alert and cooperative.    Head:  Atraumatic and normocephalic.   Eyes: Conjunctivae and sclerae normal, no icterus. No pallor.   Ears:  Ears with no abnormalities noted.   Throat: No oral lesions, no thrush, oral mucosa moist.   Neck: Supple, trachea midline, no thyromegaly.  Port noted to right chest wall.   Back:   No kyphoscoliosis present. No tenderness to palpation.   Lungs:   Breath sounds heard bilaterally equally.  No crackles or wheezing. No Pleural rub or bronchial breathing.  On room air unlabored.   Heart:  Normal S1 and S2, no murmur, no gallop, no rub. No JVD.   Abdomen:   Normal bowel sounds, no masses, no organomegaly. Soft, generalized tenderness throughout.  Nondistended, no rebound tenderness.   Extremities: Supple, no edema, no cyanosis, no clubbing.   Pulses: Pulses palpable bilaterally.   Skin: No bleeding or rash.   Neurologic: Alert and oriented x 3. No facial asymmetry. Moves all four limbs. No tremors.     Pertinent Lab Results:     Results from last 7 days   Lab Units 01/23/24  0804 01/23/24  0548 01/22/24  1634   SODIUM mmol/L 129* 125* 133*   POTASSIUM mmol/L 4.0 6.9* 3.7   CHLORIDE mmol/L 92* 95* 93*   CO2 mmol/L 23.7 22.0 24.1   BUN mg/dL 7* 6* 10   CREATININE mg/dL 0.57 0.51*  0.56*   CALCIUM mg/dL 8.2* 6.6* 9.1   BILIRUBIN mg/dL 0.4 0.4 0.3   ALK PHOS U/L 100 76 106   ALT (SGPT) U/L <5 <5 7   AST (SGOT) U/L 15 11 15   GLUCOSE mg/dL 176* 762* 96     Results from last 7 days   Lab Units 01/23/24  0548 01/22/24  1634   WBC 10*3/mm3 11.76* 13.21*   HEMOGLOBIN g/dL 10.3* 10.7*   HEMATOCRIT % 30.3* 30.6*   PLATELETS 10*3/mm3 461* 490*                     Results from last 7 days   Lab Units 01/22/24  1634   LIPASE U/L 9*         Results from last 7 days   Lab Units 01/22/24  1748   URINECX  No growth       Pertinent Radiology Results:    Imaging Results (All)       None            Echo:    Results for orders placed in visit on 06/02/23    Adult Transthoracic Echo Complete W/ Cont if Necessary Per Protocol    Interpretation Summary    Left ventricular systolic function is normal. Estimated left ventricular EF = 66% Left ventricular ejection fraction appears to be 66 - 70%.    Left ventricular diastolic function is consistent with (grade I) impaired relaxation.    Estimated right ventricular systolic pressure from tricuspid regurgitation is normal (<35 mmHg).    Condition on Discharge:      Stable.    Code status during the hospital stay:    Code Status and Medical Interventions:   Ordered at: 01/22/24 1956     Code Status (Patient has no pulse and is not breathing):    CPR (Attempt to Resuscitate)     Medical Interventions (Patient has pulse or is breathing):    Full Support     Discharge Disposition:    Home or Self Care    Discharge Medications:       Discharge Medications        New Medications        Instructions Start Date   Narcan 4 MG/0.1ML nasal spray  Generic drug: naloxone   Call 911. [place] into nostril upon signs of overdose. May repeat in 2-3 minutes in other nostril if no or minimal breathing and responsiveness.      oxyCODONE 10 MG tablet  Commonly known as: ROXICODONE   10 mg, Oral, Every 4 Hours PRN      oxyCODONE ER 20 MG 12 hr tablet  Commonly known as: oxyCONTIN   20 mg,  Oral, Every 12 Hours Scheduled      Stool Softener Plus Laxative 8.6-50 MG per tablet  Generic drug: sennosides-docusate   2 tablets, Oral, 2 Times Daily             Changes to Medications        Instructions Start Date   carvedilol 6.25 MG tablet  Commonly known as: COREG  What changed: when to take this   6.25 mg, Oral, 2 Times Daily With Meals      ondansetron 8 MG tablet  Commonly known as: ZOFRAN  What changed: when to take this   8 mg, Oral, 3 Times Daily PRN      ondansetron ODT 4 MG disintegrating tablet  Commonly known as: ZOFRAN-ODT  What changed: Another medication with the same name was added. Make sure you understand how and when to take each.   4 mg, Translingual, Every 8 Hours PRN      ondansetron ODT 4 MG disintegrating tablet  Commonly known as: ZOFRAN-ODT  What changed: You were already taking a medication with the same name, and this prescription was added. Make sure you understand how and when to take each.   4 mg, Translingual, Every 6 Hours PRN             Continue These Medications        Instructions Start Date   carbidopa-levodopa  MG per tablet  Commonly known as: SINEMET   Take 1 tablet by mouth 2 (Two) Times a Day.      dexAMETHasone 2 MG tablet  Commonly known as: DECADRON   4 mg, Oral, Daily      diphenhydrAMINE 25 mg capsule  Commonly known as: BENADRYL   25 mg      docusate sodium 100 MG capsule  Commonly known as: COLACE   Take 1 capsule every day by oral route.      esomeprazole 40 MG capsule  Commonly known as: nexIUM   40 mg, Oral, Every Morning Before Breakfast      lactulose 10 GM/15ML solution  Commonly known as: CHRONULAC   TAKE 30ML BY MOUTH 3 TIMES DAILY.      lidocaine-prilocaine 2.5-2.5 % cream  Commonly known as: EMLA       multivitamin with minerals tablet tablet   1 tablet, Oral, Daily      polyethylene glycol 17 GM/SCOOP powder  Commonly known as: MIRALAX   17 g      Prolia 60 MG/ML solution prefilled syringe syringe  Generic drug: denosumab   INJECT 1 MILLILITER  (60 MG) BY SUBCUTANEOUS ROUTE EVERY 6 MONTHS IN THE UPPER ARM, UPPER THIGH OR ABDOMEN      senna 8.6 MG tablet  Commonly known as: SENOKOT   8.6 mg, Oral, Daily             Stop These Medications      HYDROcodone-acetaminophen  MG per tablet  Commonly known as: NORCO            Discharge Diet:     Diet Instructions       Diet: Regular/House Diet; Thin (IDDSI 0)      Discharge Diet: Regular/House Diet    Fluid Consistency: Thin (IDDSI 0)          Activity at Discharge:     Activity Instructions       Activity as Tolerated            Follow-up Appointments:     Follow-up Information       Johnathan Aguilera MD Follow up in 3 day(s).    Specialty: Internal Medicine  Contact information:  [address]  [address]  [facility] [id number] [phone]               Janell Corrales MD Follow up in 3 day(s).    Specialties: Hematology and Oncology, Oncology, Hematology  Contact information:  [address]  [address]  [facility] [address]  [phone]                           Future Appointments   Date Time Provider Department Center   1/26/2024 11:00 AM Sheri Saul PA-C [facility] None   2/6/2024  8:30 AM CHAIR 5 - [facility]   2/27/2024 12:30 PM CHAIR 1 - [facility]   3/19/2024  9:30 AM CHAIR 4 - [facility]   4/9/2024 11:00 AM CHAIR 4 - [facility]   4/30/2024 11:00 AM CHAIR 7 - [facility]   5/21/2024 11:00 AM CHAIR 7 - [facility]   6/11/2024 11:00 AM CHAIR 4 - [facility]     Test Results Pending at Discharge:    Pending Labs       Order Current Status    Urine Culture - Urine, Urine, Clean Catch Preliminary result               Gayle Castillo, APRN  01/23/24  11:49 EST    Time: I spent 40 minutes on this discharge activity which included: face-to-face encounter with the patient, reviewing the data in the system, coordination of the care with the nursing staff as well  as consultants, documentation, and entering orders.     Dictated utilizing Dragon dictation.

## 2024-01-23 NOTE — CONSULTS
[facility]    INPATIENT PALLIATIVE CARE CONSULT      Name:  Silvia Chung   Age:  73 y.o.  Sex:  female  :  1950  MRN:  [id number]   Visit Number:  [id number]  Date of Service:  24  Date of Admission:  2024  Primary Care Physician:  Johnathan Aguilera MD    Consulting Physician:  Dr. Barrientos    Reason For Consult:  Introduction to Palliative services, Goals of care discussions , Support during serious illness, and Symptom management     History of Present Illness:  Silvia Chung is a 73 y.o. 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  She had recurrence of disease noted , CT thoracic spine noting left paraspinous mass.  Additional imaging noted increasing mass centered at the level of T10, multiple pulmonary nodules, 2 liver lesions all consistent with metastatic disease.  Per most recent oncology visit, patient planning to proceed with radiation followed by chemotherapy, all with palliative intent.  Patient presented to [facility] on 2024 secondary to intractable left abdominal pain.  ED work up noted sodium 133, K 3.7, creatinine 0.56, lactic acid 2.5, WBC 13.21, hgb 10.7, platelets 490.  Patient provided pain control and admitted to the primary medicine service for continued evaluation and management with consultation to palliative services to further review GOC and assist with symptom management.  After speaking directly to the primary medicine team, met with patient and spouse at bedside.  Patient reports that she has been experiencing 10/10 abdominal pain that was not controlled with use of Hydrocodone 10mg, noting approximately taking these 6/day.  Additionally she reports symptoms of constipation, poor sleep secondary to pain, and tearfulness.  She has appointment with palliative care outpatient on 24.  Patient reports that functional  status is beginning to decline, needing assistance with more and more ADLs, such as bathing.  Patient is ambulatory at baseline.  She is not sleeping throughout the day.  Appetite is labile, ocassional nausea.     Patient agreeable to continued palliative care follow up and discussions regarding goals of care and advance care planning.     Review of Systems   Constitutional:  Positive for fatigue.   Gastrointestinal:  Positive for abdominal distention and constipation.   Neurological:  Positive for weakness.   Psychiatric/Behavioral:  Positive for sleep disturbance. The patient is nervous/anxious.         Medical History:  Past Medical History:   Diagnosis Date    Asthma     Hyperlipidemia     Hypertension     Impaired functional mobility, balance, gait, and endurance     Pancreatic cancer 2022    Parkinson disease     PONV (postoperative nausea and vomiting)     Scoliosis     Seasonal allergies       Past Surgical History:   Procedure Laterality Date    ADENOIDECTOMY      BREAST BIOPSY Right 2019    Needle biopsy    CATARACT EXTRACTION Bilateral     CHOLECYSTECTOMY OPEN  12/19/2022    done at     COLONOSCOPY      FOOT SURGERY Left     torn ligament, screws placed.    HIP HEMIARTHROPLASTY Left 10/21/2020    Procedure: HIP HEMIARTHROPLASTY LEFT;  Surgeon: Humza Winters MD;  Location: [facility];  Service: Orthopedics;  Laterality: Left;    LYMPHADENECTOMY  12/19/2022    partial, done at     NECK SURGERY      four fusions    OMENTECTOMY  12/19/2022    done at     PANCREATECTOMY  12/19/2022    done at , left adrenolectomy also performed    PORTACATH PLACEMENT Right 02/09/2023    Procedure: INSERTION OF PORTACATH;  Surgeon: Marcio Garcia MD;  Location: [facility];  Service: General;  Laterality: Right;    SPLENECTOMY  12/19/2022    done at     TONSILLECTOMY       Family History   Problem Relation Age of Onset    Heart failure Father     Lung cancer Father     Cancer Brother     Breast cancer Maternal  Grandmother     Breast cancer Paternal Grandmother     Breast cancer Cousin     Ovarian cancer Neg Hx      Allergies: Patient has no known allergies.    Hospital Scheduled Medications:    Current Facility-Administered Medications:     acetaminophen (TYLENOL) tablet 650 mg, 650 mg, Oral, Q4H PRN, Jonathan Barrientos DO    sennosides-docusate (PERICOLACE) 8.6-50 MG per tablet 2 tablet, 2 tablet, Oral, BID, 2 tablet at 01/23/24 0838 **AND** polyethylene glycol (MIRALAX) packet 17 g, 17 g, Oral, Daily PRN **AND** bisacodyl (DULCOLAX) EC tablet 5 mg, 5 mg, Oral, Daily PRN **AND** bisacodyl (DULCOLAX) suppository 10 mg, 10 mg, Rectal, Daily PRN, Jonathan Barrientos DO    calcium carbonate (TUMS) chewable tablet 500 mg (200 mg elemental), 2 tablet, Oral, BID PRN, Jonathan Barrientos DO, 2 tablet at 01/23/24 1034    carbidopa-levodopa (SINEMET)  MG per tablet 1 tablet, 1 tablet, Oral, BID, Jonathan Barrientos DO, 1 tablet at 01/23/24 0909    carvedilol (COREG) tablet 6.25 mg, 6.25 mg, Oral, BID With Meals, Jonathan Barrientos DO, 6.25 mg at 01/23/24 0838    dexAMETHasone (DECADRON) tablet 4 mg, 4 mg, Oral, Daily, Jonathan Barrientos DO, 4 mg at 01/23/24 0909    diphenhydrAMINE (BENADRYL) capsule 25 mg, 25 mg, Oral, Q6H PRN, Jonathan Barrientos DO, 25 mg at 01/23/24 0112    Enoxaparin Sodium (LOVENOX) syringe 40 mg, 40 mg, Subcutaneous, Daily, Jonathan Barrientos DO, 40 mg at 01/23/24 0836    lactulose (CHRONULAC) 10 GM/15ML solution 20 g, 20 g, Oral, TID, Jonathan Barrientos DO, 20 g at 01/23/24 0837    melatonin tablet 5 mg, 5 mg, Oral, Nightly, Jonathan Barrientos DO, 5 mg at 01/22/24 2112    multivitamin with minerals 1 tablet, 1 tablet, Oral, Daily, Jonathan Barrientos DO, 1 tablet at 01/23/24 0900    ondansetron ODT (ZOFRAN-ODT) disintegrating tablet 4 mg, 4 mg, Oral, Q6H PRN **OR** ondansetron (ZOFRAN) injection 4 mg, 4 mg, Intravenous, Q6H PRN, Jonathan Barrientos DO, 4 mg at 01/23/24 0042     "ondansetron (ZOFRAN) tablet 8 mg, 8 mg, Oral, PRN, Iliana, Jonathan Willy, DO, 8 mg at 01/23/24 1022    ondansetron ODT (ZOFRAN-ODT) disintegrating tablet 4 mg, 4 mg, Oral, Q8H PRN, Iliana, Jonathan Willy, DO    oxyCODONE (oxyCONTIN) 12 hr tablet 20 mg, 20 mg, Oral, Q12H, Iliana, Jonathan Willy, DO, 20 mg at 01/23/24 0837    oxyCODONE (ROXICODONE) immediate release tablet 10 mg, 10 mg, Oral, Q4H PRN, Iliana, Jonathan Willy, DO, 10 mg at 01/23/24 0719    pantoprazole (PROTONIX) EC tablet 40 mg, 40 mg, Oral, Q24H, Iilana, Jonathan Willy, DO, 40 mg at 01/23/24 0059    [COMPLETED] Insert Peripheral IV, , , Once **AND** sodium chloride 0.9 % flush 10 mL, 10 mL, Intravenous, PRN, Iliana, Jonathan Willy, DO    sodium chloride 0.9 % flush 10 mL, 10 mL, Intravenous, Q12H, Iliana, Jonathan Willy, DO    sodium chloride 0.9 % flush 10 mL, 10 mL, Intravenous, PRN, Iliana, Jonathan Willy, DO    sodium chloride 0.9 % infusion 40 mL, 40 mL, Intravenous, PRN, Iliana, Jonathan Willy, DO  I have utilized all immediate resources to obtain, update, or review the patient's current medications (including all prescription, over-the-counter products, herbals, and/or nutritional supplements).      Vitals: BP (!) 160/101 (BP Location: Left arm, Patient Position: Sitting)   Pulse 90   Temp 97.5 °F (36.4 °C) (Oral)   Resp 16   Ht 172.7 cm (68\")   Wt 62.1 kg (137 lb)   SpO2 95%   BMI 20.83 kg/m²     Intake/Output Summary (Last 24 hours) at 1/23/2024 1147  Last data filed at 1/22/2024 1840  Gross per 24 hour   Intake 1000 ml   Output --   Net 1000 ml       Physical Exam  Vitals and nursing note reviewed.   Constitutional:       Appearance: She is well-developed.      Comments: Pleasant and interactive female lying in bed, NAD   HENT:      Head: Normocephalic and atraumatic.      Mouth/Throat:      Mouth: Mucous membranes are moist.      Pharynx: Oropharynx is clear.   Eyes:      Conjunctiva/sclera: Conjunctivae normal.      Pupils: Pupils are equal, round, and " reactive to light.   Cardiovascular:      Rate and Rhythm: Normal rate and regular rhythm.   Pulmonary:      Effort: Pulmonary effort is normal. No respiratory distress.      Breath sounds: Normal breath sounds.   Abdominal:      General: Bowel sounds are normal. There is no distension.      Palpations: Abdomen is soft.      Tenderness: There is no abdominal tenderness.   Musculoskeletal:         General: No swelling. Normal range of motion.      Cervical back: Normal range of motion and neck supple.   Skin:     General: Skin is warm and dry.      Capillary Refill: Capillary refill takes less than 2 seconds.   Neurological:      Mental Status: She is alert and oriented to person, place, and time.   Psychiatric:      Comments: Tearful          Diagnostics Review:  Laboratory Data:  Results from last 7 days   Lab Units 01/23/24  0804 01/23/24  0548 01/22/24  1634   SODIUM mmol/L 129* 125* 133*   POTASSIUM mmol/L 4.0 6.9* 3.7   CHLORIDE mmol/L 92* 95* 93*   CO2 mmol/L 23.7 22.0 24.1   BUN mg/dL 7* 6* 10   CREATININE mg/dL 0.57 0.51* 0.56*   CALCIUM mg/dL 8.2* 6.6* 9.1   ALBUMIN g/dL 3.9 3.1* 3.9   BILIRUBIN mg/dL 0.4 0.4 0.3   ALK PHOS U/L 100 76 106   ALT (SGPT) U/L <5 <5 7   AST (SGOT) U/L 15 11 15   GLUCOSE mg/dL 176* 762* 96     Results from last 7 days   Lab Units 01/23/24  0548 01/22/24  1634   WBC 10*3/mm3 11.76* 13.21*   HEMOGLOBIN g/dL 10.3* 10.7*   HEMATOCRIT % 30.3* 30.6*   PLATELETS 10*3/mm3 461* 490*             Results from last 7 days   Lab Units 01/22/24  1748   COLOR UA  Yellow   GLUCOSE UA  Negative   KETONES UA  Negative   BLOOD UA  Negative   LEUKOCYTES UA  Moderate (2+)*   PH, URINE  6.5   BILIRUBIN UA  Negative   UROBILINOGEN UA  1.0 E.U./dL     Pain Management Panel          Latest Ref Rng & Units 11/21/2023   Pain Management Panel   Buprenorphine, Screen, Urine <10 ng/mL  <50 ng/mL <10     <50       Cocaine Screen, Urine <50 ng/mL <50       Hydromorphone <50 ng/mL 287          Details           This result is from an external source.    Multiple values from one day are sorted in reverse-chronological order             Results from last 7 days   Lab Units 01/22/24  1748   URINECX  No growth     Nutritional Status:   Results from last 7 days   Lab Units 01/23/24  0804 01/23/24  0548 01/22/24  1634   ALBUMIN g/dL 3.9 3.1* 3.9     Body mass index is 20.83 kg/m².  Documented weights    01/22/24 1610   Weight: 62.1 kg (137 lb)        Radiology:  No radiology results for the last 3 days      Goals of Care/Advance Care Planning:  Advance Care Planning     1. Determination of Decisional Capacity:  Decisional capacity: YES     2. Advanced Directives:  There are no Advance Directives on file.  and Patient reports previously filled Living Will, copy requested   Healthcare surrogate/legal decision maker: Bong Chung  Relationship to individual: Spouse  Surrogate/decision maker understands role and will honor individual's decisions: YES    3. Patient & Family Meeting:  Members present at meeting Silvia Chung, Bong Chung, Mary Carmen Sharma, Jarett Foster  Meeting took place at [facility] 2nd floor patient's room     4. Summary of the discussion:  After generalized introductions, introduced the role of palliative care in assisting with complex medical decision making, goals of care discussions, and symptom management/supportive care.  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  She moved to [place] in the past few years, as her spouse Bong has family from here.  They do not have any children.   I reviewed oncology history, patient expressing insight regarding the same.  Additionally, I reviewed the current plan regarding cancer directed therapies which include radiation and chemotherapy with palliative intent.  Patient who is tearful throughout our encounter, expresses that ultimately she knows that her cancer is not curable and that she will die from her disease.  She  expresses feeling of fear of pain, ultimately noting that when she arrived at the ED she felt as though her pain would never be able to be controlled.  She notes that her intent to proceed with treatment options provided is to have improved symptom control and that these metastatic lesions will shrink.  I provided empathetic listening throughout our conversation, reassuring her that she can have improved pain control, and subsequently improved quality of life.  I reviewed CODE STATUS, patient becoming quite tearful upon discussing her CODE STATUS.  Patient expresses again that she knows that she is dying, however she is not ready to finalize any decisions.  I encouraged patient reflect on desire for life prolonging interventions vs. Electing for a natural dying process.  She expresses she would not want to prolong death on a machine, but again states she isn't ready to make these decisions just yet.  I introduced Hospice services and contrasted this with palliative services, encouraging electing for Hospice services when able as they are able to assist with symptom management and help to improve quality of life.  She expresses interest in Hospice, following completion of cancer directed therapies.    CODE STATUS reviewed/confirmed: FULL CODE   Baseline Functional Status: Palliative Performance Scale Score: Performance 80% based on the following measures: Ambulation: Full ambulation, Activity and Evidence of Disease: Normal activity with effort, some evidence of disease, Self-Care: Fully independent,  Intake: Normal or reduced, LOC: Full           Palliative Care Assessment:  Metastatic pancreatic cancer  Neoplasm related pain  constipation    Recommendations/Plan:  - CODE STATUS reviewed/confirmed: FULL CODE   - Goal at the present is to pursue cancer directed therapies, given with palliative intent  - Although goals are not in alignment, patient would qualify for Hospice services in the future under cancer  criteria  - CM following, agree with HH to maximize home services  - Discussed with SW, who have assisted with organizing patient's upcoming appointments, they have recommended ambulatory referral to SW  - Contacted Palliative Care outpatient provider Sheri Saul PA-C regarding inpatient status to assist with coordination of care  - Current Functional Status: Palliative Performance Scale Score: Performance 60% based on the following measures: Ambulation: Reduced, Activity and Evidence of Disease: Unable to do hobby or some work, significant evidence of disease, Self-Care: Occasional assist necessary,  Intake: Normal or reduced, LOC: Full or confusion  - Palliative care will continue to follow/support patient and family    Recommendations:  - Agree with Oxycodone ER 20mg BID along with Oxycodone IR 5mg Q4hrs for breakthrough  - Recommend increasing senna to 2 tabs TID, may continue lactulose  - Patient reports not having BM for 2 weeks, recommend enema prior to discharge  - Please ensure she has antiemetics for home use for intermittent nausea  - Patient has appointment with Palliative Care outpatient on 1/26/24, will defer to this appointment regarding need for additional SSRI/SNRI; patient tearful throughout our conversation today, may benefit from medication to support mood    I appreciate the opportunity to participate in the care of Ms. Silvia Chung.  Please do not hesitate to contact me with any questions or concerns.      I spent 100 total minutes conducting chart review for relevant information, face to face assessment, counseling patient and/or family, and coordination of care.  10 minutes spent discussing advanced care planning.  Part of this note may be an electronic transcription/translation of spoken language to printed text using the Dragon Dictation System.       Mary Carmen Sharma PA-C  01/23/24  11:47 EST

## 2024-01-23 NOTE — CASE MANAGEMENT/SOCIAL WORK
Case Management Discharge Note      Final Note: Priority is pain control to return home with managable symptoms so she can spend time with her , dogs, and cats.         Selected Continued Care - Admitted Since 1/22/2024       Destination    No services have been selected for the patient.                Durable Medical Equipment    No services have been selected for the patient.                Dialysis/Infusion    No services have been selected for the patient.                Home Medical Care    No services have been selected for the patient.                Therapy    No services have been selected for the patient.                Community Resources    No services have been selected for the patient.                Community & DME    No services have been selected for the patient.                    Selected Continued Care - Episodes Includes continued care and service providers with selected services from the active episodes listed below      High Risk Care Management Episode start date: 9/22/2023 (Paused)   There are no active outsourced providers for this episode.                 Transportation Services  Private: Car    Final Discharge Disposition Code: 01 - home or self-care

## 2024-01-23 NOTE — H&P
[facility]   HISTORY AND PHYSICAL      Name:  Silvia Chung   Age:  73 y.o.  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  Was pending radiation treatment and then chemo treatment palliatively for symptom control.  However her pain in her left abdomen became too severe not controlled with the hydrocodone she was taking and she had to come to the ER.  In the ER she was otherwise stable aside from her intractable cancer-related pain.  She elects to be full code.  But she is agreeable to talking with palliative care.    Pertinent findings: Sodium 133, potassium 3.7, creatinine 0.56, lactic acid 2.5, WBC 13.21, hemoglobin 10.7, platelets 490, urinalysis showing 6-10 WBCs moderate leukocytes, urine culture pending, PET/CT showing paraspinal mass at T10 as well as pulmonary metastases multifocal and at least 2 hypermetabolic liver lesions    ED Medications:    Medications   sodium chloride 0.9 % flush 10 mL (has no administration in time range)   sodium chloride 0.9 % bolus 1,000 mL (0 mL Intravenous Stopped 24 1840)   ondansetron (ZOFRAN) injection 4 mg (4 mg Intravenous Given 24 1638)   Morphine sulfate (PF) injection 2 mg (2 mg Intravenous Given 24 1639)   Morphine sulfate (PF) injection 2 mg (2 mg Intravenous Given 24 1730)   oxyCODONE-acetaminophen (PERCOCET) 5-325 MG per tablet 1 tablet (1 tablet Oral Given 24 1804)       Edited by: Jonathan Barrientos DO at 2024 194     Review Of Systems:    All systems were reviewed and negative except as mentioned in history of presenting illness, assessment and plan.    Past Medical  History: Patient  has a past medical history of Asthma, Hyperlipidemia, Hypertension, Impaired functional mobility, balance, gait, and endurance, Pancreatic cancer (2022), Parkinson disease, PONV (postoperative nausea and vomiting), Scoliosis, and Seasonal allergies.    Past Surgical History: Patient  has a past surgical history that includes Tonsillectomy; Adenoidectomy; Neck surgery; Cataract extraction (Bilateral); Hip hemiArthroplasty (Left, 10/21/2020); Foot surgery (Left); Colonoscopy; Breast biopsy (Right, 2019); Pancreatectomy (12/19/2022); Splenectomy, total (12/19/2022); Omentectomy (12/19/2022); Lymphadenectomy (12/19/2022); Cholecystectomy open (12/19/2022); and Portacath placement (Right, 02/09/2023).    Social History: Patient  reports that she has never smoked. She has never used smokeless tobacco. She reports that she does not currently use alcohol. Drug use questions deferred to the physician.    Family History:  Patient's family history has been reviewed and found to be noncontributory.     Allergies:      Patient has no known allergies.    Home Medications:    Prior to Admission Medications       Prescriptions Last Dose Informant Patient Reported? Taking?    carbidopa-levodopa (SINEMET)  MG per tablet   Yes No    Take 1 tablet by mouth 2 (Two) Times a Day.    carvedilol (COREG) 6.25 MG tablet   Yes No    Take 1 tablet by mouth.    denosumab (Prolia) 60 MG/ML solution prefilled syringe syringe   Yes No    INJECT 1 MILLILITER (60 MG) BY SUBCUTANEOUS ROUTE EVERY 6 MONTHS IN THE UPPER ARM, UPPER THIGH OR ABDOMEN    dexAMETHasone (DECADRON) 2 MG tablet   Yes No    Take 2 tablets by mouth Daily.    diphenhydrAMINE (BENADRYL) 25 mg capsule   Yes No    1 capsule.    docusate sodium (COLACE) 100 MG capsule   Yes No    Take 1 capsule every day by oral route.    esomeprazole (nexIUM) 40 MG capsule  Self Yes No    Take 1 capsule by mouth Every Morning Before Breakfast.    HYDROcodone-acetaminophen  "(NORCO)  MG per tablet   No No    Take 1 tablet by mouth Every 4 (Four) Hours As Needed for Moderate Pain. THIS IS AN INCREASE IN DOSING.  PATIENT CAN TAKE UP TO 6 TIMES A DAY INSTEAD OF TWICE A DAY.    lactulose (CHRONULAC) 10 GM/15ML solution   No No    TAKE 30ML BY MOUTH 3 TIMES DAILY.    lidocaine-prilocaine (EMLA) 2.5-2.5 % cream   Yes No    Multiple Vitamins-Minerals (multivitamin with minerals) tablet tablet   Yes No    Take 1 tablet by mouth Daily.    ondansetron (ZOFRAN) 8 MG tablet   No No    Take 1 tablet by mouth 3 (Three) Times a Day As Needed for Nausea or Vomiting.    Patient taking differently:  Take 1 tablet by mouth As Needed for Nausea or Vomiting.    ondansetron ODT (ZOFRAN-ODT) 4 MG disintegrating tablet   No No    Place 1 tablet on the tongue Every 8 (Eight) Hours As Needed for Nausea or Vomiting.    polyethylene glycol (MIRALAX) 17 GM/SCOOP powder   Yes No    17 g.    senna (SENOKOT) 8.6 MG tablet   Yes No    Take 1 tablet by mouth Daily.              Vital Signs:  Temp:  [98.8 °F (37.1 °C)] 98.8 °F (37.1 °C)  Heart Rate:  [102-106] 102  Resp:  [18] 18  BP: (181)/(111) 181/111        01/22/24  1610   Weight: 62.1 kg (137 lb)     Body mass index is 20.83 kg/m².    Physical Exam:     Most recent vital Signs: BP (!) 181/111 (Patient Position: Sitting)   Pulse 102   Temp 98.8 °F (37.1 °C) (Oral)   Resp 18   Ht 172.7 cm (68\")   Wt 62.1 kg (137 lb)   SpO2 98%   BMI 20.83 kg/m²     Constitutional: Awake, alert  Eyes: PERRLA, sclerae anicteric, no conjunctival injection  HENT: NCAT, mucous membranes moist  Neck: Supple, no thyromegaly, no lymphadenopathy, trachea midline  Respiratory: Clear to auscultation bilaterally, nonlabored respirations   Cardiovascular: RRR, no murmurs, rubs, or gallops, palpable pedal pulses bilaterally  Gastrointestinal: Positive bowel sounds, mild diffuse abdominal tenderness which per patient's report had vastly improved since receiving pain " medicine  Musculoskeletal: No bilateral ankle edema, no clubbing or cyanosis to extremities  Psychiatric: Appropriate affect, cooperative  Neurologic: Oriented x 3, speech clear  Skin: No rashes  Edited by: Jonathan Barrientos DO at 1/22/2024 1949      Laboratory data:    I have reviewed the labs done in the emergency room.    Results from last 7 days   Lab Units 01/22/24  1634   SODIUM mmol/L 133*   POTASSIUM mmol/L 3.7   CHLORIDE mmol/L 93*   CO2 mmol/L 24.1   BUN mg/dL 10   CREATININE mg/dL 0.56*   CALCIUM mg/dL 9.1   BILIRUBIN mg/dL 0.3   ALK PHOS U/L 106   ALT (SGPT) U/L 7   AST (SGOT) U/L 15   GLUCOSE mg/dL 96     Results from last 7 days   Lab Units 01/22/24  1634   WBC 10*3/mm3 13.21*   HEMOGLOBIN g/dL 10.7*   HEMATOCRIT % 30.6*   PLATELETS 10*3/mm3 490*                     Results from last 7 days   Lab Units 01/22/24  1634   LIPASE U/L 9*         Results from last 7 days   Lab Units 01/22/24  1748   COLOR UA  Yellow   GLUCOSE UA  Negative   KETONES UA  Negative   BLOOD UA  Negative   LEUKOCYTES UA  Moderate (2+)*   PH, URINE  6.5   BILIRUBIN UA  Negative   UROBILINOGEN UA  1.0 E.U./dL   RBC UA /HPF None Seen   WBC UA /HPF 6-10*       Pain Management Panel          Latest Ref Rng & Units 11/21/2023   Pain Management Panel   Buprenorphine, Screen, Urine <10 ng/mL  <50 ng/mL <10     <50       Cocaine Screen, Urine <50 ng/mL <50       Hydromorphone <50 ng/mL 287          Details          This result is from an external source.    Multiple values from one day are sorted in reverse-chronological order               EKG:      Not available for review    Radiology:    No radiology results for the last 3 days    Assessment/Plan:      Intractable pain    Malignant neoplasm of body of pancreas    Widespread metastatic malignant neoplastic disease    -Observation MedSurg.  Will start OxyContin oxycodone.  Consult to palliative patient is not ready for hospice just yet but has been hoping to establish with palliative.   Is hoping to go home with pain control.  Will need a plan for ongoing prescription of her controlled substances after discharge.  Was taking 10 mg hydrocodone 6 times a day without control of pain which is 60 morphine equivalents.  Will provide 20 mg of oxycodone extended release twice a day which is similar to her total morphine equivalents previously.  Will provide 5 mg immediate release oxycodone every 4 hours as needed monitor for symptom control.  Continue bowel regimen.  Edited by: Jonathan Barrientos DO at 1/22/2024 1958           Risk Assessment: Moderate  DVT Prophylaxis: Lovenox  Code Status:   Code Status and Medical Interventions:   Ordered at: 01/22/24 1956     Code Status (Patient has no pulse and is not breathing):    CPR (Attempt to Resuscitate)     Medical Interventions (Patient has pulse or is breathing):    Full Support      Diet:      Advance Care Planning   ACP discussion was held with the patient during this visit. Patient does not have an advance directive, declines further assistance.           Jonathan Barrientos DO  01/22/24  19:58 EST    Dictated utilizing Dragon dictation.

## 2024-01-23 NOTE — DISCHARGE INSTRUCTIONS
Patient to be discharged home today.  Follow with oncology as scheduled.  Follow with PCP as scheduled.  Return to ED for worsening symptoms.          Oncology- Jan 30th 2024 at 10:15 with Dr. Corrales   PCP- Feb 1st 2024 at 2:45 with Zoie Sánchez at Dr. Gonzalez office.

## 2024-01-23 NOTE — PLAN OF CARE
"Goal Outcome Evaluation:     Palliative care was consulted for symptom management and palliative introduction. Patient presented to ED 1/22/2024 for uncontrolled cancer pain. 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  was pending radiation treatment and then chemo treatment palliatively for symptom control.     Upon introduction, Palliative care explained to the patient to role of inpatient Palliative care. Initially, patient was emotional regarding diagnosis but was able to speak with us regarding symptom control. Patient noted previous prescription for hydrocodone was unable to control her pain but since admission and patient began oxycodone pain level has decreased from a 10 to a 3 on a 1-10 pain scale. Mrs. Chung also mentions difficulty with constipation, \"states she has not had a BM in 2 weeks\". Mary Carmen BAKER suggest patient receiving an enema prior to d/c, patient was agreeable. Mary Carmen also spoke with the patient regarding other symptom management such as nausea and appetite then addressed these issues accordingly.     Patient has a outpatient Palliative care appointment scheduled for this Friday to continue symptoms management. Current plan for patient to d/c home today.                                         "

## 2024-01-24 LAB
ANION GAP SERPL CALCULATED.3IONS-SCNC: 12.2 MMOL/L (ref 5–15)
BUN SERPL-MCNC: 11 MG/DL (ref 8–23)
BUN/CREAT SERPL: 20.8 (ref 7–25)
CALCIUM SPEC-SCNC: 8.9 MG/DL (ref 8.6–10.5)
CHLORIDE SERPL-SCNC: 94 MMOL/L (ref 98–107)
CO2 SERPL-SCNC: 24.8 MMOL/L (ref 22–29)
CREAT SERPL-MCNC: 0.53 MG/DL (ref 0.57–1)
EGFRCR SERPLBLD CKD-EPI 2021: 99 ML/MIN/1.73
GLUCOSE SERPL-MCNC: 107 MG/DL (ref 65–99)
POTASSIUM SERPL-SCNC: 3.6 MMOL/L (ref 3.5–5.2)
SODIUM SERPL-SCNC: 131 MMOL/L (ref 136–145)

## 2024-01-24 PROCEDURE — 25010000002 MORPHINE PER 10 MG: Performed by: NURSE PRACTITIONER

## 2024-01-24 PROCEDURE — 25810000003 SODIUM CHLORIDE 0.9 % SOLUTION: Performed by: NURSE PRACTITIONER

## 2024-01-24 PROCEDURE — 25010000002 ENOXAPARIN PER 10 MG: Performed by: INTERNAL MEDICINE

## 2024-01-24 PROCEDURE — 63710000001 DEXAMETHASONE PER 0.25 MG: Performed by: INTERNAL MEDICINE

## 2024-01-24 PROCEDURE — 80048 BASIC METABOLIC PNL TOTAL CA: CPT | Performed by: NURSE PRACTITIONER

## 2024-01-24 PROCEDURE — 25010000002 PROCHLORPERAZINE 10 MG/2ML SOLUTION: Performed by: NURSE PRACTITIONER

## 2024-01-24 PROCEDURE — 25010000002 ONDANSETRON PER 1 MG: Performed by: INTERNAL MEDICINE

## 2024-01-24 PROCEDURE — 63710000001 ONDANSETRON PER 8 MG: Performed by: INTERNAL MEDICINE

## 2024-01-24 PROCEDURE — 99232 SBSQ HOSP IP/OBS MODERATE 35: CPT | Performed by: NURSE PRACTITIONER

## 2024-01-24 RX ORDER — PROCHLORPERAZINE 25 MG
25 SUPPOSITORY, RECTAL RECTAL EVERY 12 HOURS PRN
Status: DISCONTINUED | OUTPATIENT
Start: 2024-01-24 | End: 2024-01-25 | Stop reason: HOSPADM

## 2024-01-24 RX ORDER — PROCHLORPERAZINE EDISYLATE 5 MG/ML
5 INJECTION INTRAMUSCULAR; INTRAVENOUS EVERY 6 HOURS PRN
Status: DISCONTINUED | OUTPATIENT
Start: 2024-01-24 | End: 2024-01-25 | Stop reason: HOSPADM

## 2024-01-24 RX ORDER — SIMETHICONE 80 MG
80 TABLET,CHEWABLE ORAL 4 TIMES DAILY PRN
Status: DISCONTINUED | OUTPATIENT
Start: 2024-01-24 | End: 2024-01-25 | Stop reason: HOSPADM

## 2024-01-24 RX ORDER — PANTOPRAZOLE SODIUM 40 MG/1
40 TABLET, DELAYED RELEASE ORAL EVERY 24 HOURS
Status: DISCONTINUED | OUTPATIENT
Start: 2024-01-24 | End: 2024-01-25 | Stop reason: HOSPADM

## 2024-01-24 RX ORDER — SODIUM CHLORIDE 9 MG/ML
100 INJECTION, SOLUTION INTRAVENOUS CONTINUOUS
Status: DISCONTINUED | OUTPATIENT
Start: 2024-01-24 | End: 2024-01-25 | Stop reason: HOSPADM

## 2024-01-24 RX ORDER — BUSPIRONE HYDROCHLORIDE 10 MG/1
10 TABLET ORAL EVERY 12 HOURS SCHEDULED
Status: DISCONTINUED | OUTPATIENT
Start: 2024-01-24 | End: 2024-01-25 | Stop reason: HOSPADM

## 2024-01-24 RX ORDER — PROCHLORPERAZINE MALEATE 10 MG
5 TABLET ORAL EVERY 6 HOURS PRN
Status: DISCONTINUED | OUTPATIENT
Start: 2024-01-24 | End: 2024-01-25 | Stop reason: HOSPADM

## 2024-01-24 RX ORDER — MORPHINE SULFATE 2 MG/ML
2 INJECTION, SOLUTION INTRAMUSCULAR; INTRAVENOUS EVERY 4 HOURS PRN
Status: DISCONTINUED | OUTPATIENT
Start: 2024-01-24 | End: 2024-01-25 | Stop reason: HOSPADM

## 2024-01-24 RX ADMIN — SODIUM CHLORIDE 100 ML/HR: 9 INJECTION, SOLUTION INTRAVENOUS at 20:52

## 2024-01-24 RX ADMIN — PANTOPRAZOLE SODIUM 40 MG: 40 TABLET, DELAYED RELEASE ORAL at 08:21

## 2024-01-24 RX ADMIN — LIDOCAINE 2 PATCH: 700 PATCH TOPICAL at 08:24

## 2024-01-24 RX ADMIN — Medication 5 MG: at 20:53

## 2024-01-24 RX ADMIN — SODIUM CHLORIDE 100 ML/HR: 9 INJECTION, SOLUTION INTRAVENOUS at 11:56

## 2024-01-24 RX ADMIN — DEXAMETHASONE 4 MG: 4 TABLET ORAL at 08:21

## 2024-01-24 RX ADMIN — MULTIPLE VITAMINS W/ MINERALS TAB 1 TABLET: TAB at 08:21

## 2024-01-24 RX ADMIN — MORPHINE SULFATE 2 MG: 2 INJECTION, SOLUTION INTRAMUSCULAR; INTRAVENOUS at 11:56

## 2024-01-24 RX ADMIN — CARVEDILOL 6.25 MG: 6.25 TABLET, FILM COATED ORAL at 18:39

## 2024-01-24 RX ADMIN — BUSPIRONE HYDROCHLORIDE 10 MG: 10 TABLET ORAL at 14:30

## 2024-01-24 RX ADMIN — OXYCODONE HYDROCHLORIDE 20 MG: 10 TABLET, FILM COATED, EXTENDED RELEASE ORAL at 08:21

## 2024-01-24 RX ADMIN — BUSPIRONE HYDROCHLORIDE 10 MG: 10 TABLET ORAL at 20:55

## 2024-01-24 RX ADMIN — PROCHLORPERAZINE EDISYLATE 5 MG: 5 INJECTION INTRAMUSCULAR; INTRAVENOUS at 17:44

## 2024-01-24 RX ADMIN — SIMETHICONE 80 MG: 80 TABLET, CHEWABLE ORAL at 02:49

## 2024-01-24 RX ADMIN — ONDANSETRON HYDROCHLORIDE 8 MG: 4 TABLET, FILM COATED ORAL at 11:03

## 2024-01-24 RX ADMIN — CARBIDOPA AND LEVODOPA 1 TABLET: 25; 250 TABLET ORAL at 20:53

## 2024-01-24 RX ADMIN — ALPRAZOLAM 0.5 MG: 0.5 TABLET ORAL at 04:11

## 2024-01-24 RX ADMIN — ENOXAPARIN SODIUM 40 MG: 100 INJECTION SUBCUTANEOUS at 08:21

## 2024-01-24 RX ADMIN — OXYCODONE HYDROCHLORIDE 20 MG: 10 TABLET, FILM COATED, EXTENDED RELEASE ORAL at 20:53

## 2024-01-24 RX ADMIN — OXYCODONE HYDROCHLORIDE 10 MG: 5 TABLET ORAL at 14:30

## 2024-01-24 RX ADMIN — OXYCODONE HYDROCHLORIDE 10 MG: 5 TABLET ORAL at 09:43

## 2024-01-24 RX ADMIN — ONDANSETRON 4 MG: 2 INJECTION INTRAMUSCULAR; INTRAVENOUS at 16:53

## 2024-01-24 RX ADMIN — CARVEDILOL 6.25 MG: 6.25 TABLET, FILM COATED ORAL at 08:21

## 2024-01-24 RX ADMIN — CARBIDOPA AND LEVODOPA 1 TABLET: 25; 250 TABLET ORAL at 08:21

## 2024-01-24 NOTE — PLAN OF CARE
Goal Outcome Evaluation:  Plan of Care Reviewed With: patient           Outcome Evaluation: VSS, bowel regimen has been unsuccessful in producing a bowel movement overnight, patient has had complaints of increased flatulent pain and simethicone was ordered and administered, pain has been adequately controlled with PO medications.

## 2024-01-24 NOTE — PROGRESS NOTES
[facility]    Name:  Silvia Chung   Age:  73 y.o.  Sex:  female  :  1950  MRN:  [id number]   Visit Number:  [id number]  Admission Date:  2024  Date Of Service:  24  Primary Care Physician:  Johnathan Aguilera MD     LOS: 0 days :    Chief Complaint:      Pain    Subjective:    Patient seen and examined with  at bedside.  She does appear quite drowsy and is repetitive with questions.  She continues to have great concern regarding the ability to lie flat due to her severity of back pain.  She currently states that her pain is a 3 out of a 10.  Most of her current pain is currently in her lower abdomen and cramp-like due to recent initiation of bowel regimen.  Fortunately, she was able to have bowel movement this morning.  Patient anxious stating that she is not sure she can care for herself and administer her own medications at home.  Advised we would restart fluids today and check labs for which she is agreeable.    Hospital Course:    Ms. Chung is a pleasant 73-year-old female with pancreatic cancer status postresection with recurrent metastatic disease.  Unfortunately patient presented to the emergency department due to worsening left-sided abdominal pain which was not controlled on her usual Norco.  Patient currently pending radiation treatment palliatively for symptom control.  Upon ED presentation patient hemodynamically stable.Pertinent findings: Sodium 133, potassium 3.7, creatinine 0.56, lactic acid 2.5, WBC 13.21, hemoglobin 10.7, platelets 490, urinalysis showing 6-10 WBCs moderate leukocytes with urine culture pending, PET/CT showing paraspinal mass at T10 as well as pulmonary metastases multifocal and at least 2 hypermetabolic liver lesions.  Patient was initiated on Oxycodone/OxyContin with improved pain.  Patient also continued on bowel regimen with enema given history of constipation.  Patient encouraged to continue bowel  regimen at home due to chronic opioid dependence.  Unfortunately patient without bowel movement x 2 weeks.  Discharge held due to this.  Upon further patient assessment she appeared lethargic and confused.  Pain appears to be well-controlled with current regimen.  However, patient continued to have severe anxiety regarding radiation treatment and inability to lie flat on table for this.  Given lethargy and confusion patient reinitiated on IV fluids with labs pending.    Review of Systems:     All systems were reviewed and negative except as mentioned in subjective, assessment and plan.    Vital Signs:    Temp:  [97.6 °F (36.4 °C)-98.8 °F (37.1 °C)] 98.8 °F (37.1 °C)  Heart Rate:  [81-92] 92  Resp:  [16-18] 18  BP: (119-199)/() 151/104    Intake and output:    No intake/output data recorded.  I/O this shift:  In: 240 [P.O.:240]  Out: -     Physical Examination:    General Appearance:  Alert and cooperative.  Chronically ill/frail appearing middle-aged female.   Head:  Atraumatic and normocephalic.   Eyes: Conjunctivae and sclerae normal, no icterus. No pallor.   Throat: No oral lesions, no thrush, dry mucous membranes.   Neck: Supple, trachea midline, no thyromegaly.   Lungs:   Breath sounds heard bilaterally equally.  No wheezing or crackles. No Pleural rub or bronchial breathing.  On room air unlabored.   Heart:  Normal S1 and S2, no murmur, no gallop, no rub. No JVD.   Abdomen:   Normal bowel sounds, no masses, no organomegaly. Soft, tenderness to lower quadrants.   Extremities: Supple, no edema, no cyanosis, no clubbing.   Skin: No bleeding or rash.   Neurologic: Drowsy but oriented x 3 with repetitive questions noted.  No facial asymmetry. Moves all four limbs. No tremors.  Generalized weakness and frailty.     Laboratory results:    Results from last 7 days   Lab Units 01/23/24  0804 01/23/24  0548 01/22/24  1634   SODIUM mmol/L 129* 125* 133*   POTASSIUM mmol/L 4.0 6.9* 3.7   CHLORIDE mmol/L 92* 95* 93*  "  CO2 mmol/L 23.7 22.0 24.1   BUN mg/dL 7* 6* 10   CREATININE mg/dL 0.57 0.51* 0.56*   CALCIUM mg/dL 8.2* 6.6* 9.1   BILIRUBIN mg/dL 0.4 0.4 0.3   ALK PHOS U/L 100 76 106   ALT (SGPT) U/L <5 <5 7   AST (SGOT) U/L 15 11 15   GLUCOSE mg/dL 176* 762* 96     Results from last 7 days   Lab Units 01/23/24  0548 01/22/24  1634   WBC 10*3/mm3 11.76* 13.21*   HEMOGLOBIN g/dL 10.3* 10.7*   HEMATOCRIT % 30.3* 30.6*   PLATELETS 10*3/mm3 461* 490*             Results from last 7 days   Lab Units 01/22/24  1748   URINECX  No growth     No results for input(s): \"PHART\", \"QUO1AIP\", \"PO2ART\", \"KRT6XAR\", \"BASEEXCESS\" in the last 8760 hours.   I have reviewed the patient's laboratory results.    Radiology results:    XR Abdomen KUB    Result Date: 1/23/2024  PROCEDURE: XR ABDOMEN KUB-  INDICATION:  constipation; R52-Pain, unspecified; C25.9-Malignant neoplasm of pancreas, unspecified; C80.0-Disseminated malignant neoplasm, unspecified, no bowel movement for 2 weeks, pain and nausea  COMPARISON:  None.  FINDINGS:  A supine view of the abdomen was obtained. Multiple small and large bowel loops that are upper limits of normal in diameter identified. There is rectal air. Mild to moderate stool burden noted.. There are no pathologic calcifications.  No acute osseous abnormality is identified. Total left hip arthroplasty noted. There are metallic surgical clips in the right upper quadrant consistent with previous cholecystectomy. Hand noted overlying the right lower quadrant. There is a circular density projected over the left inferior pubic ramus medially, suspect external to the patient.       Impression: Mild to moderate stool burden, possible constipation..     This report was signed and finalized on 1/23/2024 3:05 PM by Stormy Gonzalez MD.     I have reviewed the patient's radiology reports.    Medication Review:     I have reviewed the patient's active and prn medications.     Problem List:      Intractable pain    Malignant neoplasm of " body of pancreas    Widespread metastatic malignant neoplastic disease      Assessment:    1.  Intractable pain due to metastatic pancreatic cancer  2.  Constipation  3.  Hypertension    Plan:    -Continue supportive care with IV fluids/antiemetics.  -BMP pending.  -Continue pain control with oxycodone/OxyContin.  Consider adding Lyrica as she does describe pain as sharp.  -Continue Decadron.  -Continue bowel regimen.  -Discussed with radiology oncologist Dr. Triana.  Stated that patient tolerated stimulation test well previously.   -Appreciate palliative care consulting with recommendations.  -Patient concerned about self-care and administration of multiple medications at this point.  She does not feel as if her  will be able to adequately take care of her and she does not have any family members nearby.    DVT Prophylaxis: SCDs  Code Status: Full code  Diet: As tolerated  Discharge Plan: Pending.    Gayle Castillo, APRN  01/24/24  12:01 EST    Dictated utilizing Dragon dictation.

## 2024-01-24 NOTE — PLAN OF CARE
Goal Outcome Evaluation:              Outcome Evaluation: BM today, pain improved with medication,

## 2024-01-24 NOTE — PROGRESS NOTES
"    [facility]     PALLIATIVE CARE FOLLOW UP NOTE    Name:  Silvia Chung   Age:  73 y.o.  Sex:  female  :  1950  MRN:  [id number]   Visit Number:  [id number]  Date Of Service:  24  Primary Care Physician:  Johnathan Aguilera MD    Chief Complaint: Back pain, abdominal cramping    Interval History:  Patient seen today during palliative care team rounds.  Record reviewed and case discussed with staff.  Patient became anxious, hypertensive, and had not yet produced BM, ultimately did not discharge.  Nursing report she produced large amount of loose stools.  Patient with c/o abdominal cramping, back pain, feeling restless/anxious.  She is unsure if she could tolerate lying flat for radiation, would like to discuss conscious sedation during radiation treatments.  I advised her that I can't speak for radiology, but the primary team can reach out to her oncology team and help facilitate her concerns.  Regarding her pain control, she reports continued pain to her back and abdominal cramping, however does note interval improvements.  She is passing flatus.  Per chart review, last dose of Oxycodone IR given  at 2309.  She has continued with Oxycodone ER as scheduled.          Review of Systems   Constitutional:  Positive for activity change, appetite change and fatigue.   Gastrointestinal:  Positive for abdominal pain and constipation.   Musculoskeletal:  Positive for back pain.   Psychiatric/Behavioral:  The patient is nervous/anxious.           Pain Assessment  Pain Location: back, abdomen  Pain Description: constant, aching  Vitals: BP (!) 151/104 (BP Location: Right arm, Patient Position: Lying)   Pulse 92   Temp 98.8 °F (37.1 °C) (Oral)   Resp 18   Ht 172.7 cm (68\")   Wt 62.1 kg (137 lb)   SpO2 97%   BMI 20.83 kg/m²     Physical Exam  Vitals and nursing note reviewed.   Constitutional:       General: She is not in acute distress.     Appearance: She is normal weight.      " Comments: Somewhat restless appearing female lying in bed   HENT:      Head: Normocephalic and atraumatic.      Mouth/Throat:      Mouth: Mucous membranes are moist.      Pharynx: Oropharynx is clear.   Eyes:      Conjunctiva/sclera: Conjunctivae normal.      Pupils: Pupils are equal, round, and reactive to light.   Cardiovascular:      Rate and Rhythm: Normal rate.      Comments: Appears well perfused  Pulmonary:      Effort: Pulmonary effort is normal. No respiratory distress.   Abdominal:      General: There is no distension.      Palpations: Abdomen is soft.      Tenderness: There is abdominal tenderness (generalized).      Comments: Hyperactive bowel sounds   Musculoskeletal:      Cervical back: Normal range of motion and neck supple.   Skin:     General: Skin is warm and dry.      Capillary Refill: Capillary refill takes less than 2 seconds.   Neurological:      Mental Status: She is alert and oriented to person, place, and time.   Psychiatric:      Comments: restless          Results Reviewed:    Intake/Output Summary (Last 24 hours) at 1/24/2024 1212  Last data filed at 1/24/2024 0821  Gross per 24 hour   Intake 240 ml   Output --   Net 240 ml     Results from last 7 days   Lab Units 01/23/24  0804   SODIUM mmol/L 129*   POTASSIUM mmol/L 4.0   CHLORIDE mmol/L 92*   CO2 mmol/L 23.7   BUN mg/dL 7*   CREATININE mg/dL 0.57   CALCIUM mg/dL 8.2*   BILIRUBIN mg/dL 0.4   ALK PHOS U/L 100   ALT (SGPT) U/L <5   AST (SGOT) U/L 15   GLUCOSE mg/dL 176*     Results from last 7 days   Lab Units 01/23/24  0548   WBC 10*3/mm3 11.76*   HEMOGLOBIN g/dL 10.3*   HEMATOCRIT % 30.3*   PLATELETS 10*3/mm3 461*       Medication Review:   I have reviewed the patients active and prn medications.     Palliative Care Assessment:  Metastatic pancreatic cancer  Neoplasm related pain  constipation    Recommendations/Plan:  - Goal at the present is to pursue cancer directed therapies, given with palliative intent  - Although goals are not in  alignment, patient would qualify for Hospice services in the future under cancer criteria  - CM following, agree with HH to maximize home services  - SW following regarding coordination of care, patient having difficulty managing her upcoming appointments/transportation  - Palliative care will continue to follow/support patient and family    Recommendations  - Educated patient regarding use of IR for breakthrough pain control, nursing will continue to monitor closely for need of PRN every 4 hours  - May consider addition of IV morphine 2 mg Q4 hours for pain if unable to tolerate PO  - Continue multimodal approach to pain with use of lidocaine patches, ice/head as desired  - Recommend holding lactulose, as this can contribute to cramping/bloating  - Patient does not describe neuropathic pain, however if she has burning/shooting type pain lyrica may be of benefit; per oncology note she was previously prescribed gabapentin  - Continue antiemetics and antiflatulent medications as needed  - Consider discontinuing xanax, consider short acting anxiolytic such as Ativan 0.25 Q8 hrs; if concerns for lethargy/somnolence may consider Buspar   - Patient may benefit from SSRI/SNRI, defer to palliative care outpatient      CODE STATUS:   Code Status and Medical Interventions:   Ordered at: 01/22/24 1956     Code Status (Patient has no pulse and is not breathing):    CPR (Attempt to Resuscitate)     Medical Interventions (Patient has pulse or is breathing):    Full Support       I spent 45 total minutes, including face to face assessment, record review, coordination of care with staff, and counseling patient and/or family  Part of this note may be an electronic transcription/translation of spoken language to printed text using the Dragon Dictation System.    Mary Carmen Sharma PA-C  01/24/24  12:12 EST

## 2024-01-25 ENCOUNTER — READMISSION MANAGEMENT (OUTPATIENT)
Dept: CALL CENTER | Facility: HOSPITAL | Age: 74
End: 2024-01-25
Payer: MEDICARE

## 2024-01-25 VITALS
RESPIRATION RATE: 17 BRPM | WEIGHT: 137 LBS | HEART RATE: 76 BPM | OXYGEN SATURATION: 97 % | SYSTOLIC BLOOD PRESSURE: 149 MMHG | DIASTOLIC BLOOD PRESSURE: 91 MMHG | TEMPERATURE: 98.9 F | BODY MASS INDEX: 20.76 KG/M2 | HEIGHT: 68 IN

## 2024-01-25 PROCEDURE — 63710000001 DEXAMETHASONE PER 0.25 MG: Performed by: INTERNAL MEDICINE

## 2024-01-25 PROCEDURE — 99239 HOSP IP/OBS DSCHRG MGMT >30: CPT | Performed by: NURSE PRACTITIONER

## 2024-01-25 PROCEDURE — 25010000002 HEPARIN LOCK FLUSH PER 10 UNITS: Performed by: NURSE PRACTITIONER

## 2024-01-25 PROCEDURE — 25810000003 SODIUM CHLORIDE 0.9 % SOLUTION: Performed by: NURSE PRACTITIONER

## 2024-01-25 PROCEDURE — 25010000002 ONDANSETRON PER 1 MG: Performed by: INTERNAL MEDICINE

## 2024-01-25 PROCEDURE — 25010000002 ENOXAPARIN PER 10 MG: Performed by: INTERNAL MEDICINE

## 2024-01-25 RX ORDER — HEPARIN SODIUM (PORCINE) LOCK FLUSH IV SOLN 100 UNIT/ML 100 UNIT/ML
100 SOLUTION INTRAVENOUS ONCE
Status: COMPLETED | OUTPATIENT
Start: 2024-01-25 | End: 2024-01-25

## 2024-01-25 RX ORDER — LIDOCAINE 50 MG/G
2 PATCH TOPICAL
Qty: 60 PATCH | Refills: 0 | Status: SHIPPED | OUTPATIENT
Start: 2024-01-26 | End: 2024-02-25

## 2024-01-25 RX ORDER — BUSPIRONE HYDROCHLORIDE 10 MG/1
10 TABLET ORAL EVERY 12 HOURS SCHEDULED
Qty: 60 TABLET | Refills: 0 | Status: SHIPPED | OUTPATIENT
Start: 2024-01-25

## 2024-01-25 RX ORDER — DEXAMETHASONE 4 MG/1
4 TABLET ORAL 2 TIMES DAILY WITH MEALS
Qty: 30 TABLET | Refills: 0 | Status: SHIPPED | OUTPATIENT
Start: 2024-01-25

## 2024-01-25 RX ORDER — PROCHLORPERAZINE MALEATE 5 MG/1
5 TABLET ORAL EVERY 6 HOURS PRN
Qty: 60 TABLET | Refills: 0 | Status: SHIPPED | OUTPATIENT
Start: 2024-01-25

## 2024-01-25 RX ORDER — HEPARIN SODIUM (PORCINE) LOCK FLUSH IV SOLN 100 UNIT/ML 100 UNIT/ML
500 SOLUTION INTRAVENOUS ONCE
Status: DISCONTINUED | OUTPATIENT
Start: 2024-01-25 | End: 2024-01-25

## 2024-01-25 RX ADMIN — CARVEDILOL 6.25 MG: 6.25 TABLET, FILM COATED ORAL at 07:36

## 2024-01-25 RX ADMIN — CARBIDOPA AND LEVODOPA 1 TABLET: 25; 250 TABLET ORAL at 08:34

## 2024-01-25 RX ADMIN — ENOXAPARIN SODIUM 40 MG: 100 INJECTION SUBCUTANEOUS at 08:06

## 2024-01-25 RX ADMIN — MULTIPLE VITAMINS W/ MINERALS TAB 1 TABLET: TAB at 08:04

## 2024-01-25 RX ADMIN — PANTOPRAZOLE SODIUM 40 MG: 40 TABLET, DELAYED RELEASE ORAL at 07:38

## 2024-01-25 RX ADMIN — DEXAMETHASONE 4 MG: 4 TABLET ORAL at 08:15

## 2024-01-25 RX ADMIN — ACETAMINOPHEN 650 MG: 325 TABLET, FILM COATED ORAL at 08:15

## 2024-01-25 RX ADMIN — ONDANSETRON 4 MG: 2 INJECTION INTRAMUSCULAR; INTRAVENOUS at 10:14

## 2024-01-25 RX ADMIN — OXYCODONE HYDROCHLORIDE 10 MG: 5 TABLET ORAL at 05:48

## 2024-01-25 RX ADMIN — SODIUM CHLORIDE 100 ML/HR: 9 INJECTION, SOLUTION INTRAVENOUS at 06:12

## 2024-01-25 RX ADMIN — HEPARIN 100 UNITS: 100 SYRINGE at 10:32

## 2024-01-25 RX ADMIN — LIDOCAINE 2 PATCH: 700 PATCH TOPICAL at 08:06

## 2024-01-25 RX ADMIN — OXYCODONE HYDROCHLORIDE 20 MG: 10 TABLET, FILM COATED, EXTENDED RELEASE ORAL at 09:43

## 2024-01-25 RX ADMIN — OXYCODONE HYDROCHLORIDE 10 MG: 5 TABLET ORAL at 01:14

## 2024-01-25 RX ADMIN — BUSPIRONE HYDROCHLORIDE 10 MG: 10 TABLET ORAL at 08:06

## 2024-01-25 RX ADMIN — DOCUSATE SODIUM 50MG AND SENNOSIDES 8.6MG 2 TABLET: 8.6; 5 TABLET, FILM COATED ORAL at 08:04

## 2024-01-25 NOTE — CASE MANAGEMENT/SOCIAL WORK
Case Management Discharge Note      Final Note: Priority is pain control to return home with managable symptoms so she can spend time with her , dogs, and cats.         Selected Continued Care - Discharged on 1/25/2024 Admission date: 1/22/2024 - Discharge disposition: Home or Self Care      Destination    No services have been selected for the patient.                Durable Medical Equipment    No services have been selected for the patient.                Dialysis/Infusion    No services have been selected for the patient.                Home Medical Care    No services have been selected for the patient.                Therapy    No services have been selected for the patient.                Community Resources    No services have been selected for the patient.                Community & DME    No services have been selected for the patient.                    Selected Continued Care - Episodes Includes continued care and service providers with selected services from the active episodes listed below      High Risk Care Management Episode start date: 9/22/2023   There are no active outsourced providers for this episode.                 Transportation Services  Private: Car    Final Discharge Disposition Code: 01 - home or self-care

## 2024-01-25 NOTE — PLAN OF CARE
Goal Outcome Evaluation:              Outcome Evaluation: VSS, pain controlled well, discharge home, instructed on appts, and meds. Patient verbally understood.

## 2024-01-25 NOTE — OUTREACH NOTE
Prep Survey      Flowsheet Row Responses   [facility] patient discharged from? [place]   Is LACE score < 7 ? No   Eligibility [facility]   Date of Admission 01/22/24   Date of Discharge 01/25/24   Discharge Disposition Home or Self Care   Discharge diagnosis ntractable pain due to metastatic pancreatic cancer.   Does the patient have one of the following disease processes/diagnoses(primary or secondary)? Other   Does the patient have Home health ordered? No   Is there a DME ordered? No   Prep survey completed? Yes            HEIDE MAN - Registered Nurse

## 2024-01-25 NOTE — DISCHARGE SUMMARY
[facility]   DISCHARGE SUMMARY      Name:  Silvia Chung   Age:  73 y.o.  Sex:  female  :  1950  MRN:  [id number]   Visit Number:  [id number]    Admission Date:  2024  Date of Discharge:  2024  Primary Care Physician:  Johnathan Aguilera MD    Important issues to note:    -Patient admitted secondary to intractable pain due to metastatic pancreatic cancer.  -Previously taking Norco.  Transitioned to oxycodone ER twice daily with as needed oxycodone for breakthrough pain.  Patient tolerated well.  -Bowel regimen continued.  -Seen by palliative care during admission.  -Initiated on BuSpar due to severe anxiety.  -Patient was secure follow-up with oncologist Dr. Corrales in 5 days.  She does have a 5-day supply of medications as well.  -Patient to have radiation today per Dr. Triana.  -Strict return precautions given.    Discharge Diagnoses:     1.  Intractable pain due to metastatic pancreatic cancer  2.  Constipation  3.  Hypertension  4.  Parkinson's disease    Problem List:     Active Hospital Problems    Diagnosis  POA    **Intractable pain [R52]  Yes    Widespread metastatic malignant neoplastic disease [C80.0]  Yes    Malignant neoplasm of body of pancreas [C25.1]  Yes      Resolved Hospital Problems   No resolved problems to display.     Presenting Problem:    Chief Complaint   Patient presents with    Abdominal Pain      Consults:     Consulting Physician(s)                     None              Procedures Performed:        History of presenting illness/Hospital Course:    Ms. Chung is a pleasant 73-year-old female with pancreatic cancer status postresection with recurrent metastatic disease.  Unfortunately patient presented to the emergency department due to worsening left-sided abdominal pain which was not controlled on her usual Norco.  Patient currently pending radiation treatment palliatively for symptom control.    Upon ED presentation patient  hemodynamically stable.Pertinent findings: Sodium 133, potassium 3.7, creatinine 0.56, lactic acid 2.5, WBC 13.21, hemoglobin 10.7, platelets 490, urinalysis showing 6-10 WBCs moderate leukocytes with urine culture pending, PET/CT showing paraspinal mass at T10 as well as pulmonary metastases multifocal and at least 2 hypermetabolic liver lesions.  Patient was initiated on Oxycodone extended release twice daily with as needed for breakthrough pain as well with improved pain.  Patient also continued on bowel regimen with enema given history of constipation.  Patient encouraged to continue bowel regimen at home due to chronic opioid dependence.  Unfortunately patient without bowel movement x 2 weeks.  Discharge held due to this.  Upon further patient assessment she appeared lethargic and confused.  Pain appears to be well-controlled with current regimen.  However, patient continued to have severe anxiety regarding radiation treatment and inability to lie flat on table for this.  Given lethargy and confusion patient reinitiated on IV fluids with labs pending.  Labs and vitals remain reassuring.  Pain controlled on current regimen of Oxycodone extended release twice daily with as needed for breakthrough pain as well.  BuSpar initiated for anxiety.  Patient to continue Decadron/bowel regimen.  Advised to take medication 1 hour prior to radiation treatment and to have  drive to appointment.  Overall care updated with Dr. Triana.  Patient was seen by palliative care during admission for which they will continue to follow.  Strict return precautions given.  Patient does have secure follow-up with oncology in 5 days.  She was given a 5-day supply of her medications as well.    Vital Signs:    Temp:  [97.6 °F (36.4 °C)-98.9 °F (37.2 °C)] 98.9 °F (37.2 °C)  Heart Rate:  [] 76  Resp:  [16-18] 17  BP: (123-165)/(79-95) 149/91    Physical Exam:    General Appearance:  Alert and cooperative.  Chronically ill  middle-aged female.  Anxious.  Appears much improved.   Head:  Atraumatic and normocephalic.   Eyes: Conjunctivae and sclerae normal, no icterus. No pallor.   Ears:  Ears with no abnormalities noted.   Throat: No oral lesions, no thrush, oral mucosa moist.   Neck: Supple, trachea midline, no thyromegaly.  Right chest wall port.   Back:   No kyphoscoliosis present. No tenderness to palpation.   Lungs:   Breath sounds heard bilaterally equally.  No crackles or wheezing. No Pleural rub or bronchial breathing.   Heart:  Normal S1 and S2, no murmur, no gallop, no rub. No JVD.   Abdomen:   Normal bowel sounds, no masses, no organomegaly. Soft, generalized tenderness throughout.   Extremities: Supple, no edema, no cyanosis, no clubbing.   Pulses: Pulses palpable bilaterally.   Skin: No bleeding or rash.   Neurologic: Alert and oriented x 3. No facial asymmetry. Moves all four limbs. No tremors.     Pertinent Lab Results:     Results from last 7 days   Lab Units 01/24/24  1200 01/23/24  0804 01/23/24  0548 01/22/24  1634   SODIUM mmol/L 131* 129* 125* 133*   POTASSIUM mmol/L 3.6 4.0 6.9* 3.7   CHLORIDE mmol/L 94* 92* 95* 93*   CO2 mmol/L 24.8 23.7 22.0 24.1   BUN mg/dL 11 7* 6* 10   CREATININE mg/dL 0.53* 0.57 0.51* 0.56*   CALCIUM mg/dL 8.9 8.2* 6.6* 9.1   BILIRUBIN mg/dL  --  0.4 0.4 0.3   ALK PHOS U/L  --  100 76 106   ALT (SGPT) U/L  --  <5 <5 7   AST (SGOT) U/L  --  15 11 15   GLUCOSE mg/dL 107* 176* 762* 96     Results from last 7 days   Lab Units 01/23/24  0548 01/22/24  1634   WBC 10*3/mm3 11.76* 13.21*   HEMOGLOBIN g/dL 10.3* 10.7*   HEMATOCRIT % 30.3* 30.6*   PLATELETS 10*3/mm3 461* 490*                     Results from last 7 days   Lab Units 01/22/24  1634   LIPASE U/L 9*         Results from last 7 days   Lab Units 01/22/24  1748   URINECX  No growth       Pertinent Radiology Results:    Imaging Results (All)       Procedure Component Value Units Date/Time    XR Abdomen KUB [[id number]] Collected: 01/23/24 1509      Updated: 01/23/24 1507    Narrative:      PROCEDURE: XR ABDOMEN KUB-     INDICATION:  constipation; R52-Pain, unspecified; C25.9-Malignant  neoplasm of pancreas, unspecified; C80.0-Disseminated malignant  neoplasm, unspecified, no bowel movement for 2 weeks, pain and nausea     COMPARISON:  None.     FINDINGS:  A supine view of the abdomen was obtained. Multiple small and  large bowel loops that are upper limits of normal in diameter  identified. There is rectal air. Mild to moderate stool burden noted..   There are no pathologic calcifications.  No acute osseous abnormality is  identified. Total left hip arthroplasty noted. There are metallic  surgical clips in the right upper quadrant consistent with previous  cholecystectomy. Hand noted overlying the right lower quadrant. There is  a circular density projected over the left inferior pubic ramus  medially, suspect external to the patient.          Impression:      Mild to moderate stool burden, possible constipation..              This report was signed and finalized on 1/23/2024 3:05 PM by Stormy Gonzalez MD.               Echo:    Results for orders placed in visit on 06/02/23    Adult Transthoracic Echo Complete W/ Cont if Necessary Per Protocol    Interpretation Summary    Left ventricular systolic function is normal. Estimated left ventricular EF = 66% Left ventricular ejection fraction appears to be 66 - 70%.    Left ventricular diastolic function is consistent with (grade I) impaired relaxation.    Estimated right ventricular systolic pressure from tricuspid regurgitation is normal (<35 mmHg).    Condition on Discharge:      Stable.    Code status during the hospital stay:    Code Status and Medical Interventions:   Ordered at: 01/22/24 1956     Code Status (Patient has no pulse and is not breathing):    CPR (Attempt to Resuscitate)     Medical Interventions (Patient has pulse or is breathing):    Full Support     Discharge Disposition:    Home or Self  Care    Discharge Medications:       Discharge Medications        New Medications        Instructions Start Date   busPIRone 10 MG tablet  Commonly known as: BUSPAR   10 mg, Oral, Every 12 Hours Scheduled      lidocaine 5 %  Commonly known as: LIDODERM   2 patches, Transdermal, Every 24 Hours Scheduled, Remove & Discard patch within 12 hours or as directed by MD   Start Date: January 26, 2024     Narcan 4 MG/0.1ML nasal spray  Generic drug: naloxone   Call 911. [place] into nostril upon signs of overdose. May repeat in 2-3 minutes in other nostril if no or minimal breathing and responsiveness.      oxyCODONE 10 MG tablet  Commonly known as: ROXICODONE   10 mg, Oral, Every 4 Hours PRN      oxyCODONE ER 20 MG 12 hr tablet  Commonly known as: oxyCONTIN   20 mg, Oral, Every 12 Hours Scheduled      prochlorperazine 5 MG tablet  Commonly known as: COMPAZINE   5 mg, Oral, Every 6 Hours PRN      Stool Softener Plus Laxative 8.6-50 MG per tablet  Generic drug: sennosides-docusate   2 tablets, Oral, 2 Times Daily             Changes to Medications        Instructions Start Date   carvedilol 6.25 MG tablet  Commonly known as: COREG  What changed: when to take this   6.25 mg, Oral, 2 Times Daily With Meals      dexAMETHasone 4 MG tablet  Commonly known as: DECADRON  What changed:   medication strength  when to take this   4 mg, Oral, 2 Times Daily With Meals      ondansetron ODT 4 MG disintegrating tablet  Commonly known as: ZOFRAN-ODT  What changed: Another medication with the same name was added. Make sure you understand how and when to take each.   4 mg, Translingual, Every 8 Hours PRN      ondansetron ODT 4 MG disintegrating tablet  Commonly known as: ZOFRAN-ODT  What changed: You were already taking a medication with the same name, and this prescription was added. Make sure you understand how and when to take each.   4 mg, Translingual, Every 6 Hours PRN             Continue These Medications        Instructions Start Date    carbidopa-levodopa  MG per tablet  Commonly known as: SINEMET   Take 1 tablet by mouth 2 (Two) Times a Day.      diphenhydrAMINE 25 mg capsule  Commonly known as: BENADRYL   25 mg, Oral, Nightly PRN      docusate sodium 100 MG capsule  Commonly known as: COLACE   Take 1 capsule every day by oral route.      esomeprazole 40 MG capsule  Commonly known as: nexIUM   40 mg, Oral, Every Morning Before Breakfast      lactulose 10 GM/15ML solution  Commonly known as: CHRONULAC   TAKE 30ML BY MOUTH 3 TIMES DAILY.      lidocaine-prilocaine 2.5-2.5 % cream  Commonly known as: EMLA   Apply 1 application  topically to the appropriate area as directed As Needed.      multivitamin with minerals tablet tablet   1 tablet, Oral, Daily      ondansetron 8 MG tablet  Commonly known as: ZOFRAN   8 mg, Oral, 3 Times Daily PRN      Prolia 60 MG/ML solution prefilled syringe syringe  Generic drug: denosumab   INJECT 1 MILLILITER (60 MG) BY SUBCUTANEOUS ROUTE EVERY 6 MONTHS IN THE UPPER ARM, UPPER THIGH OR ABDOMEN      senna 8.6 MG tablet  Commonly known as: SENOKOT   2 tablets, Oral, Daily             Stop These Medications      HYDROcodone-acetaminophen  MG per tablet  Commonly known as: NORCO            Discharge Diet:     Diet Instructions       Diet: Regular/House Diet; Thin (IDDSI 0)      Discharge Diet: Regular/House Diet    Fluid Consistency: Thin (IDDSI 0)          Activity at Discharge:     Activity Instructions       Activity as Tolerated            Follow-up Appointments:     Follow-up Information       Johnathan Aguilera MD Follow up in 3 day(s).    Specialty: Internal Medicine  Contact information:  [address]  [facility] [address] [phone]               Janell Corrales MD Follow up in 3 day(s).    Specialties: Hematology and Oncology, Oncology, Hematology  Contact information:  [address] [address]  [facility] [id number] [phone]                           Future Appointments   Date  Time Provider Department Center   1/26/2024 11:00 AM Sheri Saul PA-C [facility] None   1/30/2024 10:15 AM Janell Corrales MD [facility]   2/1/2024  2:45 PM Zoie Sánchez, APRN [facility]   2/6/2024  8:30 AM CHAIR 5 - [facility]   2/27/2024 12:30 PM CHAIR 1 - [facility]   3/19/2024  9:30 AM CHAIR 4 - [facility]   4/9/2024 11:00 AM CHAIR 4 - [facility]   4/30/2024 11:00 AM CHAIR 7 - [facility]   5/21/2024 11:00 AM CHAIR 7 - [facility]   6/11/2024 11:00 AM CHAIR 4 - [facility]     Test Results Pending at Discharge:           Gayle Castillo, APRN  01/25/24  11:38 EST    Time: I spent 40 minutes on this discharge activity which included: face-to-face encounter with the patient, reviewing the data in the system, coordination of the care with the nursing staff as well as consultants, documentation, and entering orders.     Dictated utilizing Dragon dictation.

## 2024-01-25 NOTE — PROGRESS NOTES
"    [facility]     PALLIATIVE CARE FOLLOW UP NOTE    Name:  Silvia Chung   Age:  73 y.o.  Sex:  female  :  1950  MRN:  [id number]   Visit Number:  [id number]  Date Of Service:  24  Primary Care Physician:  Johnathan Aguilera MD    Chief Complaint: Back pain    Interval History:  Patient seen today during palliative care team rounds.  Record reviewed and case discussed with staff, pain well controlled over the past 24 hours.  Met with patient at bedside who reports the same.  She has noted improved control of her anxiety with initiation of Buspar.  She has been able to ambulate to the restrooms, abdominal cramping resolved.    She had a good breakfast, 75% per chart review.  BM improving, reporting 1 this morning.  We reviewed IR use of Oxycodone 10 mg from last date, reviewing how to assess for need and concept of not getting behind on her pain control.  She is tearful discussing her neoplasm related pain, expressing fear of having to go through intense pain again.  I provided reassurance and empathetic listening, noting the multiple members of her team that will help continue to monitor and manage her symptoms.  She expresses appreciation, further eager to proceed with radiation, hopeful this will improve her pain.       Review of Systems   Constitutional:  Positive for activity change, appetite change and fatigue.   Gastrointestinal:  Positive for abdominal pain and constipation.   Musculoskeletal:  Positive for back pain.   Psychiatric/Behavioral:  The patient is nervous/anxious.           Pain Assessment  Nonverbal Indicators of Pain: nonverbal indicators absent  Pain Location: abdomen  Pain Description: aching  Vitals: /91 (BP Location: Left arm, Patient Position: Lying)   Pulse 76   Temp 98.9 °F (37.2 °C) (Oral)   Resp 17   Ht 172.7 cm (68\")   Wt 62.1 kg (137 lb)   SpO2 97%   BMI 20.83 kg/m²     Physical Exam  Vitals and nursing note reviewed.   Constitutional:      "  General: She is not in acute distress.     Appearance: She is normal weight.      Comments: Pleasant and interactive, NAD   HENT:      Head: Normocephalic and atraumatic.      Mouth/Throat:      Mouth: Mucous membranes are moist.      Pharynx: Oropharynx is clear.   Eyes:      Conjunctiva/sclera: Conjunctivae normal.      Pupils: Pupils are equal, round, and reactive to light.   Cardiovascular:      Comments: Appears well perfused  Pulmonary:      Effort: Pulmonary effort is normal. No respiratory distress.   Abdominal:      Comments: Hyperactive bowel sounds   Musculoskeletal:      Cervical back: Normal range of motion and neck supple.   Skin:     General: Skin is warm and dry.      Capillary Refill: Capillary refill takes less than 2 seconds.   Neurological:      Mental Status: She is alert and oriented to person, place, and time.   Psychiatric:         Mood and Affect: Mood normal.         Behavior: Behavior normal.          Results Reviewed:    Intake/Output Summary (Last 24 hours) at 1/25/2024 1120  Last data filed at 1/25/2024 0800  Gross per 24 hour   Intake 360 ml   Output 1 ml   Net 359 ml     Results from last 7 days   Lab Units 01/24/24  1200 01/23/24  0804   SODIUM mmol/L 131* 129*   POTASSIUM mmol/L 3.6 4.0   CHLORIDE mmol/L 94* 92*   CO2 mmol/L 24.8 23.7   BUN mg/dL 11 7*   CREATININE mg/dL 0.53* 0.57   CALCIUM mg/dL 8.9 8.2*   BILIRUBIN mg/dL  --  0.4   ALK PHOS U/L  --  100   ALT (SGPT) U/L  --  <5   AST (SGOT) U/L  --  15   GLUCOSE mg/dL 107* 176*     Results from last 7 days   Lab Units 01/23/24  0548   WBC 10*3/mm3 11.76*   HEMOGLOBIN g/dL 10.3*   HEMATOCRIT % 30.3*   PLATELETS 10*3/mm3 461*       Medication Review:   I have reviewed the patients active and prn medications.     Palliative Care Assessment:  Metastatic pancreatic cancer  Neoplasm related pain  constipation    Recommendations/Plan:  - Goal at the present is to pursue cancer directed therapies, given with palliative intent, patient  likely to discharge today with plans for radiation today and palliative outpatient follow up tomorrow   - Although goals are not in alignment, patient would qualify for Hospice services in the future under cancer criteria  - CM following, agree with HH to maximize in home services  - SW following regarding coordination of care, patient having difficulty managing her upcoming appointments/transportation  - Palliative care will continue to follow/support patient and family    Recommendations  - Educated patient regarding use of IR for breakthrough pain control, nursing will continue to monitor closely for need of PRN every 4 hours; required 3 breakthrough doses yesterday  - Did require morphine 2mg IV for breakthrough around midnight, however no PO medications was given prior  - Continue multimodal approach to pain with use of lidocaine patches, ice/head as desired  - Recommend holding lactulose, as this can contribute to cramping/bloating  - Patient does not describe neuropathic pain, however if she has burning/shooting type pain lyrica may be of benefit; per oncology note she was previously prescribed gabapentin  - Continue antiemetics and antiflatulent medications as needed  - Continue Buspar   - Patient may benefit from SSRI/SNRI, defer to palliative care outpatient      CODE STATUS:   Code Status and Medical Interventions:   Ordered at: 01/22/24 1956     Code Status (Patient has no pulse and is not breathing):    CPR (Attempt to Resuscitate)     Medical Interventions (Patient has pulse or is breathing):    Full Support       I spent 35 total minutes, including face to face assessment, record review, coordination of care with staff, and counseling patient and/or family  Part of this note may be an electronic transcription/translation of spoken language to printed text using the Dragon Dictation System.    Mary Carmen Sharma PA-C  01/25/24  11:20 EST

## 2024-01-25 NOTE — PLAN OF CARE
Goal Outcome Evaluation:              Outcome Evaluation: VSS, adequate pain relief,ambulated to bathroom,+ BM,anticipating discharge

## 2024-01-26 ENCOUNTER — PATIENT ROUNDING (BHMG ONLY) (OUTPATIENT)
Dept: PALLIATIVE CARE | Facility: CLINIC | Age: 74
End: 2024-01-26
Payer: MEDICARE

## 2024-01-26 ENCOUNTER — TELEMEDICINE (OUTPATIENT)
Dept: PALLIATIVE CARE | Facility: CLINIC | Age: 74
End: 2024-01-26
Payer: MEDICARE

## 2024-01-26 ENCOUNTER — TRANSITIONAL CARE MANAGEMENT TELEPHONE ENCOUNTER (OUTPATIENT)
Dept: CALL CENTER | Facility: HOSPITAL | Age: 74
End: 2024-01-26
Payer: MEDICARE

## 2024-01-26 ENCOUNTER — LAB (OUTPATIENT)
Dept: LAB | Facility: HOSPITAL | Age: 74
End: 2024-01-26
Payer: MEDICARE

## 2024-01-26 VITALS — DIASTOLIC BLOOD PRESSURE: 91 MMHG | SYSTOLIC BLOOD PRESSURE: 149 MMHG

## 2024-01-26 DIAGNOSIS — R11.2 CHEMOTHERAPY INDUCED NAUSEA AND VOMITING: ICD-10-CM

## 2024-01-26 DIAGNOSIS — R63.0 POOR APPETITE: ICD-10-CM

## 2024-01-26 DIAGNOSIS — G63 NEUROPATHY ASSOCIATED WITH MALIGNANT NEOPLASM: ICD-10-CM

## 2024-01-26 DIAGNOSIS — C80.1 NEUROPATHY ASSOCIATED WITH MALIGNANT NEOPLASM: ICD-10-CM

## 2024-01-26 DIAGNOSIS — T45.1X5A CHEMOTHERAPY INDUCED NAUSEA AND VOMITING: ICD-10-CM

## 2024-01-26 DIAGNOSIS — C80.0 WIDESPREAD METASTATIC MALIGNANT NEOPLASTIC DISEASE: ICD-10-CM

## 2024-01-26 DIAGNOSIS — G47.00 INSOMNIA, UNSPECIFIED TYPE: ICD-10-CM

## 2024-01-26 DIAGNOSIS — Z51.81 THERAPEUTIC DRUG MONITORING: ICD-10-CM

## 2024-01-26 DIAGNOSIS — C25.1 MALIGNANT NEOPLASM OF BODY OF PANCREAS: Primary | ICD-10-CM

## 2024-01-26 DIAGNOSIS — F39 MOOD DISORDER: ICD-10-CM

## 2024-01-26 DIAGNOSIS — G89.3 CANCER RELATED PAIN: ICD-10-CM

## 2024-01-26 LAB
AMPHET+METHAMPHET UR QL: NEGATIVE
AMPHETAMINES UR QL: NEGATIVE
BARBITURATES UR QL SCN: NEGATIVE
BENZODIAZ UR QL SCN: POSITIVE
BUPRENORPHINE SERPL-MCNC: NEGATIVE NG/ML
CANNABINOIDS SERPL QL: NEGATIVE
COCAINE UR QL: NEGATIVE
FENTANYL UR-MCNC: NEGATIVE NG/ML
METHADONE UR QL SCN: NEGATIVE
OPIATES UR QL: POSITIVE
OXYCODONE UR QL SCN: POSITIVE
PCP UR QL SCN: NEGATIVE
TRICYCLICS UR QL SCN: NEGATIVE

## 2024-01-26 PROCEDURE — 3077F SYST BP >= 140 MM HG: CPT | Performed by: PHYSICIAN ASSISTANT

## 2024-01-26 PROCEDURE — 1125F AMNT PAIN NOTED PAIN PRSNT: CPT | Performed by: PHYSICIAN ASSISTANT

## 2024-01-26 PROCEDURE — 1159F MED LIST DOCD IN RCRD: CPT | Performed by: PHYSICIAN ASSISTANT

## 2024-01-26 PROCEDURE — 80307 DRUG TEST PRSMV CHEM ANLYZR: CPT | Performed by: PHYSICIAN ASSISTANT

## 2024-01-26 PROCEDURE — 99215 OFFICE O/P EST HI 40 MIN: CPT | Performed by: PHYSICIAN ASSISTANT

## 2024-01-26 PROCEDURE — 3080F DIAST BP >= 90 MM HG: CPT | Performed by: PHYSICIAN ASSISTANT

## 2024-01-26 PROCEDURE — 1160F RVW MEDS BY RX/DR IN RCRD: CPT | Performed by: PHYSICIAN ASSISTANT

## 2024-01-26 RX ORDER — OXYCODONE HYDROCHLORIDE 10 MG/1
10 TABLET ORAL EVERY 4 HOURS PRN
Qty: 180 TABLET | Refills: 0 | Status: SHIPPED | OUTPATIENT
Start: 2024-01-26 | End: 2024-02-25

## 2024-01-26 RX ORDER — POLYETHYLENE GLYCOL 3350 17 G/17G
17 POWDER, FOR SOLUTION ORAL DAILY
COMMUNITY

## 2024-01-26 RX ORDER — OXYCODONE HCL 20 MG/1
20 TABLET, FILM COATED, EXTENDED RELEASE ORAL EVERY 12 HOURS SCHEDULED
Qty: 60 TABLET | Refills: 0 | Status: SHIPPED | OUTPATIENT
Start: 2024-01-26 | End: 2024-02-25

## 2024-01-26 RX ORDER — PREGABALIN 50 MG/1
50 CAPSULE ORAL 3 TIMES DAILY
Qty: 90 CAPSULE | Refills: 0 | Status: SHIPPED | OUTPATIENT
Start: 2024-01-26

## 2024-01-26 RX ORDER — DRONABINOL 2.5 MG/1
2.5 CAPSULE ORAL
Qty: 60 CAPSULE | Refills: 0 | Status: SHIPPED | OUTPATIENT
Start: 2024-01-26 | End: 2024-02-25

## 2024-01-26 RX ORDER — HYDROCODONE BITARTRATE AND ACETAMINOPHEN 10; 325 MG/1; MG/1
TABLET ORAL
COMMUNITY
Start: 2024-01-03 | End: 2024-01-26 | Stop reason: ALTCHOICE

## 2024-01-26 NOTE — PROGRESS NOTES
Palliative Clinic Note      Name: Silvia Chung  Age: 73 y.o.  Sex:   : 1950  MRN: [id number]  Date of Service: 2024   Medical Oncologist: Dr. Corrales   Mode of visit: video-conference  Location of patient: [place] [facility]  Location of provider: [facility] [place] [facility]    Subjective:    Chief Complaint: Pain, poor appetite, insomnia     History of Present Illness: Silvia Chung is a 73 y.o. female with past medical history significant for HTN, Parkinson's disease, pancreatic cancer, mood disorder who presents via doxTERMINALFOUR video to the palliative clinic today to establish care.     Treatment summary: Patient presented in 2022 with c/o pain and nausea with vomiting for several months. Imaging revealed a mass in the distal pancreatic body and tail. Patient underwent a distal pancreatectomy and splenectomy in 2022. 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 Patient completed 12 cycles of adjuvant mFOLFIRINOX. Patient diagnosed with disease recurrence to the thoracic spine in 2023. Patient hospitalized -24 for intractable pain secondary to the pancreatic cancer. Inpatient palliative care consulted during admission. Patient is scheduled to start 10 fractions of palliative radiation to the thoracic spine later today under the care of Dr. Triana. Plan to restart chemotherapy after completion of radiation.     Pain: Patient complains of pain in her left upper quadrant that radiates to her back.  She describes this pain as a pressure.  She also has pain in her thoracic spine that she describes as a sharp or stabbing sensation.  Patient was started on Norco 10 mg by her oncologist for the cancer related pain.  Patient's pain medication adjusted during recent hospitalization.  She was started on oxycodone ER 20 mg BID and oxycodone IR 10 mg q4h PRN for breakthrough pain.  Patient reports being discharged with a short prescription for both pain medications  yesterday.  When she got home she misplaced the bottle of long-acting oxycodone.  Since that time she has been taking the short acting oxycodone every 3 hours to control her pain.  Patient reports gabapentin has not been effective for her in the past for chronic neck pain.    Other symptoms: Patient complains of a poor appetite.  Patient has to force herself to eat and supplement with [place] breakfast shakes.  Her weight has stabilized over the past several weeks.  Steroids have been helpful in the past but she is interested in trying other appetite stimulants.  Patient complains of liquid stools.  She plans to restart fiber supplement. Patient also complains of difficulty sleeping.  She states this has been a chronic issue all of her life that originated due to her work schedule.  Patient has experienced side effects with Ambien in the past including sleepwalking.  Melatonin was ineffective in the hospital.    Pyschosocial: Patient lives at home with her .  No use of tobacco, alcohol or illicit drugs.  No personal history of mental illness.  Patient was started on Buspar for anxiety during recent hospitalization.     Goals: Maximize comfort, optimize function & psychosocial wellbeing, and promote advanced care planning.    The following portions of the patient's history were reviewed and updated as appropriate: allergies, current medications, past family history, past medical history, past social history, past surgical history and problem list.    Decisional capacity:Full  ORT-R: Low risk  PHQ-9: 15-19 (Moderately Severe Depression)  SNEHA: 10  ECOG: (1) Restricted in physically strenuous activity, ambulatory and able to do work of light nature   Palliative Performance Scale Score: 70%     Objective:    /91     Constitutional: Awake, alert, sitting up in exam chair, in no acute distress  Eyes: PERRLA, EOMS intact  HENT: NCAT, face symmetric  Neck: Supple, trachea midline  Respiratory: Nonlabored  respirations  Musculoskeletal: Moves all extremities   Psychiatric: Appropriate affect, cooperative  Neurologic: Oriented x 3, Cranial Nerves grossly intact to confrontation, speech clear    Medication Counts: Instructed to bring controlled medications to all appointments.   I have reviewed the patient's [place] PDMP. FABIOLA Req #[id number].   UDS: Collected today. Results pending.    Assessment & Plan:    1. Malignant neoplasm of body of pancreas  - Patient hospitalized 1/22-1/25/24 for intractable pain secondary to the pancreatic cancer. Inpatient palliative care consulted during admission. Patient is scheduled to start 10 fractions of palliative radiation to the thoracic spine later today under the care of Dr. Triana. Plan to restart chemotherapy after completion of radiation.     2. Cancer related pain  - We discussed goals for pain relief, risks associated with opioid therapy, proper use, safe storage and disposal of opioids. We recommended the patient be supervised for the first few days when starting a new medication or dose and always start the new medication or dose in the morning. We reviewed our universal surveillance strategies including urine drug screens, FABIOLA reports, pill counts and risk assessment screenings. The Consent for Treatment with Controlled Substances and Controlled Substance Prescribing Agreement were both reviewed, signed, and scanned into the chart. The patient was given a copy of the Controlled Substance Education handout. Provided the patient signs to identify an opioid overdose. Educated the patient on the steps to respond to an overdose and administration of naloxone. Prescription for naloxone nasal spray already sent to the patient's pharmacy.    - Patient's pain medication adjusted during recent hospitalization.  She was started on oxycodone ER 20 mg BID and oxycodone IR 10 mg q4h PRN for breakthrough pain.  Patient reports adequate pain control while in the hospital.  Will continue  this regimen and submit prior authorizations to insurance company for 30-day prescriptions.    3. Neuropathy associated with malignant neoplasm  - Start trial of pregabalin (LYRICA) 50 MG capsule; Take 1 capsule by mouth 3 (Three) Times a Day.      4. Poor appetite  5. Chemotherapy induced nausea and vomiting  - Start trial of dronabinol (MARINOL) 2.5 MG capsule; Take 1 capsule by mouth 2 (Two) Times a Day Before Meals for 30 days.  Prior authorization submitted to the insurance company.    6. Therapeutic drug monitoring  - Urine Drug Screen collected today.  Results are pending.    7. Insomnia, unspecified type  - Continue sleep hygiene strategies.  Recommend trial of Benadryl combination pill.     8. Mood disorder  - Patient screened positive for depression based on a PHQ-9 score of 15 on 1/26/2024. Follow-up recommendations include: Prescribed antidepressant medication treatment. Patient started on Buspar during recent hospitalization.  May consider referral to behavioral health at next appointment.    Code status: FULL   Advanced directives: No.    Return in about 3 months (around 4/26/2024) for Office Visit.    The patient has chosen to receive care through a telehealth visit.   Does the patient consent to using a video visit for their medical care today? Yes   The visit included audio and video interaction. No technical issues occurred during this visit.  I spent 60 minutes caring for Silvia Chung on this date of service. This time includes time spent by me in the following activities: preparing for the visit, reviewing tests, obtaining and/or reviewing a separately obtained history, performing a medically appropriate examination and/or evaluation, counseling and educating the patient/family/caregiver, ordering medications, tests, or procedures, documenting information in the medical record, independently interpreting results and communicating that information with the patient/family/caregiver, and care  coordination.    Sheri Saul PA-C  01/26/2024    Medication Date Filled # Filled Count Used # Days  ANGELI   [place] 10/325 1/13/2024 150 -- -- -- --   Percocet 5/325 11/19/2023 14 -- -- -- --

## 2024-01-26 NOTE — OUTREACH NOTE
Call Center TCM Note      Flowsheet Row Responses   [facility] patient discharged from? [facility]   Does the patient have one of the following disease processes/diagnoses(primary or secondary)? Other   TCM attempt successful? Yes   Call start time 1435   Call end time 1438   Discharge diagnosis ntractable pain due to metastatic pancreatic cancer.   Person spoke with today (if not patient) and relationship pt   Meds reviewed with patient/caregiver? Yes   Is the patient having any side effects they believe may be caused by any medication additions or changes? No   Does the patient have all medications ordered at discharge? Yes   Is the patient taking all medications as directed (includes completed medication regime)? Yes   Comments Oncology 1/30/24 10:15 AM   Does the patient have an appointment with their PCP within 7-14 days of discharge? Yes  [2/1/2024 at 2:45 PM]   Psychosocial issues? No   Did the patient receive a copy of their discharge instructions? Yes   Nursing interventions Reviewed instructions with patient   What is the patient's perception of their health status since discharge? Improving   Is the patient/caregiver able to teach back signs and symptoms related to disease process for when to call PCP? Yes   Is the patient/caregiver able to teach back signs and symptoms related to disease process for when to call 911? Yes   Is the patient/caregiver able to teach back the hierarchy of who to call/visit for symptoms/problems? PCP, Specialist, Home health nurse, Urgent Care, ED, 911 Yes   If the patient is a current smoker, are they able to teach back resources for cessation? Not a smoker   TCM call completed? Yes   Wrap up additional comments Spouse states pt denies any abdominal pain. Reviewed AVS/meds with spouse. Spouse verified PCP/Oncology fu appts.   Call end time 1438   Would this patient benefit from a Referral to [facility] Social Work? No   Is the patient interested in additional calls from an  ambulatory ? No            Trina Babin RN    1/26/2024, 14:39 EST

## 2024-01-30 ENCOUNTER — OFFICE VISIT (OUTPATIENT)
Dept: ONCOLOGY | Facility: CLINIC | Age: 74
End: 2024-01-30
Payer: MEDICARE

## 2024-01-30 VITALS
WEIGHT: 138 LBS | OXYGEN SATURATION: 98 % | HEART RATE: 104 BPM | DIASTOLIC BLOOD PRESSURE: 83 MMHG | HEIGHT: 68 IN | RESPIRATION RATE: 16 BRPM | SYSTOLIC BLOOD PRESSURE: 147 MMHG | TEMPERATURE: 98 F | BODY MASS INDEX: 20.92 KG/M2

## 2024-01-30 DIAGNOSIS — C25.1 MALIGNANT NEOPLASM OF BODY OF PANCREAS: Primary | ICD-10-CM

## 2024-01-30 PROCEDURE — 1125F AMNT PAIN NOTED PAIN PRSNT: CPT | Performed by: INTERNAL MEDICINE

## 2024-01-30 PROCEDURE — 3077F SYST BP >= 140 MM HG: CPT | Performed by: INTERNAL MEDICINE

## 2024-01-30 PROCEDURE — 99214 OFFICE O/P EST MOD 30 MIN: CPT | Performed by: INTERNAL MEDICINE

## 2024-01-30 PROCEDURE — 3079F DIAST BP 80-89 MM HG: CPT | Performed by: INTERNAL MEDICINE

## 2024-01-30 NOTE — PROGRESS NOTES
PROBLEM LIST:  1. [id number] adenosquamous carcinoma of the pancreatic body/tail  A) presented to the ED on 11/23/22 with LUQ pain, worsening over 3-4 months, associated with nausea and occasional vomiting.  She has had GI side effects with sinemet for her PD so she associated this symptom with that medication.  She has lost about 10 lbs. CT a/p without contrast showed a 6.8 x 3.7 cm mass in the distal pancreatic body and tail with minimal surrounding inflammation.  Multiple borderline enalrged nodes near celiac axis and mesenteric axis.  CT a/p with contrast 12/1/22 showed a 5.6 x 3.6 cm mass in the tail and distal body of the pancreas, no adenopathy.  B) distal pancreatectomy and splenectomy performed on 12/19/2022.  Pathology showed poorly differentiated pancreatic adenosquamous carcinoma with involvement of adrenal gland by direct extension, 1 out of 5 lymph nodes involved.  Surgical margins negative. + lymphovascular invasion, + perineural invasion.  C) adjuvant mFOLFIRINOX started 2/13/23.  Completed 12 cycles.  D) disease recurrence noted on imaging - CT thoracic spine on 12/30/23 showed a 4.4 cm left paraspinous mass at T9 with erosion of the spinous process.  CA 19-9 increased to 82.  PET/CT on 1/15/2024 showed a hypermetabolic left paraspinal soft tissue mass at T10, multiple bilateral pulmonary nodules, mostly small in size, one of the lesions hypermetabolic, at least 2 hypermetabolic liver lesions consistent with hepatic metastases.  Palliative radiotherapy to the thoracic spine was completed early February 2024.  Treatment with Gemzar and Abraxane started to 624  2. parkinsons disease  3. Hypertension  4. Hyperlipidemia  5. asthma    Subjective     CHIEF COMPLAINT: pancreas cancer    HISTORY OF PRESENT ILLNESS:   Silvia Chung returns for follow-up.  She has been feeling much better.  She thinks even from the first dose of radiation her pain has improved.  She is currently taking OxyContin as well as  "oxycodone which is working well, and she also was started on steroids.  Currently on dexamethasone 4 mg twice a day and her energy level has been great.  She is doing things around the house again.          Objective      /83   Pulse 104   Temp 98 °F (36.7 °C)   Resp 16   Ht 172.7 cm (68\")   Wt 62.6 kg (138 lb)   SpO2 98%   BMI 20.98 kg/m²      Performance Status:1          General: frail appearing female in no acute distress  Neuro: alert and oriented  HEENT: sclera anicteric, oropharynx clear  Abdomen: soft, nontender, nondistended.  No palpable organomegaly  Extremeties: no lower extremity edema  Skin: no rashes, lesions, bruising, or petechiae  Psych: mood and affect appropriate                  RECENT LABS:  Lab Results   Component Value Date    WBC 11.76 (H) 01/23/2024    HGB 10.3 (L) 01/23/2024    HCT 30.3 (L) 01/23/2024    .6 (H) 01/23/2024     (H) 01/23/2024       Lab Results   Component Value Date    GLUCOSE 107 (H) 01/24/2024    BUN 11 01/24/2024    CREATININE 0.53 (L) 01/24/2024    EGFRIFNONA 85 10/23/2020    EGFRIFAFRI 92 12/07/2023    BCR 20.8 01/24/2024    K 3.6 01/24/2024    CO2 24.8 01/24/2024    CALCIUM 8.9 01/24/2024    ALBUMIN 3.9 01/23/2024    AST 15 01/23/2024    ALT <5 01/23/2024                   ASSESSMENT AND PLAN:     Silvia Chung is a 73 y.o. female with a T3N1M0 adenosquamous carcinoma of the pancreas, now with recurrent disease.    She has had disease recurrence within about 4 months of completing adjuvant FOLFIRINOX.  She has involvement of the liver, lungs, and a paraspinal mass.  She currently is completing radiation to the paraspinal mass.  We are planning to start chemotherapy with Abraxane and Gemzar next week.  We discussed that the goals of treatment are to help her maintain the best quality of life for as long as possible.    Cancer related pain: Continue current medications with OxyContin and oxycodone for breakthrough.  I recommend that she " stop the steroids once that prescription runs out.  We will refill her prescriptions as needed until Ms. Saul returns from leave.    Neuropathy: Continue gabapentin.      Diarrhea:  Continue Creon    Parkinson's: Continue regular medications.    Post splenectomy vaccines given at .    Follow-up in 3 weeks with cycle 1 day 15.  Plan to repeat imaging with a PET/CT after 2-3 cycles of treatment.              Janell Corrales MD  [facility]    1/30/2024          CC:

## 2024-02-06 ENCOUNTER — INFUSION (OUTPATIENT)
Dept: ONCOLOGY | Facility: HOSPITAL | Age: 74
End: 2024-02-06
Payer: MEDICARE

## 2024-02-06 VITALS
DIASTOLIC BLOOD PRESSURE: 98 MMHG | OXYGEN SATURATION: 99 % | BODY MASS INDEX: 21.12 KG/M2 | RESPIRATION RATE: 18 BRPM | HEART RATE: 113 BPM | WEIGHT: 138.9 LBS | TEMPERATURE: 98.6 F | SYSTOLIC BLOOD PRESSURE: 162 MMHG

## 2024-02-06 DIAGNOSIS — C25.1 MALIGNANT NEOPLASM OF BODY OF PANCREAS: Primary | ICD-10-CM

## 2024-02-06 DIAGNOSIS — K86.89 PANCREATIC MASS: ICD-10-CM

## 2024-02-06 LAB
ALBUMIN SERPL-MCNC: 3.6 G/DL (ref 3.5–5.2)
ALBUMIN/GLOB SERPL: 1 G/DL
ALP SERPL-CCNC: 93 U/L (ref 39–117)
ALT SERPL W P-5'-P-CCNC: <5 U/L (ref 1–33)
ANION GAP SERPL CALCULATED.3IONS-SCNC: 12 MMOL/L (ref 5–15)
AST SERPL-CCNC: 14 U/L (ref 1–32)
BASOPHILS # BLD AUTO: 0.01 10*3/MM3 (ref 0–0.2)
BASOPHILS NFR BLD AUTO: 0.1 % (ref 0–1.5)
BILIRUB SERPL-MCNC: 0.4 MG/DL (ref 0–1.2)
BUN SERPL-MCNC: 9 MG/DL (ref 8–23)
BUN/CREAT SERPL: 20 (ref 7–25)
CALCIUM SPEC-SCNC: 9.2 MG/DL (ref 8.6–10.5)
CANCER AG19-9 SERPL-ACNC: 444 U/ML
CHLORIDE SERPL-SCNC: 93 MMOL/L (ref 98–107)
CO2 SERPL-SCNC: 24 MMOL/L (ref 22–29)
CREAT SERPL-MCNC: 0.45 MG/DL (ref 0.57–1)
DEPRECATED RDW RBC AUTO: 55.2 FL (ref 37–54)
EGFRCR SERPLBLD CKD-EPI 2021: 103 ML/MIN/1.73
EOSINOPHIL # BLD AUTO: 0.02 10*3/MM3 (ref 0–0.4)
EOSINOPHIL NFR BLD AUTO: 0.2 % (ref 0.3–6.2)
ERYTHROCYTE [DISTWIDTH] IN BLOOD BY AUTOMATED COUNT: 13.9 % (ref 12.3–15.4)
GLOBULIN UR ELPH-MCNC: 3.6 GM/DL
GLUCOSE SERPL-MCNC: 112 MG/DL (ref 65–99)
HCT VFR BLD AUTO: 31.3 % (ref 34–46.6)
HGB BLD-MCNC: 10.9 G/DL (ref 12–15.9)
IMM GRANULOCYTES # BLD AUTO: 0.09 10*3/MM3 (ref 0–0.05)
IMM GRANULOCYTES NFR BLD AUTO: 0.8 % (ref 0–0.5)
LYMPHOCYTES # BLD AUTO: 0.4 10*3/MM3 (ref 0.7–3.1)
LYMPHOCYTES NFR BLD AUTO: 3.8 % (ref 19.6–45.3)
MCH RBC QN AUTO: 37.1 PG (ref 26.6–33)
MCHC RBC AUTO-ENTMCNC: 34.8 G/DL (ref 31.5–35.7)
MCV RBC AUTO: 106.5 FL (ref 79–97)
MONOCYTES # BLD AUTO: 0.93 10*3/MM3 (ref 0.1–0.9)
MONOCYTES NFR BLD AUTO: 8.7 % (ref 5–12)
NEUTROPHILS NFR BLD AUTO: 86.4 % (ref 42.7–76)
NEUTROPHILS NFR BLD AUTO: 9.19 10*3/MM3 (ref 1.7–7)
NRBC BLD AUTO-RTO: 0 /100 WBC (ref 0–0.2)
PLAT MORPH BLD: NORMAL
PLATELET # BLD AUTO: 342 10*3/MM3 (ref 140–450)
PMV BLD AUTO: 9.6 FL (ref 6–12)
POTASSIUM SERPL-SCNC: 4.1 MMOL/L (ref 3.5–5.2)
PROT SERPL-MCNC: 7.2 G/DL (ref 6–8.5)
RBC # BLD AUTO: 2.94 10*6/MM3 (ref 3.77–5.28)
RBC MORPH BLD: NORMAL
SODIUM SERPL-SCNC: 129 MMOL/L (ref 136–145)
WBC MORPH BLD: NORMAL
WBC NRBC COR # BLD AUTO: 10.64 10*3/MM3 (ref 3.4–10.8)

## 2024-02-06 PROCEDURE — 25010000002 GEMCITABINE 1 GM/26.3ML SOLUTION 26.3 ML VIAL: Performed by: INTERNAL MEDICINE

## 2024-02-06 PROCEDURE — 96413 CHEMO IV INFUSION 1 HR: CPT

## 2024-02-06 PROCEDURE — 85025 COMPLETE CBC W/AUTO DIFF WBC: CPT

## 2024-02-06 PROCEDURE — 85007 BL SMEAR W/DIFF WBC COUNT: CPT

## 2024-02-06 PROCEDURE — 86301 IMMUNOASSAY TUMOR CA 19-9: CPT

## 2024-02-06 PROCEDURE — 25010000002 DEXAMETHASONE SODIUM PHOSPHATE 20 MG/5ML SOLUTION: Performed by: INTERNAL MEDICINE

## 2024-02-06 PROCEDURE — 25810000003 SODIUM CHLORIDE 0.9 % SOLUTION 250 ML FLEX CONT: Performed by: INTERNAL MEDICINE

## 2024-02-06 PROCEDURE — 80053 COMPREHEN METABOLIC PANEL: CPT

## 2024-02-06 PROCEDURE — 25010000002 PACLITAXEL PROTEIN-BOUND PART PER 1 MG: Performed by: INTERNAL MEDICINE

## 2024-02-06 PROCEDURE — 96375 TX/PRO/DX INJ NEW DRUG ADDON: CPT

## 2024-02-06 PROCEDURE — 96417 CHEMO IV INFUS EACH ADDL SEQ: CPT

## 2024-02-06 PROCEDURE — 25010000002 HEPARIN LOCK FLUSH PER 10 UNITS: Performed by: INTERNAL MEDICINE

## 2024-02-06 RX ORDER — PACLITAXEL 100 MG/20ML
125 INJECTION, POWDER, LYOPHILIZED, FOR SUSPENSION INTRAVENOUS ONCE
Status: COMPLETED | OUTPATIENT
Start: 2024-02-06 | End: 2024-02-06

## 2024-02-06 RX ORDER — HEPARIN SODIUM (PORCINE) LOCK FLUSH IV SOLN 100 UNIT/ML 100 UNIT/ML
500 SOLUTION INTRAVENOUS AS NEEDED
Status: DISCONTINUED | OUTPATIENT
Start: 2024-02-06 | End: 2024-02-06 | Stop reason: HOSPADM

## 2024-02-06 RX ORDER — SODIUM CHLORIDE 0.9 % (FLUSH) 0.9 %
10 SYRINGE (ML) INJECTION AS NEEDED
OUTPATIENT
Start: 2024-02-06

## 2024-02-06 RX ORDER — SODIUM CHLORIDE 9 MG/ML
20 INJECTION, SOLUTION INTRAVENOUS ONCE
Status: DISCONTINUED | OUTPATIENT
Start: 2024-02-06 | End: 2024-02-06 | Stop reason: HOSPADM

## 2024-02-06 RX ORDER — SODIUM CHLORIDE 0.9 % (FLUSH) 0.9 %
10 SYRINGE (ML) INJECTION AS NEEDED
Status: DISCONTINUED | OUTPATIENT
Start: 2024-02-06 | End: 2024-02-06 | Stop reason: HOSPADM

## 2024-02-06 RX ORDER — HEPARIN SODIUM (PORCINE) LOCK FLUSH IV SOLN 100 UNIT/ML 100 UNIT/ML
500 SOLUTION INTRAVENOUS AS NEEDED
OUTPATIENT
Start: 2024-02-06

## 2024-02-06 RX ADMIN — PACLITAXEL 215 MG: 100 INJECTION, POWDER, LYOPHILIZED, FOR SUSPENSION INTRAVENOUS at 09:58

## 2024-02-06 RX ADMIN — Medication 10 ML: at 11:59

## 2024-02-06 RX ADMIN — GEMCITABINE HYDROCHLORIDE 1700 MG: 1 INJECTION, SOLUTION INTRAVENOUS at 11:06

## 2024-02-06 RX ADMIN — DEXAMETHASONE SODIUM PHOSPHATE 12 MG: 4 INJECTION, SOLUTION INTRAMUSCULAR; INTRAVENOUS at 09:21

## 2024-02-06 RX ADMIN — HEPARIN 500 UNITS: 100 SYRINGE at 11:59

## 2024-02-10 ENCOUNTER — HOSPITAL ENCOUNTER (EMERGENCY)
Facility: HOSPITAL | Age: 74
Discharge: HOME | End: 2024-02-10
Attending: STUDENT IN AN ORGANIZED HEALTH CARE EDUCATION/TRAINING PROGRAM
Payer: MEDICARE

## 2024-02-10 ENCOUNTER — APPOINTMENT (OUTPATIENT)
Dept: CT IMAGING | Facility: HOSPITAL | Age: 74
End: 2024-02-10
Payer: MEDICARE

## 2024-02-10 ENCOUNTER — APPOINTMENT (OUTPATIENT)
Dept: GENERAL RADIOLOGY | Facility: HOSPITAL | Age: 74
End: 2024-02-10
Payer: MEDICARE

## 2024-02-10 VITALS
WEIGHT: 138.89 LBS | TEMPERATURE: 98 F | RESPIRATION RATE: 18 BRPM | OXYGEN SATURATION: 98 % | SYSTOLIC BLOOD PRESSURE: 142 MMHG | DIASTOLIC BLOOD PRESSURE: 86 MMHG | HEART RATE: 110 BPM | HEIGHT: 68 IN | BODY MASS INDEX: 21.05 KG/M2

## 2024-02-10 DIAGNOSIS — R91.8 PULMONARY NODULES: Primary | ICD-10-CM

## 2024-02-10 DIAGNOSIS — C25.9 MALIGNANT NEOPLASM OF PANCREAS, UNSPECIFIED LOCATION OF MALIGNANCY: ICD-10-CM

## 2024-02-10 LAB
ALBUMIN SERPL-MCNC: 3.3 G/DL (ref 3.5–5.2)
ALBUMIN/GLOB SERPL: 0.9 G/DL
ALP SERPL-CCNC: 86 U/L (ref 39–117)
ALT SERPL W P-5'-P-CCNC: 10 U/L (ref 1–33)
ANION GAP SERPL CALCULATED.3IONS-SCNC: 11.4 MMOL/L (ref 5–15)
AST SERPL-CCNC: 19 U/L (ref 1–32)
BASOPHILS # BLD AUTO: 0.01 10*3/MM3 (ref 0–0.2)
BASOPHILS NFR BLD AUTO: 0.2 % (ref 0–1.5)
BILIRUB SERPL-MCNC: 0.7 MG/DL (ref 0–1.2)
BUN SERPL-MCNC: 10 MG/DL (ref 8–23)
BUN/CREAT SERPL: 17.2 (ref 7–25)
CALCIUM SPEC-SCNC: 8.7 MG/DL (ref 8.6–10.5)
CHLORIDE SERPL-SCNC: 88 MMOL/L (ref 98–107)
CO2 SERPL-SCNC: 23.6 MMOL/L (ref 22–29)
CREAT SERPL-MCNC: 0.58 MG/DL (ref 0.57–1)
DEPRECATED RDW RBC AUTO: 51 FL (ref 37–54)
EGFRCR SERPLBLD CKD-EPI 2021: 96.9 ML/MIN/1.73
EOSINOPHIL # BLD AUTO: 0.02 10*3/MM3 (ref 0–0.4)
EOSINOPHIL NFR BLD AUTO: 0.3 % (ref 0.3–6.2)
ERYTHROCYTE [DISTWIDTH] IN BLOOD BY AUTOMATED COUNT: 13.2 % (ref 12.3–15.4)
GLOBULIN UR ELPH-MCNC: 3.7 GM/DL
GLUCOSE SERPL-MCNC: 120 MG/DL (ref 65–99)
HCT VFR BLD AUTO: 28.1 % (ref 34–46.6)
HGB BLD-MCNC: 10.2 G/DL (ref 12–15.9)
HOLD SPECIMEN: NORMAL
HOLD SPECIMEN: NORMAL
IMM GRANULOCYTES # BLD AUTO: 0.05 10*3/MM3 (ref 0–0.05)
IMM GRANULOCYTES NFR BLD AUTO: 0.8 % (ref 0–0.5)
LIPASE SERPL-CCNC: 7 U/L (ref 13–60)
LYMPHOCYTES # BLD AUTO: 0.18 10*3/MM3 (ref 0.7–3.1)
LYMPHOCYTES NFR BLD AUTO: 3 % (ref 19.6–45.3)
MCH RBC QN AUTO: 38.1 PG (ref 26.6–33)
MCHC RBC AUTO-ENTMCNC: 36.3 G/DL (ref 31.5–35.7)
MCV RBC AUTO: 104.9 FL (ref 79–97)
MONOCYTES # BLD AUTO: 0.05 10*3/MM3 (ref 0.1–0.9)
MONOCYTES NFR BLD AUTO: 0.8 % (ref 5–12)
NEUTROPHILS NFR BLD AUTO: 5.6 10*3/MM3 (ref 1.7–7)
NEUTROPHILS NFR BLD AUTO: 94.9 % (ref 42.7–76)
NRBC BLD AUTO-RTO: 0 /100 WBC (ref 0–0.2)
PLATELET # BLD AUTO: 280 10*3/MM3 (ref 140–450)
PMV BLD AUTO: 10.9 FL (ref 6–12)
POTASSIUM SERPL-SCNC: 4.7 MMOL/L (ref 3.5–5.2)
PROT SERPL-MCNC: 7 G/DL (ref 6–8.5)
RBC # BLD AUTO: 2.68 10*6/MM3 (ref 3.77–5.28)
SODIUM SERPL-SCNC: 123 MMOL/L (ref 136–145)
TROPONIN T SERPL HS-MCNC: 30 NG/L
WBC NRBC COR # BLD AUTO: 5.91 10*3/MM3 (ref 3.4–10.8)
WHOLE BLOOD HOLD COAG: NORMAL
WHOLE BLOOD HOLD SPECIMEN: NORMAL

## 2024-02-10 PROCEDURE — 25010000002 MORPHINE PER 10 MG: Performed by: EMERGENCY MEDICINE

## 2024-02-10 PROCEDURE — 99285 EMERGENCY DEPT VISIT HI MDM: CPT

## 2024-02-10 PROCEDURE — 71045 X-RAY EXAM CHEST 1 VIEW: CPT

## 2024-02-10 PROCEDURE — 71275 CT ANGIOGRAPHY CHEST: CPT

## 2024-02-10 PROCEDURE — 80053 COMPREHEN METABOLIC PANEL: CPT | Performed by: EMERGENCY MEDICINE

## 2024-02-10 PROCEDURE — 36415 COLL VENOUS BLD VENIPUNCTURE: CPT

## 2024-02-10 PROCEDURE — 93005 ELECTROCARDIOGRAM TRACING: CPT | Performed by: EMERGENCY MEDICINE

## 2024-02-10 PROCEDURE — 85025 COMPLETE CBC W/AUTO DIFF WBC: CPT | Performed by: EMERGENCY MEDICINE

## 2024-02-10 PROCEDURE — 84484 ASSAY OF TROPONIN QUANT: CPT | Performed by: EMERGENCY MEDICINE

## 2024-02-10 PROCEDURE — 83690 ASSAY OF LIPASE: CPT | Performed by: NURSE PRACTITIONER

## 2024-02-10 PROCEDURE — 25510000001 IOPAMIDOL 61 % SOLUTION: Performed by: EMERGENCY MEDICINE

## 2024-02-10 PROCEDURE — 96374 THER/PROPH/DIAG INJ IV PUSH: CPT

## 2024-02-10 PROCEDURE — 96376 TX/PRO/DX INJ SAME DRUG ADON: CPT

## 2024-02-10 RX ORDER — MORPHINE SULFATE 2 MG/ML
2 INJECTION, SOLUTION INTRAMUSCULAR; INTRAVENOUS ONCE
Status: COMPLETED | OUTPATIENT
Start: 2024-02-10 | End: 2024-02-10

## 2024-02-10 RX ORDER — ASPIRIN 325 MG
325 TABLET ORAL ONCE
Status: DISCONTINUED | OUTPATIENT
Start: 2024-02-10 | End: 2024-02-10

## 2024-02-10 RX ORDER — SODIUM CHLORIDE 0.9 % (FLUSH) 0.9 %
10 SYRINGE (ML) INJECTION AS NEEDED
Status: DISCONTINUED | OUTPATIENT
Start: 2024-02-10 | End: 2024-02-10 | Stop reason: HOSPADM

## 2024-02-10 RX ADMIN — MORPHINE SULFATE 4 MG: 4 INJECTION, SOLUTION INTRAMUSCULAR; INTRAVENOUS at 18:56

## 2024-02-10 RX ADMIN — IOPAMIDOL 100 ML: 612 INJECTION, SOLUTION INTRAVENOUS at 19:21

## 2024-02-10 RX ADMIN — MORPHINE SULFATE 2 MG: 2 INJECTION, SOLUTION INTRAMUSCULAR; INTRAVENOUS at 20:55

## 2024-02-11 NOTE — ED PROVIDER NOTES
Subjective:  History of Present Illness:    Patient is a 73-year-old female with history of pancreatic cancer.  She presents to the ER today for chest pain and shortness of air since yesterday.  She reports that pain shoots from chest into thoracic back.  She denies cough or fever.  She denies OTC medication or home remedy.  Denies alleviating or exacerbating factors.    Nurses Notes reviewed and agree, including vitals, allergies, social history and prior medical history.     REVIEW OF SYSTEMS: All systems reviewed and not pertinent unless noted.  Review of Systems   Respiratory:  Positive for shortness of breath.    Cardiovascular:  Positive for chest pain.   All other systems reviewed and are negative.      Past Medical History:   Diagnosis Date    Asthma     Hyperlipidemia     Hypertension     Impaired functional mobility, balance, gait, and endurance     Pancreatic cancer 2022    Parkinson disease     PONV (postoperative nausea and vomiting)     Scoliosis     Seasonal allergies        Allergies:    Patient has no known allergies.      Past Surgical History:   Procedure Laterality Date    ADENOIDECTOMY      BREAST BIOPSY Right 2019    Needle biopsy    CATARACT EXTRACTION Bilateral     CHOLECYSTECTOMY OPEN  12/19/2022    done at     COLONOSCOPY      FOOT SURGERY Left     torn ligament, screws placed.    HIP HEMIARTHROPLASTY Left 10/21/2020    Procedure: HIP HEMIARTHROPLASTY LEFT;  Surgeon: Humza Winters MD;  Location: [facility] OR;  Service: Orthopedics;  Laterality: Left;    LYMPHADENECTOMY  12/19/2022    partial, done at     NECK SURGERY      four fusions    OMENTECTOMY  12/19/2022    done at     PANCREATECTOMY  12/19/2022    done at , left adrenolectomy also performed    PORTACATH PLACEMENT Right 02/09/2023    Procedure: INSERTION OF PORTACATH;  Surgeon: Marcio Garcia MD;  Location: [facility] OR;  Service: General;  Laterality: Right;    SPLENECTOMY  12/19/2022    done at     TONSILLECTOMY    "        Social History     Socioeconomic History    Marital status:    Tobacco Use    Smoking status: Never    Smokeless tobacco: Never   Vaping Use    Vaping Use: Never used   Substance and Sexual Activity    Alcohol use: Not Currently     Comment: rare    Drug use: Defer    Sexual activity: Defer         Family History   Problem Relation Age of Onset    Heart failure Father     Lung cancer Father     Cancer Brother     Breast cancer Maternal Grandmother     Breast cancer Paternal Grandmother     Breast cancer Cousin     Ovarian cancer Neg Hx        Objective  Physical Exam:  /84   Pulse 109   Temp 98 °F (36.7 °C)   Resp 15   Ht 172.7 cm (67.99\")   Wt 63 kg (138 lb 14.2 oz)   SpO2 97%   BMI 21.12 kg/m²      Physical Exam  Vitals and nursing note reviewed.   Constitutional:       Appearance: She is well-developed and normal weight.   HENT:      Head: Normocephalic and atraumatic.   Eyes:      Extraocular Movements: Extraocular movements intact.      Pupils: Pupils are equal, round, and reactive to light.   Cardiovascular:      Rate and Rhythm: Normal rate and regular rhythm.      Heart sounds: Normal heart sounds.   Pulmonary:      Effort: Pulmonary effort is normal.      Breath sounds: Normal breath sounds.   Abdominal:      General: Bowel sounds are normal.      Palpations: Abdomen is soft.   Musculoskeletal:         General: Normal range of motion.      Cervical back: Normal range of motion and neck supple.   Skin:     General: Skin is warm and dry.      Capillary Refill: Capillary refill takes less than 2 seconds.   Neurological:      General: No focal deficit present.      Mental Status: She is alert and oriented to person, place, and time.   Psychiatric:         Mood and Affect: Mood normal.         Behavior: Behavior normal.         Procedures    ED Course:    ED Course as of 02/10/24 2046   Sat Feb 10, 2024   1835 EKG interpretation  Sinus tach rate of 120 bpm, left axis, normal " intervals, no ST elevation, no T wave inversions [CS]      ED Course User Index  [CS] Deny Rodgers MD       Lab Results (last 24 hours)       Procedure Component Value Units Date/Time    CBC & Differential [[id number]]  (Abnormal) Collected: 02/10/24 1832    Specimen: Blood Updated: 02/10/24 1841    Narrative:      The following orders were created for panel order CBC & Differential.  Procedure                               Abnormality         Status                     ---------                               -----------         ------                     CBC Auto Differential[[id number]]        Abnormal            Final result                 Please view results for these tests on the individual orders.    Comprehensive Metabolic Panel [[id number]]  (Abnormal) Collected: 02/10/24 1832    Specimen: Blood Updated: 02/10/24 1858     Glucose 120 mg/dL      BUN 10 mg/dL      Creatinine 0.58 mg/dL      Sodium 123 mmol/L      Potassium 4.7 mmol/L      Comment: Slight hemolysis detected by analyzer. Result may be falsely elevated.        Chloride 88 mmol/L      CO2 23.6 mmol/L      Calcium 8.7 mg/dL      Total Protein 7.0 g/dL      Albumin 3.3 g/dL      ALT (SGPT) 10 U/L      AST (SGOT) 19 U/L      Comment: Slight hemolysis detected by analyzer. Result may be falsely elevated.        Alkaline Phosphatase 86 U/L      Total Bilirubin 0.7 mg/dL      Globulin 3.7 gm/dL      A/G Ratio 0.9 g/dL      BUN/Creatinine Ratio 17.2     Anion Gap 11.4 mmol/L      eGFR 96.9 mL/min/1.73     Narrative:      GFR Normal >60  Chronic Kidney Disease <60  Kidney Failure <15    The GFR formula is only valid for adults with stable renal function between ages 18 and 70.    High Sensitivity Troponin T [[id number]]  (Abnormal) Collected: 02/10/24 1832    Specimen: Blood Updated: 02/10/24 1859     HS Troponin T 30 ng/L     Narrative:      High Sensitive Troponin T Reference Range:  <14.0 ng/L- Negative Female for AMI  <22.0 ng/L- Negative  Male for AMI  >=14 - Abnormal Female indicating possible myocardial injury.  >=22 - Abnormal Male indicating possible myocardial injury.   Clinicians would have to utilize clinical acumen, EKG, Troponin, and serial changes to determine if it is an Acute Myocardial Infarction or myocardial injury due to an underlying chronic condition.         CBC Auto Differential [[id number]]  (Abnormal) Collected: 02/10/24 1832    Specimen: Blood Updated: 02/10/24 1841     WBC 5.91 10*3/mm3      RBC 2.68 10*6/mm3      Hemoglobin 10.2 g/dL      Hematocrit 28.1 %      .9 fL      MCH 38.1 pg      MCHC 36.3 g/dL      RDW 13.2 %      RDW-SD 51.0 fl      MPV 10.9 fL      Platelets 280 10*3/mm3      Neutrophil % 94.9 %      Lymphocyte % 3.0 %      Monocyte % 0.8 %      Eosinophil % 0.3 %      Basophil % 0.2 %      Immature Grans % 0.8 %      Neutrophils, Absolute 5.60 10*3/mm3      Lymphocytes, Absolute 0.18 10*3/mm3      Monocytes, Absolute 0.05 10*3/mm3      Eosinophils, Absolute 0.02 10*3/mm3      Basophils, Absolute 0.01 10*3/mm3      Immature Grans, Absolute 0.05 10*3/mm3      nRBC 0.0 /100 WBC     Lipase [[id number]]  (Abnormal) Collected: 02/10/24 1832    Specimen: Blood Updated: 02/10/24 1947     Lipase 7 U/L              CT Angiogram Chest Pulmonary Embolism    Result Date: 2/10/2024  FINAL REPORT TECHNIQUE: Thin section axial CT with contrast with multiplanar reconstruction CLINICAL HISTORY: Pulmonary embolism (PE) suspected, unknown D-dimer FINDINGS: Pulmonary vessels enhance in normal fashion without evidence of embolism.    There is borderline fusiform aneurysm of the ascending aorta that measures up to 39 mm.  There are multiple nodules, right greater than left that could be inflammatory or neoplastic.  The largest right node seen in the superior segment of the right lower lobe measures 14 mm.  The largest left lymph node in the lingula measures up to 16 mm.  There is mild right hilar adenopathy measures up to 20 mm.   There is mild right paratracheal adenopathy.  There is moderate left and a small right pleural effusion.  There is left retrocrural adenopathy measuring up to 20 mm.     No pulmonary infiltrate is present. There is no significant pericardial effusion.     Impression: 1.  No pulmonary embolism.  2.  Numerous bilateral pulmonary nodules which could be metastatic or inflammatory.  3. Nonspecific mild adenopathy could be metastatic or reactive.  4. Bilateral pleural effusions. Authenticated and Electronically Signed by LAYNE Jack MD on 02/10/2024 08:21:03 PM        MDM     Amount and/or Complexity of Data Reviewed  Decide to obtain previous medical records or to obtain history from someone other than the patient: yes        Initial impression of presenting illness: Patient is a 73-year-old female with history of pancreatic cancer.  She presents to the ER today for chest pain and shortness of air since yesterday.  She reports that pain shoots from chest into thoracic back.  She denies cough or fever.  She denies OTC medication or home remedy.  Denies alleviating or exacerbating factors.    DDX: includes but is not limited to: Metastasis, strain, sprain, chest wall or other    Patient arrives stable with vitals interpreted by myself.     Pertinent features from physical exam: Lung sounds are clear bilaterally throughout.  Abdomen soft nontender.  Bowel sounds are normal.  Cardiac sounds are normal..    Initial diagnostic plan: CBC, CMP, troponin, lipase, CT PE protocol of chest, chest x-ray    Results from initial plan were reviewed and interpreted by me revealing CBC is with anemia.  Lipase is 7.  CMP is within appropriate range.  CT PE protocol chest with the following impression #1 no pulmonary embolism.  #2 numerous bilateral pulmonary nodules which could be metastic or inflammatory.  #3 nonspecific mild adenopathy could be metastic or reactive.  #4 bilateral pleural effusions.      Diagnostic information from  other sources: Chart review    Interventions / Re-evaluation: Vital signs stable throughout encounter    Results/clinical rationale were discussed with patient    Consultations/Discussion of results with other physicians: N/A    Disposition plan: Patient is hemodynamically stable nontoxic-appearing appropriate discharge.  Pulmonary nodules most likely source of chest pain.  Will have patient follow-up with oncologist to adjust pain management.  Follow-up with your PCP in 1 week.  Follow-up with ER for any worsening symptoms.  -----        Final diagnoses:   Pulmonary nodules   Malignant neoplasm of pancreas, unspecified location of malignancy          Darrion Murguia, APRN  02/10/24 [id number]

## 2024-02-11 NOTE — DISCHARGE INSTRUCTIONS
Please follow-up with your oncologist to assist with pain management.  Follow-up with ER for new or worsening symptoms.

## 2024-02-12 ENCOUNTER — PATIENT OUTREACH (OUTPATIENT)
Dept: CASE MANAGEMENT | Facility: OTHER | Age: 74
End: 2024-02-12
Payer: MEDICARE

## 2024-02-13 ENCOUNTER — HOSPITAL ENCOUNTER (OUTPATIENT)
Facility: HOSPITAL | Age: 74
Setting detail: OBSERVATION
Discharge: HOSPICE | End: 2024-02-14
Attending: EMERGENCY MEDICINE | Admitting: STUDENT IN AN ORGANIZED HEALTH CARE EDUCATION/TRAINING PROGRAM
Payer: MEDICARE

## 2024-02-13 DIAGNOSIS — C80.0 WIDESPREAD METASTATIC MALIGNANT NEOPLASTIC DISEASE: ICD-10-CM

## 2024-02-13 DIAGNOSIS — R52 INTRACTABLE PAIN: Primary | ICD-10-CM

## 2024-02-13 LAB
ALBUMIN SERPL-MCNC: 3.6 G/DL (ref 3.5–5.2)
ALBUMIN/GLOB SERPL: 1.1 G/DL
ALP SERPL-CCNC: 97 U/L (ref 39–117)
ALT SERPL W P-5'-P-CCNC: 7 U/L (ref 1–33)
ANION GAP SERPL CALCULATED.3IONS-SCNC: 14.8 MMOL/L (ref 5–15)
AST SERPL-CCNC: 14 U/L (ref 1–32)
BASOPHILS # BLD AUTO: 0.02 10*3/MM3 (ref 0–0.2)
BASOPHILS NFR BLD AUTO: 0.4 % (ref 0–1.5)
BILIRUB SERPL-MCNC: 0.5 MG/DL (ref 0–1.2)
BUN SERPL-MCNC: 7 MG/DL (ref 8–23)
BUN/CREAT SERPL: 13.2 (ref 7–25)
CALCIUM SPEC-SCNC: 8.8 MG/DL (ref 8.6–10.5)
CHLORIDE SERPL-SCNC: 89 MMOL/L (ref 98–107)
CO2 SERPL-SCNC: 25.2 MMOL/L (ref 22–29)
CREAT SERPL-MCNC: 0.53 MG/DL (ref 0.57–1)
DEPRECATED RDW RBC AUTO: 51.1 FL (ref 37–54)
EGFRCR SERPLBLD CKD-EPI 2021: 99 ML/MIN/1.73
EOSINOPHIL # BLD AUTO: 0 10*3/MM3 (ref 0–0.4)
EOSINOPHIL NFR BLD AUTO: 0 % (ref 0.3–6.2)
ERYTHROCYTE [DISTWIDTH] IN BLOOD BY AUTOMATED COUNT: 13 % (ref 12.3–15.4)
GLOBULIN UR ELPH-MCNC: 3.2 GM/DL
GLUCOSE SERPL-MCNC: 100 MG/DL (ref 65–99)
HCT VFR BLD AUTO: 29.7 % (ref 34–46.6)
HGB BLD-MCNC: 10.2 G/DL (ref 12–15.9)
HOLD SPECIMEN: NORMAL
HOLD SPECIMEN: NORMAL
IMM GRANULOCYTES # BLD AUTO: 0.06 10*3/MM3 (ref 0–0.05)
IMM GRANULOCYTES NFR BLD AUTO: 1.3 % (ref 0–0.5)
LIPASE SERPL-CCNC: 8 U/L (ref 13–60)
LYMPHOCYTES # BLD AUTO: 0.13 10*3/MM3 (ref 0.7–3.1)
LYMPHOCYTES NFR BLD AUTO: 2.8 % (ref 19.6–45.3)
MACROCYTES BLD QL SMEAR: NORMAL
MCH RBC QN AUTO: 36.7 PG (ref 26.6–33)
MCHC RBC AUTO-ENTMCNC: 34.3 G/DL (ref 31.5–35.7)
MCV RBC AUTO: 106.8 FL (ref 79–97)
MONOCYTES # BLD AUTO: 0.17 10*3/MM3 (ref 0.1–0.9)
MONOCYTES NFR BLD AUTO: 3.7 % (ref 5–12)
NEUTROPHILS NFR BLD AUTO: 4.23 10*3/MM3 (ref 1.7–7)
NEUTROPHILS NFR BLD AUTO: 91.8 % (ref 42.7–76)
NRBC BLD AUTO-RTO: 0 /100 WBC (ref 0–0.2)
PLATELET # BLD AUTO: 153 10*3/MM3 (ref 140–450)
PMV BLD AUTO: 10.9 FL (ref 6–12)
POTASSIUM SERPL-SCNC: 4.3 MMOL/L (ref 3.5–5.2)
PROT SERPL-MCNC: 6.8 G/DL (ref 6–8.5)
RBC # BLD AUTO: 2.78 10*6/MM3 (ref 3.77–5.28)
SMALL PLATELETS BLD QL SMEAR: ADEQUATE
SODIUM SERPL-SCNC: 129 MMOL/L (ref 136–145)
WBC MORPH BLD: NORMAL
WBC NRBC COR # BLD AUTO: 4.61 10*3/MM3 (ref 3.4–10.8)
WHOLE BLOOD HOLD COAG: NORMAL
WHOLE BLOOD HOLD SPECIMEN: NORMAL

## 2024-02-13 PROCEDURE — 99223 1ST HOSP IP/OBS HIGH 75: CPT | Performed by: STUDENT IN AN ORGANIZED HEALTH CARE EDUCATION/TRAINING PROGRAM

## 2024-02-13 PROCEDURE — 96372 THER/PROPH/DIAG INJ SC/IM: CPT

## 2024-02-13 PROCEDURE — 99285 EMERGENCY DEPT VISIT HI MDM: CPT

## 2024-02-13 PROCEDURE — G0378 HOSPITAL OBSERVATION PER HR: HCPCS

## 2024-02-13 PROCEDURE — 96376 TX/PRO/DX INJ SAME DRUG ADON: CPT

## 2024-02-13 PROCEDURE — 83690 ASSAY OF LIPASE: CPT | Performed by: EMERGENCY MEDICINE

## 2024-02-13 PROCEDURE — 96374 THER/PROPH/DIAG INJ IV PUSH: CPT

## 2024-02-13 PROCEDURE — 85007 BL SMEAR W/DIFF WBC COUNT: CPT | Performed by: EMERGENCY MEDICINE

## 2024-02-13 PROCEDURE — 96375 TX/PRO/DX INJ NEW DRUG ADDON: CPT

## 2024-02-13 PROCEDURE — 80053 COMPREHEN METABOLIC PANEL: CPT | Performed by: EMERGENCY MEDICINE

## 2024-02-13 PROCEDURE — 25810000003 SODIUM CHLORIDE 0.9 % SOLUTION: Performed by: STUDENT IN AN ORGANIZED HEALTH CARE EDUCATION/TRAINING PROGRAM

## 2024-02-13 PROCEDURE — 25810000003 SODIUM CHLORIDE 0.9 % SOLUTION: Performed by: EMERGENCY MEDICINE

## 2024-02-13 PROCEDURE — 85025 COMPLETE CBC W/AUTO DIFF WBC: CPT | Performed by: EMERGENCY MEDICINE

## 2024-02-13 PROCEDURE — 25010000002 ONDANSETRON PER 1 MG: Performed by: EMERGENCY MEDICINE

## 2024-02-13 PROCEDURE — 96361 HYDRATE IV INFUSION ADD-ON: CPT

## 2024-02-13 PROCEDURE — 25010000002 HYDROMORPHONE 1 MG/ML SOLUTION: Performed by: EMERGENCY MEDICINE

## 2024-02-13 PROCEDURE — 25010000002 ENOXAPARIN PER 10 MG: Performed by: STUDENT IN AN ORGANIZED HEALTH CARE EDUCATION/TRAINING PROGRAM

## 2024-02-13 PROCEDURE — 25010000002 HYDROMORPHONE 1 MG/ML SOLUTION: Performed by: INTERNAL MEDICINE

## 2024-02-13 RX ORDER — CHOLECALCIFEROL (VITAMIN D3) 125 MCG
5 CAPSULE ORAL NIGHTLY
Status: DISCONTINUED | OUTPATIENT
Start: 2024-02-13 | End: 2024-02-14 | Stop reason: HOSPADM

## 2024-02-13 RX ORDER — LIDOCAINE 50 MG/G
2 PATCH TOPICAL
Status: DISCONTINUED | OUTPATIENT
Start: 2024-02-13 | End: 2024-02-13

## 2024-02-13 RX ORDER — SODIUM CHLORIDE 0.9 % (FLUSH) 0.9 %
10 SYRINGE (ML) INJECTION AS NEEDED
Status: DISCONTINUED | OUTPATIENT
Start: 2024-02-13 | End: 2024-02-14 | Stop reason: HOSPADM

## 2024-02-13 RX ORDER — CARBIDOPA/LEVODOPA 25MG-250MG
1 TABLET ORAL 2 TIMES DAILY
Status: DISCONTINUED | OUTPATIENT
Start: 2024-02-13 | End: 2024-02-14 | Stop reason: HOSPADM

## 2024-02-13 RX ORDER — ONDANSETRON 2 MG/ML
8 INJECTION INTRAMUSCULAR; INTRAVENOUS ONCE
Status: COMPLETED | OUTPATIENT
Start: 2024-02-13 | End: 2024-02-13

## 2024-02-13 RX ORDER — CARVEDILOL 6.25 MG/1
6.25 TABLET ORAL 2 TIMES DAILY WITH MEALS
Status: DISCONTINUED | OUTPATIENT
Start: 2024-02-13 | End: 2024-02-14 | Stop reason: HOSPADM

## 2024-02-13 RX ORDER — BUSPIRONE HYDROCHLORIDE 10 MG/1
10 TABLET ORAL EVERY 12 HOURS SCHEDULED
Status: DISCONTINUED | OUTPATIENT
Start: 2024-02-13 | End: 2024-02-14 | Stop reason: HOSPADM

## 2024-02-13 RX ORDER — OXYCODONE HCL 10 MG/1
30 TABLET, FILM COATED, EXTENDED RELEASE ORAL EVERY 12 HOURS SCHEDULED
Status: DISCONTINUED | OUTPATIENT
Start: 2024-02-13 | End: 2024-02-14 | Stop reason: HOSPADM

## 2024-02-13 RX ORDER — ACETAMINOPHEN 325 MG/1
650 TABLET ORAL EVERY 4 HOURS PRN
Status: DISCONTINUED | OUTPATIENT
Start: 2024-02-13 | End: 2024-02-14 | Stop reason: HOSPADM

## 2024-02-13 RX ORDER — ENOXAPARIN SODIUM 100 MG/ML
40 INJECTION SUBCUTANEOUS DAILY
Status: DISCONTINUED | OUTPATIENT
Start: 2024-02-13 | End: 2024-02-14 | Stop reason: HOSPADM

## 2024-02-13 RX ORDER — ONDANSETRON 2 MG/ML
4 INJECTION INTRAMUSCULAR; INTRAVENOUS EVERY 6 HOURS PRN
Status: DISCONTINUED | OUTPATIENT
Start: 2024-02-13 | End: 2024-02-14 | Stop reason: HOSPADM

## 2024-02-13 RX ORDER — DOCUSATE SODIUM 100 MG/1
100 CAPSULE, LIQUID FILLED ORAL DAILY
Status: DISCONTINUED | OUTPATIENT
Start: 2024-02-13 | End: 2024-02-14 | Stop reason: HOSPADM

## 2024-02-13 RX ORDER — PANTOPRAZOLE SODIUM 40 MG/1
40 TABLET, DELAYED RELEASE ORAL
Status: DISCONTINUED | OUTPATIENT
Start: 2024-02-14 | End: 2024-02-14 | Stop reason: HOSPADM

## 2024-02-13 RX ORDER — ACETAMINOPHEN 160 MG/5ML
650 SOLUTION ORAL EVERY 4 HOURS PRN
Status: DISCONTINUED | OUTPATIENT
Start: 2024-02-13 | End: 2024-02-14 | Stop reason: HOSPADM

## 2024-02-13 RX ORDER — ONDANSETRON 4 MG/1
8 TABLET, FILM COATED ORAL 3 TIMES DAILY PRN
Status: DISCONTINUED | OUTPATIENT
Start: 2024-02-13 | End: 2024-02-14 | Stop reason: HOSPADM

## 2024-02-13 RX ORDER — SODIUM CHLORIDE 0.9 % (FLUSH) 0.9 %
10 SYRINGE (ML) INJECTION EVERY 12 HOURS SCHEDULED
Status: DISCONTINUED | OUTPATIENT
Start: 2024-02-13 | End: 2024-02-14 | Stop reason: HOSPADM

## 2024-02-13 RX ORDER — ACETAMINOPHEN 650 MG/1
650 SUPPOSITORY RECTAL EVERY 4 HOURS PRN
Status: DISCONTINUED | OUTPATIENT
Start: 2024-02-13 | End: 2024-02-14 | Stop reason: HOSPADM

## 2024-02-13 RX ORDER — SODIUM CHLORIDE 9 MG/ML
100 INJECTION, SOLUTION INTRAVENOUS CONTINUOUS
Status: DISCONTINUED | OUTPATIENT
Start: 2024-02-13 | End: 2024-02-13

## 2024-02-13 RX ORDER — SODIUM CHLORIDE 9 MG/ML
40 INJECTION, SOLUTION INTRAVENOUS AS NEEDED
Status: DISCONTINUED | OUTPATIENT
Start: 2024-02-13 | End: 2024-02-14 | Stop reason: HOSPADM

## 2024-02-13 RX ORDER — OXYCODONE HYDROCHLORIDE 5 MG/1
15 TABLET ORAL EVERY 4 HOURS PRN
Status: DISCONTINUED | OUTPATIENT
Start: 2024-02-13 | End: 2024-02-14 | Stop reason: HOSPADM

## 2024-02-13 RX ADMIN — ENOXAPARIN SODIUM 40 MG: 100 INJECTION SUBCUTANEOUS at 08:17

## 2024-02-13 RX ADMIN — SODIUM CHLORIDE 100 ML/HR: 9 INJECTION, SOLUTION INTRAVENOUS at 06:00

## 2024-02-13 RX ADMIN — SODIUM CHLORIDE 1000 ML: 9 INJECTION, SOLUTION INTRAVENOUS at 04:48

## 2024-02-13 RX ADMIN — HYDROMORPHONE HYDROCHLORIDE 1 MG: 1 INJECTION, SOLUTION INTRAMUSCULAR; INTRAVENOUS; SUBCUTANEOUS at 18:13

## 2024-02-13 RX ADMIN — HYDROMORPHONE HYDROCHLORIDE 1 MG: 1 INJECTION, SOLUTION INTRAMUSCULAR; INTRAVENOUS; SUBCUTANEOUS at 22:03

## 2024-02-13 RX ADMIN — BUSPIRONE HYDROCHLORIDE 10 MG: 10 TABLET ORAL at 13:19

## 2024-02-13 RX ADMIN — OXYCODONE HYDROCHLORIDE 15 MG: 5 TABLET ORAL at 23:02

## 2024-02-13 RX ADMIN — HYDROMORPHONE HYDROCHLORIDE 1 MG: 1 INJECTION, SOLUTION INTRAMUSCULAR; INTRAVENOUS; SUBCUTANEOUS at 07:42

## 2024-02-13 RX ADMIN — ONDANSETRON 8 MG: 2 INJECTION INTRAMUSCULAR; INTRAVENOUS at 04:48

## 2024-02-13 RX ADMIN — BUSPIRONE HYDROCHLORIDE 10 MG: 10 TABLET ORAL at 20:00

## 2024-02-13 RX ADMIN — CARVEDILOL 6.25 MG: 6.25 TABLET, FILM COATED ORAL at 18:05

## 2024-02-13 RX ADMIN — CARBIDOPA AND LEVODOPA 1 TABLET: 25; 250 TABLET ORAL at 13:19

## 2024-02-13 RX ADMIN — OXYCODONE HYDROCHLORIDE 15 MG: 5 TABLET ORAL at 15:45

## 2024-02-13 RX ADMIN — Medication 10 ML: at 20:01

## 2024-02-13 RX ADMIN — OXYCODONE HYDROCHLORIDE 30 MG: 10 TABLET, FILM COATED, EXTENDED RELEASE ORAL at 13:19

## 2024-02-13 RX ADMIN — DOCUSATE SODIUM 100 MG: 100 CAPSULE, LIQUID FILLED ORAL at 13:19

## 2024-02-13 RX ADMIN — OXYCODONE HYDROCHLORIDE 30 MG: 10 TABLET, FILM COATED, EXTENDED RELEASE ORAL at 20:01

## 2024-02-13 RX ADMIN — HYDROMORPHONE HYDROCHLORIDE 1 MG: 1 INJECTION, SOLUTION INTRAMUSCULAR; INTRAVENOUS; SUBCUTANEOUS at 04:48

## 2024-02-13 RX ADMIN — Medication 5 MG: at 22:19

## 2024-02-13 RX ADMIN — CARBIDOPA AND LEVODOPA 1 TABLET: 25; 250 TABLET ORAL at 20:00

## 2024-02-13 RX ADMIN — SODIUM CHLORIDE 100 ML/HR: 9 INJECTION, SOLUTION INTRAVENOUS at 16:59

## 2024-02-13 RX ADMIN — HYDROMORPHONE HYDROCHLORIDE 1 MG: 1 INJECTION, SOLUTION INTRAMUSCULAR; INTRAVENOUS; SUBCUTANEOUS at 19:35

## 2024-02-13 RX ADMIN — HYDROMORPHONE HYDROCHLORIDE 1 MG: 1 INJECTION, SOLUTION INTRAMUSCULAR; INTRAVENOUS; SUBCUTANEOUS at 20:59

## 2024-02-13 RX ADMIN — HYDROMORPHONE HYDROCHLORIDE 1 MG: 1 INJECTION, SOLUTION INTRAMUSCULAR; INTRAVENOUS; SUBCUTANEOUS at 23:43

## 2024-02-13 RX ADMIN — Medication 10 ML: at 08:16

## 2024-02-13 NOTE — PLAN OF CARE
Problem: Adult Inpatient Plan of Care  Goal: Plan of Care Review  Outcome: Ongoing, Progressing  Flowsheets (Taken 2/13/2024 6505)  Progress: no change  Plan of Care Reviewed With: spouse   Goal Outcome Evaluation:  Plan of Care Reviewed With: spouse        Progress: no change               VSS, room air. Patient admitted to the 3rd floor from the ED. Complaints of pain were controlled as needed with PRN pain medication. Plan of care reviewed with the patient and spouse, no acute events to report. Will continue to monitor.

## 2024-02-13 NOTE — CONSULTS
Palliative care was consulted for pain management/Hospice information. Patient presented to ED 2/15/2024 with intractable cancer pain. Patient is a 73-year-old woman with past medical history of Parkinson's disease, pancreatic adenosquamous carcinoma metastasis, asthma, hypertension, hyperlipidemia. Palliative care team is familiar with patient from previous admission 1/22/2024. From this admission plan with follow up with Sheri BAKER with [facility] and Palliative radiation for pain control.     Upon introduction I asked the patient how her symptoms have been controlled since discharge, she mentioned that the palliative radiation for pain control in her spine had not been as effective as she would've hoped. She continued by stating that her oncologist is not giving her many options as far as options at this point. I informed her of Hospice and the support/resources they can provide, explained that they could assist with pain control to improve comfort level. Patient is agreeable that Hospice sounds like the best option arlette her at this time.     [facility] given her current difficulties with pain control but at this time she would like to go home and try to have pain managed. I have placed a referral with [facility]osp[facility] about the patient, they plan to follow up via phone.     Current plan for patient to d/c home tomorrow, Palliative will continue to follow patient and aid in all aspects of care as necessary.

## 2024-02-13 NOTE — H&P
[facility]   HISTORY AND PHYSICAL      Name:  Silvia Chung   Age:  73 y.o.  Sex:  female  :  1950  MRN:  [id number]   Visit Number:  [id number]  Admission Date:  2024  Date Of Service:  24  Primary Care Physician:  Johnathan Aguilera MD    Chief Complaint:     Intractable cancer pain    History Of Presenting Illness:      Silvia Chung is a 73-year-old woman with past medical history of Parkinson's disease, pancreatic adenosquamous carcinoma metastasis, asthma, hypertension, hyperlipidemia.  Presented to [facility] ED 2024 with intractable left flank pain.  Denied fevers or chills, nausea or vomiting, diarrhea.  He is passing flatus.  She has tried increasing her own pain regimen at home without good pain control.    ED summary: Afebrile, tachycardic, otherwise vital signs stable on room air.  EKG sinus tachycardia rate 120, no ST elevations or depressions.  Sensitivity troponin 30, ACS not suspected, chronically elevated.  Sodium 129, chloride 89, blood glucose 100.  8.  No leukocytosis.  Hemoglobin 10.2.  Provided Dilaudid, Zofran, 1 L normal saline bolus.    Review Of Systems:    All systems were reviewed and negative except as mentioned in history of presenting illness, assessment and plan.    Past Medical History: Patient  has a past medical history of Asthma, Hyperlipidemia, Hypertension, Impaired functional mobility, balance, gait, and endurance, Pancreatic cancer (), Parkinson disease, PONV (postoperative nausea and vomiting), Scoliosis, and Seasonal allergies.    Past Surgical History: Patient  has a past surgical history that includes Tonsillectomy; Adenoidectomy; Neck surgery; Cataract extraction (Bilateral); Hip hemiArthroplasty (Left, 10/21/2020); Foot surgery (Left); Colonoscopy; Breast biopsy (Right, ); Pancreatectomy (2022); Splenectomy, total (2022); Omentectomy (2022); Lymphadenectomy (2022); Cholecystectomy open  (12/19/2022); and Portacath placement (Right, 02/09/2023).    Social History: Patient  reports that she has never smoked. She has never used smokeless tobacco. She reports that she does not currently use alcohol. Drug use questions deferred to the physician.    Family History:  Patient's family history has been reviewed and found to be noncontributory.     Allergies:      Patient has no known allergies.    Home Medications:    Prior to Admission Medications       Prescriptions Last Dose Informant Patient Reported? Taking?    busPIRone (BUSPAR) 10 MG tablet   No No    Take 1 tablet by mouth Every 12 (Twelve) Hours.    carbidopa-levodopa (SINEMET)  MG per tablet   Yes No    Take 1 tablet by mouth 2 (Two) Times a Day.    carvedilol (COREG) 6.25 MG tablet   No No    Take 1 tablet by mouth 2 (Two) Times a Day With Meals.    denosumab (Prolia) 60 MG/ML solution prefilled syringe syringe   Yes No    INJECT 1 MILLILITER (60 MG) BY SUBCUTANEOUS ROUTE EVERY 6 MONTHS IN THE UPPER ARM, UPPER THIGH OR ABDOMEN    dexAMETHasone (DECADRON) 4 MG tablet   No No    Take 1 tablet by mouth 2 (Two) Times a Day With Meals.    diphenhydrAMINE (BENADRYL) 25 mg capsule   Yes No    Take 1 capsule by mouth At Night As Needed.    docusate sodium (COLACE) 100 MG capsule   Yes No    Take 1 capsule every day by oral route.    dronabinol (MARINOL) 2.5 MG capsule   No No    Take 1 capsule by mouth 2 (Two) Times a Day Before Meals for 30 days.    esomeprazole (nexIUM) 40 MG capsule  Self Yes No    Take 1 capsule by mouth Every Morning Before Breakfast.    lactulose (CHRONULAC) 10 GM/15ML solution   No No    TAKE 30ML BY MOUTH 3 TIMES DAILY.    lidocaine (LIDODERM) 5 %   No No    Place 2 patches on the skin as directed by provider Daily for 30 days. Remove & Discard patch within 12 hours or as directed by MD    lidocaine-prilocaine (EMLA) 2.5-2.5 % cream   Yes No    Apply 1 application  topically to the appropriate area as directed As Needed.     Multiple Vitamins-Minerals (multivitamin with minerals) tablet tablet   Yes No    Take 1 tablet by mouth Daily.    naloxone (NARCAN) 4 MG/0.1ML nasal spray   No No    Call 911. Spray into nostril upon signs of overdose. May repeat in 2-3 minutes in other nostril if no or minimal breathing and responsiveness.    ondansetron (ZOFRAN) 8 MG tablet   No No    Take 1 tablet by mouth 3 (Three) Times a Day As Needed for Nausea or Vomiting.    ondansetron ODT (ZOFRAN-ODT) 4 MG disintegrating tablet   No No    Place 1 tablet on the tongue Every 8 (Eight) Hours As Needed for Nausea or Vomiting.    oxyCODONE (ROXICODONE) 10 MG tablet   No No    Take 1 tablet by mouth Every 4 (Four) Hours As Needed for Moderate Pain or Severe Pain for up to 30 days.    oxyCODONE ER (oxyCONTIN) 20 MG 12 hr tablet   No No    Take 1 tablet by mouth Every 12 (Twelve) Hours for 30 days.    polyethylene glycol (MIRALAX) 17 g packet   Yes No    Take 17 g by mouth Daily.    pregabalin (LYRICA) 50 MG capsule   No No    Take 1 capsule by mouth 3 (Three) Times a Day.    prochlorperazine (COMPAZINE) 5 MG tablet   No No    Take 1 tablet by mouth Every 6 (Six) Hours As Needed for Nausea or Vomiting.    senna (SENOKOT) 8.6 MG tablet   Yes No    Take 2 tablets by mouth Daily.    sennosides-docusate (PERICOLACE) 8.6-50 MG per tablet   No No    Take 2 tablets by mouth 2 (Two) Times a Day.          ED Medications:    Medications   sodium chloride 0.9 % flush 10 mL (has no administration in time range)   sodium chloride 0.9 % bolus 1,000 mL (1,000 mL Intravenous New Bag 2/13/24 0448)   HYDROmorphone (DILAUDID) injection 1 mg (1 mg Intravenous Given 2/13/24 0448)   ondansetron (ZOFRAN) injection 8 mg (8 mg Intravenous Given 2/13/24 0448)     Vital Signs:  Temp:  [98.6 °F (37 °C)] 98.6 °F (37 °C)  Heart Rate:  [108] 108  Resp:  [18] 18  BP: (149-150)/(90-92) 149/92        02/13/24  0407   Weight: 61.2 kg (135 lb)     Body mass index is 20.53 kg/m².    Physical Exam:  "    Most recent vital Signs: /92   Pulse 108   Temp 98.6 °F (37 °C) (Oral)   Resp 18   Ht 172.7 cm (68\")   Wt 61.2 kg (135 lb)   SpO2 94%   BMI 20.53 kg/m²     Physical Exam  Constitutional:       General: She is not in acute distress.     Appearance: She is ill-appearing. She is not toxic-appearing.   HENT:      Mouth/Throat:      Mouth: Mucous membranes are moist.   Eyes:      Extraocular Movements: Extraocular movements intact.   Cardiovascular:      Rate and Rhythm: Normal rate and regular rhythm.      Pulses: Normal pulses.      Heart sounds: Normal heart sounds.   Pulmonary:      Effort: Pulmonary effort is normal.      Breath sounds: Normal breath sounds.   Abdominal:      Palpations: Abdomen is soft.      Tenderness: There is no abdominal tenderness.   Musculoskeletal:      Right lower leg: No edema.      Left lower leg: No edema.   Skin:     General: Skin is warm.      Capillary Refill: Capillary refill takes less than 2 seconds.   Neurological:      General: No focal deficit present.      Mental Status: She is alert and oriented to person, place, and time.   Psychiatric:         Mood and Affect: Mood normal.         Thought Content: Thought content normal.         Laboratory data:    I have reviewed the labs done in the emergency room.    Results from last 7 days   Lab Units 02/13/24  0406 02/10/24  1832 02/06/24  0838   SODIUM mmol/L 129* 123* 129*   POTASSIUM mmol/L 4.3 4.7 4.1   CHLORIDE mmol/L 89* 88* 93*   CO2 mmol/L 25.2 23.6 24.0   BUN mg/dL 7* 10 9   CREATININE mg/dL 0.53* 0.58 0.45*   CALCIUM mg/dL 8.8 8.7 9.2   BILIRUBIN mg/dL 0.5 0.7 0.4   ALK PHOS U/L 97 86 93   ALT (SGPT) U/L 7 10 <5   AST (SGOT) U/L 14 19 14   GLUCOSE mg/dL 100* 120* 112*     Results from last 7 days   Lab Units 02/13/24  0406 02/10/24  1832 02/06/24  0838   WBC 10*3/mm3 4.61 5.91 10.64   HEMOGLOBIN g/dL 10.2* 10.2* 10.9*   HEMATOCRIT % 29.7* 28.1* 31.3*   PLATELETS 10*3/mm3 153 280 342         Results from last " "7 days   Lab Units 02/10/24  1832   HSTROP T ng/L 30*             Results from last 7 days   Lab Units 02/13/24  0406   LIPASE U/L 8*               Invalid input(s): \"USDES\", \"NITRITITE\", \"BACT\", \"EP\"    Pain Management Panel  More data may exist         Latest Ref Rng & Units 1/26/2024 11/21/2023   Pain Management Panel   Amphetamine, Urine Qual Negative Negative  -   Barbiturates Screen, Urine Negative Negative  -   Benzodiazepine Screen, Urine Negative Positive  -   Buprenorphine, Screen, Urine Negative Negative  <10     <50       Cocaine Screen, Urine Negative Negative  <50       Fentanyl, Urine Negative Negative  -   Hydromorphone <50 ng/mL - 287       Methadone Screen , Urine Negative Negative  -   Methamphetamine, Ur Negative Negative  -      Details          This result is from an external source.    Multiple values from one day are sorted in reverse-chronological order               EKG:      EKG sinus tachycardia rate 120, no ST elevations or depressions.     Radiology:    XR Chest 1 View    Result Date: 2/11/2024  PROCEDURE: XR CHEST 1 VW-  HISTORY: Chest Pain Triage Protocol   COMPARISON: November 18, 2023.  FINDINGS:  The heart size is normal. A right subclavian chest port is again noted. New left lung base opacities are seen consistent with pneumonia or atelectasis. There is no pneumothorax. Postoperative changes are noted in the lower cervical spine.      Left lung base pneumonia or atelectasis.    This report was signed and finalized on 2/11/2024 6:35 AM by José Luis Guy MD.      CT Angiogram Chest Pulmonary Embolism    Result Date: 2/10/2024  FINAL REPORT TECHNIQUE: Thin section axial CT with contrast with multiplanar reconstruction CLINICAL HISTORY: Pulmonary embolism (PE) suspected, unknown D-dimer FINDINGS: Pulmonary vessels enhance in normal fashion without evidence of embolism.    There is borderline fusiform aneurysm of the ascending aorta that measures up to 39 mm.  There are multiple " nodules, right greater than left that could be inflammatory or neoplastic.  The largest right node seen in the superior segment of the right lower lobe measures 14 mm.  The largest left lymph node in the lingula measures up to 16 mm.  There is mild right hilar adenopathy measures up to 20 mm.  There is mild right paratracheal adenopathy.  There is moderate left and a small right pleural effusion.  There is left retrocrural adenopathy measuring up to 20 mm.     No pulmonary infiltrate is present. There is no significant pericardial effusion.     1.  No pulmonary embolism.  2.  Numerous bilateral pulmonary nodules which could be metastatic or inflammatory.  3. Nonspecific mild adenopathy could be metastatic or reactive.  4. Bilateral pleural effusions. Authenticated and Electronically Signed by LAYNE Jack MD on 02/10/2024 08:21:03 PM     Assessment/Plan:    Observation general floor admission 2/13/2024 with intractable cancer pain, history of metastatic pancreatic cancer, hyponatremia.    At time of admission comfortable in bed, pleasantly conversational. Pain controlled with provided Dilaudid.    Intractable cancer pain  Metastatic pancreatic cancer  Dilaudid as needed.    Hyponatremia  IVF.    Chronic:  Parkinson's disease, pancreatic adenosquamous carcinoma metastasis, asthma, hypertension, hyperlipidemia.      Continue home medications    Risk Assessment: High  DVT Prophylaxis: Lovenox  Code Status: DNR/DNI  Diet: Cardiac    Advance Care Planning   ACP discussion was held with the patient during this visit. Patient does not have an advance directive, information provided.           Brian Joseph Kerley, DO  02/13/24  05:01 EST    Dictated utilizing Dragon dictation.

## 2024-02-13 NOTE — CASE MANAGEMENT/SOCIAL WORK
Discharge Planning Assessment  [facility]     Patient Name: Silvia Chung  MRN: [id number]  Today's Date: 2/13/2024    Admit Date: 2/13/2024    Plan: The patient is awake and able to answer questions.  She is a current patient of Dr. Aguilera and gets her medications from [facility].  She elects to be enrolled in Meds to Bed.  She has a walker, wheelchair, and bedside commode available for her use at home.  She denies the need for additional DME.  She requests home health om [place] to help with setting her medications up weekly.  She does not have a preference for home health company.  At the time of DC the patient plans to return to the home she shares with her  and will receive home health services.  Questions and concerns were addressed and STEWART was delivered at the time of this conversation. Will provide additional resources and information upon patient request.   Discharge Needs Assessment       Row Name 02/13/24 1038       Living Environment    People in Home spouse    Name(s) of People in Home Bong Cuhng,     Current Living Arrangements home    Duration at Residence 11 years    Potentially Unsafe Housing Conditions none    In the past 12 months has the electric, gas, oil, or water company threatened to shut off services in your home? No    Primary Care Provided by self;spouse/significant other    Provides Primary Care For no one, unable/limited ability to care for self    Quality of Family Relationships helpful;involved;supportive    Able to Return to Prior Arrangements yes       Resource/Environmental Concerns    Resource/Environmental Concerns none    Transportation Concerns none       Transportation Needs    In the past 12 months, has lack of transportation kept you from medical appointments or from getting medications? no    In the past 12 months, has lack of transportation kept you from meetings, work, or from getting things needed for daily living? No       Food Insecurity    Within the  past 12 months, you worried that your food would run out before you got the money to buy more. Never true    Within the past 12 months, the food you bought just didn't last and you didn't have money to get more. Never true       Transition Planning    Patient/Family Anticipates Transition to home with help/services    Patient/Family Anticipated Services at Transition home health care       Discharge Needs Assessment    Readmission Within the Last 30 Days no previous admission in last 30 days    Equipment Currently Used at Home wheelchair;walker, rolling;commode    Concerns to be Addressed discharge planning    Anticipated Changes Related to Illness inability to care for self    Equipment Needed After Discharge none    Outpatient/Agency/Support Group Needs homecare agency    Provided Post Acute Provider List? Yes    Post Acute Provider List Home Health    Provided Post Acute Provider Quality & Resource List? Yes    Post Acute Provider Quality and Resource List Home Health    Delivered To Patient    Method of Delivery In person    Patient's Choice of Community Agency(s) No preference at this time    Current Discharge Risk terminally ill                   Discharge Plan       Row Name 02/13/24 1042       Plan    Plan The patient is awake and able to answer questions.  She is a current patient of Dr. Aguilera and gets her medications from [facility].  She elects to be enrolled in Meds to Bed.  She has a walker, wheelchair, and bedside commode available for her use at home.  She denies the need for additional DME.  She requests home health om [place] to help with setting her medications up weekly.  She does not have a preference for home health company.  At the time of DC the patient plans to return to the home she shares with her  and will receive home health services.  Questions and concerns were addressed and STEWART was delivered at the time of this conversation. Will provide additional resources and information upon patient  request.    Patient/Family in Agreement with Plan yes    Provided Post Acute Provider List? Yes    Post Acute Provider List Home Health    Provided Post Acute Provider Quality & Resource List? Yes    Post Acute Provider Quality and Resource List Home Health    Delivered To Patient    Method of Delivery In person    Plan Comments Needs assistance setting meds up weekly    Final Discharge Disposition Code 06 - home with home health care    Final Note Plans to DC home with  and home health                  Continued Care and Services - Admitted Since 2/13/2024    Coordination has not been started for this encounter.       Selected Continued Care - Episodes Includes continued care and service providers with selected services from the active episodes listed below      High Risk Care Management Episode start date: 9/22/2023 (Paused)   There are no active outsourced providers for this episode.                    Demographic Summary       Row Name 02/13/24 1034       General Information    Admission Type observation    Arrived From emergency department    Required Notices Provided Observation Status Notice    Referral Source admission list    Reason for Consult discharge planning    Preferred Language English       Contact Information    Permission Granted to Share Info With                    Functional Status       Row Name 02/13/24 1034       Functional Status    Usual Activity Tolerance moderate    Current Activity Tolerance fair       Physical Activity    On average, how many days per week do you engage in moderate to strenuous exercise (like a brisk walk)? 0 days    On average, how many minutes do you engage in exercise at this level? 0 min    Number of minutes of exercise per week 0       Assessment of Health Literacy    How often do you have someone help you read hospital materials? Never    How often do you have problems learning about your medical condition because of difficulty understanding  written information? Never    How often do you have a problem understanding what is told to you about your medical condition? Never    How confident are you filling out medical forms by yourself? Extremely    Health Literacy Excellent       Functional Status, IADL    Medications assistive person    Meal Preparation assistive person    Housekeeping assistive person    Laundry assistive person    Shopping assistive person       Mental Status    General Appearance WDL WDL       Mental Status Summary    Recent Changes in Mental Status/Cognitive Functioning no changes                   Psychosocial       Row Name 02/13/24 1035       Values/Beliefs    Spiritual, Cultural Beliefs, Buddhism Practices, Values that Affect Care no       Behavior WDL    Behavior WDL WDL       Emotion Mood WDL    Emotion/Mood/Affect WDL WDL       Mental Health    Little Interest or Pleasure in Doing Things 0-->not at all    Feeling Down, Depressed or Hopeless 0-->not at all       Speech WDL    Speech WDL WDL       Perceptual State WDL    Perceptual State WDL WDL       Thought Process WDL    Thought Process WDL WDL       Intellectual Performance WDL    Intellectual Performance WDL WDL                   Abuse/Neglect       Row Name 02/13/24 1035       Personal Safety    Feels Unsafe at Home or Work/School no    Feels Threatened by Someone no    Does Anyone Try to Keep You From Having Contact with Others or Doing Things Outside Your Home? no    Physical Signs of Abuse Present no                   Legal       Row Name 02/13/24 1035       Financial Resource Strain    How hard is it for you to pay for the very basics like food, housing, medical care, and heating? Not hard                   Substance Abuse       Row Name 02/13/24 1035       Substance Use    Substance Use Status never used                   Patient Forms       Row Name 02/13/24 1046       Patient Forms    Important Message from Medicare (IMM) Delivered    Delivered to Patient    Method  of delivery In person                      Magda Foster RN

## 2024-02-13 NOTE — ED PROVIDER NOTES
EMERGENCY DEPARTMENT ENCOUNTER    Pt Name: Silvia Chung  MRN: [id number]  Pt :   1950  Room Number:    Date of encounter:  2024  PCP: Johnathan Aguilera MD  ED Provider: Xavier Foss MD    Historian: Patient      HPI:  Chief Complaint: Left flank pain        Context: Silvia Chung is a 73 y.o. female who presents to the ED c/o left flank pain.  Patient has a past history significant for metastatic pancreatic cancer.  She was hospitalized here  for intractable pain related to her metastatic pancreatic cancer.  Her home pain control regimen was adjusted and was discharged home on the .  She presented back here on February 10 with chest pain and was discharged home.  Patient returns tonight with left flank pain.  She says this is radiating to the left upper quadrant of her abdomen and back.  She denies having associated fever, nausea or vomiting.  She denies diarrhea.  She is uncertain of her last bowel movement but says she is passing flatus.  She says that she has had to increase her pain regimen on her own and it is not controlling her pain at home.      PAST MEDICAL HISTORY  Past Medical History:   Diagnosis Date    Asthma     Hyperlipidemia     Hypertension     Impaired functional mobility, balance, gait, and endurance     Pancreatic cancer     Parkinson disease     PONV (postoperative nausea and vomiting)     Scoliosis     Seasonal allergies          PAST SURGICAL HISTORY  Past Surgical History:   Procedure Laterality Date    ADENOIDECTOMY      BREAST BIOPSY Right 2019    Needle biopsy    CATARACT EXTRACTION Bilateral     CHOLECYSTECTOMY OPEN  2022    done at     COLONOSCOPY      FOOT SURGERY Left     torn ligament, screws placed.    HIP HEMIARTHROPLASTY Left 10/21/2020    Procedure: HIP HEMIARTHROPLASTY LEFT;  Surgeon: Humza Winters MD;  Location: [facility];  Service: Orthopedics;  Laterality: Left;    LYMPHADENECTOMY  2022    partial, done at      NECK SURGERY      four fusions    OMENTECTOMY  12/19/2022    done at     PANCREATECTOMY  12/19/2022    done at , left adrenolectomy also performed    PORTACATH PLACEMENT Right 02/09/2023    Procedure: INSERTION OF PORTACATH;  Surgeon: Marcio Garcia MD;  Location: [facility] OR;  Service: General;  Laterality: Right;    SPLENECTOMY  12/19/2022    done at     TONSILLECTOMY           FAMILY HISTORY  Family History   Problem Relation Age of Onset    Heart failure Father     Lung cancer Father     Cancer Brother     Breast cancer Maternal Grandmother     Breast cancer Paternal Grandmother     Breast cancer Cousin     Ovarian cancer Neg Hx          SOCIAL HISTORY  Social History     Socioeconomic History    Marital status:    Tobacco Use    Smoking status: Never    Smokeless tobacco: Never   Vaping Use    Vaping Use: Never used   Substance and Sexual Activity    Alcohol use: Not Currently     Comment: rare    Drug use: Defer    Sexual activity: Defer         ALLERGIES  Patient has no known allergies.        REVIEW OF SYSTEMS    All systems reviewed and negative except for those discussed in HPI.       PHYSICAL EXAM    I have reviewed the triage vital signs and nursing notes.    ED Triage Vitals [02/13/24 0407]   Temp Heart Rate Resp BP SpO2   98.6 °F (37 °C) 108 18 150/90 95 %      Temp src Heart Rate Source Patient Position BP Location FiO2 (%)   Oral Monitor Lying Left arm --         General: moderate acute distress secondary to pain.  Skin: normal color, warm and dry.  No rash to the left flank or any evidence of shingles.  Head: normocephalic, atraumatic  Nose: normal nasal mucosa, no visible deformity.  Mouth: dry mucous membranes.  Chest: no retractions, no visible deformity  Cardiovascular: Regular rate and rhythm.  Lungs: clear to auscultation bilaterally.  Abdomen: soft, slightly tender to palpation in the left upper quadrant, non-distended. No rebound tenderness, no guarding.  No peritonitis.  Neuro:   alert and oriented x3, no focal neurological deficits.  Psych:  appropriate mood and behavior.        LAB RESULTS  Recent Results (from the past 24 hour(s))   Comprehensive Metabolic Panel    Collection Time: 02/13/24  4:06 AM    Specimen: Blood   Result Value Ref Range    Glucose 100 (H) 65 - 99 mg/dL    BUN 7 (L) 8 - 23 mg/dL    Creatinine 0.53 (L) 0.57 - 1.00 mg/dL    Sodium 129 (L) 136 - 145 mmol/L    Potassium 4.3 3.5 - 5.2 mmol/L    Chloride 89 (L) 98 - 107 mmol/L    CO2 25.2 22.0 - 29.0 mmol/L    Calcium 8.8 8.6 - 10.5 mg/dL    Total Protein 6.8 6.0 - 8.5 g/dL    Albumin 3.6 3.5 - 5.2 g/dL    ALT (SGPT) 7 1 - 33 U/L    AST (SGOT) 14 1 - 32 U/L    Alkaline Phosphatase 97 39 - 117 U/L    Total Bilirubin 0.5 0.0 - 1.2 mg/dL    Globulin 3.2 gm/dL    A/G Ratio 1.1 g/dL    BUN/Creatinine Ratio 13.2 7.0 - 25.0    Anion Gap 14.8 5.0 - 15.0 mmol/L    eGFR 99.0 >60.0 mL/min/1.73   Lipase    Collection Time: 02/13/24  4:06 AM    Specimen: Blood   Result Value Ref Range    Lipase 8 (L) 13 - 60 U/L   Green Top (Gel)    Collection Time: 02/13/24  4:06 AM   Result Value Ref Range    Extra Tube Hold for add-ons.    Lavender Top    Collection Time: 02/13/24  4:06 AM   Result Value Ref Range    Extra Tube hold for add-on    Gold Top - SST    Collection Time: 02/13/24  4:06 AM   Result Value Ref Range    Extra Tube Hold for add-ons.    Light Blue Top    Collection Time: 02/13/24  4:06 AM   Result Value Ref Range    Extra Tube Hold for add-ons.    CBC Auto Differential    Collection Time: 02/13/24  4:06 AM    Specimen: Blood   Result Value Ref Range    WBC 4.61 3.40 - 10.80 10*3/mm3    RBC 2.78 (L) 3.77 - 5.28 10*6/mm3    Hemoglobin 10.2 (L) 12.0 - 15.9 g/dL    Hematocrit 29.7 (L) 34.0 - 46.6 %    .8 (H) 79.0 - 97.0 fL    MCH 36.7 (H) 26.6 - 33.0 pg    MCHC 34.3 31.5 - 35.7 g/dL    RDW 13.0 12.3 - 15.4 %    RDW-SD 51.1 37.0 - 54.0 fl    MPV 10.9 6.0 - 12.0 fL    Platelets 153 140 - 450 10*3/mm3    Neutrophil % 91.8 (H)  42.7 - 76.0 %    Lymphocyte % 2.8 (L) 19.6 - 45.3 %    Monocyte % 3.7 (L) 5.0 - 12.0 %    Eosinophil % 0.0 (L) 0.3 - 6.2 %    Basophil % 0.4 0.0 - 1.5 %    Immature Grans % 1.3 (H) 0.0 - 0.5 %    Neutrophils, Absolute 4.23 1.70 - 7.00 10*3/mm3    Lymphocytes, Absolute 0.13 (L) 0.70 - 3.10 10*3/mm3    Monocytes, Absolute 0.17 0.10 - 0.90 10*3/mm3    Eosinophils, Absolute 0.00 0.00 - 0.40 10*3/mm3    Basophils, Absolute 0.02 0.00 - 0.20 10*3/mm3    Immature Grans, Absolute 0.06 (H) 0.00 - 0.05 10*3/mm3    nRBC 0.0 0.0 - 0.2 /100 WBC   Scan Slide    Collection Time: 02/13/24  4:06 AM    Specimen: Blood   Result Value Ref Range    Macrocytes Mod/2+ None Seen    WBC Morphology Normal Normal    Platelet Estimate Adequate Normal       If labs were ordered, I independently reviewed the results and considered them in treating the patient.        RADIOLOGY  No Radiology Exams Resulted Within Past 24 Hours      PROCEDURES    Procedures    No orders to display       MEDICATIONS GIVEN IN ER    Medications   sodium chloride 0.9 % flush 10 mL (has no administration in time range)   HYDROmorphone (DILAUDID) injection 1 mg (has no administration in time range)   HYDROmorphone (DILAUDID) injection 1 mg (1 mg Intravenous Given 2/13/24 0448)   ondansetron (ZOFRAN) injection 8 mg (8 mg Intravenous Given 2/13/24 0448)   sodium chloride 0.9 % bolus 1,000 mL (1,000 mL Intravenous New Bag 2/13/24 0448)         MEDICAL DECISION MAKING, PROGRESS, and CONSULTS    All labs, if obtained, have been independently reviewed by me.  All radiology studies, if obtained, have been reviewed by me and the radiologist dictating the report.  All EKG's, if obtained, have been independently viewed and interpreted by me/my attending physician.      Discussion below represents my analysis of pertinent findings related to patient's condition, differential diagnosis, treatment plan and final disposition.                         Differential  diagnosis:    Differential diagnosis for this patient includes intractable cancer related pain, hepatitis, cholangitis, cholecystitis, pancreatitis, gastritis, enteritis, colitis, gastroenteritis, appendicitis, volvulus, obstruction, ischemia, torsion, cystitis, pyelonephritis, nephrolithiasis, uretolithiasis, other acute emergency.    Medical Decision Making Discussion:    Patient's vitals are reviewed and are remarkable for tachycardia and hypertension.    Patient's labs are reviewed and are all unremarkable.    Patient has been given IV Dilaudid for pain with minimal improvement.  Clinically, she has intractable pain related to her metastatic pancreatic cancer.  She says she is already taking more of her prescribed medicine that she is supposed to.  Since she was just here on February 10 and has already bounced back for intractable pain I think she is appropriate hospitalization.  Case was discussed with Dr. Kerley who has accepted the patient for hospitalization.        Additional sources:    - Discussed/ obtained information from independent historians:  n/a    - External (non-ED) record review: History and physical and discharge summary from January 22 and January 25 respectively.    - Chronic or social conditions impacting care: Metastatic pancreatic cancer.    Shared Decision Making:  After my consideration of clinical presentation and any laboratory/radiology studies obtained, I discussed the findings with the patient/patient representative who is in agreement with the treatment plan and the final disposition.   Risks and benefits of discharge and/or observation/admission were discussed.    Orders placed during this visit:  Orders Placed This Encounter   Procedures    [place] Draw    Comprehensive Metabolic Panel    Lipase    Urinalysis With Microscopic If Indicated (No Culture) - Urine, Clean Catch    CBC Auto Differential    Scan Slide    NPO Diet NPO Type: Strict NPO    Undress & Gown    Insert  Peripheral IV    Initiate Observation Status    CBC & Differential    Green Top (Gel)    Lavender Top    Gold Top - SST    Light Blue Top       AS OF 05:20 EST VITALS:    BP - 149/92  HR - 108  TEMP - 98.6 °F (37 °C) (Oral)  O2 SATS - 94%                  DIAGNOSIS  Final diagnoses:   Intractable pain         DISPOSITION  Admit      Please note that portions of this document were completed with voice recognition software.        Xavier Foss MD  02/13/24 0580

## 2024-02-14 VITALS
TEMPERATURE: 96.9 F | WEIGHT: 143.96 LBS | RESPIRATION RATE: 18 BRPM | BODY MASS INDEX: 21.82 KG/M2 | HEART RATE: 76 BPM | SYSTOLIC BLOOD PRESSURE: 131 MMHG | DIASTOLIC BLOOD PRESSURE: 83 MMHG | HEIGHT: 68 IN | OXYGEN SATURATION: 96 %

## 2024-02-14 LAB
ANION GAP SERPL CALCULATED.3IONS-SCNC: 13.3 MMOL/L (ref 5–15)
BASOPHILS # BLD AUTO: 0.02 10*3/MM3 (ref 0–0.2)
BASOPHILS NFR BLD AUTO: 0.4 % (ref 0–1.5)
BUN SERPL-MCNC: 10 MG/DL (ref 8–23)
BUN/CREAT SERPL: 20 (ref 7–25)
CALCIUM SPEC-SCNC: 8.8 MG/DL (ref 8.6–10.5)
CHLORIDE SERPL-SCNC: 96 MMOL/L (ref 98–107)
CO2 SERPL-SCNC: 22.7 MMOL/L (ref 22–29)
CREAT SERPL-MCNC: 0.5 MG/DL (ref 0.57–1)
DEPRECATED RDW RBC AUTO: 51.9 FL (ref 37–54)
EGFRCR SERPLBLD CKD-EPI 2021: 100.4 ML/MIN/1.73
EOSINOPHIL # BLD AUTO: 0 10*3/MM3 (ref 0–0.4)
EOSINOPHIL NFR BLD AUTO: 0 % (ref 0.3–6.2)
ERYTHROCYTE [DISTWIDTH] IN BLOOD BY AUTOMATED COUNT: 13.1 % (ref 12.3–15.4)
GLUCOSE SERPL-MCNC: 137 MG/DL (ref 65–99)
HCT VFR BLD AUTO: 28.8 % (ref 34–46.6)
HGB BLD-MCNC: 9.9 G/DL (ref 12–15.9)
IMM GRANULOCYTES # BLD AUTO: 0.04 10*3/MM3 (ref 0–0.05)
IMM GRANULOCYTES NFR BLD AUTO: 0.9 % (ref 0–0.5)
LYMPHOCYTES # BLD AUTO: 0.27 10*3/MM3 (ref 0.7–3.1)
LYMPHOCYTES NFR BLD AUTO: 5.9 % (ref 19.6–45.3)
MACROCYTES BLD QL SMEAR: NORMAL
MCH RBC QN AUTO: 37.1 PG (ref 26.6–33)
MCHC RBC AUTO-ENTMCNC: 34.4 G/DL (ref 31.5–35.7)
MCV RBC AUTO: 107.9 FL (ref 79–97)
MONOCYTES # BLD AUTO: 0.34 10*3/MM3 (ref 0.1–0.9)
MONOCYTES NFR BLD AUTO: 7.4 % (ref 5–12)
NEUTROPHILS NFR BLD AUTO: 3.93 10*3/MM3 (ref 1.7–7)
NEUTROPHILS NFR BLD AUTO: 85.4 % (ref 42.7–76)
NRBC BLD AUTO-RTO: 0 /100 WBC (ref 0–0.2)
PLAT MORPH BLD: NORMAL
PLATELET # BLD AUTO: 191 10*3/MM3 (ref 140–450)
PMV BLD AUTO: 10.9 FL (ref 6–12)
POTASSIUM SERPL-SCNC: 4.4 MMOL/L (ref 3.5–5.2)
RBC # BLD AUTO: 2.67 10*6/MM3 (ref 3.77–5.28)
SODIUM SERPL-SCNC: 132 MMOL/L (ref 136–145)
WBC MORPH BLD: NORMAL
WBC NRBC COR # BLD AUTO: 4.6 10*3/MM3 (ref 3.4–10.8)

## 2024-02-14 PROCEDURE — G0378 HOSPITAL OBSERVATION PER HR: HCPCS

## 2024-02-14 PROCEDURE — 25010000002 ENOXAPARIN PER 10 MG: Performed by: STUDENT IN AN ORGANIZED HEALTH CARE EDUCATION/TRAINING PROGRAM

## 2024-02-14 PROCEDURE — 96372 THER/PROPH/DIAG INJ SC/IM: CPT

## 2024-02-14 PROCEDURE — 80048 BASIC METABOLIC PNL TOTAL CA: CPT | Performed by: STUDENT IN AN ORGANIZED HEALTH CARE EDUCATION/TRAINING PROGRAM

## 2024-02-14 PROCEDURE — 85025 COMPLETE CBC W/AUTO DIFF WBC: CPT | Performed by: STUDENT IN AN ORGANIZED HEALTH CARE EDUCATION/TRAINING PROGRAM

## 2024-02-14 PROCEDURE — 25010000002 HYDROMORPHONE 1 MG/ML SOLUTION: Performed by: INTERNAL MEDICINE

## 2024-02-14 PROCEDURE — 96376 TX/PRO/DX INJ SAME DRUG ADON: CPT

## 2024-02-14 PROCEDURE — 25010000002 LORAZEPAM PER 2 MG: Performed by: STUDENT IN AN ORGANIZED HEALTH CARE EDUCATION/TRAINING PROGRAM

## 2024-02-14 PROCEDURE — 96375 TX/PRO/DX INJ NEW DRUG ADDON: CPT

## 2024-02-14 PROCEDURE — 85007 BL SMEAR W/DIFF WBC COUNT: CPT | Performed by: STUDENT IN AN ORGANIZED HEALTH CARE EDUCATION/TRAINING PROGRAM

## 2024-02-14 PROCEDURE — 99239 HOSP IP/OBS DSCHRG MGMT >30: CPT | Performed by: INTERNAL MEDICINE

## 2024-02-14 RX ORDER — OXYCODONE HYDROCHLORIDE 15 MG/1
15 TABLET ORAL EVERY 4 HOURS PRN
Qty: 10 TABLET | Refills: 0 | Status: SHIPPED | OUTPATIENT
Start: 2024-02-14 | End: 2024-02-14 | Stop reason: SDUPTHER

## 2024-02-14 RX ORDER — OXYCODONE HYDROCHLORIDE 15 MG/1
15 TABLET ORAL EVERY 4 HOURS PRN
Qty: 10 TABLET | Refills: 0 | Status: SHIPPED | OUTPATIENT
Start: 2024-02-14

## 2024-02-14 RX ORDER — LORAZEPAM 2 MG/ML
1 INJECTION INTRAMUSCULAR ONCE
Status: COMPLETED | OUTPATIENT
Start: 2024-02-14 | End: 2024-02-14

## 2024-02-14 RX ORDER — OXYCODONE HYDROCHLORIDE 30 MG/1
30 TABLET, FILM COATED, EXTENDED RELEASE ORAL EVERY 12 HOURS SCHEDULED
Qty: 5 TABLET | Refills: 0 | Status: SHIPPED | OUTPATIENT
Start: 2024-02-14 | End: 2024-02-14 | Stop reason: SDUPTHER

## 2024-02-14 RX ORDER — OXYCODONE HYDROCHLORIDE 30 MG/1
30 TABLET, FILM COATED, EXTENDED RELEASE ORAL EVERY 12 HOURS SCHEDULED
Qty: 5 TABLET | Refills: 0 | Status: SHIPPED | OUTPATIENT
Start: 2024-02-14 | End: 2024-02-17

## 2024-02-14 RX ADMIN — BUSPIRONE HYDROCHLORIDE 10 MG: 10 TABLET ORAL at 08:40

## 2024-02-14 RX ADMIN — HYDROMORPHONE HYDROCHLORIDE 1 MG: 1 INJECTION, SOLUTION INTRAMUSCULAR; INTRAVENOUS; SUBCUTANEOUS at 05:03

## 2024-02-14 RX ADMIN — HYDROMORPHONE HYDROCHLORIDE 1 MG: 1 INJECTION, SOLUTION INTRAMUSCULAR; INTRAVENOUS; SUBCUTANEOUS at 01:38

## 2024-02-14 RX ADMIN — OXYCODONE HYDROCHLORIDE 15 MG: 5 TABLET ORAL at 03:30

## 2024-02-14 RX ADMIN — CARBIDOPA AND LEVODOPA 1 TABLET: 25; 250 TABLET ORAL at 08:40

## 2024-02-14 RX ADMIN — OXYCODONE HYDROCHLORIDE 30 MG: 10 TABLET, FILM COATED, EXTENDED RELEASE ORAL at 08:40

## 2024-02-14 RX ADMIN — Medication 10 ML: at 08:40

## 2024-02-14 RX ADMIN — LORAZEPAM 1 MG: 2 INJECTION INTRAMUSCULAR; INTRAVENOUS at 00:17

## 2024-02-14 RX ADMIN — HYDROMORPHONE HYDROCHLORIDE 1 MG: 1 INJECTION, SOLUTION INTRAMUSCULAR; INTRAVENOUS; SUBCUTANEOUS at 06:38

## 2024-02-14 RX ADMIN — DOCUSATE SODIUM 100 MG: 100 CAPSULE, LIQUID FILLED ORAL at 08:40

## 2024-02-14 RX ADMIN — ENOXAPARIN SODIUM 40 MG: 100 INJECTION SUBCUTANEOUS at 08:40

## 2024-02-14 RX ADMIN — PANTOPRAZOLE SODIUM 40 MG: 40 TABLET, DELAYED RELEASE ORAL at 05:03

## 2024-02-14 RX ADMIN — CARVEDILOL 6.25 MG: 6.25 TABLET, FILM COATED ORAL at 08:40

## 2024-02-14 NOTE — CASE MANAGEMENT/SOCIAL WORK
Case Management Discharge Note      Final Note: Plans to DC home with  and home health    Provided Post Acute Provider List?: Yes  Post Acute Provider List: Home Health  Provided Post Acute Provider Quality & Resource List?: Yes  Post Acute Provider Quality and Resource List: Home Health  Delivered To: Patient  Method of Delivery: In person    Selected Continued Care - Discharged on 2/14/2024 Admission date: 2/13/2024 - Discharge disposition: Hospice/Home      Destination    No services have been selected for the patient.                Durable Medical Equipment    No services have been selected for the patient.                Dialysis/Infusion    No services have been selected for the patient.                Home Medical Care Coordination complete.      Service Provider Selected Services Address Phone Fax Patient Preferred    [facility] [address] [address] [phone] --              Therapy    No services have been selected for the patient.                Community Resources    No services have been selected for the patient.                Community & DME    No services have been selected for the patient.                    Selected Continued Care - Episodes Includes continued care and service providers with selected services from the active episodes listed below      High Risk Care Management Episode start date: 9/22/2023 (Paused)   There are no active outsourced providers for this episode.                 Transportation Services  Taxi: [employer]    Final Discharge Disposition Code: 50 - home with hospice

## 2024-02-14 NOTE — PLAN OF CARE
Goal Outcome Evaluation:               Pt admitted for intractable pain.  Has pancreatic cancer.  PO and IV pain meds given with good relief of S/SX. Pt having episodes of HTN.  MD aware.

## 2024-02-14 NOTE — DISCHARGE SUMMARY
[facility]   DISCHARGE SUMMARY      Name:  Silvia Chung   Age:  73 y.o.  Sex:  female  :  1950  MRN:  [id number]   Visit Number:  [id number]    Admission Date:  2024  Date of Discharge:  2024  Primary Care Physician:  Johnathan Aguilera MD    Important issues to note:    Start: Increased dose of OxyContin and Roxicodone  Stop: Other meds remain the same  Follow up: PCP and hospice director  Brief Summary: Presented with diffuse pain due to metastatic pancreatic cancer. Initially had required IV pain medicine with Dilaudid was transition to higher doses of oral opiates with good control of her pain.      Discharge Diagnoses:       Intractable pain        Problem List:     Active Hospital Problems    Diagnosis  POA    **Intractable pain [R52]  Yes      Resolved Hospital Problems   No resolved problems to display.     Presenting Problem:    Chief Complaint   Patient presents with    Flank Pain      Consults:     Consulting Physician(s)                     None                History Of Presenting Illness:       Silvia Chung is a 73-year-old woman with past medical history of Parkinson's disease, pancreatic adenosquamous carcinoma metastasis, asthma, hypertension, hyperlipidemia.  Presented to [facility] ED 2024 with intractable left flank pain.  Denied fevers or chills, nausea or vomiting, diarrhea.  He is passing flatus.  She has tried increasing her own pain regimen at home without good pain control.     ED summary: Afebrile, tachycardic, otherwise vital signs stable on room air.  EKG sinus tachycardia rate 120, no ST elevations or depressions.  Sensitivity troponin 30, ACS not suspected, chronically elevated.  Sodium 129, chloride 89, blood glucose 100.  8.  No leukocytosis.  Hemoglobin 10.2.  Provided Dilaudid, Zofran, 1 L normal saline bolus.      Hospital course:     Intractable cancer pain  Metastatic pancreatic cancer  Was initially given Dilaudid as needed.   Receiving increased doses of OxyContin and Roxicodone patient's pain was well-controlled and she was stable for discharge.      Vital Signs:    Temp:  [96.9 °F (36.1 °C)-98.5 °F (36.9 °C)] 96.9 °F (36.1 °C)  Heart Rate:  [] 76  Resp:  [14-18] 18  BP: (124-157)/() 131/83    Physical Exam:    Constitutional: No acute distress, awake, alert  HENT: NCAT, mucous membranes dry  Respiratory: Clear to auscultation bilaterally, respiratory effort normal   Cardiovascular: RRR, no murmurs, rubs, or gallops  Gastrointestinal: Positive bowel sounds, soft, moderate tenderness to palpation of the left side abdomen, nondistended  Musculoskeletal: No bilateral ankle edema  Psychiatric: Appropriate affect, cooperative  Neurologic: Oriented x 3, speech clear  Skin: No rashes    Pertinent Lab Results:     Results from last 7 days   Lab Units 02/14/24  0523 02/13/24  0406 02/10/24  1832   SODIUM mmol/L 132* 129* 123*   POTASSIUM mmol/L 4.4 4.3 4.7   CHLORIDE mmol/L 96* 89* 88*   CO2 mmol/L 22.7 25.2 23.6   BUN mg/dL 10 7* 10   CREATININE mg/dL 0.50* 0.53* 0.58   CALCIUM mg/dL 8.8 8.8 8.7   BILIRUBIN mg/dL  --  0.5 0.7   ALK PHOS U/L  --  97 86   ALT (SGPT) U/L  --  7 10   AST (SGOT) U/L  --  14 19   GLUCOSE mg/dL 137* 100* 120*     Results from last 7 days   Lab Units 02/14/24  0523 02/13/24  0406 02/10/24  1832   WBC 10*3/mm3 4.60 4.61 5.91   HEMOGLOBIN g/dL 9.9* 10.2* 10.2*   HEMATOCRIT % 28.8* 29.7* 28.1*   PLATELETS 10*3/mm3 191 153 280         Results from last 7 days   Lab Units 02/10/24  1832   HSTROP T ng/L 30*             Results from last 7 days   Lab Units 02/13/24  0406   LIPASE U/L 8*               Pertinent Radiology Results:    Imaging Results (All)       None            Echo:    Results for orders placed in visit on 06/02/23    Adult Transthoracic Echo Complete W/ Cont if Necessary Per Protocol    Interpretation Summary    Left ventricular systolic function is normal. Estimated left ventricular EF = 66% Left  ventricular ejection fraction appears to be 66 - 70%.    Left ventricular diastolic function is consistent with (grade I) impaired relaxation.    Estimated right ventricular systolic pressure from tricuspid regurgitation is normal (<35 mmHg).    Condition on Discharge:      Stable.    Code status during the hospital stay:    Code Status and Medical Interventions:   Ordered at: 02/13/24 0541     Medical Intervention Limits:    NO intubation (DNI)     Code Status (Patient has no pulse and is not breathing):    No CPR (Do Not Attempt to Resuscitate)     Medical Interventions (Patient has pulse or is breathing):    Limited Support     Discharge Disposition:    Hospice/Home    Discharge Medications:       Discharge Medications        Changes to Medications        Instructions Start Date   oxyCODONE HCl ER 30 MG tablet extended-release 12 hour  Commonly known as: oxyCONTIN  What changed:   medication strength  how much to take   30 mg, Oral, Every 12 Hours Scheduled      oxyCODONE 15 MG immediate release tablet  Commonly known as: ROXICODONE  What changed:   medication strength  how much to take   15 mg, Oral, Every 4 Hours PRN             Continue These Medications        Instructions Start Date   busPIRone 10 MG tablet  Commonly known as: BUSPAR   10 mg, Oral, Every 12 Hours Scheduled      carbidopa-levodopa  MG per tablet  Commonly known as: SINEMET   Take 1 tablet by mouth 2 (Two) Times a Day.      carvedilol 6.25 MG tablet  Commonly known as: COREG   6.25 mg, Oral, 2 Times Daily With Meals      dexAMETHasone 4 MG tablet  Commonly known as: DECADRON   4 mg, Oral, 2 Times Daily With Meals      diphenhydrAMINE 25 mg capsule  Commonly known as: BENADRYL   25 mg, Oral, Nightly PRN      docusate sodium 100 MG capsule  Commonly known as: COLACE   Take 1 capsule every day by oral route.      esomeprazole 40 MG capsule  Commonly known as: nexIUM   40 mg, Oral, Every Morning Before Breakfast      multivitamin with  minerals tablet tablet   1 tablet, Oral, Daily      Narcan 4 MG/0.1ML nasal spray  Generic drug: naloxone   Call 911. [place] into nostril upon signs of overdose. May repeat in 2-3 minutes in other nostril if no or minimal breathing and responsiveness.      ondansetron 8 MG tablet  Commonly known as: ZOFRAN   8 mg, Oral, 3 Times Daily PRN      ondansetron ODT 4 MG disintegrating tablet  Commonly known as: ZOFRAN-ODT   4 mg, Translingual, Every 8 Hours PRN      polyethylene glycol 17 g packet  Commonly known as: MIRALAX   17 g, Oral, Daily      prochlorperazine 5 MG tablet  Commonly known as: COMPAZINE   5 mg, Oral, Every 6 Hours PRN      Prolia 60 MG/ML solution prefilled syringe syringe  Generic drug: denosumab   INJECT 1 MILLILITER (60 MG) BY SUBCUTANEOUS ROUTE EVERY 6 MONTHS IN THE UPPER ARM, UPPER THIGH OR ABDOMEN      Stool Softener Plus Laxative 8.6-50 MG per tablet  Generic drug: sennosides-docusate   2 tablets, Oral, 2 Times Daily             Discharge Diet:     Diet Instructions       Advance Diet As Tolerated -Target Diet: Regular/comfort feeding      Target Diet: Regular/comfort feeding          Activity at Discharge:       Follow-up Appointments:    Additional Instructions for the Follow-ups that You Need to Schedule       Discharge Follow-up with PCP   As directed       Currently Documented PCP:    Johnathan Aguilera MD    PCP Phone Number:    [phone]     Follow Up Details: as needed        Discharge Follow-up with Specified Provider: Hospice director; 1 Week   As directed      To: Hospice director   Follow Up: 1 Week               Follow-up Information       Johnathan Aguilera MD .    Specialty: Internal Medicine  Why: as needed  Contact information:  [address] [facility] [address]  [facility] [place] [address]  [phone]                           Future Appointments   Date Time Provider Department Center   2/20/2024  9:15 AM Arlene Najera APRN [facility] ON[facility]   2/20/2024  9:30 AM CHAIR  - [facility] OP INF [facility]  [facility]   3/5/2024  1:00 PM CHAIR 1 - [facility]   3/19/2024 11:30 AM CHAIR 4 - [facility]F [facility]   4/2/2024  8:30 AM CHAIR 5 - [facility]   4/16/2024  8:00 AM CHAIR 9 - [facility]F [facility]   4/30/2024 11:00 AM CHAIR 7 - [facility]F [facility]   5/14/2024 11:00 AM CHAIR 5 - [facility]F [facility]   5/28/2024 10:30 AM CHAIR 5 - [facility]F [facility]   6/11/2024 10:30 AM CHAIR 5 - [facility]F [facility]   6/25/2024 10:30 AM CHAIR 5 - [facility]F [facility]     Test Results Pending at Discharge:           Jonathan Barrientos DO  02/14/24  09:02 EST    Time: I spent 45 minutes on this discharge activity which included: face-to-face encounter with the patient, reviewing the data in the system, coordination of the care with the nursing staff as well as consultants, documentation, and entering orders.     Dictated utilizing Dragon dictation.

## 2024-02-14 NOTE — PROGRESS NOTES
"Dietitian Assessment    Patient Name: Silvia Chung  YOB: 1950  MRN: [id number]  Admission date: 2/13/2024    Comment:      Clinical Nutrition Assessment      Reason for Assessment MST   H&P  Past Medical History:   Diagnosis Date    Asthma     Hyperlipidemia     Hypertension     Impaired functional mobility, balance, gait, and endurance     Pancreatic cancer 2022    Parkinson disease     PONV (postoperative nausea and vomiting)     Scoliosis     Seasonal allergies        Past Surgical History:   Procedure Laterality Date    ADENOIDECTOMY      BREAST BIOPSY Right 2019    Needle biopsy    CATARACT EXTRACTION Bilateral     CHOLECYSTECTOMY OPEN  12/19/2022    done at     COLONOSCOPY      FOOT SURGERY Left     torn ligament, screws placed.    HIP HEMIARTHROPLASTY Left 10/21/2020    Procedure: HIP HEMIARTHROPLASTY LEFT;  Surgeon: Humza Winters MD;  Location: [facility];  Service: Orthopedics;  Laterality: Left;    LYMPHADENECTOMY  12/19/2022    partial, done at     NECK SURGERY      four fusions    OMENTECTOMY  12/19/2022    done at     PANCREATECTOMY  12/19/2022    done at , left adrenolectomy also performed    PORTACATH PLACEMENT Right 02/09/2023    Procedure: INSERTION OF PORTACATH;  Surgeon: Marcio Garcia MD;  Location: [facility] OR;  Service: General;  Laterality: Right;    SPLENECTOMY  12/19/2022    done at     TONSILLECTOMY              Current Problems   Intractable cancer pain  Metastatic pancreatic cancer     Encounter Information        Trending Narrative     2/14: Pt w/ pancreatic cancer and MST score of 3 r/t reduced oral intake over the last month. PO intake averaging 75% x1 meal - will order supplementation to provide additional calories and protein.      Anthropometrics        Current Height, Weight Height: 172.7 cm (68\")  Weight: 65.3 kg (143 lb 15.4 oz) (02/14/24 0519)   Trending Weight Hx     This admission:              PTA:     Wt Readings from Last 30 Encounters: "   02/14/24 0519 65.3 kg (143 lb 15.4 oz)   02/13/24 0407 61.2 kg (135 lb)   02/10/24 1829 63 kg (138 lb 14.2 oz)   02/06/24 0845 63 kg (138 lb 14.4 oz)   01/30/24 0958 62.6 kg (138 lb)   01/22/24 1610 62.1 kg (137 lb)   01/16/24 1109 62.1 kg (137 lb)   12/30/23 1016 59.9 kg (132 lb)   12/02/23 1550 59.9 kg (132 lb)   11/28/23 1456 59.9 kg (132 lb)   11/18/23 1802 62.6 kg (138 lb)   11/16/23 1457 61.6 kg (135 lb 12.8 oz)   10/01/23 0433 66.7 kg (147 lb)   09/21/23 1159 66.7 kg (147 lb)   08/22/23 1430 68.5 kg (151 lb)   08/01/23 0900 67.6 kg (149 lb)   07/18/23 0826 67.6 kg (149 lb)   07/11/23 1028 67.6 kg (149 lb)   07/03/23 0919 69.3 kg (152 lb 11.2 oz)   06/22/23 1315 68.3 kg (150 lb 9.6 oz)   06/20/23 0848 68.5 kg (151 lb)   06/08/23 1257 68.5 kg (151 lb)   06/06/23 0853 69.4 kg (153 lb)   06/02/23 1125 68.5 kg (151 lb)   05/23/23 0927 69.9 kg (154 lb)   05/01/23 0956 68.5 kg (151 lb)   04/25/23 0919 69.9 kg (154 lb)   04/17/23 0944 68.9 kg (152 lb)   04/11/23 0900 68.9 kg (152 lb)   03/28/23 0858 69.4 kg (153 lb)   03/14/23 0926 70.3 kg (155 lb)      BMI kg/m2 Body mass index is 21.89 kg/m².     Labs        Pertinent Labs     Results from last 7 days   Lab Units 02/14/24  0523 02/13/24  0406 02/10/24  1832   SODIUM mmol/L 132* 129* 123*   POTASSIUM mmol/L 4.4 4.3 4.7   CHLORIDE mmol/L 96* 89* 88*   CO2 mmol/L 22.7 25.2 23.6   BUN mg/dL 10 7* 10   CREATININE mg/dL 0.50* 0.53* 0.58   CALCIUM mg/dL 8.8 8.8 8.7   BILIRUBIN mg/dL  --  0.5 0.7   ALK PHOS U/L  --  97 86   ALT (SGPT) U/L  --  7 10   AST (SGOT) U/L  --  14 19   GLUCOSE mg/dL 137* 100* 120*       Results from last 7 days   Lab Units 02/14/24  0523   HEMOGLOBIN g/dL 9.9*   HEMATOCRIT % 28.8*       Lab Results   Component Value Date    HGBA1C 5.6 12/08/2023            Medications       Scheduled Medications busPIRone, 10 mg, Oral, Q12H  carbidopa-levodopa, 1 tablet, Oral, BID  carvedilol, 6.25 mg, Oral, BID With Meals  docusate sodium, 100 mg, Oral,  Daily  enoxaparin, 40 mg, Subcutaneous, Daily  melatonin, 5 mg, Oral, Nightly  oxyCODONE ER, 30 mg, Oral, Q12H  pantoprazole, 40 mg, Oral, Q AM  sodium chloride, 10 mL, Intravenous, Q12H        Infusions       PRN Medications   acetaminophen **OR** acetaminophen **OR** acetaminophen    Calcium Replacement - Follow Nurse / BPA Driven Protocol    Magnesium Standard Dose Replacement - Follow Nurse / BPA Driven Protocol    ondansetron    ondansetron    oxyCODONE    Phosphorus Replacement - Follow Nurse / BPA Driven Protocol    Potassium Replacement - Follow Nurse / BPA Driven Protocol    sodium chloride    sodium chloride    sodium chloride     Physical Findings        Trending Physical   Appearance, NFPE    --  Edema  None reported   Bowel Function None reported   Tubes Peripheral IV    Chewing/Swallowing WNL    Skin WNL      Estimated/Assessed Needs       Energy Requirements    EST Needs, Method, Wt used 1632-1959kcals/day using 25-30kcals/kg       Protein Requirements    EST Needs, Method, Wt used 78g protein per day using 1.2g/kg       Fluid Requirements     Estimated Needs (mL/day) 1959mL per day        Current Nutrition Orders & Evaluation of Intake       Oral Nutrition     Food Allergies    Current PO Diet Diet: Regular/House Diet; Texture: Regular Texture (IDDSI 7); Fluid Consistency: Thin (IDDSI 0)   Supplement    PO Evaluation     Trending % PO Intake 2/14: 75% x1 meal      Enteral Nutrition    Enteral Route    Order, Modulars, Flushes    Residual/Tolerance    TF Observation         Parenteral Nutrition     TPN Route    Total # Days on TPN    TPN Order, Lipid Details    MVI & Trace Element Freq    TPN Observation       Nutrition Diagnosis         Nutrition Dx Problem 1 Increased estimated needs r/t pancreatic cancer as evidenced by need for oral nutrition supplement to meet increased needs     Nutrition Dx Problem 2        Intervention Goal         Intervention Goal(s) PO intake to meet >50% of estimated  needs  Adhere to supplement ordered  Maintain current body weight      Nutrition Intervention        RD Action Will order Boost Very High Calorie daily      Nutrition Prescription          Diet Prescription Regular    Supplement Prescription Boost Very High Calorie daily      Enteral Prescription        TPN Prescription      Monitor/Evaluation        Monitor Per protocol, PO intake, Supplement intake, Pertinent labs, Weight, Skin status, GI status, Symptoms, Swallow function, Hemodynamic stability     RD to follow-up.     Electronically signed by:  Wendy Wei RD  02/14/24 07:59 EST

## 2024-02-14 NOTE — PLAN OF CARE
Goal Outcome Evaluation:      Palliative care RN met with patient during am rounds. We discussed pain control regimen, patient currently rates her pain at 3 on a 0-10 pain score. Plan for patient to d/c home today with hospice services, Hospice RN will be to patient's home tomorrow for admission assessment.

## 2024-02-14 NOTE — CASE MANAGEMENT/SOCIAL WORK
Met with pt, moon given. Pt will need cab voucher. Pt concerned about home meds, spoke with LOREN Denson who confirmed Hospice would see pt 2/15 in her home; relayed information to pt and spouse.

## 2024-02-20 ENCOUNTER — TELEPHONE (OUTPATIENT)
Dept: ONCOLOGY | Facility: CLINIC | Age: 74
End: 2024-02-20
Payer: MEDICARE

## 2024-02-20 ENCOUNTER — INFUSION (OUTPATIENT)
Dept: ONCOLOGY | Facility: HOSPITAL | Age: 74
End: 2024-02-20
Payer: MEDICARE

## 2024-02-20 ENCOUNTER — OFFICE VISIT (OUTPATIENT)
Dept: ONCOLOGY | Facility: CLINIC | Age: 74
End: 2024-02-20
Payer: MEDICARE

## 2024-02-20 VITALS
WEIGHT: 129 LBS | HEIGHT: 68 IN | BODY MASS INDEX: 19.55 KG/M2 | TEMPERATURE: 97.1 F | SYSTOLIC BLOOD PRESSURE: 118 MMHG | RESPIRATION RATE: 16 BRPM | DIASTOLIC BLOOD PRESSURE: 76 MMHG | HEART RATE: 104 BPM | OXYGEN SATURATION: 98 %

## 2024-02-20 DIAGNOSIS — K86.89 PANCREATIC MASS: ICD-10-CM

## 2024-02-20 DIAGNOSIS — C80.0 WIDESPREAD METASTATIC MALIGNANT NEOPLASTIC DISEASE: ICD-10-CM

## 2024-02-20 DIAGNOSIS — C25.1 MALIGNANT NEOPLASM OF BODY OF PANCREAS: Primary | ICD-10-CM

## 2024-02-20 LAB
ALBUMIN SERPL-MCNC: 3.7 G/DL (ref 3.5–5.2)
ALBUMIN/GLOB SERPL: 1.1 G/DL
ALP SERPL-CCNC: 90 U/L (ref 39–117)
ALT SERPL W P-5'-P-CCNC: <5 U/L (ref 1–33)
ANION GAP SERPL CALCULATED.3IONS-SCNC: 12 MMOL/L (ref 5–15)
AST SERPL-CCNC: 12 U/L (ref 1–32)
BASOPHILS # BLD AUTO: 0.03 10*3/MM3 (ref 0–0.2)
BASOPHILS NFR BLD AUTO: 0.5 % (ref 0–1.5)
BILIRUB SERPL-MCNC: 0.2 MG/DL (ref 0–1.2)
BUN SERPL-MCNC: 13 MG/DL (ref 8–23)
BUN/CREAT SERPL: 20.3 (ref 7–25)
CALCIUM SPEC-SCNC: 9.1 MG/DL (ref 8.6–10.5)
CHLORIDE SERPL-SCNC: 92 MMOL/L (ref 98–107)
CO2 SERPL-SCNC: 27 MMOL/L (ref 22–29)
CREAT SERPL-MCNC: 0.64 MG/DL (ref 0.57–1)
DEPRECATED RDW RBC AUTO: 51.9 FL (ref 37–54)
EGFRCR SERPLBLD CKD-EPI 2021: 94.6 ML/MIN/1.73
EOSINOPHIL # BLD AUTO: 0 10*3/MM3 (ref 0–0.4)
EOSINOPHIL NFR BLD AUTO: 0 % (ref 0.3–6.2)
ERYTHROCYTE [DISTWIDTH] IN BLOOD BY AUTOMATED COUNT: 13.5 % (ref 12.3–15.4)
GLOBULIN UR ELPH-MCNC: 3.4 GM/DL
GLUCOSE SERPL-MCNC: 149 MG/DL (ref 65–99)
HCT VFR BLD AUTO: 30.4 % (ref 34–46.6)
HGB BLD-MCNC: 10.6 G/DL (ref 12–15.9)
IMM GRANULOCYTES # BLD AUTO: 0.17 10*3/MM3 (ref 0–0.05)
IMM GRANULOCYTES NFR BLD AUTO: 3 % (ref 0–0.5)
LARGE PLATELETS: NORMAL
LYMPHOCYTES # BLD AUTO: 0.15 10*3/MM3 (ref 0.7–3.1)
LYMPHOCYTES NFR BLD AUTO: 2.6 % (ref 19.6–45.3)
MCH RBC QN AUTO: 36.6 PG (ref 26.6–33)
MCHC RBC AUTO-ENTMCNC: 34.9 G/DL (ref 31.5–35.7)
MCV RBC AUTO: 104.8 FL (ref 79–97)
MONOCYTES # BLD AUTO: 0.35 10*3/MM3 (ref 0.1–0.9)
MONOCYTES NFR BLD AUTO: 6.2 % (ref 5–12)
NEUTROPHILS NFR BLD AUTO: 4.99 10*3/MM3 (ref 1.7–7)
NEUTROPHILS NFR BLD AUTO: 87.7 % (ref 42.7–76)
NRBC BLD AUTO-RTO: 1.4 /100 WBC (ref 0–0.2)
PLATELET # BLD AUTO: 564 10*3/MM3 (ref 140–450)
PMV BLD AUTO: 10.6 FL (ref 6–12)
POTASSIUM SERPL-SCNC: 4.2 MMOL/L (ref 3.5–5.2)
PROT SERPL-MCNC: 7.1 G/DL (ref 6–8.5)
RBC # BLD AUTO: 2.9 10*6/MM3 (ref 3.77–5.28)
RBC MORPH BLD: NORMAL
SMALL PLATELETS BLD QL SMEAR: NORMAL
SODIUM SERPL-SCNC: 131 MMOL/L (ref 136–145)
WBC MORPH BLD: NORMAL
WBC NRBC COR # BLD AUTO: 5.69 10*3/MM3 (ref 3.4–10.8)

## 2024-02-20 PROCEDURE — 25010000002 HEPARIN LOCK FLUSH PER 10 UNITS: Performed by: INTERNAL MEDICINE

## 2024-02-20 PROCEDURE — 25010000002 PACLITAXEL PROTEIN-BOUND PART PER 1 MG: Performed by: INTERNAL MEDICINE

## 2024-02-20 PROCEDURE — 1160F RVW MEDS BY RX/DR IN RCRD: CPT | Performed by: NURSE PRACTITIONER

## 2024-02-20 PROCEDURE — 99214 OFFICE O/P EST MOD 30 MIN: CPT | Performed by: NURSE PRACTITIONER

## 2024-02-20 PROCEDURE — 3078F DIAST BP <80 MM HG: CPT | Performed by: NURSE PRACTITIONER

## 2024-02-20 PROCEDURE — 96375 TX/PRO/DX INJ NEW DRUG ADDON: CPT

## 2024-02-20 PROCEDURE — 25010000002 GEMCITABINE 1 GM/26.3ML SOLUTION 26.3 ML VIAL: Performed by: INTERNAL MEDICINE

## 2024-02-20 PROCEDURE — 1159F MED LIST DOCD IN RCRD: CPT | Performed by: NURSE PRACTITIONER

## 2024-02-20 PROCEDURE — 85007 BL SMEAR W/DIFF WBC COUNT: CPT

## 2024-02-20 PROCEDURE — 96367 TX/PROPH/DG ADDL SEQ IV INF: CPT

## 2024-02-20 PROCEDURE — 25810000003 SODIUM CHLORIDE 0.9 % SOLUTION: Performed by: INTERNAL MEDICINE

## 2024-02-20 PROCEDURE — 96417 CHEMO IV INFUS EACH ADDL SEQ: CPT

## 2024-02-20 PROCEDURE — 3074F SYST BP LT 130 MM HG: CPT | Performed by: NURSE PRACTITIONER

## 2024-02-20 PROCEDURE — 1126F AMNT PAIN NOTED NONE PRSNT: CPT | Performed by: NURSE PRACTITIONER

## 2024-02-20 PROCEDURE — 85025 COMPLETE CBC W/AUTO DIFF WBC: CPT

## 2024-02-20 PROCEDURE — 25810000003 SODIUM CHLORIDE 0.9 % SOLUTION 250 ML FLEX CONT: Performed by: INTERNAL MEDICINE

## 2024-02-20 PROCEDURE — 96413 CHEMO IV INFUSION 1 HR: CPT

## 2024-02-20 PROCEDURE — 80053 COMPREHEN METABOLIC PANEL: CPT

## 2024-02-20 PROCEDURE — 25010000002 DEXAMETHASONE SODIUM PHOSPHATE 20 MG/5ML SOLUTION: Performed by: INTERNAL MEDICINE

## 2024-02-20 RX ORDER — TRAZODONE HYDROCHLORIDE 50 MG/1
TABLET ORAL
COMMUNITY

## 2024-02-20 RX ORDER — HEPARIN SODIUM (PORCINE) LOCK FLUSH IV SOLN 100 UNIT/ML 100 UNIT/ML
500 SOLUTION INTRAVENOUS AS NEEDED
OUTPATIENT
Start: 2024-02-20

## 2024-02-20 RX ORDER — SODIUM CHLORIDE 0.9 % (FLUSH) 0.9 %
10 SYRINGE (ML) INJECTION AS NEEDED
OUTPATIENT
Start: 2024-02-20

## 2024-02-20 RX ORDER — PACLITAXEL 100 MG/20ML
125 INJECTION, POWDER, LYOPHILIZED, FOR SUSPENSION INTRAVENOUS ONCE
Status: COMPLETED | OUTPATIENT
Start: 2024-02-20 | End: 2024-02-20

## 2024-02-20 RX ORDER — HEPARIN SODIUM (PORCINE) LOCK FLUSH IV SOLN 100 UNIT/ML 100 UNIT/ML
500 SOLUTION INTRAVENOUS AS NEEDED
Status: DISCONTINUED | OUTPATIENT
Start: 2024-02-20 | End: 2024-02-20 | Stop reason: HOSPADM

## 2024-02-20 RX ORDER — SODIUM CHLORIDE 9 MG/ML
20 INJECTION, SOLUTION INTRAVENOUS ONCE
Status: COMPLETED | OUTPATIENT
Start: 2024-02-20 | End: 2024-02-20

## 2024-02-20 RX ORDER — SODIUM CHLORIDE 0.9 % (FLUSH) 0.9 %
10 SYRINGE (ML) INJECTION AS NEEDED
Status: DISCONTINUED | OUTPATIENT
Start: 2024-02-20 | End: 2024-02-20 | Stop reason: HOSPADM

## 2024-02-20 RX ADMIN — Medication 10 ML: at 14:07

## 2024-02-20 RX ADMIN — GEMCITABINE HYDROCHLORIDE 1700 MG: 1 INJECTION, SOLUTION INTRAVENOUS at 13:30

## 2024-02-20 RX ADMIN — PACLITAXEL 215 MG: 100 INJECTION, POWDER, LYOPHILIZED, FOR SUSPENSION INTRAVENOUS at 12:20

## 2024-02-20 RX ADMIN — SODIUM CHLORIDE 20 ML/HR: 9 INJECTION, SOLUTION INTRAVENOUS at 12:20

## 2024-02-20 RX ADMIN — DEXAMETHASONE SODIUM PHOSPHATE 12 MG: 4 INJECTION, SOLUTION INTRAMUSCULAR; INTRAVENOUS at 11:41

## 2024-02-20 RX ADMIN — HEPARIN 500 UNITS: 100 SYRINGE at 14:08

## 2024-02-20 NOTE — TELEPHONE ENCOUNTER
I called and spoke with Sumi the nurse with Hospice and told her that the patient has decided to get chemotherapy today.  I asked Sumi if we could get palliative services through hospice but at this time, Sumi said we can put a referral in but that it may take a while to initiate that service.  There is not a person for that capacity at this time.

## 2024-02-20 NOTE — PROGRESS NOTES
PROBLEM LIST:  1. 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  She has had GI side effects with sinemet for her PD so she associated this symptom with that medication.  She has lost about 10 lbs. CT a/p without contrast showed a 6.8 x 3.7 cm mass in the distal pancreatic body and tail with minimal surrounding inflammation.  Multiple borderline enalrged nodes near celiac axis and mesenteric axis.  CT a/p with contrast 12/1/22 showed a 5.6 x 3.6 cm mass in the tail and distal body of the pancreas, no adenopathy.  B) distal pancreatectomy and splenectomy performed on 12/19/2022.  Pathology showed poorly differentiated pancreatic adenosquamous carcinoma with involvement of adrenal gland by direct extension, 1 out of 5 lymph nodes involved.  Surgical margins negative. + lymphovascular invasion, + perineural invasion.  C) adjuvant mFOLFIRINOX started 2/13/23.  Completed 12 cycles.  D) disease recurrence noted on imaging - CT thoracic spine on 12/30/23 showed a 4.4 cm left paraspinous mass at T9 with erosion of the spinous process.  CA 19-9 increased to 82.  PET/CT on 1/15/2024 showed a hypermetabolic left paraspinal soft tissue mass at T10, multiple bilateral pulmonary nodules, mostly small in size, one of the lesions hypermetabolic, at least 2 hypermetabolic liver lesions consistent with hepatic metastases.  Palliative radiotherapy to the thoracic spine was completed early February 2024.  Treatment with Gemzar and Abraxane started to 624  2. parkinsons disease  3. Hypertension  4. Hyperlipidemia  5. asthma    Subjective     CHIEF COMPLAINT: pancreas cancer    HISTORY OF PRESENT ILLNESS:   Silvia Chung returns for follow-up.  Unfortunately, she was admitted to the hospital due to pain control after her last cycle.  She says that she feels like she is tolerating treatment  "fairly well with minimal side effects.  Her pain is much better under control since meeting with the palliative team in the hospital.  She was discharged with hospice.  The patient is confused about what that means and if she can continue with treatment.  She would like to continue with her current regimen.           Objective      /76   Pulse 104   Temp 97.1 °F (36.2 °C)   Resp 16   Ht 172.7 cm (68\")   Wt 58.5 kg (129 lb)   SpO2 98%   BMI 19.61 kg/m²      Performance Status:1          General: well appearing female in no acute distress  Neuro: alert and oriented  HEENT: sclera anicteric, oropharynx clear  Lymphatics: no cervical, supraclavicular, or axillary adenopathy  Cardiovascular: regular rate and rhythm, no murmurs  Lungs: clear to auscultation bilaterally  Abdomen: soft, nontender, nondistended.   Extremeties: no lower extremity edema  Skin: no rashes, lesions, bruising, or petechiae  Psych: mood and affect appropriate                    RECENT LABS:  Lab Results   Component Value Date    WBC 4.60 02/14/2024    HGB 9.9 (L) 02/14/2024    HCT 28.8 (L) 02/14/2024    .9 (H) 02/14/2024     02/14/2024       Lab Results   Component Value Date    GLUCOSE 137 (H) 02/14/2024    BUN 10 02/14/2024    CREATININE 0.50 (L) 02/14/2024    EGFRIFNONA 85 10/23/2020    EGFRIFAFRI 92 12/07/2023    BCR 20.0 02/14/2024    K 4.4 02/14/2024    CO2 22.7 02/14/2024    CALCIUM 8.8 02/14/2024    ALBUMIN 3.6 02/13/2024    AST 14 02/13/2024    ALT 7 02/13/2024                   ASSESSMENT AND PLAN:     Silvia Chung is a 73 y.o. female with a T3N1M0 adenosquamous carcinoma of the pancreas, now with recurrent disease.    She has had disease recurrence within about 4 months of completing adjuvant FOLFIRINOX.  She has involvement of the liver, lungs, and a paraspinal mass.  She completed radiation to the paraspinal mass and pain has improved significantly.      She had cycle 1 day 1 on using  Abraxane and " Gemzar.  She felt like she tolerated treatment overall well.  We again discussed goals of treatment and how to help maintain her best quality of life.  We had a long discussion about hospice versus continuing with treatment.  She would like to give treatment another cycle to see how she does.  If she ends up in the hospital again she will likely discontinue treatment at that time.      We will contact hospice to let them know of the patient's decision.  We will try to refer to the palliative side to continue ongoing pain control.      Cancer related pain: Continue current medications with OxyContin and oxycodone for breakthrough.  We will refill her prescriptions as needed until Ms. Saul returns from leave.    Neuropathy: Continue gabapentin.      Diarrhea:  Continue Creon    Parkinson's: Continue regular medications.    Post splenectomy vaccines given at .    Follow-up in 2 weeks.  Plan to repeat imaging with PET/CT after 2-3 cycles of treatment.            Arlene Najera APRN  [facility]    2/20/2024          CC:

## 2024-02-20 NOTE — PLAN OF CARE
Problem: Adult Inpatient Plan of Care  Goal: Plan of Care Review  Recent Flowsheet Documentation  Taken 10/24/2020 1118 by Misti Zambrano, CLAIR  Progress: improving  Plan of Care Reviewed With: patient  Outcome Summary: Patient was able to amb 4 steps with CGA and was able to recall posterior hip precautions. Patient would benefit from home health PT upon discharge        Test Date:  2024    Patient:  avtar collier : 1960 Physician: Fernando Salas D.O.   ID#: [id number] SEX: Male Ref. Phys: Adrián Shaw MD     HPI: Avtar Collier is a 63 y.o.male who presents for NCS/EMG to evaluate for CTS bilaterally.  Hx of RCC with nephrectomy, on HD since  with access in right thigh.  On Plavix.      NCV & EMG Findings:  Evaluation of the left median motor nerve showed prolonged distal onset latency and reduced amplitude.  The right median motor nerve showed prolonged distal onset latency, reduced amplitude, and decreased conduction velocity.  The left median sensory and the right median sensory nerves showed no response.  All remaining nerves (as indicated in the following tables) were within normal limits.  Needle evaluation of the left Triceps Brachii muscle showed increased motor unit amplitude and slightly increased polyphasic potentials.  The left Pronator Teres muscle showed increased motor unit amplitude and diminished recruitment.  The left Abductor Pollicis Brevis muscle showed increased insertional activity, slightly increased spontaneous activity, increased motor unit amplitude, and diminished recruitment.  The right Abductor Pollicis Brevis muscle showed increased insertional activity, slightly increased spontaneous activity, and moderately increased polyphasic potentials.  All remaining muscles (as indicated in the following table) showed no evidence of electrical instability.    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 Carpal tunnel syndrome).  There is motor axonal loss.  There is active denervation.  This is graded as Severe in severity bilaterally, with the left being worse than the right comparatively.      There is evidence to suggest a chronic left C6 and/or C7 radiculopathy, with no active/ongoing denervation.      ___________________________  Fernando Salas  D.O.        NCS+  Motor Nerve Results      Latency Amplitude F-Lat Segment Distance CV Comment   Site (ms) Norm (mV) Norm (ms)  (cm) (m/s) Norm    Left Median (APB)   Palm 1.43 - 1.48 -         Wrist *9.9  < 4.7 *2.4  > 3.8  Wrist-Palm 6 7 -    Elbow 14.9 - 2.8 -  Elbow-Wrist 25 50  > 47    Right Median (APB)   Palm 1.78 - 1.62 -         Wrist *7.6  < 4.7 *2.4  > 3.8  Wrist-Palm 6 10 -    Elbow 13.6 - 3.4 -  Elbow-Wrist 23 *38  > 47    Left Ulnar (ADM)   Wrist 2.7  < 3.7 8.3  > 3.0         Bel Elbow 7.5 - 7.9 -  Bel Elbow-Wrist 27 56  > 52    Abv Elbow 9.4 - 7.7 -  Abv Elbow-Bel Elbow 11 58  > 43    Right Ulnar (ADM)   Wrist 2.9  < 3.7 7.5  > 3.0         Bel Elbow 8.3 - 6.9 -  Bel Elbow-Wrist 28 52  > 52    Abv Elbow 10.1 - 6.0 -  Abv Elbow-Bel Elbow 10 56  > 43      Sensory Nerve Results      Latency (Peak) Amplitude (P-P) Segment Distance CV Comment   Site (ms) Norm (µV) Norm  (cm) (m/s) Norm    Left Median (Rec:Dig II)   Wrist *NR  < 4.0 *NR  > 8 Wrist-Dig II 14 *NR  > 39    Right Median (Rec:Dig II)   Wrist *NR  < 4.0 *NR  > 8 Wrist-Dig II 14 *NR  > 39    Left Ulnar (Rec:Dig V)   Wrist 3.3  < 4.0 13  > 4 Wrist-Dig V 14 42  > 38    Right Ulnar (Rec:Dig V)   Wrist 3.2  < 4.0 25  > 4 Wrist-Dig V 14 44  > 38    Right Radial (Rec:Wrist)   Forearm 1.83  < 2.8 19  > 11 Forearm-Wrist 10 55 -      EMG+     Side Muscle Nerve Root Ins Act Fibs Psw Amp Dur Poly Recrt Int Pat Comment   Left Deltoid Axillary C5-C6 Nml Nml Nml Nml Nml 0 Nml Nml    Left Biceps Musculocut C5-C6 Nml Nml Nml Nml Nml 0 Nml Nml    Left Pronator Teres Median C6-C7 Nml Nml Nml *Incr Nml 0 *Reduced Nml    Left Triceps Radial C6-C8 Nml Nml Nml *Incr Nml *1+ Nml Nml    Left Cervical Parasp (Lower) Rami C7-C8 Nml Nml Nml         Left EIP Post Interosseous,  R... C7-C8 Nml Nml Nml Nml Nml 0 Nml Nml    Left APB Median C8-T1 *Incr *1+ *1+ *Incr Nml 0 *Reduced Nml    Right APB Median C8-T1 *Incr *1+ *1+ Nml Nml *2+ Nml Nml            Waveforms:    Motor               Sensory

## 2024-02-23 ENCOUNTER — TELEPHONE (OUTPATIENT)
Dept: ONCOLOGY | Facility: CLINIC | Age: 74
End: 2024-02-23
Payer: MEDICARE

## 2024-02-23 NOTE — TELEPHONE ENCOUNTER
I talked with the manager of Hospice and they are willing to keep Ms. Chung as a patient for now but if chemotherapy continues, they will have to discharge her.  I explained that her next treatment is not till March 5 and a lot can change between now and then and Kori was kind enough to not discharge at this time.  Koris cell phone is [phone].  I told her we would keep in touch.

## 2024-02-27 ENCOUNTER — PATIENT OUTREACH (OUTPATIENT)
Dept: CASE MANAGEMENT | Facility: OTHER | Age: 74
End: 2024-02-27
Payer: MEDICARE

## 2024-02-27 NOTE — OUTREACH NOTE
AMBULATORY CASE MANAGEMENT NOTE    Name and Relationship of Patient/Support Person: BENITOLEONE - Emergency Contact  Emergency Contact    Patient Outreach     states patient is not  good today, has increased pain on both sides of mid section.  Pain medications reviewed. Patient taking oxycodone 15 mg IR every 4 hours without full relief. Reports he is also giving liquid morphine sparingly, states patient prefers oxycodone. Patient's appetite is declining, not eating as much as usual, reports patient is receiving adequate hydration. Patient's last bowel movement was yesterday.  Education provided on ensuring patient has regular bowel movements. He is giving patient sennosides-docusate 2 tablets, twice daily.  Education provided on the use of Miralax if patient is exhibiting symptoms of constipation.  Patient did not go to treatment today due to pain. Goals of hospice care explained to  and he was reminded that he can call them back at any time. Active listening and empathy employed.    Education Documentation  Medication Management - pain medication- laxative taught by Sharon Mora, LOREN at 2/27/2024 12:39 PM.  Learner: Significant Other  Readiness: Acceptance  Method: Explanation  Response: Needs Reinforcement    Unresolved/Worsening Symptoms - uncontrolled pain, taught by Sharon Mora, RN at 2/27/2024 12:39 PM.  Learner: Significant Other  Readiness: Acceptance  Method: Explanation  Response: Needs Reinforcement          Sharon FALCON  Ambulatory Case Management    2/27/2024, 12:43 EST   Bedside report received. Pt is currently sitting up in bed. Respirations are even and unlabored, on room air. Mother, son and boyfriend at bedside. All lines and orders verified. Pt states she has no needs at this time. Call light is within reach, hourly rounding in place.

## 2024-03-05 ENCOUNTER — TELEPHONE (OUTPATIENT)
Dept: ONCOLOGY | Facility: CLINIC | Age: 74
End: 2024-03-05
Payer: COMMERCIAL

## 2024-03-05 NOTE — TELEPHONE ENCOUNTER
Called patient asking how she was. At this time she states she has been hospitalized for pain management at hospice unit. Asking patient if she was wanting to continue treatment or do hospice. Patient reporting she is wanting to do both. Discussing with her that she could do palliative care. Patient stating yes that is what she is wanting to do.

## 2024-03-25 ENCOUNTER — PATIENT OUTREACH (OUTPATIENT)
Dept: CASE MANAGEMENT | Facility: OTHER | Age: 74
End: 2024-03-25
Payer: COMMERCIAL

## 2024-03-28 ENCOUNTER — HOSPITAL ENCOUNTER (INPATIENT)
Facility: HOSPITAL | Age: 74
LOS: 1 days | Discharge: HOME | DRG: 388 | End: 2024-03-29
Attending: EMERGENCY MEDICINE | Admitting: INTERNAL MEDICINE
Payer: MEDICARE

## 2024-03-28 ENCOUNTER — APPOINTMENT (OUTPATIENT)
Dept: CT IMAGING | Facility: HOSPITAL | Age: 74
DRG: 388 | End: 2024-03-28
Payer: MEDICARE

## 2024-03-28 ENCOUNTER — APPOINTMENT (OUTPATIENT)
Dept: GENERAL RADIOLOGY | Facility: HOSPITAL | Age: 74
DRG: 388 | End: 2024-03-28
Payer: MEDICARE

## 2024-03-28 DIAGNOSIS — C25.9 MALIGNANT NEOPLASM OF PANCREAS, UNSPECIFIED LOCATION OF MALIGNANCY: ICD-10-CM

## 2024-03-28 DIAGNOSIS — J18.9 PNEUMONIA OF RIGHT LOWER LOBE DUE TO INFECTIOUS ORGANISM: ICD-10-CM

## 2024-03-28 DIAGNOSIS — N39.0 ACUTE UTI: ICD-10-CM

## 2024-03-28 DIAGNOSIS — E86.0 ACUTE DEHYDRATION: ICD-10-CM

## 2024-03-28 DIAGNOSIS — K92.2 ACUTE GI BLEEDING: Primary | ICD-10-CM

## 2024-03-28 PROBLEM — C78.00 METASTASIS TO LUNG: Status: ACTIVE | Noted: 2024-03-28

## 2024-03-28 PROBLEM — K59.00 CONSTIPATION: Status: ACTIVE | Noted: 2024-03-28

## 2024-03-28 PROBLEM — D53.9 MACROCYTIC ANEMIA: Status: ACTIVE | Noted: 2024-03-28

## 2024-03-28 PROBLEM — R82.81 PYURIA: Status: ACTIVE | Noted: 2024-03-28

## 2024-03-28 PROBLEM — C79.51 METASTASIS TO SPINAL COLUMN: Status: ACTIVE | Noted: 2024-03-28

## 2024-03-28 LAB
ABO GROUP BLD: NORMAL
ABO GROUP BLD: NORMAL
ALBUMIN SERPL-MCNC: 3.2 G/DL (ref 3.5–5.2)
ALBUMIN/GLOB SERPL: 1.1 G/DL
ALP SERPL-CCNC: 125 U/L (ref 39–117)
ALT SERPL W P-5'-P-CCNC: 5 U/L (ref 1–33)
AMORPH URATE CRY URNS QL MICRO: ABNORMAL /HPF
ANION GAP SERPL CALCULATED.3IONS-SCNC: 15.5 MMOL/L (ref 5–15)
AST SERPL-CCNC: 18 U/L (ref 1–32)
BACTERIA UR QL AUTO: ABNORMAL /HPF
BASOPHILS # BLD AUTO: 0.01 10*3/MM3 (ref 0–0.2)
BASOPHILS NFR BLD AUTO: 0.1 % (ref 0–1.5)
BILIRUB SERPL-MCNC: 0.3 MG/DL (ref 0–1.2)
BILIRUB UR QL STRIP: NEGATIVE
BLD GP AB SCN SERPL QL: NEGATIVE
BUN SERPL-MCNC: 15 MG/DL (ref 8–23)
BUN/CREAT SERPL: 26.8 (ref 7–25)
CALCIUM SPEC-SCNC: 9 MG/DL (ref 8.6–10.5)
CHLORIDE SERPL-SCNC: 86 MMOL/L (ref 98–107)
CLARITY UR: ABNORMAL
CO2 SERPL-SCNC: 28.5 MMOL/L (ref 22–29)
COD CRY URNS QL: ABNORMAL /HPF
COLOR UR: YELLOW
CREAT SERPL-MCNC: 0.56 MG/DL (ref 0.57–1)
D-LACTATE SERPL-SCNC: 1.3 MMOL/L (ref 0.5–2)
D-LACTATE SERPL-SCNC: 2.6 MMOL/L (ref 0.5–2)
DEPRECATED RDW RBC AUTO: 48.5 FL (ref 37–54)
EGFRCR SERPLBLD CKD-EPI 2021: 97.7 ML/MIN/1.73
EOSINOPHIL # BLD AUTO: 0.02 10*3/MM3 (ref 0–0.4)
EOSINOPHIL NFR BLD AUTO: 0.2 % (ref 0.3–6.2)
ERYTHROCYTE [DISTWIDTH] IN BLOOD BY AUTOMATED COUNT: 13.2 % (ref 12.3–15.4)
FLUAV SUBTYP SPEC NAA+PROBE: NOT DETECTED
FLUBV RNA ISLT QL NAA+PROBE: NOT DETECTED
FOLATE SERPL-MCNC: 12.6 NG/ML (ref 4.78–24.2)
GLOBULIN UR ELPH-MCNC: 3 GM/DL
GLUCOSE SERPL-MCNC: 143 MG/DL (ref 65–99)
GLUCOSE UR STRIP-MCNC: NEGATIVE MG/DL
HCT VFR BLD AUTO: 27.2 % (ref 34–46.6)
HEMOCCULT STL QL: POSITIVE
HGB BLD-MCNC: 9.3 G/DL (ref 12–15.9)
HGB UR QL STRIP.AUTO: ABNORMAL
HOLD SPECIMEN: NORMAL
HOLD SPECIMEN: NORMAL
HYALINE CASTS UR QL AUTO: ABNORMAL /LPF
IMM GRANULOCYTES # BLD AUTO: 0.16 10*3/MM3 (ref 0–0.05)
IMM GRANULOCYTES NFR BLD AUTO: 1.3 % (ref 0–0.5)
KETONES UR QL STRIP: NEGATIVE
LEUKOCYTE ESTERASE UR QL STRIP.AUTO: ABNORMAL
LYMPHOCYTES # BLD AUTO: 0.38 10*3/MM3 (ref 0.7–3.1)
LYMPHOCYTES NFR BLD AUTO: 3 % (ref 19.6–45.3)
MCH RBC QN AUTO: 34.3 PG (ref 26.6–33)
MCHC RBC AUTO-ENTMCNC: 34.2 G/DL (ref 31.5–35.7)
MCV RBC AUTO: 100.4 FL (ref 79–97)
MONOCYTES # BLD AUTO: 0.49 10*3/MM3 (ref 0.1–0.9)
MONOCYTES NFR BLD AUTO: 3.9 % (ref 5–12)
NEUTROPHILS NFR BLD AUTO: 11.48 10*3/MM3 (ref 1.7–7)
NEUTROPHILS NFR BLD AUTO: 91.5 % (ref 42.7–76)
NITRITE UR QL STRIP: NEGATIVE
NRBC BLD AUTO-RTO: 0 /100 WBC (ref 0–0.2)
PH UR STRIP.AUTO: 6 [PH] (ref 5–8)
PLATELET # BLD AUTO: 304 10*3/MM3 (ref 140–450)
PMV BLD AUTO: 10.4 FL (ref 6–12)
POTASSIUM SERPL-SCNC: 4.5 MMOL/L (ref 3.5–5.2)
PROT SERPL-MCNC: 6.2 G/DL (ref 6–8.5)
PROT UR QL STRIP: NEGATIVE
RBC # BLD AUTO: 2.71 10*6/MM3 (ref 3.77–5.28)
RBC # UR STRIP: ABNORMAL /HPF
REF LAB TEST METHOD: ABNORMAL
RH BLD: POSITIVE
RH BLD: POSITIVE
SARS-COV-2 RNA RESP QL NAA+PROBE: NOT DETECTED
SODIUM SERPL-SCNC: 130 MMOL/L (ref 136–145)
SP GR UR STRIP: >1.03 (ref 1–1.03)
SQUAMOUS #/AREA URNS HPF: ABNORMAL /HPF
T&S EXPIRATION DATE: NORMAL
TRI-PHOS CRY URNS QL MICRO: ABNORMAL /HPF
UROBILINOGEN UR QL STRIP: ABNORMAL
VIT B12 BLD-MCNC: 1227 PG/ML (ref 211–946)
WBC # UR STRIP: ABNORMAL /HPF
WBC NRBC COR # BLD AUTO: 12.54 10*3/MM3 (ref 3.4–10.8)
WHOLE BLOOD HOLD COAG: NORMAL
WHOLE BLOOD HOLD SPECIMEN: NORMAL

## 2024-03-28 PROCEDURE — 25810000003 LACTATED RINGERS PER 1000 ML: Performed by: INTERNAL MEDICINE

## 2024-03-28 PROCEDURE — 87186 SC STD MICRODIL/AGAR DIL: CPT | Performed by: EMERGENCY MEDICINE

## 2024-03-28 PROCEDURE — 99285 EMERGENCY DEPT VISIT HI MDM: CPT

## 2024-03-28 PROCEDURE — 80053 COMPREHEN METABOLIC PANEL: CPT | Performed by: EMERGENCY MEDICINE

## 2024-03-28 PROCEDURE — 81001 URINALYSIS AUTO W/SCOPE: CPT | Performed by: EMERGENCY MEDICINE

## 2024-03-28 PROCEDURE — 25810000003 SODIUM CHLORIDE 0.9 % SOLUTION: Performed by: EMERGENCY MEDICINE

## 2024-03-28 PROCEDURE — P9612 CATHETERIZE FOR URINE SPEC: HCPCS

## 2024-03-28 PROCEDURE — 86901 BLOOD TYPING SEROLOGIC RH(D): CPT

## 2024-03-28 PROCEDURE — 71275 CT ANGIOGRAPHY CHEST: CPT

## 2024-03-28 PROCEDURE — 71045 X-RAY EXAM CHEST 1 VIEW: CPT

## 2024-03-28 PROCEDURE — 82607 VITAMIN B-12: CPT | Performed by: INTERNAL MEDICINE

## 2024-03-28 PROCEDURE — 99222 1ST HOSP IP/OBS MODERATE 55: CPT | Performed by: INTERNAL MEDICINE

## 2024-03-28 PROCEDURE — 87636 SARSCOV2 & INF A&B AMP PRB: CPT | Performed by: EMERGENCY MEDICINE

## 2024-03-28 PROCEDURE — 25510000001 IOPAMIDOL 61 % SOLUTION: Performed by: EMERGENCY MEDICINE

## 2024-03-28 PROCEDURE — 86901 BLOOD TYPING SEROLOGIC RH(D): CPT | Performed by: EMERGENCY MEDICINE

## 2024-03-28 PROCEDURE — 99223 1ST HOSP IP/OBS HIGH 75: CPT | Performed by: INTERNAL MEDICINE

## 2024-03-28 PROCEDURE — 87077 CULTURE AEROBIC IDENTIFY: CPT | Performed by: EMERGENCY MEDICINE

## 2024-03-28 PROCEDURE — 86900 BLOOD TYPING SEROLOGIC ABO: CPT | Performed by: EMERGENCY MEDICINE

## 2024-03-28 PROCEDURE — 82272 OCCULT BLD FECES 1-3 TESTS: CPT | Performed by: EMERGENCY MEDICINE

## 2024-03-28 PROCEDURE — 25810000003 SODIUM CHLORIDE 0.9 % SOLUTION: Performed by: INTERNAL MEDICINE

## 2024-03-28 PROCEDURE — 83605 ASSAY OF LACTIC ACID: CPT | Performed by: EMERGENCY MEDICINE

## 2024-03-28 PROCEDURE — 87086 URINE CULTURE/COLONY COUNT: CPT | Performed by: EMERGENCY MEDICINE

## 2024-03-28 PROCEDURE — 36415 COLL VENOUS BLD VENIPUNCTURE: CPT

## 2024-03-28 PROCEDURE — 87040 BLOOD CULTURE FOR BACTERIA: CPT | Performed by: EMERGENCY MEDICINE

## 2024-03-28 PROCEDURE — 74178 CT ABD&PLV WO CNTR FLWD CNTR: CPT

## 2024-03-28 PROCEDURE — 25010000002 CEFTRIAXONE PER 250 MG: Performed by: EMERGENCY MEDICINE

## 2024-03-28 PROCEDURE — 86850 RBC ANTIBODY SCREEN: CPT | Performed by: EMERGENCY MEDICINE

## 2024-03-28 PROCEDURE — 82746 ASSAY OF FOLIC ACID SERUM: CPT | Performed by: INTERNAL MEDICINE

## 2024-03-28 PROCEDURE — 86900 BLOOD TYPING SEROLOGIC ABO: CPT

## 2024-03-28 PROCEDURE — 25010000002 AZITHROMYCIN PER 500 MG: Performed by: INTERNAL MEDICINE

## 2024-03-28 PROCEDURE — 85025 COMPLETE CBC W/AUTO DIFF WBC: CPT

## 2024-03-28 RX ORDER — SODIUM CHLORIDE 0.9 % (FLUSH) 0.9 %
10 SYRINGE (ML) INJECTION AS NEEDED
Status: DISCONTINUED | OUTPATIENT
Start: 2024-03-28 | End: 2024-03-29 | Stop reason: HOSPADM

## 2024-03-28 RX ORDER — CARVEDILOL 6.25 MG/1
6.25 TABLET ORAL 2 TIMES DAILY WITH MEALS
Status: DISCONTINUED | OUTPATIENT
Start: 2024-03-28 | End: 2024-03-29 | Stop reason: HOSPADM

## 2024-03-28 RX ORDER — ONDANSETRON 4 MG/1
4 TABLET, ORALLY DISINTEGRATING ORAL EVERY 6 HOURS PRN
Status: DISCONTINUED | OUTPATIENT
Start: 2024-03-28 | End: 2024-03-29 | Stop reason: HOSPADM

## 2024-03-28 RX ORDER — ONDANSETRON 4 MG/1
4 TABLET, FILM COATED ORAL EVERY 8 HOURS PRN
COMMUNITY

## 2024-03-28 RX ORDER — POLYETHYLENE GLYCOL 3350 17 G/17G
17 POWDER, FOR SOLUTION ORAL DAILY
Status: DISCONTINUED | OUTPATIENT
Start: 2024-03-28 | End: 2024-03-29 | Stop reason: HOSPADM

## 2024-03-28 RX ORDER — BISACODYL 10 MG
10 SUPPOSITORY, RECTAL RECTAL DAILY PRN
Status: DISCONTINUED | OUTPATIENT
Start: 2024-03-28 | End: 2024-03-29 | Stop reason: HOSPADM

## 2024-03-28 RX ORDER — SODIUM CHLORIDE, SODIUM LACTATE, POTASSIUM CHLORIDE, CALCIUM CHLORIDE 600; 310; 30; 20 MG/100ML; MG/100ML; MG/100ML; MG/100ML
50 INJECTION, SOLUTION INTRAVENOUS CONTINUOUS
Status: DISCONTINUED | OUTPATIENT
Start: 2024-03-28 | End: 2024-03-29 | Stop reason: HOSPADM

## 2024-03-28 RX ORDER — PROCHLORPERAZINE MALEATE 10 MG
5 TABLET ORAL EVERY 6 HOURS PRN
Status: DISCONTINUED | OUTPATIENT
Start: 2024-03-28 | End: 2024-03-29 | Stop reason: HOSPADM

## 2024-03-28 RX ORDER — CALCIUM CARBONATE 500 MG/1
2 TABLET, CHEWABLE ORAL 2 TIMES DAILY PRN
Status: DISCONTINUED | OUTPATIENT
Start: 2024-03-28 | End: 2024-03-29 | Stop reason: HOSPADM

## 2024-03-28 RX ORDER — ONDANSETRON 4 MG/1
8 TABLET, FILM COATED ORAL 3 TIMES DAILY PRN
Status: DISCONTINUED | OUTPATIENT
Start: 2024-03-28 | End: 2024-03-29 | Stop reason: HOSPADM

## 2024-03-28 RX ORDER — SODIUM CHLORIDE 9 MG/ML
40 INJECTION, SOLUTION INTRAVENOUS AS NEEDED
Status: DISCONTINUED | OUTPATIENT
Start: 2024-03-28 | End: 2024-03-29 | Stop reason: HOSPADM

## 2024-03-28 RX ORDER — SODIUM CHLORIDE 0.9 % (FLUSH) 0.9 %
10 SYRINGE (ML) INJECTION EVERY 12 HOURS SCHEDULED
Status: DISCONTINUED | OUTPATIENT
Start: 2024-03-28 | End: 2024-03-29 | Stop reason: HOSPADM

## 2024-03-28 RX ORDER — POLYETHYLENE GLYCOL 3350 17 G/17G
17 POWDER, FOR SOLUTION ORAL DAILY PRN
Status: DISCONTINUED | OUTPATIENT
Start: 2024-03-28 | End: 2024-03-29 | Stop reason: HOSPADM

## 2024-03-28 RX ORDER — DIPHENHYDRAMINE HCL 25 MG
25 CAPSULE ORAL NIGHTLY PRN
Status: DISCONTINUED | OUTPATIENT
Start: 2024-03-28 | End: 2024-03-29 | Stop reason: HOSPADM

## 2024-03-28 RX ORDER — DOCUSATE SODIUM 100 MG/1
100 CAPSULE, LIQUID FILLED ORAL DAILY
Status: DISCONTINUED | OUTPATIENT
Start: 2024-03-28 | End: 2024-03-29 | Stop reason: HOSPADM

## 2024-03-28 RX ORDER — ONDANSETRON 2 MG/ML
4 INJECTION INTRAMUSCULAR; INTRAVENOUS EVERY 6 HOURS PRN
Status: DISCONTINUED | OUTPATIENT
Start: 2024-03-28 | End: 2024-03-29 | Stop reason: HOSPADM

## 2024-03-28 RX ORDER — ENOXAPARIN SODIUM 100 MG/ML
40 INJECTION SUBCUTANEOUS DAILY
Status: CANCELLED | OUTPATIENT
Start: 2024-03-28

## 2024-03-28 RX ORDER — OXYCODONE HYDROCHLORIDE 5 MG/1
15 TABLET ORAL EVERY 4 HOURS PRN
Status: DISCONTINUED | OUTPATIENT
Start: 2024-03-28 | End: 2024-03-29 | Stop reason: HOSPADM

## 2024-03-28 RX ORDER — AMOXICILLIN 250 MG
2 CAPSULE ORAL 2 TIMES DAILY
Status: DISCONTINUED | OUTPATIENT
Start: 2024-03-28 | End: 2024-03-29 | Stop reason: HOSPADM

## 2024-03-28 RX ORDER — MORPHINE SULFATE 30 MG/1
30 TABLET, FILM COATED, EXTENDED RELEASE ORAL 2 TIMES DAILY
COMMUNITY

## 2024-03-28 RX ORDER — BUSPIRONE HYDROCHLORIDE 10 MG/1
10 TABLET ORAL EVERY 12 HOURS SCHEDULED
Status: DISCONTINUED | OUTPATIENT
Start: 2024-03-28 | End: 2024-03-29 | Stop reason: HOSPADM

## 2024-03-28 RX ORDER — CHOLECALCIFEROL (VITAMIN D3) 125 MCG
5 CAPSULE ORAL NIGHTLY
Status: DISCONTINUED | OUTPATIENT
Start: 2024-03-28 | End: 2024-03-29 | Stop reason: HOSPADM

## 2024-03-28 RX ORDER — MORPHINE SULFATE 30 MG/1
30 TABLET, FILM COATED, EXTENDED RELEASE ORAL EVERY 12 HOURS SCHEDULED
Qty: 20 TABLET | Refills: 0 | Status: DISCONTINUED | OUTPATIENT
Start: 2024-03-28 | End: 2024-03-29 | Stop reason: HOSPADM

## 2024-03-28 RX ORDER — TRAZODONE HYDROCHLORIDE 50 MG/1
50 TABLET ORAL NIGHTLY
Status: DISCONTINUED | OUTPATIENT
Start: 2024-03-28 | End: 2024-03-29 | Stop reason: HOSPADM

## 2024-03-28 RX ORDER — ACETAMINOPHEN 325 MG/1
650 TABLET ORAL EVERY 4 HOURS PRN
Status: DISCONTINUED | OUTPATIENT
Start: 2024-03-28 | End: 2024-03-29 | Stop reason: HOSPADM

## 2024-03-28 RX ORDER — BISACODYL 5 MG/1
5 TABLET, DELAYED RELEASE ORAL DAILY PRN
Status: DISCONTINUED | OUTPATIENT
Start: 2024-03-28 | End: 2024-03-29 | Stop reason: HOSPADM

## 2024-03-28 RX ORDER — AMOXICILLIN 250 MG
2 CAPSULE ORAL 2 TIMES DAILY
Status: DISCONTINUED | OUTPATIENT
Start: 2024-03-28 | End: 2024-03-28 | Stop reason: SDUPTHER

## 2024-03-28 RX ORDER — CARBIDOPA/LEVODOPA 25MG-250MG
1 TABLET ORAL 2 TIMES DAILY
Status: DISCONTINUED | OUTPATIENT
Start: 2024-03-28 | End: 2024-03-29 | Stop reason: HOSPADM

## 2024-03-28 RX ORDER — PANTOPRAZOLE SODIUM 40 MG/1
40 TABLET, DELAYED RELEASE ORAL
Status: DISCONTINUED | OUTPATIENT
Start: 2024-03-29 | End: 2024-03-29 | Stop reason: HOSPADM

## 2024-03-28 RX ADMIN — BUSPIRONE HYDROCHLORIDE 10 MG: 10 TABLET ORAL at 13:54

## 2024-03-28 RX ADMIN — IOPAMIDOL 100 ML: 612 INJECTION, SOLUTION INTRAVENOUS at 09:29

## 2024-03-28 RX ADMIN — SODIUM CHLORIDE 1000 ML: 9 INJECTION, SOLUTION INTRAVENOUS at 10:14

## 2024-03-28 RX ADMIN — Medication 5 MG: at 20:35

## 2024-03-28 RX ADMIN — MORPHINE SULFATE 30 MG: 30 TABLET, FILM COATED, EXTENDED RELEASE ORAL at 11:01

## 2024-03-28 RX ADMIN — CEFTRIAXONE SODIUM 2000 MG: 1 INJECTION, POWDER, FOR SOLUTION INTRAMUSCULAR; INTRAVENOUS at 08:36

## 2024-03-28 RX ADMIN — DOCUSATE SODIUM 100 MG: 100 CAPSULE, LIQUID FILLED ORAL at 13:54

## 2024-03-28 RX ADMIN — DOCUSATE SODIUM 50MG AND SENNOSIDES 8.6MG 2 TABLET: 8.6; 5 TABLET, FILM COATED ORAL at 20:35

## 2024-03-28 RX ADMIN — Medication 100 MG: at 10:14

## 2024-03-28 RX ADMIN — IOPAMIDOL 100 ML: 612 INJECTION, SOLUTION INTRAVENOUS at 07:04

## 2024-03-28 RX ADMIN — POLYETHYLENE GLYCOL 3350 17 G: 17 POWDER, FOR SOLUTION ORAL at 16:17

## 2024-03-28 RX ADMIN — OXYCODONE HYDROCHLORIDE 15 MG: 5 TABLET ORAL at 16:17

## 2024-03-28 RX ADMIN — TRAZODONE HYDROCHLORIDE 50 MG: 50 TABLET ORAL at 20:35

## 2024-03-28 RX ADMIN — SODIUM CHLORIDE, POTASSIUM CHLORIDE, SODIUM LACTATE AND CALCIUM CHLORIDE 50 ML/HR: 600; 310; 30; 20 INJECTION, SOLUTION INTRAVENOUS at 13:54

## 2024-03-28 RX ADMIN — SODIUM CHLORIDE 500 MG: 900 INJECTION, SOLUTION INTRAVENOUS at 16:17

## 2024-03-28 RX ADMIN — Medication 10 ML: at 13:39

## 2024-03-28 RX ADMIN — MORPHINE SULFATE 30 MG: 30 TABLET, FILM COATED, EXTENDED RELEASE ORAL at 20:35

## 2024-03-28 RX ADMIN — CARBIDOPA AND LEVODOPA 1 TABLET: 25; 250 TABLET ORAL at 20:35

## 2024-03-28 NOTE — H&P (VIEW-ONLY)
In Patient Consult      Date of Consultation: 2024  Patient Name: Silvia Chung  MRN: [id number]  : 1950     Referring provider: Jonathan Barrientos DO    Primary care provider:  Johnathan Aguilera MD    Reason for consultation: Rectal bleeding, chronic constipation      History of Present Illness: This is a 73-year-old female patient with a prior history of hypertension, hyperlipidemia, Parkinson's disease, asthma, history of metastatic pancreatic cancer on hospice care, chronic opioid use, chronic opioid-induced constipation was brought in emergency room today on 2024 with complaints of passing dark blood from rectum.    He was diagnosed with pancreatic cancer in 2022.  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  He had a distal pancreatectomy and splenectomy subsequently in 2022, followed by adjuvant chemotherapy.  Surveillance CT scan done in 2022 revealed 4.4 cm left paraspinous mass encroaching into the spinal canal.  CA 19-9 was elevated as well.  Subsequent PET scan done in 2024 showed metabolic left paraspinal soft tissue mass at T10 with multiple bilateral pulmonary nodules and hypermetabolic liver lesions consistent with hepatic metastasis.  She did receive palliative radiotherapy to the thoracic spine in 2024.  Recently as per heme-onc note was placed on hospice care.      She is currently on MS Contin and Roxicodone.  She has a significant issue with bowel movement with the bowel movement once in couple of weeks spite on senna, MiraLAX, stool softeners.    This morning she woke up with pool of dark liquid stool.  She had 1 more episode blood mixed stool liquid stool in the emergency room.  She did not have any good bowel movement for the past couple of weeks.  She denies any significant abdominal pain nausea vomiting.  In the emergency room her lab work done revealed borderline sodium of 130 glucose of  143.  Prosperous 125 with a normal rest of the liver enzymes albumin low at 3.2.  Her hemoglobin was borderline 9.3 compared to baseline 10.6 in February 2024.  Borderline WBC 12,000.  Platelet count was normal [id number].  CT abdomen pelvis done revealed previous metastatic findings and fecal impaction mostly in the rectum with a fecal ball measuring about 8 to 9 cm.  GI was consulted for GI bleed.    Subjective     Past Medical History:   Diagnosis Date    Asthma     Hyperlipidemia     Hypertension     Impaired functional mobility, balance, gait, and endurance     Pancreatic cancer 2022    Parkinson disease     PONV (postoperative nausea and vomiting)     Scoliosis     Seasonal allergies        Past Surgical History:   Procedure Laterality Date    ADENOIDECTOMY      BREAST BIOPSY Right 2019    Needle biopsy    CATARACT EXTRACTION Bilateral     CHOLECYSTECTOMY OPEN  12/19/2022    done at     COLONOSCOPY      FOOT SURGERY Left     torn ligament, screws placed.    HIP HEMIARTHROPLASTY Left 10/21/2020    Procedure: HIP HEMIARTHROPLASTY LEFT;  Surgeon: Humza Winters MD;  Location: [facility];  Service: Orthopedics;  Laterality: Left;    LYMPHADENECTOMY  12/19/2022    partial, done at     NECK SURGERY      four fusions    OMENTECTOMY  12/19/2022    done at     PANCREATECTOMY  12/19/2022    done at , left adrenolectomy also performed    PORTACATH PLACEMENT Right 02/09/2023    Procedure: INSERTION OF PORTACATH;  Surgeon: Marcio Garcia MD;  Location: [facility];  Service: General;  Laterality: Right;    SPLENECTOMY  12/19/2022    done at     TONSILLECTOMY         Family History   Problem Relation Age of Onset    Heart failure Father     Lung cancer Father     Cancer Brother     Breast cancer Maternal Grandmother     Breast cancer Paternal Grandmother     Breast cancer Cousin     Ovarian cancer Neg Hx        Social History     Socioeconomic History    Marital status:    Tobacco Use    Smoking status: Never     Smokeless tobacco: Never   Vaping Use    Vaping status: Never Used   Substance and Sexual Activity    Alcohol use: Not Currently     Comment: rare    Drug use: Defer    Sexual activity: Defer         Current Facility-Administered Medications:     acetaminophen (TYLENOL) tablet 650 mg, 650 mg, Oral, Q4H PRN, Jonathan Barrientos DO    azithromycin (ZITHROMAX) 500 mg 0.9% NaCl (Add-vantage) 250 mL, 500 mg, Intravenous, Q24H, Jonathan Barrientos DO    sennosides-docusate (PERICOLACE) 8.6-50 MG per tablet 2 tablet, 2 tablet, Oral, BID **AND** polyethylene glycol (MIRALAX) packet 17 g, 17 g, Oral, Daily PRN **AND** bisacodyl (DULCOLAX) EC tablet 5 mg, 5 mg, Oral, Daily PRN **AND** bisacodyl (DULCOLAX) suppository 10 mg, 10 mg, Rectal, Daily PRN, Jonathan Barrientos DO    busPIRone (BUSPAR) tablet 10 mg, 10 mg, Oral, Q12H, Jonathan Barrientos DO, 10 mg at 03/28/24 1354    calcium carbonate (TUMS) chewable tablet 500 mg (200 mg elemental), 2 tablet, Oral, BID PRN, Jonathan Barrientos DO    Calcium Replacement - Follow Nurse / BPA Driven Protocol, , Does not apply, PRN, Jonathan Barrientos DO    carbidopa-levodopa (SINEMET)  MG per tablet 1 tablet, 1 tablet, Oral, BID, Jonathan Barrientos DO    [Held by provider] carvedilol (COREG) tablet 6.25 mg, 6.25 mg, Oral, BID With Meals, Jonathan Barrientos DO    [START ON 3/29/2024] cefTRIAXone (ROCEPHIN) 2,000 mg in sodium chloride 0.9 % 100 mL IVPB, 2,000 mg, Intravenous, Q24H, Jonathan Barrientos DO    diphenhydrAMINE (BENADRYL) capsule 25 mg, 25 mg, Oral, Nightly PRN, Jonathan Barrientos DO    docusate sodium (COLACE) capsule 100 mg, 100 mg, Oral, Daily, Jonathan Barrientos DO, 100 mg at 03/28/24 1354    lactated ringers infusion, 50 mL/hr, Intravenous, Continuous, Jonathan Barrientos DO, Last Rate: 50 mL/hr at 03/28/24 1354, 50 mL/hr at 03/28/24 1354    Magnesium Standard Dose Replacement - Follow Nurse / BPA Driven Protocol, , Does not apply, PRNIliana,  Jonathan Aguilera DO    melatonin tablet 5 mg, 5 mg, Oral, Nightly, Jonathan Barrientos DO    Morphine (MS CONTIN) 12 hr tablet 30 mg, 30 mg, Oral, Q12H, Jonathan Barrientos, , 30 mg at 03/28/24 1101    ondansetron ODT (ZOFRAN-ODT) disintegrating tablet 4 mg, 4 mg, Oral, Q6H PRN **OR** ondansetron (ZOFRAN) injection 4 mg, 4 mg, Intravenous, Q6H PRN, Jonathan Barrientos,     ondansetron (ZOFRAN) tablet 8 mg, 8 mg, Oral, TID PRN, Jonathan Barrientos DO    oxyCODONE (ROXICODONE) immediate release tablet 15 mg, 15 mg, Oral, Q4H PRN, Jonathan Barrientos,     [START ON 3/29/2024] pantoprazole (PROTONIX) EC tablet 40 mg, 40 mg, Oral, Q AM, Jonathan Barrientos DO    Phosphorus Replacement - Follow Nurse / BPA Driven Protocol, , Does not apply, PRN, Jonathan Barrientos,     polyethylene glycol (MIRALAX) packet 17 g, 17 g, Oral, Daily, Jonathan Barrientos DO    Potassium Replacement - Follow Nurse / BPA Driven Protocol, , Does not apply, PRN, Jonathan Barrientos,     prochlorperazine (COMPAZINE) tablet 5 mg, 5 mg, Oral, Q6H PRN, Jonathan Barrientos,     sodium chloride 0.9 % flush 10 mL, 10 mL, Intravenous, PRN, Jonathan Barrientos,     sodium chloride 0.9 % flush 10 mL, 10 mL, Intravenous, Q12H, Jonathan Barrientos, , 10 mL at 03/28/24 1339    sodium chloride 0.9 % flush 10 mL, 10 mL, Intravenous, PRN, Jonathan Barrientos,     sodium chloride 0.9 % infusion 40 mL, 40 mL, Intravenous, PRN, Jonathan Barrientos,     traZODone (DESYREL) tablet 50 mg, 50 mg, Oral, Nightly, Jonathan Barrientos, DO    No Known Allergies    Review of Systems   Constitutional:  Negative for appetite change, fatigue, fever and unexpected weight loss.   HENT:  Negative for trouble swallowing.    Gastrointestinal:  Positive for blood in stool and constipation. Negative for abdominal distention, abdominal pain, anal bleeding, diarrhea, nausea, rectal pain, vomiting, GERD and indigestion.        The following portions of the patient's  history were reviewed and updated as appropriate: allergies, current medications, past family history, past medical history, past social history, past surgical history and problem list.    Objective     Vitals:    03/28/24 1030 03/28/24 1129 03/28/24 1144 03/28/24 1231   BP: 121/90 115/77  119/72   BP Location:       Patient Position:       Pulse:   92    Resp:   16    Temp:   98 °F (36.7 °C)    TempSrc:   Oral    SpO2: 98% 99%  100%   Weight:       Height:           Physical Exam  Constitutional:       Comments: Poorly built and nourished   HENT:      Head: Normocephalic and atraumatic.   Eyes:      Comments: Pallor present   Abdominal:      General: Abdomen is flat. There is no distension.      Palpations: There is no mass.      Tenderness: There is no abdominal tenderness. There is no guarding or rebound.      Hernia: No hernia is present.      Comments: Rectal examination done reveals rectum loaded with a hard stool   Musculoskeletal:      Cervical back: Normal range of motion and neck supple.   Neurological:      Mental Status: She is alert.         Results from last 7 days   Lab Units 03/28/24  0604   SODIUM mmol/L 130*   POTASSIUM mmol/L 4.5   CHLORIDE mmol/L 86*   CO2 mmol/L 28.5   BUN mg/dL 15   CREATININE mg/dL 0.56*   CALCIUM mg/dL 9.0   ALBUMIN g/dL 3.2*   BILIRUBIN mg/dL 0.3   ALK PHOS U/L 125*   ALT (SGPT) U/L 5   AST (SGOT) U/L 18   GLUCOSE mg/dL 143*   WBC 10*3/mm3 12.54*   HEMOGLOBIN g/dL 9.3*   PLATELETS 10*3/mm3 304       Imaging Results (Last 24 Hours)       Procedure Component Value Units Date/Time    CT Angiogram Chest [[id number]] Collected: 03/28/24 0937     Updated: 03/28/24 0949    Narrative:      PROCEDURE: CT ANGIOGRAM CHEST-     HISTORY: hypoxia; K92.2-Gastrointestinal hemorrhage, unspecified;  N39.0-Urinary tract infection, site not specified; E86.0-Dehydration;  C25.9-Malignant neoplasm of pancreas, unspecified     COMPARISON: February 10, 2024.     TECHNIQUE: The patient was injected  with  IV contrast. Axial images were  obtained through the chest in a CTA/ PE protocol. 3 D Reconstruction  images were also performed. This study was performed with techniques to  keep radiation doses as low as reasonably achievable, (ALARA).  Individualized dose reduction techniques using automated exposure  control or adjustment of mA and/or kV according to the patient size were  employed.     FINDINGS: There is no axillary adenopathy. There is been no change in  the small mediastinal lymph nodes compared to the prior exam. The heart  is proper size. There is no pericardial or pleural effusion. No filling  defects are identified to suggest PE. Again noted is borderline  enlargement of the ascending aorta at 39 mm. Again noted is the left  retrocrural mass measuring up to 44 mm with extension into the spinal  canal at the level of T10 with widening of the left neuroforamen and  partial destruction of the left T10 transverse consistent with  metastasis. Process and lateral aspect of the left T10 vertebral body.  Mild pathologic compression fracture again noted as seen on CT  abdomen/pelvis. Limited images of the upper abdomen demonstrate 2  hepatic lesions as described on CT abdomen pelvis. Again noted are  multiple bilateral noncalcified pulmonary nodules. The largest is  located anteriorly in the right upper lobe and has enlarged from 12 to  17 mm in the anterior posterior diameter. This does not have the typical  appearance of a metastasis having an irregular shape, primary lung  malignancy is in the differential. Recommend a PET/CT. The additional  bilateral noncalcified smaller nodules generally have a more rounded  contour suggesting metastasis. There is new groundglass opacity  involving the right lower lobe suggesting superimposed pneumonia.       Impression:      Multiple bilateral noncalcified pulmonary nodules which are  stable to increased in size compared to prior concerning for metastatic  disease.      Enlarging 17 mm irregular anterior right upper lobe mass, primary  pulmonary malignancy versus metastasis. PET/CT may be helpful.     New groundglass opacities right lower lobe, possible superimposed  pneumonia.     Borderline enlargement of the ascending aorta, stable from prior;  recommend 1 year follow-up.        CTDI: 9.41 mGy  DLP:331.85 mGy.cm     This report was signed and finalized on 3/28/2024 9:47 AM by Stormy Gonzalez MD.       XR Chest 1 View [[id number]] Collected: 03/28/24 0741     Updated: 03/28/24 0745    Narrative:      PROCEDURE: XR CHEST 1 VW-     HISTORY: hypoxia, history of pancreatic cancer.     COMPARISON: February 10, 2024..     FINDINGS: The heart is normal in size. Right subclavian port is stable  in position. There is mildly increased interstitial disease bilaterally.  Patchy left basilar disease is stable. There is new peripheral left mid  and lower lung field airspace disease, possible pneumonia. Given the  history of previous malignancy, recommend chest CT with contrast if  possible.. The mediastinum is unremarkable. There is no pneumothorax.   There are no acute osseous abnormalities.       Impression:      New left lateral airspace disease, recommend chest CT with  contrast if possible..     Stable right subclavian port.           This report was signed and finalized on 3/28/2024 7:42 AM by Stormy Gonzalez MD.       CT Abdomen Pelvis With & Without Contrast [[id number]] Collected: 03/28/24 0724     Updated: 03/28/24 0743    Narrative:      PROCEDURE: CT ABDOMEN PELVIS W WO CONTRAST-     HISTORY: gi bleed     COMPARISON: December 2, 2023..     PROCEDURE: The patient was injected with IV contrast. Axial images were  obtained from the lung bases to the pubic symphysis by computed  tomography. Pre-contrast images of the abdomen and pelvis were also  obtained. This study was performed with techniques to keep radiation  doses as low as reasonably achievable, (ALARA). Individualized  dose  reduction techniques using automated exposure control or adjustment of  mA and/or kV according to the patient size were employed.     FINDINGS:     ABDOMEN: The lung bases demonstrate a new oval, mildly enhancing,  rim-enhancing circumscribed lesion posterior medial right lower lobe  which is not seen previously; metastasis is a concern. There is also a  new, irregularly-shaped left retrocrural mass which measures 47 x 45 x  22 mm. This extends into the left T10 neuroforamen and extends slightly  into the spinal canal but no spinal stenosis identified at this time.  There is distraction of the left T10 transverse process as well as part  of the left lateral aspect of the T10 vertebral body. There is a new,  mild compression fracture of T10  compared to the prior study. Recommend  chest CT with contrast to evaluate for any additional lesions. There is  also new groundglass opacities in the lingula and right lower lobe. Mild  bronchiectasis identified in the right lower lobe new subcentimeter  right lower lobe nodule identified, recommend chest CT. The heart is  proper size. The liver demonstrates the circumscribed hypodense lesion  lateral right lobe of the liver measuring 9 mm and consistent with a  cyst; this is stable. The second hepatic lesion near the dome of the  diaphragm measuring 9 mm also measures 17 Hounsfield units consistent  with a cyst. This does not demonstrate the rim enhancement that was seen  previously. The spleen not identified. No adrenal mass is present.  The  pancreas demonstrates that head of the pancreas which appears stable.  Body and tail appear to been resected and several metallic surgical  clips are noted in the surgical bed. Vascular calcifications noted. The  kidneys are unremarkable. The aorta is proper caliber. There is no free  fluid or adenopathy.     Pre-contrast images demonstrate no evidence of nephrolithiasis.     PELVIS: The appendix is not identified but no secondary  signs of  appendicitis seen. There is a large stool burden. The rectum is  distended to 8.7 cm transverse diameter, possible fecal impaction. There  is streak artifact from patient's arm position as well as left hip  arthroplasty. Thoracolumbar scoliosis with significant degenerative  change identified.. The urinary bladder is unremarkable. There is no  significant free fluid or adenopathy.       Impression:      New 47 mm left retrocrural metastasis with extension into  the left T10 vertebral body and spinal canal at that level and partial  destruction of the left T10 vertebral body with new pathologic  compression fracture.     New, pleural-based 14 mm right lower lobe lesion also concerning for  metastasis, recommend chest CT with contrast if possible to evaluate for  any additional lesions.     Large stool burden with possible fecal impaction.     Improved appearance of subdiaphragmatic hepatic lesion which has the  appearance of a cyst and stable right lateral hepatic cyst..              CTDI: 9.3 mGy  DLP:939.81 mGy.cm     This report was signed and finalized on 3/28/2024 7:41 AM by Stormy Gonzalez MD.               Assessment / Plan      Assessment/Recommendations:   Rectal bleeding  Suspected stercoral ulcer with minimal mucosal bleeding  Fecal impaction with overflow diarrhea  Long-term opioid use  Chronic microcytic anemia  Metastatic pancreatic adenocarcinoma  Patient has a metastatic pancreatic cancer and currently on hospice care.    Patient history is more suggestive of rectal bleeding most likely bleeding from stercoral ulcer.  Significant use of opioids causing her significant constipation.  Patient has metastatic pancreatic cancer with metastasis in the spinal cord possibly contributing to her constipation and urinary retention.  Patient has a minimal overflow diarrhea mixed with the minimal dark blood.  Signs of any overt bleeding.  Rectal examination done reveals large firm to hard rectal stool  ball.  I tried to break the stool balls to facilitate evacuation however patient became very uncomfortable and no further attempts made.  Her admission lab work and CT scan abdomen and chest report reviewed.    Recommend mineral oil enema this afternoon and 1 at 6 PM today  Continue Dulcolax with MiraLAX  Due to significant fecal ball in the rectum this needs to be evacuated before giving any significant laxatives orally.  Fleet enema in a.m. tomorrow  If no bowel movement patient likely need manual evacuation under sedation  Keep n.p.o. after midnight        Thank you very much for letting me participate in the care of this patient.  Please do not hesitate to call me if you have any questions.    Oscar Barney MD  [facility] [place]  3/28/2024  14:08 EDT    Please note that portions of this note may have been completed with a voice recognition program.

## 2024-03-28 NOTE — CONSULTS
[facility]    INPATIENT PALLIATIVE CARE CONSULT      Name:  Silvia Chung   Age:  73 y.o.  Sex:  female  :  1950  MRN:  [id number]   Visit Number:  [id number]  Date of Service:  24  Date of Admission:  3/28/2024  Primary Care Physician:  Johnathan Aguilera MD    Consulting Physician:  Dr. Barrientos    Reason For Consult:  Goals of care discussions , Support during serious illness, and Symptom management     History of Present Illness:  Silvai Chung is a 73 y.o. 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 She had recurrence of disease noted , CT thoracic spine noting left paraspinous mass. Additional imaging noted increasing mass centered at the level of T10, multiple pulmonary nodules, 2 liver lesions all consistent with metastatic disease. Patient is known to the palliative service from initial consult on 2024.  Initial GOC yielded desire to pursue ongoing cancer directed therapies, with palliative intent.  Patient discharged with palliative care outpatient referral.  She presented back to [facility] in February, palliative care discussions ultimately yielded desire for Hospice.  Patient continued under Hospice care, was admitted to the [facility] recently for symptom control, had discharged back to home with hospice services.  Per patient, she developed rectal bleeding, contacted hospice nursing on-call, ultimately elected to present for further evaluation at [facility] ED on 3/28/2024.  ED evaluation concerning for GIB with several large bloody bowel movements.  She was admitted to the primary medicine service for continued evaluation and management.  Hgb trended closely, no urgent need for transfusion.  GI consulted, suspecting bleeding likely rectal from stercoral ulcer.  Fecal impaction with overflow diarrhea, for which mineral enema given, unfortunately unsuccessful.   Palliative consulted to further review GOC and provide ongoing support.      After speaking directly to primary service, met with patient at bedside.  Patient remembering previous encounters with me, welcoming of my visit.  She reviews interval history, seeming somewhat confused about details.  She notes that goal at the present to continue with Hospice services, as she is not a candidate for ongoing cancer treatments, nor does she wish to have them.  She reports that neoplasm pain has been well controlled with current regimen.  She denies abdominal pain, nausea, or vomiting.  She was attempting to manage symptoms at home, however when bleeding began she became nervous to stay at home.  She is tearful throughout my visit with her.  I reviewed current plan in place, for which she is in agreement.  She notes again that she wouldn't want aggressive intervention/procedures or diagnositcs given her terminal cancer diagnosis.  I reviewed CODE STATUS, patient tearful stating that she knows that she is going to die from her cancer, but for today she wishes to remain a FULL CODE.  I offered empathetic and supportive listening.  I encouraged patient to have ongoing discussions and reflections, for which she was thankful.  She is  agreeable to continued palliative care follow up.    Review of Systems   Constitutional:  Positive for fatigue.   Gastrointestinal:  Positive for anal bleeding and blood in stool.   Neurological:  Positive for weakness.        Medical History:  Past Medical History:   Diagnosis Date    Asthma     Hyperlipidemia     Hypertension     Impaired functional mobility, balance, gait, and endurance     Pancreatic cancer 2022    Parkinson disease     PONV (postoperative nausea and vomiting)     Scoliosis     Seasonal allergies       Past Surgical History:   Procedure Laterality Date    ADENOIDECTOMY      BREAST BIOPSY Right 2019    Needle biopsy    CATARACT EXTRACTION Bilateral     CHOLECYSTECTOMY OPEN   12/19/2022    done at     COLONOSCOPY      FOOT SURGERY Left     torn ligament, screws placed.    HIP HEMIARTHROPLASTY Left 10/21/2020    Procedure: HIP HEMIARTHROPLASTY LEFT;  Surgeon: Humza Winters MD;  Location: [facility] OR;  Service: Orthopedics;  Laterality: Left;    LYMPHADENECTOMY  12/19/2022    partial, done at     NECK SURGERY      four fusions    OMENTECTOMY  12/19/2022    done at     PANCREATECTOMY  12/19/2022    done at , left adrenolectomy also performed    PORTACATH PLACEMENT Right 02/09/2023    Procedure: INSERTION OF PORTACATH;  Surgeon: Marcio Garcia MD;  Location: [facility] OR;  Service: General;  Laterality: Right;    SPLENECTOMY  12/19/2022    done at     TONSILLECTOMY       Family History   Problem Relation Age of Onset    Heart failure Father     Lung cancer Father     Cancer Brother     Breast cancer Maternal Grandmother     Breast cancer Paternal Grandmother     Breast cancer Cousin     Ovarian cancer Neg Hx      Allergies: Patient has no known allergies.    Hospital Scheduled Medications:    Current Facility-Administered Medications:     acetaminophen (TYLENOL) tablet 650 mg, 650 mg, Oral, Q4H PRN, Jonathan Barrientos DO    azithromycin (ZITHROMAX) 500 mg 0.9% NaCl (Add-vantage) 250 mL, 500 mg, Intravenous, Q24H, Jonathan Barrientos DO, 500 mg at 03/28/24 1617    sennosides-docusate (PERICOLACE) 8.6-50 MG per tablet 2 tablet, 2 tablet, Oral, BID **AND** polyethylene glycol (MIRALAX) packet 17 g, 17 g, Oral, Daily PRN **AND** bisacodyl (DULCOLAX) EC tablet 5 mg, 5 mg, Oral, Daily PRN **AND** bisacodyl (DULCOLAX) suppository 10 mg, 10 mg, Rectal, Daily PRN, Jonathan Barrientos DO    busPIRone (BUSPAR) tablet 10 mg, 10 mg, Oral, Q12H, Jonathan Barrientos DO, 10 mg at 03/28/24 1354    calcium carbonate (TUMS) chewable tablet 500 mg (200 mg elemental), 2 tablet, Oral, BID PRN, Jonathan Barrientos DO    Calcium Replacement - Follow Nurse / BPA Driven Protocol, , Does not apply,  PRN, Jonathan Barrientos DO    carbidopa-levodopa (SINEMET)  MG per tablet 1 tablet, 1 tablet, Oral, BID, Jonathan Barrientos DO    [Held by provider] carvedilol (COREG) tablet 6.25 mg, 6.25 mg, Oral, BID With Meals, Jonathan Barrientos DO    [START ON 3/29/2024] cefTRIAXone (ROCEPHIN) 2,000 mg in sodium chloride 0.9 % 100 mL IVPB, 2,000 mg, Intravenous, Q24H, Jonathan Barrientos DO    diphenhydrAMINE (BENADRYL) capsule 25 mg, 25 mg, Oral, Nightly PRN, Jonathan Barrientos DO    docusate sodium (COLACE) capsule 100 mg, 100 mg, Oral, Daily, Jonathan Barrientos, , 100 mg at 03/28/24 1354    lactated ringers infusion, 50 mL/hr, Intravenous, Continuous, Jonathan Barrientos DO, Last Rate: 50 mL/hr at 03/28/24 1354, 50 mL/hr at 03/28/24 1354    Magnesium Standard Dose Replacement - Follow Nurse / BPA Driven Protocol, , Does not apply, PRN, Jonathan Barrientos DO    melatonin tablet 5 mg, 5 mg, Oral, Nightly, Jonathan Barrientos DO    Morphine (MS CONTIN) 12 hr tablet 30 mg, 30 mg, Oral, Q12H, Jonathan Barrientos, , 30 mg at 03/28/24 1101    ondansetron ODT (ZOFRAN-ODT) disintegrating tablet 4 mg, 4 mg, Oral, Q6H PRN **OR** ondansetron (ZOFRAN) injection 4 mg, 4 mg, Intravenous, Q6H PRN, Jonathan Barrientos DO    ondansetron (ZOFRAN) tablet 8 mg, 8 mg, Oral, TID PRN, Jonathan Barrientos DO    oxyCODONE (ROXICODONE) immediate release tablet 15 mg, 15 mg, Oral, Q4H PRN, Jonathan Barrientos, , 15 mg at 03/28/24 1617    [START ON 3/29/2024] pantoprazole (PROTONIX) EC tablet 40 mg, 40 mg, Oral, Q AM, Jonathan Barrientos,     Phosphorus Replacement - Follow Nurse / BPA Driven Protocol, , Does not apply, PRN, Jonathan Barrientos,     polyethylene glycol (MIRALAX) packet 17 g, 17 g, Oral, Daily, Jonathan Barrientos, , 17 g at 03/28/24 1617    Potassium Replacement - Follow Nurse / BPA Driven Protocol, , Does not apply, PRN, Jonathan Barrientos,     prochlorperazine (COMPAZINE) tablet 5 mg, 5 mg, Oral,  "Q6H PRN, Iliana, Jonathan Willy, DO    sodium chloride 0.9 % flush 10 mL, 10 mL, Intravenous, PRN, Iliana, Jonathan Willy, DO    sodium chloride 0.9 % flush 10 mL, 10 mL, Intravenous, Q12H, Iliana, Jonathan Willy, DO, 10 mL at 03/28/24 1339    sodium chloride 0.9 % flush 10 mL, 10 mL, Intravenous, PRN, Iliana, Jonathan Willy, DO    sodium chloride 0.9 % infusion 40 mL, 40 mL, Intravenous, PRN, Iliana, Jonathan Willy, DO    traZODone (DESYREL) tablet 50 mg, 50 mg, Oral, Nightly, Iliana, Jonathan Willy, DO  I have utilized all immediate resources to obtain, update, or review the patient's current medications (including all prescription, over-the-counter products, herbals, and/or nutritional supplements).      Vitals: /72 (BP Location: Right arm, Patient Position: Lying)   Pulse 84   Temp 97.3 °F (36.3 °C) (Oral)   Resp 16   Ht 172.7 cm (68\")   Wt 56.1 kg (123 lb 10.9 oz)   SpO2 100%   BMI 18.81 kg/m²     Intake/Output Summary (Last 24 hours) at 3/28/2024 1722  Last data filed at 3/28/2024 1154  Gross per 24 hour   Intake 1200 ml   Output --   Net 1200 ml       Physical Exam  Vitals and nursing note reviewed.   Constitutional:       General: She is not in acute distress.     Appearance: She is cachectic. She is not diaphoretic.      Comments: Frail, ill in appearance, female lying in bed, NAD   HENT:      Head: Normocephalic and atraumatic.      Mouth/Throat:      Mouth: Mucous membranes are moist.      Pharynx: Oropharynx is clear.   Eyes:      Conjunctiva/sclera: Conjunctivae normal.      Pupils: Pupils are equal, round, and reactive to light.   Cardiovascular:      Rate and Rhythm: Normal rate and regular rhythm.   Pulmonary:      Effort: Pulmonary effort is normal. No respiratory distress.   Abdominal:      General: Bowel sounds are normal. There is no distension.      Palpations: Abdomen is soft.      Tenderness: There is no abdominal tenderness.   Musculoskeletal:         General: No swelling. Normal range of motion. "      Cervical back: Normal range of motion and neck supple.   Skin:     General: Skin is warm and dry.      Capillary Refill: Capillary refill takes less than 2 seconds.   Neurological:      Mental Status: She is alert and oriented to person, place, and time.   Psychiatric:         Mood and Affect: Affect is tearful.          Diagnostics Review:  Laboratory Data:  Results from last 7 days   Lab Units 03/28/24  0604   SODIUM mmol/L 130*   POTASSIUM mmol/L 4.5   CHLORIDE mmol/L 86*   CO2 mmol/L 28.5   BUN mg/dL 15   CREATININE mg/dL 0.56*   CALCIUM mg/dL 9.0   ALBUMIN g/dL 3.2*   BILIRUBIN mg/dL 0.3   ALK PHOS U/L 125*   ALT (SGPT) U/L 5   AST (SGOT) U/L 18   GLUCOSE mg/dL 143*     Results from last 7 days   Lab Units 03/28/24  0604   WBC 10*3/mm3 12.54*   HEMOGLOBIN g/dL 9.3*   HEMATOCRIT % 27.2*   PLATELETS 10*3/mm3 304             Results from last 7 days   Lab Units 03/28/24  0831   COLOR UA  Yellow   GLUCOSE UA  Negative   KETONES UA  Negative   BLOOD UA  Moderate (2+)*   LEUKOCYTES UA  Moderate (2+)*   PH, URINE  6.0   BILIRUBIN UA  Negative   UROBILINOGEN UA  1.0 E.U./dL     Pain Management Panel  More data may exist         Latest Ref Rng & Units 1/26/2024 11/21/2023   Pain Management Panel   Amphetamine, Urine Qual Negative Negative  -   Barbiturates Screen, Urine Negative Negative  -   Benzodiazepine Screen, Urine Negative Positive  -   Buprenorphine, Screen, Urine Negative Negative  <10     <50       Cocaine Screen, Urine Negative Negative  <50       Fentanyl, Urine Negative Negative  -   Hydromorphone <50 ng/mL - 287       Methadone Screen , Urine Negative Negative  -   Methamphetamine, Ur Negative Negative  -      Details          This result is from an external source.    Multiple values from one day are sorted in reverse-chronological order                 Nutritional Status:   Results from last 7 days   Lab Units 03/28/24  0604   ALBUMIN g/dL 3.2*     Body mass index is 18.81 kg/m².  Documented weights     03/28/24 0531 03/28/24 1429   Weight: 59.9 kg (132 lb) 56.1 kg (123 lb 10.9 oz)        Radiology:  CT Angiogram Chest    Result Date: 3/28/2024  PROCEDURE: CT ANGIOGRAM CHEST-  HISTORY: hypoxia; K92.2-Gastrointestinal hemorrhage, unspecified; N39.0-Urinary tract infection, site not specified; E86.0-Dehydration; C25.9-Malignant neoplasm of pancreas, unspecified  COMPARISON: February 10, 2024.  TECHNIQUE: The patient was injected with  IV contrast. Axial images were obtained through the chest in a CTA/ PE protocol. 3 D Reconstruction images were also performed. This study was performed with techniques to keep radiation doses as low as reasonably achievable, (ALARA). Individualized dose reduction techniques using automated exposure control or adjustment of mA and/or kV according to the patient size were employed.  FINDINGS: There is no axillary adenopathy. There is been no change in the small mediastinal lymph nodes compared to the prior exam. The heart is proper size. There is no pericardial or pleural effusion. No filling defects are identified to suggest PE. Again noted is borderline enlargement of the ascending aorta at 39 mm. Again noted is the left retrocrural mass measuring up to 44 mm with extension into the spinal canal at the level of T10 with widening of the left neuroforamen and partial destruction of the left T10 transverse consistent with metastasis. Process and lateral aspect of the left T10 vertebral body. Mild pathologic compression fracture again noted as seen on CT abdomen/pelvis. Limited images of the upper abdomen demonstrate 2 hepatic lesions as described on CT abdomen pelvis. Again noted are multiple bilateral noncalcified pulmonary nodules. The largest is located anteriorly in the right upper lobe and has enlarged from 12 to 17 mm in the anterior posterior diameter. This does not have the typical appearance of a metastasis having an irregular shape, primary lung malignancy is in the  differential. Recommend a PET/CT. The additional bilateral noncalcified smaller nodules generally have a more rounded contour suggesting metastasis. There is new groundglass opacity involving the right lower lobe suggesting superimposed pneumonia.      Multiple bilateral noncalcified pulmonary nodules which are stable to increased in size compared to prior concerning for metastatic disease.  Enlarging 17 mm irregular anterior right upper lobe mass, primary pulmonary malignancy versus metastasis. PET/CT may be helpful.  New groundglass opacities right lower lobe, possible superimposed pneumonia.  Borderline enlargement of the ascending aorta, stable from prior; recommend 1 year follow-up.   CTDI: 9.41 mGy DLP:331.85 mGy.cm  This report was signed and finalized on 3/28/2024 9:47 AM by Stormy Gonzalez MD.      XR Chest 1 View    Result Date: 3/28/2024  PROCEDURE: XR CHEST 1 VW-  HISTORY: hypoxia, history of pancreatic cancer.  COMPARISON: February 10, 2024..  FINDINGS: The heart is normal in size. Right subclavian port is stable in position. There is mildly increased interstitial disease bilaterally. Patchy left basilar disease is stable. There is new peripheral left mid and lower lung field airspace disease, possible pneumonia. Given the history of previous malignancy, recommend chest CT with contrast if possible.. The mediastinum is unremarkable. There is no pneumothorax. There are no acute osseous abnormalities.      New left lateral airspace disease, recommend chest CT with contrast if possible..  Stable right subclavian port.    This report was signed and finalized on 3/28/2024 7:42 AM by Stormy Gonzalez MD.      CT Abdomen Pelvis With & Without Contrast    Result Date: 3/28/2024  PROCEDURE: CT ABDOMEN PELVIS W WO CONTRAST-  HISTORY: gi bleed  COMPARISON: December 2, 2023..  PROCEDURE: The patient was injected with IV contrast. Axial images were obtained from the lung bases to the pubic symphysis by computed tomography.  Pre-contrast images of the abdomen and pelvis were also obtained. This study was performed with techniques to keep radiation doses as low as reasonably achievable, (ALARA). Individualized dose reduction techniques using automated exposure control or adjustment of mA and/or kV according to the patient size were employed.  FINDINGS:  ABDOMEN: The lung bases demonstrate a new oval, mildly enhancing, rim-enhancing circumscribed lesion posterior medial right lower lobe which is not seen previously; metastasis is a concern. There is also a new, irregularly-shaped left retrocrural mass which measures 47 x 45 x 22 mm. This extends into the left T10 neuroforamen and extends slightly into the spinal canal but no spinal stenosis identified at this time. There is distraction of the left T10 transverse process as well as part of the left lateral aspect of the T10 vertebral body. There is a new, mild compression fracture of T10  compared to the prior study. Recommend chest CT with contrast to evaluate for any additional lesions. There is also new groundglass opacities in the lingula and right lower lobe. Mild bronchiectasis identified in the right lower lobe new subcentimeter right lower lobe nodule identified, recommend chest CT. The heart is proper size. The liver demonstrates the circumscribed hypodense lesion lateral right lobe of the liver measuring 9 mm and consistent with a cyst; this is stable. The second hepatic lesion near the dome of the diaphragm measuring 9 mm also measures 17 Hounsfield units consistent with a cyst. This does not demonstrate the rim enhancement that was seen previously. The spleen not identified. No adrenal mass is present.  The pancreas demonstrates that head of the pancreas which appears stable. Body and tail appear to been resected and several metallic surgical clips are noted in the surgical bed. Vascular calcifications noted. The kidneys are unremarkable. The aorta is proper caliber. There is  no free fluid or adenopathy.  Pre-contrast images demonstrate no evidence of nephrolithiasis.  PELVIS: The appendix is not identified but no secondary signs of appendicitis seen. There is a large stool burden. The rectum is distended to 8.7 cm transverse diameter, possible fecal impaction. There is streak artifact from patient's arm position as well as left hip arthroplasty. Thoracolumbar scoliosis with significant degenerative change identified.. The urinary bladder is unremarkable. There is no significant free fluid or adenopathy.      New 47 mm left retrocrural metastasis with extension into the left T10 vertebral body and spinal canal at that level and partial destruction of the left T10 vertebral body with new pathologic compression fracture.  New, pleural-based 14 mm right lower lobe lesion also concerning for metastasis, recommend chest CT with contrast if possible to evaluate for any additional lesions.  Large stool burden with possible fecal impaction.  Improved appearance of subdiaphragmatic hepatic lesion which has the appearance of a cyst and stable right lateral hepatic cyst..     CTDI: 9.3 mGy DLP:939.81 mGy.cm  This report was signed and finalized on 3/28/2024 7:41 AM by Stormy Gonzalez MD.         Palliative Care Assessment:  Metastatic pancreatic cancer  Neoplasm related pain  constipation    Recommendations/Plan:  - CODE STATUS reviewed/confirmed: FULL CODE   - Patient continues under Hospice care services, confirming with patient she continues to desire hospice services upon discharge   - Goal at the present is to continue with medical plan in place, however patient electing for a conservative approach to her care, would desire to avoid aggressive surgical intervention if able  - Ongoing discussions regarding CODE STATUS; patient ultimately understands her terminal condition, has previously elected for DO NOT RESUSCITATE/DO NOT INTUBATE, however at the present wishes to maintain FULL CODE  - Neoplasm  related pain symptoms well controlled with MS contin 30mg BID /oxycodone IR 15mg Q4 hrs PRN for breakthrough pain  - Patient with chronic constipation, has required enema prior to discharge upon previous palliative assessment; would recommend aggressive bowel regimen moving forward given concurrent opiate use  - Palliative care will continue to follow/support patient and family        I appreciate the opportunity to participate in the care of Ms. Silvia Chung.  Please do not hesitate to contact me with any questions or concerns.      I spent 50 total minutes conducting chart review for relevant information, face to face assessment, counseling patient and/or family, and coordination of care.   Part of this note may be an electronic transcription/translation of spoken language to printed text using the Dragon Dictation System.       Mary Carmen Sharma PA-C  03/28/24  17:22 EDT

## 2024-03-28 NOTE — ED NOTES
Spoke with Dr. Fuentes regarding pt soap suds enema administration, per Dr. Barney they want soap suds enema administration to relieve pt of large stool burden. Dr. Fuentes aware that pt has had several large bloody bowel movements but per Dr. Barney want administration of enema in hopes that it helps the bleeding.

## 2024-03-28 NOTE — H&P
[facility]   HISTORY AND PHYSICAL      Name:  Silvia Chung   Age:  73 y.o.  Sex:  female  :  1950  MRN:  [id number]   Visit Number:  [id number]  Admission Date:  3/28/2024  Date Of Service:  24  Primary Care Physician:  Johnathan Aguilera MD    Chief Complaint:     Constipation    History Of Presenting Illness:      73-year-old female history of metastatic pancreatic cancer, chronic indwelling Cervantes, chronic pain.  She presents with decreased oxygen levels noted in the ER but also with chronic constipation.  She reports she has had that for a few months but typically that she is able to control it with her pain medicine.  However she reports that it was so severe prior to coming to the ER at that she could not stand any longer.  She also was noted to have some bright red blood per rectum.  Also had several large bloody bowel movements in the ED.  ER had discussed this with Dr. Barney who will be following the patient as well and had recommended enema.  Also initiating a bowel regimen.  Also she was noted to have pyuria while in the ER but denies dysuria reports chronic pyuria.  She elects to be full code.    Pertinent findings: Sodium 130, bicarb 28.5, creatinine 0.56, glucose 143, alkaline phosphatase 125, albumin 3.2, lactate 2.6, WBC 12.54, hemoglobin 9.3, urinalysis WBC 11-20, specific gravity greater than 1.03 moderate blood, fecal occult blood positive, COVID and flu negative, blood and urine cultures pending, chest x-ray showing left lateral airspace disease, CT abdomen and pelvis showing new 47 mm retrocrural metastasis extension into the T10 vertebral body with pathologic fracture, new 14 mm right lower lobe metastasis, large stool burden fecal impaction, CT angiogram of the chest showing multiple bilateral noncalcified pulmonary nodules stable to increased in size compared to prior concerning for metastatic disease, enlarging 17 mm irregular anterior right  upper lobe mass primary pulmonary malignancy versus metastatic disease PET CT recommended, new groundglass opacity right lower lobe possible superimposed pneumonia,    ED Medications:    Medications   sodium chloride 0.9 % flush 10 mL (has no administration in time range)   doxycycline (VIBRAMYCIN) 100 mg/100 mL 0.9% NS MBP (has no administration in time range)   sodium chloride 0.9 % bolus 1,000 mL (has no administration in time range)   iopamidol (ISOVUE-300) 61 % injection 100 mL (100 mL Intravenous Given 3/28/24 0704)   cefTRIAXone (ROCEPHIN) 2,000 mg in sodium chloride 0.9 % 100 mL IVPB (0 mg Intravenous Stopped 3/28/24 0922)   iopamidol (ISOVUE-300) 61 % injection 100 mL (100 mL Intravenous Given 3/28/24 0929)       Edited by: Jonathan Barrientos DO at 3/28/2024 1021     Review Of Systems:    All systems were reviewed and negative except as mentioned in history of presenting illness, assessment and plan.    Past Medical History: Patient  has a past medical history of Asthma, Hyperlipidemia, Hypertension, Impaired functional mobility, balance, gait, and endurance, Pancreatic cancer (2022), Parkinson disease, PONV (postoperative nausea and vomiting), Scoliosis, and Seasonal allergies.    Past Surgical History: Patient  has a past surgical history that includes Tonsillectomy; Adenoidectomy; Neck surgery; Cataract extraction (Bilateral); Hip hemiArthroplasty (Left, 10/21/2020); Foot surgery (Left); Colonoscopy; Breast biopsy (Right, 2019); Pancreatectomy (12/19/2022); Splenectomy, total (12/19/2022); Omentectomy (12/19/2022); Lymphadenectomy (12/19/2022); Cholecystectomy open (12/19/2022); and Portacath placement (Right, 02/09/2023).    Social History: Patient  reports that she has never smoked. She has never used smokeless tobacco. She reports that she does not currently use alcohol. Drug use questions deferred to the physician.    Family History:  Patient's family history has been reviewed and found to be  noncontributory.     Allergies:      Patient has no known allergies.    Home Medications:    Prior to Admission Medications       Prescriptions Last Dose Informant Patient Reported? Taking?    busPIRone (BUSPAR) 10 MG tablet   No No    Take 1 tablet by mouth Every 12 (Twelve) Hours.    carbidopa-levodopa (SINEMET)  MG per tablet   Yes No    Take 1 tablet by mouth 2 (Two) Times a Day.    carvedilol (COREG) 6.25 MG tablet   No No    Take 1 tablet by mouth 2 (Two) Times a Day With Meals.    denosumab (Prolia) 60 MG/ML solution prefilled syringe syringe   Yes No    INJECT 1 MILLILITER (60 MG) BY SUBCUTANEOUS ROUTE EVERY 6 MONTHS IN THE UPPER ARM, UPPER THIGH OR ABDOMEN    dexAMETHasone (DECADRON) 4 MG tablet   No No    Take 1 tablet by mouth 2 (Two) Times a Day With Meals.    diphenhydrAMINE (BENADRYL) 25 mg capsule   Yes No    Take 1 capsule by mouth At Night As Needed.    docusate sodium (COLACE) 100 MG capsule   Yes No    Take 1 capsule every day by oral route.    esomeprazole (nexIUM) 40 MG capsule  Self Yes No    Take 1 capsule by mouth Every Morning Before Breakfast.    Multiple Vitamins-Minerals (multivitamin with minerals) tablet tablet   Yes No    Take 1 tablet by mouth Daily.    Patient not taking:  Reported on 2/14/2024    naloxone (NARCAN) 4 MG/0.1ML nasal spray   No No    Call 911. Spray into nostril upon signs of overdose. May repeat in 2-3 minutes in other nostril if no or minimal breathing and responsiveness.    ondansetron (ZOFRAN) 8 MG tablet   No No    Take 1 tablet by mouth 3 (Three) Times a Day As Needed for Nausea or Vomiting.    ondansetron ODT (ZOFRAN-ODT) 4 MG disintegrating tablet   No No    Place 1 tablet on the tongue Every 8 (Eight) Hours As Needed for Nausea or Vomiting.    Patient not taking:  Reported on 2/14/2024    oxyCODONE (ROXICODONE) 15 MG immediate release tablet   No No    Take 1 tablet by mouth Every 4 (Four) Hours As Needed for Moderate Pain or Severe Pain.    polyethylene  "glycol (MIRALAX) 17 g packet   Yes No    Take 17 g by mouth Daily.    prochlorperazine (COMPAZINE) 5 MG tablet   No No    Take 1 tablet by mouth Every 6 (Six) Hours As Needed for Nausea or Vomiting.    sennosides-docusate (PERICOLACE) 8.6-50 MG per tablet   No No    Take 2 tablets by mouth 2 (Two) Times a Day.    traZODone (DESYREL) 50 MG tablet   Yes No    Take  by mouth.              Vital Signs:  Temp:  [97.9 °F (36.6 °C)] 97.9 °F (36.6 °C)  Heart Rate:  [97] 97  Resp:  [16] 16  BP: (110-125)/(83-97) 121/83        03/28/24  0531   Weight: 59.9 kg (132 lb)     Body mass index is 20.07 kg/m².    Physical Exam:     Most recent vital Signs: /83   Pulse 97   Temp 97.9 °F (36.6 °C) (Oral)   Resp 16   Ht 172.7 cm (68\")   Wt 59.9 kg (132 lb)   SpO2 99%   BMI 20.07 kg/m²     Constitutional: Awake, alert, frail-appearing  Eyes: PERRLA, sclerae anicteric, no conjunctival injection  HENT: NCAT, mucous membranes moist  Neck: Supple, no thyromegaly, no lymphadenopathy, trachea midline  Respiratory: Diminished without wheeze or rhonchi bilaterally, nonlabored respirations   Cardiovascular: RRR, no murmurs, rubs, or gallops, palpable pedal pulses bilaterally  Gastrointestinal: Positive bowel sounds, soft, nontender, nondistended  Musculoskeletal: No bilateral ankle edema, no clubbing or cyanosis to extremities  Psychiatric: Appropriate affect, cooperative  Neurologic: Oriented x 3, speech clear  Skin: Dry  Edited by: Jonathan Barrientos DO at 3/28/2024 1012      Laboratory data:    I have reviewed the labs done in the emergency room.    Results from last 7 days   Lab Units 03/28/24  0604   SODIUM mmol/L 130*   POTASSIUM mmol/L 4.5   CHLORIDE mmol/L 86*   CO2 mmol/L 28.5   BUN mg/dL 15   CREATININE mg/dL 0.56*   CALCIUM mg/dL 9.0   BILIRUBIN mg/dL 0.3   ALK PHOS U/L 125*   ALT (SGPT) U/L 5   AST (SGOT) U/L 18   GLUCOSE mg/dL 143*     Results from last 7 days   Lab Units 03/28/24  0604   WBC 10*3/mm3 12.54* "   HEMOGLOBIN g/dL 9.3*   HEMATOCRIT % 27.2*   PLATELETS 10*3/mm3 304                             Results from last 7 days   Lab Units 03/28/24  0831   COLOR UA  Yellow   GLUCOSE UA  Negative   KETONES UA  Negative   BLOOD UA  Moderate (2+)*   LEUKOCYTES UA  Moderate (2+)*   PH, URINE  6.0   BILIRUBIN UA  Negative   UROBILINOGEN UA  1.0 E.U./dL   RBC UA /HPF 0-2   WBC UA /HPF 11-20*       Pain Management Panel  More data may exist         Latest Ref Rng & Units 1/26/2024 11/21/2023   Pain Management Panel   Amphetamine, Urine Qual Negative Negative  -   Barbiturates Screen, Urine Negative Negative  -   Benzodiazepine Screen, Urine Negative Positive  -   Buprenorphine, Screen, Urine Negative Negative  <10     <50       Cocaine Screen, Urine Negative Negative  <50       Fentanyl, Urine Negative Negative  -   Hydromorphone <50 ng/mL - 287       Methadone Screen , Urine Negative Negative  -   Methamphetamine, Ur Negative Negative  -      Details          This result is from an external source.    Multiple values from one day are sorted in reverse-chronological order               EKG:      None available to review    Radiology:    CT Angiogram Chest    Result Date: 3/28/2024  PROCEDURE: CT ANGIOGRAM CHEST-  HISTORY: hypoxia; K92.2-Gastrointestinal hemorrhage, unspecified; N39.0-Urinary tract infection, site not specified; E86.0-Dehydration; C25.9-Malignant neoplasm of pancreas, unspecified  COMPARISON: February 10, 2024.  TECHNIQUE: The patient was injected with  IV contrast. Axial images were obtained through the chest in a CTA/ PE protocol. 3 D Reconstruction images were also performed. This study was performed with techniques to keep radiation doses as low as reasonably achievable, (ALARA). Individualized dose reduction techniques using automated exposure control or adjustment of mA and/or kV according to the patient size were employed.  FINDINGS: There is no axillary adenopathy. There is been no change in the small  mediastinal lymph nodes compared to the prior exam. The heart is proper size. There is no pericardial or pleural effusion. No filling defects are identified to suggest PE. Again noted is borderline enlargement of the ascending aorta at 39 mm. Again noted is the left retrocrural mass measuring up to 44 mm with extension into the spinal canal at the level of T10 with widening of the left neuroforamen and partial destruction of the left T10 transverse consistent with metastasis. Process and lateral aspect of the left T10 vertebral body. Mild pathologic compression fracture again noted as seen on CT abdomen/pelvis. Limited images of the upper abdomen demonstrate 2 hepatic lesions as described on CT abdomen pelvis. Again noted are multiple bilateral noncalcified pulmonary nodules. The largest is located anteriorly in the right upper lobe and has enlarged from 12 to 17 mm in the anterior posterior diameter. This does not have the typical appearance of a metastasis having an irregular shape, primary lung malignancy is in the differential. Recommend a PET/CT. The additional bilateral noncalcified smaller nodules generally have a more rounded contour suggesting metastasis. There is new groundglass opacity involving the right lower lobe suggesting superimposed pneumonia.      Multiple bilateral noncalcified pulmonary nodules which are stable to increased in size compared to prior concerning for metastatic disease.  Enlarging 17 mm irregular anterior right upper lobe mass, primary pulmonary malignancy versus metastasis. PET/CT may be helpful.  New groundglass opacities right lower lobe, possible superimposed pneumonia.  Borderline enlargement of the ascending aorta, stable from prior; recommend 1 year follow-up.   CTDI: 9.41 mGy DLP:331.85 mGy.cm  This report was signed and finalized on 3/28/2024 9:47 AM by Stormy Gonzalez MD.      XR Chest 1 View    Result Date: 3/28/2024  PROCEDURE: XR CHEST 1 VW-  HISTORY: hypoxia, history of  pancreatic cancer.  COMPARISON: February 10, 2024..  FINDINGS: The heart is normal in size. Right subclavian port is stable in position. There is mildly increased interstitial disease bilaterally. Patchy left basilar disease is stable. There is new peripheral left mid and lower lung field airspace disease, possible pneumonia. Given the history of previous malignancy, recommend chest CT with contrast if possible.. The mediastinum is unremarkable. There is no pneumothorax. There are no acute osseous abnormalities.      New left lateral airspace disease, recommend chest CT with contrast if possible..  Stable right subclavian port.    This report was signed and finalized on 3/28/2024 7:42 AM by Stormy Gonzalez MD.      CT Abdomen Pelvis With & Without Contrast    Result Date: 3/28/2024  PROCEDURE: CT ABDOMEN PELVIS W WO CONTRAST-  HISTORY: gi bleed  COMPARISON: December 2, 2023..  PROCEDURE: The patient was injected with IV contrast. Axial images were obtained from the lung bases to the pubic symphysis by computed tomography. Pre-contrast images of the abdomen and pelvis were also obtained. This study was performed with techniques to keep radiation doses as low as reasonably achievable, (ALARA). Individualized dose reduction techniques using automated exposure control or adjustment of mA and/or kV according to the patient size were employed.  FINDINGS:  ABDOMEN: The lung bases demonstrate a new oval, mildly enhancing, rim-enhancing circumscribed lesion posterior medial right lower lobe which is not seen previously; metastasis is a concern. There is also a new, irregularly-shaped left retrocrural mass which measures 47 x 45 x 22 mm. This extends into the left T10 neuroforamen and extends slightly into the spinal canal but no spinal stenosis identified at this time. There is distraction of the left T10 transverse process as well as part of the left lateral aspect of the T10 vertebral body. There is a new, mild compression  fracture of T10  compared to the prior study. Recommend chest CT with contrast to evaluate for any additional lesions. There is also new groundglass opacities in the lingula and right lower lobe. Mild bronchiectasis identified in the right lower lobe new subcentimeter right lower lobe nodule identified, recommend chest CT. The heart is proper size. The liver demonstrates the circumscribed hypodense lesion lateral right lobe of the liver measuring 9 mm and consistent with a cyst; this is stable. The second hepatic lesion near the dome of the diaphragm measuring 9 mm also measures 17 Hounsfield units consistent with a cyst. This does not demonstrate the rim enhancement that was seen previously. The spleen not identified. No adrenal mass is present.  The pancreas demonstrates that head of the pancreas which appears stable. Body and tail appear to been resected and several metallic surgical clips are noted in the surgical bed. Vascular calcifications noted. The kidneys are unremarkable. The aorta is proper caliber. There is no free fluid or adenopathy.  Pre-contrast images demonstrate no evidence of nephrolithiasis.  PELVIS: The appendix is not identified but no secondary signs of appendicitis seen. There is a large stool burden. The rectum is distended to 8.7 cm transverse diameter, possible fecal impaction. There is streak artifact from patient's arm position as well as left hip arthroplasty. Thoracolumbar scoliosis with significant degenerative change identified.. The urinary bladder is unremarkable. There is no significant free fluid or adenopathy.      New 47 mm left retrocrural metastasis with extension into the left T10 vertebral body and spinal canal at that level and partial destruction of the left T10 vertebral body with new pathologic compression fracture.  New, pleural-based 14 mm right lower lobe lesion also concerning for metastasis, recommend chest CT with contrast if possible to evaluate for any  additional lesions.  Large stool burden with possible fecal impaction.  Improved appearance of subdiaphragmatic hepatic lesion which has the appearance of a cyst and stable right lateral hepatic cyst..     CTDI: 9.3 mGy DLP:939.81 mGy.cm  This report was signed and finalized on 3/28/2024 7:41 AM by Stormy Gonzalez MD.       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  Monitor hemoglobin.  Give a bowel regimen.  Consult to GI for colonoscopy assessment.  Check B12 and folate.  Monitor urine and blood cultures.  Will give Rocephin and azithromycin for CAP coverage.  Consult to palliative for goals of care discussion and symptom management.  Edited by: Jonathan Barrientos DO at 3/28/2024 1014           Risk Assessment: High  DVT Prophylaxis: SCDs  Code Status:   Code Status and Medical Interventions:   Ordered at: 03/28/24 1020     Code Status (Patient has no pulse and is not breathing):    CPR (Attempt to Resuscitate)     Medical Interventions (Patient has pulse or is breathing):    Full Support      Diet:      Advance Care Planning   ACP discussion was held with the patient during this visit. Patient does not have an advance directive, declines further assistance.           Jonathan Barrientos DO  03/28/24  10:21 EDT    Dictated utilizing Dragon dictation.

## 2024-03-28 NOTE — ED PROVIDER NOTES
Subjective   History of Present Illness  73-year-old female who presents for evaluation of vaginal versus rectal bleeding.  The patient states that she began to have some vaginal bleeding last night.  However she was noted to pass a bowel movement with a decent amount of blood upon arrival here to the ER.  This is suspected to be rectal in origin.  She denies previous history of GI bleed in the past.  She does not take any anticoagulation.  She denies abdominal pain.  She reports some mild fatigue.  She denies lightheadedness.  Her oxygen saturations were noted to be down to 83% on room air with no preceding history of oxygen dependence.  She denies fever, cough, shortness of breath.  No reported sick contacts.  She has a chronic Cervantes catheter in place.  She denies dysuria or urinary frequency.  She exhibits normal baseline mentation.  No other acute complaints.      Review of Systems   Constitutional:  Positive for fatigue. Negative for chills and fever.   HENT:  Negative for congestion, ear pain, postnasal drip, sinus pressure and sore throat.    Eyes:  Negative for pain, redness and visual disturbance.   Respiratory:  Negative for cough, chest tightness and shortness of breath.    Cardiovascular:  Negative for chest pain, palpitations and leg swelling.   Gastrointestinal:  Positive for blood in stool. Negative for abdominal pain, anal bleeding, diarrhea, nausea and vomiting.   Endocrine: Negative for polydipsia and polyuria.   Genitourinary:  Negative for difficulty urinating, dysuria, frequency and urgency.   Musculoskeletal:  Negative for arthralgias, back pain and neck pain.   Skin:  Negative for pallor and rash.   Allergic/Immunologic: Negative for environmental allergies and immunocompromised state.   Neurological:  Negative for dizziness, weakness and headaches.   Hematological:  Negative for adenopathy.   Psychiatric/Behavioral:  Negative for confusion, self-injury and suicidal ideas. The patient is not  nervous/anxious.    All other systems reviewed and are negative.      Past Medical History:   Diagnosis Date    Asthma     Hyperlipidemia     Hypertension     Impaired functional mobility, balance, gait, and endurance     Pancreatic cancer 2022    Parkinson disease     PONV (postoperative nausea and vomiting)     Scoliosis     Seasonal allergies        No Known Allergies    Past Surgical History:   Procedure Laterality Date    ADENOIDECTOMY      BREAST BIOPSY Right 2019    Needle biopsy    CATARACT EXTRACTION Bilateral     CHOLECYSTECTOMY OPEN  12/19/2022    done at     COLONOSCOPY      FOOT SURGERY Left     torn ligament, screws placed.    HIP HEMIARTHROPLASTY Left 10/21/2020    Procedure: HIP HEMIARTHROPLASTY LEFT;  Surgeon: Humza Winters MD;  Location: [facility];  Service: Orthopedics;  Laterality: Left;    LYMPHADENECTOMY  12/19/2022    partial, done at     NECK SURGERY      four fusions    OMENTECTOMY  12/19/2022    done at     PANCREATECTOMY  12/19/2022    done at , left adrenolectomy also performed    PORTACATH PLACEMENT Right 02/09/2023    Procedure: INSERTION OF PORTACATH;  Surgeon: Marcio Garcia MD;  Location: [facility];  Service: General;  Laterality: Right;    SPLENECTOMY  12/19/2022    done at     TONSILLECTOMY         Family History   Problem Relation Age of Onset    Heart failure Father     Lung cancer Father     Cancer Brother     Breast cancer Maternal Grandmother     Breast cancer Paternal Grandmother     Breast cancer Cousin     Ovarian cancer Neg Hx        Social History     Socioeconomic History    Marital status:    Tobacco Use    Smoking status: Never    Smokeless tobacco: Never   Vaping Use    Vaping status: Never Used   Substance and Sexual Activity    Alcohol use: Not Currently     Comment: rare    Drug use: Defer    Sexual activity: Defer           Objective   Physical Exam  Vitals and nursing note reviewed.   Constitutional:       General: She is not in acute  distress.     Appearance: Normal appearance. She is well-developed. She is not toxic-appearing or diaphoretic.   HENT:      Head: Normocephalic and atraumatic.      Right Ear: External ear normal.      Left Ear: External ear normal.      Nose: Nose normal.   Eyes:      General: Lids are normal.      Pupils: Pupils are equal, round, and reactive to light.   Neck:      Trachea: No tracheal deviation.   Cardiovascular:      Rate and Rhythm: Normal rate and regular rhythm.      Pulses: No decreased pulses.      Heart sounds: Normal heart sounds. No murmur heard.     No friction rub. No gallop.   Pulmonary:      Effort: Pulmonary effort is normal. No respiratory distress.      Breath sounds: Normal breath sounds. No decreased breath sounds, wheezing, rhonchi or rales.   Abdominal:      General: Bowel sounds are normal.      Palpations: Abdomen is soft.      Tenderness: There is no abdominal tenderness. There is no guarding or rebound.   Genitourinary:     Comments: The patient has rectal bleeding on exam.     Speculum exam was performed and there is no vaginal bleeding.  Cervantes catheter is in place and there is no blood in the Cervantes catheter bag.  Musculoskeletal:         General: No deformity. Normal range of motion.      Cervical back: Normal range of motion and neck supple.   Lymphadenopathy:      Cervical: No cervical adenopathy.   Skin:     General: Skin is warm and dry.      Findings: No rash.   Neurological:      Mental Status: She is alert and oriented to person, place, and time.      Cranial Nerves: No cranial nerve deficit.      Sensory: No sensory deficit.   Psychiatric:         Speech: Speech normal.         Behavior: Behavior normal.         Thought Content: Thought content normal.         Judgment: Judgment normal.         Procedures           ED Course  ED Course as of 03/28/24 1003   u Mar 28, 2024   0947 The patient is not currently receiving any treatment for known metastatic pancreatic cancer as the  patient is no longer a candidate for treatment.  Previously followed by Dr. Corrales of oncology.  The patient however per her report is also not a hospice patient.  She was noted to have blood in her stool prior to arrival.  Cervantes catheter was in place this was removed and a repeat urine sample was obtained and shows 1+ bacteria with white cells concerning for urinary tract infection.  Blood per rectum is consistent with hematochezia.  CT scan of the abdomen pelvis and chest x-ray show metastatic pancreatic lesions.  The one reported on the chest x-ray actually was present on a previous CT scan of the chest that was performed on recent admission.  However I have pursued a CT angiogram of the chest secondary to oxygen saturations down to 83% on room air with no preceding history of oxygen dependence.  I have initiated blood cultures, lactic acid, and antibiotics for treatment of urinary tract infection versus pneumonia.  I discussed the patient with Dr. Cox.  He recommends admission, enema and relief of current constipation and continued observation.  The patient does not take anticoagulation.  H&H is roughly 1 unit lower than previous level.  Hemodynamically stable. [NS]      ED Course User Index  [NS] Sonya Fuentes MD                                             Medical Decision Making  Differential diagnosis includes metastatic cancer, acute GI bleed, constipation, anemia, urine tract infection, pneumonia, pulmonary embolism, other unspecified etiology.    COVID and flu test are negative.    Chest x-ray reports new left lateral airspace disease.  CT scan was recommended.  CT angiogram of the chest shows multiple bilateral noncalcified pulmonary nodules which are stable to increase in size compared to prior, concerning for metastatic disease.  Enlarging 17 mm irregular anterior right upper lobe mass primarily pulmonary malignancy versus metastasis.  New groundglass opacities in right lower lobe which may  represent pneumonia.    I have ordered blood cultures, lactic acid, and initiated antibiotics in the form of Rocephin.    Urine is questionable for infection with moderate leuk esterase, 11-20 white blood cells, and 1+ bacteria.    There are also signs of dehydration with elevated specific gravity and therefore a liter of IV fluids has been administered.    Labs show mild lactic acid elevation and white blood cell elevation.  Normal kidney function with no significant electrolyte abnormalities.    I discussed the patient with the GI physician, Dr. Arin Hernandez, who recommends enema to help relieve constipation as he feels this is contributing to a mild colitis and current GI bleeding.    I discussed the patient with the hospitalist, Dr. Barrientos, who will consult on the patient to determine status of admission.    Problems Addressed:  Acute dehydration: complicated acute illness or injury with systemic symptoms that poses a threat to life or bodily functions  Acute GI bleeding: complicated acute illness or injury with systemic symptoms  Acute UTI: complicated acute illness or injury with systemic symptoms  Malignant neoplasm of pancreas, unspecified location of malignancy: complicated acute illness or injury with systemic symptoms  Pneumonia of right lower lobe due to infectious organism: complicated acute illness or injury with systemic symptoms that poses a threat to life or bodily functions    Amount and/or Complexity of Data Reviewed  External Data Reviewed: labs and radiology.  Labs: ordered. Decision-making details documented in ED Course.  Radiology: ordered and independent interpretation performed. Decision-making details documented in ED Course.    Risk  Prescription drug management.  Decision regarding hospitalization.        Final diagnoses:   Acute GI bleeding   Acute UTI   Acute dehydration   Malignant neoplasm of pancreas, unspecified location of malignancy   Pneumonia of right lower lobe due to infectious  organism       ED Disposition  ED Disposition       ED Disposition   Decision to Admit    Condition   --    Comment   --               No follow-up provider specified.       Medication List      No changes were made to your prescriptions during this visit.            Sonya Fuentes MD  03/28/24 5542

## 2024-03-28 NOTE — CONSULTS
In Patient Consult      Date of Consultation: 2024  Patient Name: Silvia Chung  MRN: [id number]  : 1950     Referring provider: Jonathan Barrientos DO    Primary care provider:  Johnathan Aguilera MD    Reason for consultation: Rectal bleeding, chronic constipation      History of Present Illness: This is a 73-year-old female patient with a prior history of hypertension, hyperlipidemia, Parkinson's disease, asthma, history of metastatic pancreatic cancer on hospice care, chronic opioid use, chronic opioid-induced constipation was brought in emergency room today on 2024 with complaints of passing dark blood from rectum.    He was diagnosed with pancreatic cancer in 2022.  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  He had a distal pancreatectomy and splenectomy subsequently in 2022, followed by adjuvant chemotherapy.  Surveillance CT scan done in 2022 revealed 4.4 cm left paraspinous mass encroaching into the spinal canal.  CA 19-9 was elevated as well.  Subsequent PET scan done in 2024 showed metabolic left paraspinal soft tissue mass at T10 with multiple bilateral pulmonary nodules and hypermetabolic liver lesions consistent with hepatic metastasis.  She did receive palliative radiotherapy to the thoracic spine in 2024.  Recently as per heme-onc note was placed on hospice care.      She is currently on MS Contin and Roxicodone.  She has a significant issue with bowel movement with the bowel movement once in couple of weeks spite on senna, MiraLAX, stool softeners.    This morning she woke up with pool of dark liquid stool.  She had 1 more episode blood mixed stool liquid stool in the emergency room.  She did not have any good bowel movement for the past couple of weeks.  She denies any significant abdominal pain nausea vomiting.  In the emergency room her lab work done revealed borderline sodium of 130 glucose of  143.  Prosperous 125 with a normal rest of the liver enzymes albumin low at 3.2.  Her hemoglobin was borderline 9.3 compared to baseline 10.6 in February 2024.  Borderline WBC 12,000.  Platelet count was normal [id number].  CT abdomen pelvis done revealed previous metastatic findings and fecal impaction mostly in the rectum with a fecal ball measuring about 8 to 9 cm.  GI was consulted for GI bleed.    Subjective     Past Medical History:   Diagnosis Date    Asthma     Hyperlipidemia     Hypertension     Impaired functional mobility, balance, gait, and endurance     Pancreatic cancer 2022    Parkinson disease     PONV (postoperative nausea and vomiting)     Scoliosis     Seasonal allergies        Past Surgical History:   Procedure Laterality Date    ADENOIDECTOMY      BREAST BIOPSY Right 2019    Needle biopsy    CATARACT EXTRACTION Bilateral     CHOLECYSTECTOMY OPEN  12/19/2022    done at     COLONOSCOPY      FOOT SURGERY Left     torn ligament, screws placed.    HIP HEMIARTHROPLASTY Left 10/21/2020    Procedure: HIP HEMIARTHROPLASTY LEFT;  Surgeon: Humza Winters MD;  Location: [facility];  Service: Orthopedics;  Laterality: Left;    LYMPHADENECTOMY  12/19/2022    partial, done at     NECK SURGERY      four fusions    OMENTECTOMY  12/19/2022    done at     PANCREATECTOMY  12/19/2022    done at , left adrenolectomy also performed    PORTACATH PLACEMENT Right 02/09/2023    Procedure: INSERTION OF PORTACATH;  Surgeon: Marcio Garcia MD;  Location: [facility];  Service: General;  Laterality: Right;    SPLENECTOMY  12/19/2022    done at     TONSILLECTOMY         Family History   Problem Relation Age of Onset    Heart failure Father     Lung cancer Father     Cancer Brother     Breast cancer Maternal Grandmother     Breast cancer Paternal Grandmother     Breast cancer Cousin     Ovarian cancer Neg Hx        Social History     Socioeconomic History    Marital status:    Tobacco Use    Smoking status: Never     Smokeless tobacco: Never   Vaping Use    Vaping status: Never Used   Substance and Sexual Activity    Alcohol use: Not Currently     Comment: rare    Drug use: Defer    Sexual activity: Defer         Current Facility-Administered Medications:     acetaminophen (TYLENOL) tablet 650 mg, 650 mg, Oral, Q4H PRN, Jonathan Barrientos DO    azithromycin (ZITHROMAX) 500 mg 0.9% NaCl (Add-vantage) 250 mL, 500 mg, Intravenous, Q24H, Jonathan Barrientos DO    sennosides-docusate (PERICOLACE) 8.6-50 MG per tablet 2 tablet, 2 tablet, Oral, BID **AND** polyethylene glycol (MIRALAX) packet 17 g, 17 g, Oral, Daily PRN **AND** bisacodyl (DULCOLAX) EC tablet 5 mg, 5 mg, Oral, Daily PRN **AND** bisacodyl (DULCOLAX) suppository 10 mg, 10 mg, Rectal, Daily PRN, Jonathan Barrientos DO    busPIRone (BUSPAR) tablet 10 mg, 10 mg, Oral, Q12H, Jonathan Barrientos DO, 10 mg at 03/28/24 1354    calcium carbonate (TUMS) chewable tablet 500 mg (200 mg elemental), 2 tablet, Oral, BID PRN, Jonathan Barrientos DO    Calcium Replacement - Follow Nurse / BPA Driven Protocol, , Does not apply, PRN, Jonathan Barrientos DO    carbidopa-levodopa (SINEMET)  MG per tablet 1 tablet, 1 tablet, Oral, BID, Jonathan Barrientos DO    [Held by provider] carvedilol (COREG) tablet 6.25 mg, 6.25 mg, Oral, BID With Meals, Jonathan Barrientos DO    [START ON 3/29/2024] cefTRIAXone (ROCEPHIN) 2,000 mg in sodium chloride 0.9 % 100 mL IVPB, 2,000 mg, Intravenous, Q24H, Jonathan Barrientos DO    diphenhydrAMINE (BENADRYL) capsule 25 mg, 25 mg, Oral, Nightly PRN, Jonathan Barrientos DO    docusate sodium (COLACE) capsule 100 mg, 100 mg, Oral, Daily, Jonathan Barrientos DO, 100 mg at 03/28/24 1354    lactated ringers infusion, 50 mL/hr, Intravenous, Continuous, Jonathan Barrientos DO, Last Rate: 50 mL/hr at 03/28/24 1354, 50 mL/hr at 03/28/24 1354    Magnesium Standard Dose Replacement - Follow Nurse / BPA Driven Protocol, , Does not apply, PRNIliana,  Jonathan Aguilera DO    melatonin tablet 5 mg, 5 mg, Oral, Nightly, Jonathan Barrientos DO    Morphine (MS CONTIN) 12 hr tablet 30 mg, 30 mg, Oral, Q12H, Jonathan Barrientos, , 30 mg at 03/28/24 1101    ondansetron ODT (ZOFRAN-ODT) disintegrating tablet 4 mg, 4 mg, Oral, Q6H PRN **OR** ondansetron (ZOFRAN) injection 4 mg, 4 mg, Intravenous, Q6H PRN, Jonathan Barrientos,     ondansetron (ZOFRAN) tablet 8 mg, 8 mg, Oral, TID PRN, Jonathan Barrientos DO    oxyCODONE (ROXICODONE) immediate release tablet 15 mg, 15 mg, Oral, Q4H PRN, Jonathan Barrientos,     [START ON 3/29/2024] pantoprazole (PROTONIX) EC tablet 40 mg, 40 mg, Oral, Q AM, Jonathan Barrientos DO    Phosphorus Replacement - Follow Nurse / BPA Driven Protocol, , Does not apply, PRN, Jonathan Barrientos,     polyethylene glycol (MIRALAX) packet 17 g, 17 g, Oral, Daily, Jonathan Barrientos DO    Potassium Replacement - Follow Nurse / BPA Driven Protocol, , Does not apply, PRN, Jonathan Barrientos,     prochlorperazine (COMPAZINE) tablet 5 mg, 5 mg, Oral, Q6H PRN, Jonathan Barrientos,     sodium chloride 0.9 % flush 10 mL, 10 mL, Intravenous, PRN, Jonathan Barrientos,     sodium chloride 0.9 % flush 10 mL, 10 mL, Intravenous, Q12H, Jonathan Barrientos, , 10 mL at 03/28/24 1339    sodium chloride 0.9 % flush 10 mL, 10 mL, Intravenous, PRN, Jonathan Barrientos,     sodium chloride 0.9 % infusion 40 mL, 40 mL, Intravenous, PRN, Jonathan Barrientos,     traZODone (DESYREL) tablet 50 mg, 50 mg, Oral, Nightly, Jonathan Barrientos, DO    No Known Allergies    Review of Systems   Constitutional:  Negative for appetite change, fatigue, fever and unexpected weight loss.   HENT:  Negative for trouble swallowing.    Gastrointestinal:  Positive for blood in stool and constipation. Negative for abdominal distention, abdominal pain, anal bleeding, diarrhea, nausea, rectal pain, vomiting, GERD and indigestion.        The following portions of the patient's  history were reviewed and updated as appropriate: allergies, current medications, past family history, past medical history, past social history, past surgical history and problem list.    Objective     Vitals:    03/28/24 1030 03/28/24 1129 03/28/24 1144 03/28/24 1231   BP: 121/90 115/77  119/72   BP Location:       Patient Position:       Pulse:   92    Resp:   16    Temp:   98 °F (36.7 °C)    TempSrc:   Oral    SpO2: 98% 99%  100%   Weight:       Height:           Physical Exam  Constitutional:       Comments: Poorly built and nourished   HENT:      Head: Normocephalic and atraumatic.   Eyes:      Comments: Pallor present   Abdominal:      General: Abdomen is flat. There is no distension.      Palpations: There is no mass.      Tenderness: There is no abdominal tenderness. There is no guarding or rebound.      Hernia: No hernia is present.      Comments: Rectal examination done reveals rectum loaded with a hard stool   Musculoskeletal:      Cervical back: Normal range of motion and neck supple.   Neurological:      Mental Status: She is alert.         Results from last 7 days   Lab Units 03/28/24  0604   SODIUM mmol/L 130*   POTASSIUM mmol/L 4.5   CHLORIDE mmol/L 86*   CO2 mmol/L 28.5   BUN mg/dL 15   CREATININE mg/dL 0.56*   CALCIUM mg/dL 9.0   ALBUMIN g/dL 3.2*   BILIRUBIN mg/dL 0.3   ALK PHOS U/L 125*   ALT (SGPT) U/L 5   AST (SGOT) U/L 18   GLUCOSE mg/dL 143*   WBC 10*3/mm3 12.54*   HEMOGLOBIN g/dL 9.3*   PLATELETS 10*3/mm3 304       Imaging Results (Last 24 Hours)       Procedure Component Value Units Date/Time    CT Angiogram Chest [[id number]] Collected: 03/28/24 0937     Updated: 03/28/24 0949    Narrative:      PROCEDURE: CT ANGIOGRAM CHEST-     HISTORY: hypoxia; K92.2-Gastrointestinal hemorrhage, unspecified;  N39.0-Urinary tract infection, site not specified; E86.0-Dehydration;  C25.9-Malignant neoplasm of pancreas, unspecified     COMPARISON: February 10, 2024.     TECHNIQUE: The patient was injected  with  IV contrast. Axial images were  obtained through the chest in a CTA/ PE protocol. 3 D Reconstruction  images were also performed. This study was performed with techniques to  keep radiation doses as low as reasonably achievable, (ALARA).  Individualized dose reduction techniques using automated exposure  control or adjustment of mA and/or kV according to the patient size were  employed.     FINDINGS: There is no axillary adenopathy. There is been no change in  the small mediastinal lymph nodes compared to the prior exam. The heart  is proper size. There is no pericardial or pleural effusion. No filling  defects are identified to suggest PE. Again noted is borderline  enlargement of the ascending aorta at 39 mm. Again noted is the left  retrocrural mass measuring up to 44 mm with extension into the spinal  canal at the level of T10 with widening of the left neuroforamen and  partial destruction of the left T10 transverse consistent with  metastasis. Process and lateral aspect of the left T10 vertebral body.  Mild pathologic compression fracture again noted as seen on CT  abdomen/pelvis. Limited images of the upper abdomen demonstrate 2  hepatic lesions as described on CT abdomen pelvis. Again noted are  multiple bilateral noncalcified pulmonary nodules. The largest is  located anteriorly in the right upper lobe and has enlarged from 12 to  17 mm in the anterior posterior diameter. This does not have the typical  appearance of a metastasis having an irregular shape, primary lung  malignancy is in the differential. Recommend a PET/CT. The additional  bilateral noncalcified smaller nodules generally have a more rounded  contour suggesting metastasis. There is new groundglass opacity  involving the right lower lobe suggesting superimposed pneumonia.       Impression:      Multiple bilateral noncalcified pulmonary nodules which are  stable to increased in size compared to prior concerning for metastatic  disease.      Enlarging 17 mm irregular anterior right upper lobe mass, primary  pulmonary malignancy versus metastasis. PET/CT may be helpful.     New groundglass opacities right lower lobe, possible superimposed  pneumonia.     Borderline enlargement of the ascending aorta, stable from prior;  recommend 1 year follow-up.        CTDI: 9.41 mGy  DLP:331.85 mGy.cm     This report was signed and finalized on 3/28/2024 9:47 AM by Stormy Gonzalez MD.       XR Chest 1 View [[id number]] Collected: 03/28/24 0741     Updated: 03/28/24 0745    Narrative:      PROCEDURE: XR CHEST 1 VW-     HISTORY: hypoxia, history of pancreatic cancer.     COMPARISON: February 10, 2024..     FINDINGS: The heart is normal in size. Right subclavian port is stable  in position. There is mildly increased interstitial disease bilaterally.  Patchy left basilar disease is stable. There is new peripheral left mid  and lower lung field airspace disease, possible pneumonia. Given the  history of previous malignancy, recommend chest CT with contrast if  possible.. The mediastinum is unremarkable. There is no pneumothorax.   There are no acute osseous abnormalities.       Impression:      New left lateral airspace disease, recommend chest CT with  contrast if possible..     Stable right subclavian port.           This report was signed and finalized on 3/28/2024 7:42 AM by Stormy Gonzalez MD.       CT Abdomen Pelvis With & Without Contrast [[id number]] Collected: 03/28/24 0724     Updated: 03/28/24 0743    Narrative:      PROCEDURE: CT ABDOMEN PELVIS W WO CONTRAST-     HISTORY: gi bleed     COMPARISON: December 2, 2023..     PROCEDURE: The patient was injected with IV contrast. Axial images were  obtained from the lung bases to the pubic symphysis by computed  tomography. Pre-contrast images of the abdomen and pelvis were also  obtained. This study was performed with techniques to keep radiation  doses as low as reasonably achievable, (ALARA). Individualized  dose  reduction techniques using automated exposure control or adjustment of  mA and/or kV according to the patient size were employed.     FINDINGS:     ABDOMEN: The lung bases demonstrate a new oval, mildly enhancing,  rim-enhancing circumscribed lesion posterior medial right lower lobe  which is not seen previously; metastasis is a concern. There is also a  new, irregularly-shaped left retrocrural mass which measures 47 x 45 x  22 mm. This extends into the left T10 neuroforamen and extends slightly  into the spinal canal but no spinal stenosis identified at this time.  There is distraction of the left T10 transverse process as well as part  of the left lateral aspect of the T10 vertebral body. There is a new,  mild compression fracture of T10  compared to the prior study. Recommend  chest CT with contrast to evaluate for any additional lesions. There is  also new groundglass opacities in the lingula and right lower lobe. Mild  bronchiectasis identified in the right lower lobe new subcentimeter  right lower lobe nodule identified, recommend chest CT. The heart is  proper size. The liver demonstrates the circumscribed hypodense lesion  lateral right lobe of the liver measuring 9 mm and consistent with a  cyst; this is stable. The second hepatic lesion near the dome of the  diaphragm measuring 9 mm also measures 17 Hounsfield units consistent  with a cyst. This does not demonstrate the rim enhancement that was seen  previously. The spleen not identified. No adrenal mass is present.  The  pancreas demonstrates that head of the pancreas which appears stable.  Body and tail appear to been resected and several metallic surgical  clips are noted in the surgical bed. Vascular calcifications noted. The  kidneys are unremarkable. The aorta is proper caliber. There is no free  fluid or adenopathy.     Pre-contrast images demonstrate no evidence of nephrolithiasis.     PELVIS: The appendix is not identified but no secondary  signs of  appendicitis seen. There is a large stool burden. The rectum is  distended to 8.7 cm transverse diameter, possible fecal impaction. There  is streak artifact from patient's arm position as well as left hip  arthroplasty. Thoracolumbar scoliosis with significant degenerative  change identified.. The urinary bladder is unremarkable. There is no  significant free fluid or adenopathy.       Impression:      New 47 mm left retrocrural metastasis with extension into  the left T10 vertebral body and spinal canal at that level and partial  destruction of the left T10 vertebral body with new pathologic  compression fracture.     New, pleural-based 14 mm right lower lobe lesion also concerning for  metastasis, recommend chest CT with contrast if possible to evaluate for  any additional lesions.     Large stool burden with possible fecal impaction.     Improved appearance of subdiaphragmatic hepatic lesion which has the  appearance of a cyst and stable right lateral hepatic cyst..              CTDI: 9.3 mGy  DLP:939.81 mGy.cm     This report was signed and finalized on 3/28/2024 7:41 AM by Stormy Gonzalez MD.               Assessment / Plan      Assessment/Recommendations:   Rectal bleeding  Suspected stercoral ulcer with minimal mucosal bleeding  Fecal impaction with overflow diarrhea  Long-term opioid use  Chronic microcytic anemia  Metastatic pancreatic adenocarcinoma  Patient has a metastatic pancreatic cancer and currently on hospice care.    Patient history is more suggestive of rectal bleeding most likely bleeding from stercoral ulcer.  Significant use of opioids causing her significant constipation.  Patient has metastatic pancreatic cancer with metastasis in the spinal cord possibly contributing to her constipation and urinary retention.  Patient has a minimal overflow diarrhea mixed with the minimal dark blood.  Signs of any overt bleeding.  Rectal examination done reveals large firm to hard rectal stool  ball.  I tried to break the stool balls to facilitate evacuation however patient became very uncomfortable and no further attempts made.  Her admission lab work and CT scan abdomen and chest report reviewed.    Recommend mineral oil enema this afternoon and 1 at 6 PM today  Continue Dulcolax with MiraLAX  Due to significant fecal ball in the rectum this needs to be evacuated before giving any significant laxatives orally.  Fleet enema in a.m. tomorrow  If no bowel movement patient likely need manual evacuation under sedation  Keep n.p.o. after midnight        Thank you very much for letting me participate in the care of this patient.  Please do not hesitate to call me if you have any questions.    Oscar Barney MD  [facility] [place]  3/28/2024  14:08 EDT    Please note that portions of this note may have been completed with a voice recognition program.

## 2024-03-28 NOTE — CASE MANAGEMENT/SOCIAL WORK
Discharge Planning Assessment  [facility]     Patient Name: Silvia Chung  MRN: [id number]  Today's Date: 3/28/2024    Admit Date: 3/28/2024    Plan: The patient is awake and able to answer questions.  She is a current patient of Dr. Aguilera and gets her [facility].  She elects to enroll in Meds to Bed.  She uses the following DME:  walker, rollator, hospital bed, bedside commode, and shower chair.  She denies the need for additional DME at [place].  At the time of DC the paient plans to return to the home she shares with her  and will continue care with Hospice.  Questions and concerns addressed at the time of this conversation.  IMM delivered at earlier time.  Will provide additional resources and information upon patient request.   Discharge Needs Assessment       Row Name 03/28/24 1326       Living Environment    People in Home spouse    Name(s) of People in Home Julieta Chung,     Current Living Arrangements home    Duration at Residence 12 years    Potentially Unsafe Housing Conditions none    In the past 12 months has the electric, gas, oil, or water company threatened to shut off services in your home? No    Primary Care Provided by spouse/significant other;homecare agency    Provides Primary Care For no one, unable/limited ability to care for self    Family Caregiver if Needed none    Quality of Family Relationships helpful;involved;supportive    Able to Return to Prior Arrangements yes       Resource/Environmental Concerns    Resource/Environmental Concerns none    Transportation Concerns none       Transportation Needs    In the past 12 months, has lack of transportation kept you from medical appointments or from getting medications? no    In the past 12 months, has lack of transportation kept you from meetings, work, or from getting things needed for daily living? No       Food Insecurity    Within the past 12 months, you worried that your food would run out before  you got the money to buy more. Never true    Within the past 12 months, the food you bought just didn't last and you didn't have money to get more. Never true       Transition Planning    Patient/Family Anticipates Transition to home with help/services    Patient/Family Anticipated Services at Transition none       Discharge Needs Assessment    Readmission Within the Last 30 Days no previous admission in last 30 days    Current Outpatient/Agency/Support Group home hospice    Equipment Currently Used at Home walker, rolling;rollator;wheelchair;hospital bed;commode;shower chair    Concerns to be Addressed denies needs/concerns at this time    Anticipated Changes Related to Illness inability to care for self    Equipment Needed After Discharge none    Provided Post Acute Provider List? N/A    Provided Post Acute Provider Quality & Resource List? N/A    Current Discharge Risk terminally ill                   Discharge Plan       Row Name 03/28/24 9488       Plan    Plan The patient is awake and able to answer questions.  She is a current patient of Dr. Aguilera and gets her medications from [facility] and [facility].  She elects to enroll in Meds to Bed.  She uses the following DME:  walker, rollator, hospital bed, bedside commode, and shower chair.  She denies the need for additional DME at DC.  At the time of DC the paient plans to return to the home she shares with her  and will continue care with Hospice.  Questions and concerns addressed at the time of this conversation.  IMM delivered at earlier time.  Will provide additional resources and information upon patient request.    Patient/Family in Agreement with Plan yes    Provided Post Acute Provider List? N/A    Provided Post Acute Provider Quality & Resource List? N/A    Plan Comments Denies needs at this time    Final Discharge Disposition Code 50 - home with hospice    Final Note DC home with family and Hospice Care                  Continued Care and Services -  Admitted Since 3/28/2024    No active coordination exists for this encounter.       Selected Continued Care - Prior Encounters Includes continued care and service providers with selected services from prior encounters from 12/29/2023 to 3/28/2024      Discharged on 2/14/2024 Admission date: 2/13/2024 - Discharge disposition: Hospice/Home      Home Medical Care       Service Provider Selected Services Address Phone Fax Patient Preferred    [facility] [address] [address] [phone] --                             Demographic Summary       Row Name 03/28/24 1324       General Information    Admission Type inpatient    Arrived From emergency department    Required Notices Provided Important Message from Medicare    Referral Source admission list    Reason for Consult discharge planning    Preferred Language English       Contact Information    Permission Granted to Share Info With                    Functional Status       Row Name 03/28/24 1325       Functional Status    Usual Activity Tolerance moderate    Current Activity Tolerance fair       Physical Activity    On average, how many days per week do you engage in moderate to strenuous exercise (like a brisk walk)? 0 days    On average, how many minutes do you engage in exercise at this level? 0 min    Number of minutes of exercise per week 0       Assessment of Health Literacy    How often do you have someone help you read hospital materials? Never    How often do you have problems learning about your medical condition because of difficulty understanding written information? Never    How often do you have a problem understanding what is told to you about your medical condition? Never    How confident are you filling out medical forms by yourself? Extremely    Health Literacy Excellent       Functional Status, IADL    Medications assistive person    Meal Preparation completely dependent     Housekeeping completely dependent    Laundry completely dependent    Shopping completely dependent       Mental Status    General Appearance WDL WDL       Mental Status Summary    Recent Changes in Mental Status/Cognitive Functioning no changes                   Psychosocial       Row Name 03/28/24 1325       Values/Beliefs    Spiritual, Cultural Beliefs, Anabaptist Practices, Values that Affect Care no       Behavior WDL    Behavior WDL WDL       Emotion Mood WDL    Emotion/Mood/Affect WDL WDL       Speech WDL    Speech WDL WDL       Perceptual State WDL    Perceptual State WDL WDL       Thought Process WDL    Thought Process WDL WDL       Intellectual Performance WDL    Intellectual Performance WDL WDL                   Abuse/Neglect       Row Name 03/28/24 1325       Personal Safety    Feels Unsafe at Home or Work/School no    Feels Threatened by Someone no    Does Anyone Try to Keep You From Having Contact with Others or Doing Things Outside Your Home? no    Physical Signs of Abuse Present no                   Legal       Row Name 03/28/24 1325       Financial Resource Strain    How hard is it for you to pay for the very basics like food, housing, medical care, and heating? Not hard                   Substance Abuse       Row Name 03/28/24 1325       Substance Use    Substance Use Status never used                   Patient Forms       Row Name 03/28/24 1331       Patient Forms    Important Message from Medicare (IMM) Delivered    Delivered to Patient    Method of delivery In person                      Magda Foster RN

## 2024-03-28 NOTE — ED NOTES
Pt given medications that were stocked at this time, other medications were not available at this time. Pt transported to the floor by Alex harris.

## 2024-03-28 NOTE — PLAN OF CARE
Problem: Pain Chronic (Persistent)  Goal: Acceptable Pain Control and Functional Ability  Outcome: Ongoing, Progressing  Intervention: Manage Persistent Pain  Recent Flowsheet Documentation  Taken 3/28/2024 1430 by Jayden Pratt RN  Medication Review/Management: medications reviewed  Intervention: Optimize Psychosocial Wellbeing  Recent Flowsheet Documentation  Taken 3/28/2024 1430 by Jayden Pratt RN  Diversional Activities:   television   smartphone   tablet  Family/Support System Care: support provided     Problem: Adult Inpatient Plan of Care  Goal: Plan of Care Review  Outcome: Ongoing, Progressing  Goal: Patient-Specific Goal (Individualized)  Outcome: Ongoing, Progressing  Goal: Absence of Hospital-Acquired Illness or Injury  Outcome: Ongoing, Progressing  Intervention: Identify and Manage Fall Risk  Recent Flowsheet Documentation  Taken 3/28/2024 1800 by Jayden Pratt RN  Safety Promotion/Fall Prevention: safety round/check completed  Taken 3/28/2024 1600 by Jayden Pratt RN  Safety Promotion/Fall Prevention: safety round/check completed  Taken 3/28/2024 1430 by Jayden Pratt RN  Safety Promotion/Fall Prevention: safety round/check completed  Intervention: Prevent Skin Injury  Recent Flowsheet Documentation  Taken 3/28/2024 1800 by Jayden Pratt RN  Body Position: supine, legs elevated  Taken 3/28/2024 1600 by Jayden Pratt RN  Body Position:   side-lying   right   position changed independently  Taken 3/28/2024 1430 by Jayden Pratt RN  Body Position: supine, legs elevated  Skin Protection: tubing/devices free from skin contact  Intervention: Prevent and Manage VTE (Venous Thromboembolism) Risk  Recent Flowsheet Documentation  Taken 3/28/2024 1800 by Jayden Pratt RN  Activity Management: activity encouraged  Taken 3/28/2024 1600 by Jayden Pratt RN  Activity Management: activity encouraged  Taken 3/28/2024 1430 by Jayden Pratt RN  Activity Management: activity encouraged  VTE  Prevention/Management:   bilateral   sequential compression devices off  Range of Motion: active ROM (range of motion) encouraged  Goal: Optimal Comfort and Wellbeing  Outcome: Ongoing, Progressing  Intervention: Provide Person-Centered Care  Recent Flowsheet Documentation  Taken 3/28/2024 1430 by Jayden Pratt RN  Trust Relationship/Rapport:   care explained   choices provided   emotional support provided   empathic listening provided   questions encouraged   questions answered   reassurance provided   thoughts/feelings acknowledged  Goal: Readiness for Transition of Care  Outcome: Ongoing, Progressing  Intervention: Mutually Develop Transition Plan  Recent Flowsheet Documentation  Taken 3/28/2024 1437 by Jayden Pratt RN  Equipment Currently Used at Home:   walker, rolling   rollator   wheelchair   hospital bed   commode   shower chair  Taken 3/28/2024 1436 by Jayden Pratt RN  Transportation Anticipated: family or friend will provide  Transportation Concerns: none  Patient/Family Anticipated Services at Transition: none  Patient/Family Anticipates Transition to:   home with family   home with help/services     Problem: Skin Injury Risk Increased  Goal: Skin Health and Integrity  Outcome: Ongoing, Progressing  Intervention: Optimize Skin Protection  Recent Flowsheet Documentation  Taken 3/28/2024 1800 by Jayden Pratt RN  Head of Bed (HOB) Positioning: HOB lowered  Taken 3/28/2024 1600 by Jayden Pratt RN  Head of Bed (HOB) Positioning: HOB lowered  Taken 3/28/2024 1430 by Jayden Pratt RN  Pressure Reduction Techniques: frequent weight shift encouraged  Head of Bed (HOB) Positioning: HOB lowered  Pressure Reduction Devices: positioning supports utilized  Skin Protection: tubing/devices free from skin contact   Goal Outcome Evaluation:   New ED admit. Cont IV fluids and abx. Pain controlled per MAR. Patient continues to have dark, red, bloody stool. Enema given per MD order. Patient NPO after midnight per MD  order. Patient currently resting well with no complaints. DC pending.

## 2024-03-28 NOTE — PLAN OF CARE
Goal Outcome Evaluation:         3/28/2024, 1153. Pt pain is at a 1/10 at this time on a standardized pain scale and pt is below her accepted pain scale of 3/10. Interventions affective for pt at this time.

## 2024-03-29 ENCOUNTER — ANESTHESIA EVENT (OUTPATIENT)
Dept: GASTROENTEROLOGY | Facility: HOSPITAL | Age: 74
End: 2024-03-29
Payer: MEDICARE

## 2024-03-29 ENCOUNTER — ANESTHESIA (OUTPATIENT)
Dept: GASTROENTEROLOGY | Facility: HOSPITAL | Age: 74
End: 2024-03-29
Payer: MEDICARE

## 2024-03-29 ENCOUNTER — READMISSION MANAGEMENT (OUTPATIENT)
Dept: CALL CENTER | Facility: HOSPITAL | Age: 74
End: 2024-03-29
Payer: COMMERCIAL

## 2024-03-29 VITALS
DIASTOLIC BLOOD PRESSURE: 66 MMHG | RESPIRATION RATE: 16 BRPM | HEIGHT: 68 IN | TEMPERATURE: 98.2 F | OXYGEN SATURATION: 96 % | SYSTOLIC BLOOD PRESSURE: 103 MMHG | WEIGHT: 125.66 LBS | BODY MASS INDEX: 19.05 KG/M2 | HEART RATE: 95 BPM

## 2024-03-29 PROBLEM — E43 SEVERE MALNUTRITION: Status: ACTIVE | Noted: 2024-03-29

## 2024-03-29 LAB
ALBUMIN SERPL-MCNC: 3 G/DL (ref 3.5–5.2)
ALBUMIN/GLOB SERPL: 1.3 G/DL
ALP SERPL-CCNC: 123 U/L (ref 39–117)
ALT SERPL W P-5'-P-CCNC: 11 U/L (ref 1–33)
ANION GAP SERPL CALCULATED.3IONS-SCNC: 11.1 MMOL/L (ref 5–15)
AST SERPL-CCNC: 25 U/L (ref 1–32)
BILIRUB SERPL-MCNC: 0.2 MG/DL (ref 0–1.2)
BUN SERPL-MCNC: 14 MG/DL (ref 8–23)
BUN/CREAT SERPL: 26.4 (ref 7–25)
CALCIUM SPEC-SCNC: 8.5 MG/DL (ref 8.6–10.5)
CHLORIDE SERPL-SCNC: 92 MMOL/L (ref 98–107)
CO2 SERPL-SCNC: 28.9 MMOL/L (ref 22–29)
CREAT SERPL-MCNC: 0.53 MG/DL (ref 0.57–1)
DEPRECATED RDW RBC AUTO: 51.4 FL (ref 37–54)
EGFRCR SERPLBLD CKD-EPI 2021: 99 ML/MIN/1.73
ERYTHROCYTE [DISTWIDTH] IN BLOOD BY AUTOMATED COUNT: 13.7 % (ref 12.3–15.4)
GLOBULIN UR ELPH-MCNC: 2.4 GM/DL
GLUCOSE SERPL-MCNC: 98 MG/DL (ref 65–99)
HCT VFR BLD AUTO: 22.8 % (ref 34–46.6)
HGB BLD-MCNC: 7.6 G/DL (ref 12–15.9)
MCH RBC QN AUTO: 34.4 PG (ref 26.6–33)
MCHC RBC AUTO-ENTMCNC: 33.3 G/DL (ref 31.5–35.7)
MCV RBC AUTO: 103.2 FL (ref 79–97)
PLATELET # BLD AUTO: 283 10*3/MM3 (ref 140–450)
PMV BLD AUTO: 10.7 FL (ref 6–12)
POTASSIUM SERPL-SCNC: 4 MMOL/L (ref 3.5–5.2)
PROT SERPL-MCNC: 5.4 G/DL (ref 6–8.5)
RBC # BLD AUTO: 2.21 10*6/MM3 (ref 3.77–5.28)
SODIUM SERPL-SCNC: 132 MMOL/L (ref 136–145)
WBC NRBC COR # BLD AUTO: 15.2 10*3/MM3 (ref 3.4–10.8)

## 2024-03-29 PROCEDURE — 25010000002 PROPOFOL 200 MG/20ML EMULSION: Performed by: NURSE ANESTHETIST, CERTIFIED REGISTERED

## 2024-03-29 PROCEDURE — 25810000003 LACTATED RINGERS PER 1000 ML: Performed by: INTERNAL MEDICINE

## 2024-03-29 PROCEDURE — 25010000002 CEFTRIAXONE PER 250 MG: Performed by: INTERNAL MEDICINE

## 2024-03-29 PROCEDURE — 25010000002 PROPOFOL 10 MG/ML EMULSION: Performed by: NURSE ANESTHETIST, CERTIFIED REGISTERED

## 2024-03-29 PROCEDURE — 99239 HOSP IP/OBS DSCHRG MGMT >30: CPT | Performed by: INTERNAL MEDICINE

## 2024-03-29 PROCEDURE — 0DCP7ZZ EXTIRPATION OF MATTER FROM RECTUM, VIA NATURAL OR ARTIFICIAL OPENING: ICD-10-PCS | Performed by: INTERNAL MEDICINE

## 2024-03-29 PROCEDURE — 80053 COMPREHEN METABOLIC PANEL: CPT | Performed by: INTERNAL MEDICINE

## 2024-03-29 PROCEDURE — 25010000002 LORAZEPAM PER 2 MG: Performed by: INTERNAL MEDICINE

## 2024-03-29 PROCEDURE — 85027 COMPLETE CBC AUTOMATED: CPT | Performed by: INTERNAL MEDICINE

## 2024-03-29 PROCEDURE — 45915 REMOVE RECTAL OBSTRUCTION: CPT | Performed by: INTERNAL MEDICINE

## 2024-03-29 PROCEDURE — 25010000002 HEPARIN LOCK FLUSH PER 10 UNITS: Performed by: INTERNAL MEDICINE

## 2024-03-29 RX ORDER — LIDOCAINE HCL/PF 100 MG/5ML
SYRINGE (ML) INJECTION AS NEEDED
Status: DISCONTINUED | OUTPATIENT
Start: 2024-03-29 | End: 2024-03-29 | Stop reason: SURG

## 2024-03-29 RX ORDER — CEFDINIR 300 MG/1
300 CAPSULE ORAL 2 TIMES DAILY
Qty: 6 CAPSULE | Refills: 0 | Status: SHIPPED | OUTPATIENT
Start: 2024-03-29 | End: 2024-04-01

## 2024-03-29 RX ORDER — HEPARIN SODIUM (PORCINE) LOCK FLUSH IV SOLN 100 UNIT/ML 100 UNIT/ML
100 SOLUTION INTRAVENOUS ONCE
Status: COMPLETED | OUTPATIENT
Start: 2024-03-29 | End: 2024-03-29

## 2024-03-29 RX ORDER — BISACODYL 10 MG
10 SUPPOSITORY, RECTAL RECTAL DAILY PRN
Qty: 15 EACH | Refills: 0 | Status: SHIPPED | OUTPATIENT
Start: 2024-03-29

## 2024-03-29 RX ORDER — AMOXICILLIN 250 MG
2 CAPSULE ORAL 3 TIMES DAILY
Qty: 180 TABLET | Refills: 0 | Status: SHIPPED | OUTPATIENT
Start: 2024-03-29 | End: 2024-04-28

## 2024-03-29 RX ORDER — AZITHROMYCIN 500 MG/1
500 TABLET, FILM COATED ORAL DAILY
Qty: 2 TABLET | Refills: 0 | Status: SHIPPED | OUTPATIENT
Start: 2024-03-29

## 2024-03-29 RX ORDER — SODIUM CHLORIDE 9 MG/ML
30 INJECTION, SOLUTION INTRAVENOUS CONTINUOUS
Status: DISCONTINUED | OUTPATIENT
Start: 2024-03-29 | End: 2024-03-29 | Stop reason: HOSPADM

## 2024-03-29 RX ORDER — PROPOFOL 10 MG/ML
INJECTION, EMULSION INTRAVENOUS AS NEEDED
Status: DISCONTINUED | OUTPATIENT
Start: 2024-03-29 | End: 2024-03-29 | Stop reason: SURG

## 2024-03-29 RX ORDER — MAGNESIUM CARB/ALUMINUM HYDROX 105-160MG
296 TABLET,CHEWABLE ORAL ONCE
Status: COMPLETED | OUTPATIENT
Start: 2024-03-29 | End: 2024-03-29

## 2024-03-29 RX ORDER — LORAZEPAM 2 MG/ML
0.5 INJECTION INTRAMUSCULAR ONCE
Status: COMPLETED | OUTPATIENT
Start: 2024-03-29 | End: 2024-03-29

## 2024-03-29 RX ADMIN — CEFTRIAXONE SODIUM 2000 MG: 1 INJECTION, POWDER, FOR SOLUTION INTRAMUSCULAR; INTRAVENOUS at 08:11

## 2024-03-29 RX ADMIN — Medication 10 ML: at 08:12

## 2024-03-29 RX ADMIN — SODIUM CHLORIDE, POTASSIUM CHLORIDE, SODIUM LACTATE AND CALCIUM CHLORIDE 50 ML/HR: 600; 310; 30; 20 INJECTION, SOLUTION INTRAVENOUS at 08:20

## 2024-03-29 RX ADMIN — OXYCODONE HYDROCHLORIDE 15 MG: 5 TABLET ORAL at 15:55

## 2024-03-29 RX ADMIN — MAGNESIUM CITRATE 296 ML: 1.75 LIQUID ORAL at 15:01

## 2024-03-29 RX ADMIN — PROPOFOL 50 MG: 10 INJECTION, EMULSION INTRAVENOUS at 13:45

## 2024-03-29 RX ADMIN — LORAZEPAM 0.5 MG: 2 INJECTION INTRAMUSCULAR; INTRAVENOUS at 17:30

## 2024-03-29 RX ADMIN — HEPARIN 100 UNITS: 100 SYRINGE at 17:31

## 2024-03-29 RX ADMIN — Medication 40 MG: at 13:42

## 2024-03-29 RX ADMIN — PROPOFOL 140 MCG/KG/MIN: 10 INJECTION, EMULSION INTRAVENOUS at 13:46

## 2024-03-29 RX ADMIN — MORPHINE SULFATE 30 MG: 30 TABLET, FILM COATED, EXTENDED RELEASE ORAL at 15:09

## 2024-03-29 RX ADMIN — BUSPIRONE HYDROCHLORIDE 10 MG: 10 TABLET ORAL at 17:08

## 2024-03-29 RX ADMIN — PROPOFOL 100 MG: 10 INJECTION, EMULSION INTRAVENOUS at 13:42

## 2024-03-29 RX ADMIN — POLYETHYLENE GLYCOL 3350 17 G: 17 POWDER, FOR SOLUTION ORAL at 08:11

## 2024-03-29 NOTE — PROGRESS NOTES
"    [facility]     PALLIATIVE CARE FOLLOW UP NOTE    Name:  Silvia Chung   Age:  73 y.o.  Sex:  female  :  1950  MRN:  [id number]   Visit Number:  [id number]  Date Of Service:  24  Primary Care Physician:  Johnathan Aguilera MD    Chief Complaint: Constipation    Interval History:  Patient seen today during palliative care team rounds.  Record reviewed and case discussed with staff.  Unfortunately despite multiple enemas overnight, unable to pass stool.  Patient underwent procedure for manual evacuation of stool today.  Patient reports some rectal and stomach discomfort during assessment.  She is ready to eat her sandwich appearing without acute distress.  We discussed discharge likely today, back to home with hospice services for which she is agreeable.  We reviewed aggressive bowel regimen, including senna, miralax, and dulcolax suppository.      Review of Systems   Constitutional:  Positive for fatigue.   Gastrointestinal:  Positive for constipation.   Neurological:  Positive for weakness (constipation).          Pain Assessment  Nonverbal Indicators of Pain: nonverbal indicators absent  Vitals: /66 (BP Location: Right arm, Patient Position: Lying)   Pulse 95   Temp 98.2 °F (36.8 °C) (Temporal)   Resp 16   Ht 172.7 cm (68\")   Wt 57 kg (125 lb 10.6 oz)   SpO2 96%   BMI 19.11 kg/m²     Physical Exam  Vitals and nursing note reviewed.   Constitutional:       General: She is not in acute distress.     Appearance: She is cachectic. She is not diaphoretic.      Comments: Frail, chronically ill appearing female lying in bed, NAD   HENT:      Head: Normocephalic and atraumatic.      Mouth/Throat:      Mouth: Mucous membranes are moist.      Pharynx: Oropharynx is clear.   Eyes:      Conjunctiva/sclera: Conjunctivae normal.      Pupils: Pupils are equal, round, and reactive to light.   Cardiovascular:      Comments: Appears well perfused  Pulmonary:      Effort: Pulmonary " effort is normal. No respiratory distress.   Musculoskeletal:         General: No swelling. Normal range of motion.      Cervical back: Normal range of motion and neck supple.   Skin:     General: Skin is warm and dry.      Capillary Refill: Capillary refill takes less than 2 seconds.   Neurological:      Mental Status: She is alert and oriented to person, place, and time.   Psychiatric:         Mood and Affect: Mood normal.         Behavior: Behavior normal.          Results Reviewed:    Intake/Output Summary (Last 24 hours) at 3/29/2024 1600  Last data filed at 3/29/2024 1237  Gross per 24 hour   Intake --   Output 800 ml   Net -800 ml     Results from last 7 days   Lab Units 03/29/24  0541   SODIUM mmol/L 132*   POTASSIUM mmol/L 4.0   CHLORIDE mmol/L 92*   CO2 mmol/L 28.9   BUN mg/dL 14   CREATININE mg/dL 0.53*   CALCIUM mg/dL 8.5*   BILIRUBIN mg/dL 0.2   ALK PHOS U/L 123*   ALT (SGPT) U/L 11   AST (SGOT) U/L 25   GLUCOSE mg/dL 98     Results from last 7 days   Lab Units 03/29/24  0541   WBC 10*3/mm3 15.20*   HEMOGLOBIN g/dL 7.6*   HEMATOCRIT % 22.8*   PLATELETS 10*3/mm3 283       Medication Review:   I have reviewed the patients active and prn medications.     Palliative Care Assessment:  Metastatic pancreatic cancer  Neoplasm related pain  Constipation    Recommendations/Plan:  - Patient continues under Hospice care services, confirming she continues to desire hospice services upon discharge, likely today  - Have discussed directly with Hospice, who report home nurse to follow up in the next day or so  - Recommend aggressive senna 17.2mg BID-TID, daily miralax, dulcolax suppository Q3 days if not having daily bowel movement  - Neoplasm related pain symptoms well controlled with MS contin 30mg BID /oxycodone IR 15mg Q4 hrs PRN for breakthrough pain   - Palliative care will continue to follow/support patient and family    CODE STATUS:   Code Status and Medical Interventions:   Ordered at: 03/28/24 1020     Code  Status (Patient has no pulse and is not breathing):    CPR (Attempt to Resuscitate)     Medical Interventions (Patient has pulse or is breathing):    Full Support       I spent 35 total minutes, including face to face assessment, record review, coordination of care with staff, and counseling patient and/or family  Part of this note may be an electronic transcription/translation of spoken language to printed text using the Dragon Dictation System.    Mary Carmen Sharma PA-C  03/29/24  16:00 EDT

## 2024-03-29 NOTE — PROCEDURES
Name of the procedure;   manual evacuation of the impacted rectal stool under sedation    Physician. Dr thomas    Anesthesia ; propofol sedation    Indication;   Fecal impaction rectum    Procedure details;     Informed consent was obtained before the procedure.  Patient was positioned on the left lateral position.  Ample lube was applied to the anal canal.  With a gentle manipulation old impacted stool ball was broken down and removed.  Rectum emptied.  Minimal blood adhered to the impacted stool noted, no active bleeding identified.     Recommendation;     Magnesium citrate 300 mL p.o. x 1 today  Linzess 290 mcg p.o. daily  Can take MiraLAX or senna along with the Linzess if that is required

## 2024-03-29 NOTE — ANESTHESIA POSTPROCEDURE EVALUATION
Patient: Silvia Chung    Procedure Summary       Date: 03/29/24 Room / Location: [facility] ENDOSCOPY 2 / [facility] ENDOSCOPY    Anesthesia Start: 1340 Anesthesia Stop: 1358    Procedure: manual removal of stool (Rectum) Diagnosis:     Surgeons: Oscar Barney MD Provider: Alfie Sinclair CRNA    Anesthesia Type: MAC ASA Status: 3            Anesthesia Type: MAC    Vitals  HR 98  Resp 12  BP 89/49  Temp 98.2  Sat 95        Post Anesthesia Care and Evaluation    Patient location during evaluation: PACU  Patient participation: complete - patient participated  Level of consciousness: awake and alert  Pain management: satisfactory to patient    Airway patency: patent  Anesthetic complications: No anesthetic complications    Cardiovascular status: acceptable and stable  Respiratory status: acceptable and nasal cannula  Hydration status: acceptable

## 2024-03-29 NOTE — PLAN OF CARE
Goal Outcome Evaluation: Patient being discharged home with hospice today

## 2024-03-29 NOTE — CASE MANAGEMENT/SOCIAL WORK
Continued Stay Note   [facility]     Patient Name: Silvia Chung  MRN: [id number]  Today's Date: 3/29/2024    Admit Date: 3/28/2024    Plan: spoke with pt and spouse in room; pt wearing oxygen 1L; spouse stated he has his own oxygen concentrator at home that she uses when needed; stated he owns the concentrator; pt stated she usually does not wear oxygen and spouse stated that is true; rn in room assessing need for oxygen;  palliative in room and stated will work with hospice on oxygen needs for d/c home; spouse stated he will be driving her home; caretaker also in room with pt and spouse   Discharge Plan       Row Name 03/29/24 1554       Plan    Plan spoke with pt and spouse in room; pt wearing oxygen 1L; spouse stated he has his own oxygen concentrator at home that she uses when needed; stated he owns the concentrator; pt stated she usually does not wear oxygen and spouse stated that is true; rn in room assessing need for oxygen;  palliative in room and stated will work with hospice on oxygen needs for d/c home; spouse stated he will be driving her home; caretaker also in room with pt and spouse      Row Name 03/29/24 1526       Plan    Final Discharge Disposition Code 50 - home with hospice                   Discharge Codes    No documentation.                 Expected Discharge Date and Time       Expected Discharge Date Expected Discharge Time    Mar 29, 2024               Peggy Chavarria RN

## 2024-03-29 NOTE — ANESTHESIA PREPROCEDURE EVALUATION
Anesthesia Evaluation     Patient summary reviewed and Nursing notes reviewed   no history of anesthetic complications:   NPO Solid Status: > 8 hours  NPO Liquid Status: > 8 hours           Airway   Mallampati: II  TM distance: >3 FB  Neck ROM: full  no difficulty expected  Dental - normal exam     Pulmonary - normal exam   (+) lung cancer, asthma,  (-) not a smoker  Cardiovascular - normal exam  Exercise tolerance: good (4-7 METS)    Patient on routine beta blocker  Rhythm: regular  Rate: normal    (+) hypertension 2 medications or greater, hyperlipidemia      Neuro/Psych  (+) Parkinson's disease  GI/Hepatic/Renal/Endo    (+) liver disease (lesions)    Musculoskeletal (-) negative ROS    Abdominal  - normal exam    Abdomen: soft.  Bowel sounds: normal.   Substance History   (+) alcohol use     OB/GYN negative ob/gyn ROS         Other      history of cancer (pancreatic)                  Anesthesia Plan    ASA 3     MAC     (Risks and benefits discussed including risk of aspiration, recall and dental damage. All patient questions answered.    Will continue with plan of care.)  intravenous induction     Anesthetic plan, risks, benefits, and alternatives have been provided, discussed and informed consent has been obtained with: patient.  Pre-procedure education provided

## 2024-03-29 NOTE — OUTREACH NOTE
Prep Survey      Flowsheet Row Responses   [facility] facility patient discharged from? [facility]   Is LACE score < 7 ? No   Eligibility Not Eligible   What are the reasons patient is not eligible? Hospice/Pallative Care  [[facility]]   Does the patient have one of the following disease processes/diagnoses(primary or secondary)? Other   Prep survey completed? Yes            Zoie VICTOR - Registered Nurse

## 2024-03-29 NOTE — PROGRESS NOTES
Despite multiple enemas unable to pass the stool.  Patient has a large formed to hard stool ball in the rectum may not be able to come spontaneously with the enemas.  Needs manual evacuation under sedation.  Scheduled for this afternoon

## 2024-03-29 NOTE — PROGRESS NOTES
Malnutrition Severity Assessment      Patient meets criteria for : Severe Malnutrition    Pt meets malnutrition criteria based on severe wt loss, severe muscle loss, and severe subcutaneous fat loss. She is currently on clear liquid diet. Will order Boost Breeze BID. RD to f/up.     Malnutrition Type (Last 8 Hours)       Malnutrition Severity Assessment       Row Name 03/29/24 0934       Malnutrition Severity Assessment    Malnutrition Type Chronic Disease - Related Malnutrition      Row Name 03/29/24 0934       Unintentional Weight Loss     Unintentional Weight Loss  Weight loss greater than 7.5% in three months      Row Name 03/29/24 0934       Muscle Loss    Loss of Muscle Mass Findings Severe    [place] Severe - deep hollowing/scooping, lack of muscle to touch, facial bones well defined    Clavicle Bone Region Severe - protruding prominent bone    Acromion Bone Region Severe - squared shoulders, bones, and acromion process protrusion prominent    Dorsal Hand Region Severe - prominent depression    Patellar Region Severe - prominent bone, square looking, very little muscle definition    Anterior Thigh Region Severe - line/depression along thigh, obviously thin    Posterior Calf Region Severe - thin with very little definition/firmness      Row Name 03/29/24 0934       Fat Loss    Subcutaneous Fat Loss Findings Severe    Orbital Region  Severe - pronounced hollowness/depression, dark circles, loose saggy skin    Upper Arm Region Severe - mostly skin, very little space between folds, fingers touch      Row Name 03/29/24 0934       Criteria Met (Must meet criteria for severity in at least 2 of these categories: M Wasting, Fat Loss, Fluid, Secondary Signs, Wt. Status, Intake)    Patient meets criteria for  Severe Malnutrition

## 2024-03-29 NOTE — CONSULTS
"Dietitian Assessment    Patient Name: Silvia Chung  YOB: 1950  MRN: [id number]  Admission date: 3/28/2024    Comment:    Pt with significant wt loss. Will add ONS once diet advances. RD to f/up.    Clinical Nutrition Assessment      Reason for Assessment MST=3   H&P  Past Medical History:   Diagnosis Date    Asthma     Hyperlipidemia     Hypertension     Impaired functional mobility, balance, gait, and endurance     Pancreatic cancer 2022    Parkinson disease     PONV (postoperative nausea and vomiting)     Scoliosis     Seasonal allergies        Past Surgical History:   Procedure Laterality Date    ADENOIDECTOMY      BREAST BIOPSY Right 2019    Needle biopsy    CATARACT EXTRACTION Bilateral     CHOLECYSTECTOMY OPEN  12/19/2022    done at     COLONOSCOPY      FOOT SURGERY Left     torn ligament, screws placed.    HIP HEMIARTHROPLASTY Left 10/21/2020    Procedure: HIP HEMIARTHROPLASTY LEFT;  Surgeon: Humza Winters MD;  Location: [facility];  Service: Orthopedics;  Laterality: Left;    LYMPHADENECTOMY  12/19/2022    partial, done at     NECK SURGERY      four fusions    OMENTECTOMY  12/19/2022    done at     PANCREATECTOMY  12/19/2022    done at , left adrenolectomy also performed    PORTACATH PLACEMENT Right 02/09/2023    Procedure: INSERTION OF PORTACATH;  Surgeon: Marcio Garcia MD;  Location: [facility];  Service: General;  Laterality: Right;    SPLENECTOMY  12/19/2022    done at     TONSILLECTOMY              Current Problems        Encounter Information        Trending Narrative     3/29: Pt admitted d/t GI bleed. PMH metastatic pancreatic cancer. EMR shows wt loss of 13# (9.4%) within 2 months. Wt loss is significant to malnutrition criteria. Pt is currently NPO. Will order when diet advances. RD to f/up.     Anthropometrics        Current Height, Weight Height: 172.7 cm (68\")  Weight: 57 kg (125 lb 10.6 oz) (03/29/24 0207)   Trending Weight Hx     This admission:              " PTA:     Wt Readings from Last 30 Encounters:   03/29/24 0207 57 kg (125 lb 10.6 oz)   03/28/24 1429 56.1 kg (123 lb 10.9 oz)   03/28/24 0531 59.9 kg (132 lb)   02/20/24 0938 58.5 kg (129 lb)   02/14/24 0519 65.3 kg (143 lb 15.4 oz)   02/13/24 0407 61.2 kg (135 lb)   02/10/24 1829 63 kg (138 lb 14.2 oz)   02/06/24 0845 63 kg (138 lb 14.4 oz)   01/30/24 0958 62.6 kg (138 lb)   01/22/24 1610 62.1 kg (137 lb)   01/16/24 1109 62.1 kg (137 lb)   12/30/23 1016 59.9 kg (132 lb)   12/02/23 1550 59.9 kg (132 lb)   11/28/23 1456 59.9 kg (132 lb)   11/18/23 1802 62.6 kg (138 lb)   11/16/23 1457 61.6 kg (135 lb 12.8 oz)   10/01/23 0433 66.7 kg (147 lb)   09/21/23 1159 66.7 kg (147 lb)   08/22/23 1430 68.5 kg (151 lb)   08/01/23 0900 67.6 kg (149 lb)   07/18/23 0826 67.6 kg (149 lb)   07/11/23 1028 67.6 kg (149 lb)   07/03/23 0919 69.3 kg (152 lb 11.2 oz)   06/22/23 1315 68.3 kg (150 lb 9.6 oz)   06/20/23 0848 68.5 kg (151 lb)   06/08/23 1257 68.5 kg (151 lb)   06/06/23 0853 69.4 kg (153 lb)   06/02/23 1125 68.5 kg (151 lb)   05/23/23 0927 69.9 kg (154 lb)   05/01/23 0956 68.5 kg (151 lb)   04/25/23 0919 69.9 kg (154 lb)   04/17/23 0944 68.9 kg (152 lb)   04/11/23 0900 68.9 kg (152 lb)      BMI kg/m2 Body mass index is 19.11 kg/m².     Labs        Pertinent Labs     Results from last 7 days   Lab Units 03/29/24  0541 03/28/24  0604   SODIUM mmol/L 132* 130*   POTASSIUM mmol/L 4.0 4.5   CHLORIDE mmol/L 92* 86*   CO2 mmol/L 28.9 28.5   BUN mg/dL 14 15   CREATININE mg/dL 0.53* 0.56*   CALCIUM mg/dL 8.5* 9.0   BILIRUBIN mg/dL 0.2 0.3   ALK PHOS U/L 123* 125*   ALT (SGPT) U/L 11 5   AST (SGOT) U/L 25 18   GLUCOSE mg/dL 98 143*       Results from last 7 days   Lab Units 03/29/24  0541   HEMOGLOBIN g/dL 7.6*   HEMATOCRIT % 22.8*       Lab Results   Component Value Date    HGBA1C 5.6 12/08/2023            Medications       Scheduled Medications azithromycin, 500 mg, Intravenous, Q24H  busPIRone, 10 mg, Oral, Q12H  carbidopa-levodopa, 1  tablet, Oral, BID  [Held by provider] carvedilol, 6.25 mg, Oral, BID With Meals  cefTRIAXone, 2,000 mg, Intravenous, Q24H  docusate sodium, 100 mg, Oral, Daily  melatonin, 5 mg, Oral, Nightly  Morphine, 30 mg, Oral, Q12H  pantoprazole, 40 mg, Oral, Q AM  polyethylene glycol, 17 g, Oral, Daily  senna-docusate sodium, 2 tablet, Oral, BID  sodium chloride, 10 mL, Intravenous, Q12H  traZODone, 50 mg, Oral, Nightly        Infusions lactated ringers, 50 mL/hr, Last Rate: 50 mL/hr (03/28/24 1354)         PRN Medications   acetaminophen    senna-docusate sodium **AND** polyethylene glycol **AND** bisacodyl **AND** bisacodyl    calcium carbonate    Calcium Replacement - Follow Nurse / BPA Driven Protocol    diphenhydrAMINE    Magnesium Standard Dose Replacement - Follow Nurse / BPA Driven Protocol    ondansetron ODT **OR** ondansetron    ondansetron    oxyCODONE    Phosphorus Replacement - Follow Nurse / BPA Driven Protocol    Potassium Replacement - Follow Nurse / BPA Driven Protocol    prochlorperazine    sodium chloride    sodium chloride    sodium chloride     Physical Findings        Trending Physical   Appearance, NFPE    --  Edema  None noted     Bowel Function LBM: 3/29     Tubes Single lumen port     Chewing/Swallowing NPO     Skin WNL       Estimated/Assessed Needs       Energy Requirements    EST Needs, Method, Wt used 7873-8309 kcal (30-35 kcal/kg CBW)       Protein Requirements    EST Needs, Method, Wt used 68-80 g pro (1.2-1.4 g pro/kg CBW)       Fluid Requirements     Estimated Needs (mL/day) 5389-9253 mL       Current Nutrition Orders & Evaluation of Intake       Oral Nutrition     Food Allergies    Current PO Diet NPO Diet NPO Type: Strict NPO   Supplement    PO Evaluation     Trending % PO Intake      Enteral Nutrition    Enteral Route    Order, Modulars, Flushes    Residual/Tolerance    TF Observation         Parenteral Nutrition     TPN Route    Total # Days on TPN    TPN Order, Lipid Details    MVI &  Trace Element Freq    TPN Observation       Nutrition Diagnosis         Nutrition Dx Problem 1 Unintended wt loss r/t pancreatic cancer AEB EMR shows wt loss of 13# (9.4%) within 2 months.      Nutrition Dx Problem 2        Intervention Goal         Intervention Goal(s) Maintain CBW  Diet advancement per MD     Nutrition Intervention        RD Action Will add ONS once diet advances     Nutrition Prescription          Diet Prescription NPO   Supplement Prescription      Enteral Prescription        TPN Prescription      Monitor/Evaluation        Monitor Per protocol, I&O, PO intake, Supplement intake, Pertinent labs, Weight, Skin status, GI status, Symptoms, POC/GOC, Swallow function     RD to f/up    Electronically signed by:  Ilana Wright RD  03/29/24 07:08 EDT

## 2024-03-29 NOTE — DISCHARGE SUMMARY
[facility]   DISCHARGE SUMMARY      Name:  Silvia Chung   Age:  73 y.o.  Sex:  female  :  1950  MRN:  [id number]   Visit Number:  [id number]    Admission Date:  3/28/2024  Date of Discharge:  3/29/2024  Primary Care Physician:  Johnathan Aguilera MD    Important issues to note:    Start: increased bowel regimen  Stop: Nothing  Follow up: PCP and hospice director  Brief Summary: Presented with abdominal pain due to fecal impaction from chronic opiate use due to cancer pain. Had to have manual disimpaction under sedation which was successful. DC to hospice at home with home oxygen.     Discharge Diagnoses:       GI bleed    Malignant neoplasm of body of pancreas    Widespread metastatic malignant neoplastic disease    Macrocytic anemia    Constipation    Metastasis to spinal column    Metastasis to lung    Pyuria    CAP (community acquired pneumonia)    Severe malnutrition        Problem List:     Active Hospital Problems    Diagnosis  POA    **GI bleed [K92.2]  Yes    Severe malnutrition [E43]  Yes    Macrocytic anemia [D53.9]  Yes    Constipation [K59.00]  Yes    Metastasis to spinal column [C79.51]  Yes    Metastasis to lung [C78.00]  Yes    Pyuria [R82.81]  Yes    CAP (community acquired pneumonia) [J18.9]  Yes    Widespread metastatic malignant neoplastic disease [C80.0]  Yes    Malignant neoplasm of body of pancreas [C25.1]  Yes      Resolved Hospital Problems   No resolved problems to display.     Presenting Problem:    Chief Complaint   Patient presents with    bleeding      Consults:     Consulting Physician(s)         Provider   Role Specialty     Oscar Barney MD      Consulting Physician Gastroenterology          Procedures Performed:    Procedure(s):  manual removal of stool      History of Presenting Illness:    73-year-old female history of metastatic pancreatic cancer, chronic indwelling Cervantes, chronic pain.  She presents with decreased oxygen levels  noted in the ER but also with chronic constipation.  She reports she has had that for a few months but typically that she is able to control it with her pain medicine.  However she reports that it was so severe prior to coming to the ER at that she could not stand any longer.  She also was noted to have some bright red blood per rectum.  Also had several large bloody bowel movements in the ED.  ER had discussed this with Dr. Barney who will be following the patient as well and had recommended enema.  Also initiating a bowel regimen.  Also she was noted to have pyuria while in the ER but denies dysuria reports chronic pyuria.  She elects to be full code.    Pertinent findings: Sodium 130, bicarb 28.5, creatinine 0.56, glucose 143, alkaline phosphatase 125, albumin 3.2, lactate 2.6, WBC 12.54, hemoglobin 9.3, urinalysis WBC 11-20, specific gravity greater than 1.03 moderate blood, fecal occult blood positive, COVID and flu negative, blood and urine cultures pending, chest x-ray showing left lateral airspace disease, CT abdomen and pelvis showing new 47 mm retrocrural metastasis extension into the T10 vertebral body with pathologic fracture, new 14 mm right lower lobe metastasis, large stool burden fecal impaction, CT angiogram of the chest showing multiple bilateral noncalcified pulmonary nodules stable to increased in size compared to prior concerning for metastatic disease, enlarging 17 mm irregular anterior right upper lobe mass primary pulmonary malignancy versus metastatic disease PET CT recommended, new groundglass opacity right lower lobe possible superimposed pneumonia,      Hospital Course:       GI bleed    Malignant neoplasm of body of pancreas    Widespread metastatic malignant neoplastic disease    Macrocytic anemia    Constipation    Metastasis to spinal column    Metastasis to lung    Pyuria    CAP (community acquired pneumonia)        -Admitted with concern for GI bleed.  Appears GI bleed was  secondary to large fecal impaction which was manually disimpacted under anesthesia by Dr. Barney patient was stable after.  Was cleared for discharge by gastroenterology.  Was going home with hospice oxygen was arranged.  Monitor for further bleeding return to the ER if this occurs.  Bowel regimen given to avoid further episodes of this.  Edited by: Jonathan Barrientos DO at 3/28/2024 1024      Vital Signs:    Temp:  [97.4 °F (36.3 °C)-98.5 °F (36.9 °C)] 98.2 °F (36.8 °C)  Heart Rate:  [86-95] 95  Resp:  [14-16] 16  BP: ()/(49-67) 103/66    Physical Exam:    Constitutional: Awake, alert, frail-appearing  Eyes: PERRLA, sclerae anicteric, no conjunctival injection  HENT: NCAT, mucous membranes moist  Neck: Supple, no thyromegaly, no lymphadenopathy, trachea midline  Respiratory: Diminished without wheeze or rhonchi bilaterally, nonlabored respirations   Cardiovascular: RRR, no murmurs, rubs, or gallops, palpable pedal pulses bilaterally  Gastrointestinal: Positive bowel sounds, soft, nontender, nondistended  Musculoskeletal: No bilateral ankle edema, no clubbing or cyanosis to extremities  Psychiatric: Appropriate affect, cooperative  Neurologic: Oriented x 3, speech clear  Skin: Dry  Exam stable 3/29/24    Pertinent Lab Results:     Results from last 7 days   Lab Units 03/29/24  0541 03/28/24  0604   SODIUM mmol/L 132* 130*   POTASSIUM mmol/L 4.0 4.5   CHLORIDE mmol/L 92* 86*   CO2 mmol/L 28.9 28.5   BUN mg/dL 14 15   CREATININE mg/dL 0.53* 0.56*   CALCIUM mg/dL 8.5* 9.0   BILIRUBIN mg/dL 0.2 0.3   ALK PHOS U/L 123* 125*   ALT (SGPT) U/L 11 5   AST (SGOT) U/L 25 18   GLUCOSE mg/dL 98 143*     Results from last 7 days   Lab Units 03/29/24  0541 03/28/24  0604   WBC 10*3/mm3 15.20* 12.54*   HEMOGLOBIN g/dL 7.6* 9.3*   HEMATOCRIT % 22.8* 27.2*   PLATELETS 10*3/mm3 283 304                             Results from last 7 days   Lab Units 03/28/24  0831 03/28/24  0717   BLOODCX   --  No growth at 24 hours  No  growth at 24 hours   URINECX  Culture in progress  --        Pertinent Radiology Results:    Imaging Results (All)       Procedure Component Value Units Date/Time    CT Angiogram Chest [[id number]] Collected: 03/28/24 0937     Updated: 03/28/24 0949    Narrative:      PROCEDURE: CT ANGIOGRAM CHEST-     HISTORY: hypoxia; K92.2-Gastrointestinal hemorrhage, unspecified;  N39.0-Urinary tract infection, site not specified; E86.0-Dehydration;  C25.9-Malignant neoplasm of pancreas, unspecified     COMPARISON: February 10, 2024.     TECHNIQUE: The patient was injected with  IV contrast. Axial images were  obtained through the chest in a CTA/ PE protocol. 3 D Reconstruction  images were also performed. This study was performed with techniques to  keep radiation doses as low as reasonably achievable, (ALARA).  Individualized dose reduction techniques using automated exposure  control or adjustment of mA and/or kV according to the patient size were  employed.     FINDINGS: There is no axillary adenopathy. There is been no change in  the small mediastinal lymph nodes compared to the prior exam. The heart  is proper size. There is no pericardial or pleural effusion. No filling  defects are identified to suggest PE. Again noted is borderline  enlargement of the ascending aorta at 39 mm. Again noted is the left  retrocrural mass measuring up to 44 mm with extension into the spinal  canal at the level of T10 with widening of the left neuroforamen and  partial destruction of the left T10 transverse consistent with  metastasis. Process and lateral aspect of the left T10 vertebral body.  Mild pathologic compression fracture again noted as seen on CT  abdomen/pelvis. Limited images of the upper abdomen demonstrate 2  hepatic lesions as described on CT abdomen pelvis. Again noted are  multiple bilateral noncalcified pulmonary nodules. The largest is  located anteriorly in the right upper lobe and has enlarged from 12 to  17 mm in the  anterior posterior diameter. This does not have the typical  appearance of a metastasis having an irregular shape, primary lung  malignancy is in the differential. Recommend a PET/CT. The additional  bilateral noncalcified smaller nodules generally have a more rounded  contour suggesting metastasis. There is new groundglass opacity  involving the right lower lobe suggesting superimposed pneumonia.       Impression:      Multiple bilateral noncalcified pulmonary nodules which are  stable to increased in size compared to prior concerning for metastatic  disease.     Enlarging 17 mm irregular anterior right upper lobe mass, primary  pulmonary malignancy versus metastasis. PET/CT may be helpful.     New groundglass opacities right lower lobe, possible superimposed  pneumonia.     Borderline enlargement of the ascending aorta, stable from prior;  recommend 1 year follow-up.        CTDI: 9.41 mGy  DLP:331.85 mGy.cm     This report was signed and finalized on 3/28/2024 9:47 AM by Stormy Gonzalez MD.       XR Chest 1 View [[id number]] Collected: 03/28/24 0741     Updated: 03/28/24 0745    Narrative:      PROCEDURE: XR CHEST 1 VW-     HISTORY: hypoxia, history of pancreatic cancer.     COMPARISON: February 10, 2024..     FINDINGS: The heart is normal in size. Right subclavian port is stable  in position. There is mildly increased interstitial disease bilaterally.  Patchy left basilar disease is stable. There is new peripheral left mid  and lower lung field airspace disease, possible pneumonia. Given the  history of previous malignancy, recommend chest CT with contrast if  possible.. The mediastinum is unremarkable. There is no pneumothorax.   There are no acute osseous abnormalities.       Impression:      New left lateral airspace disease, recommend chest CT with  contrast if possible..     Stable right subclavian port.           This report was signed and finalized on 3/28/2024 7:42 AM by Stormy Gonzalez MD.       CT Abdomen  Pelvis With & Without Contrast [[id number]] Collected: 03/28/24 0724     Updated: 03/28/24 0743    Narrative:      PROCEDURE: CT ABDOMEN PELVIS W WO CONTRAST-     HISTORY: gi bleed     COMPARISON: December 2, 2023..     PROCEDURE: The patient was injected with IV contrast. Axial images were  obtained from the lung bases to the pubic symphysis by computed  tomography. Pre-contrast images of the abdomen and pelvis were also  obtained. This study was performed with techniques to keep radiation  doses as low as reasonably achievable, (ALARA). Individualized dose  reduction techniques using automated exposure control or adjustment of  mA and/or kV according to the patient size were employed.     FINDINGS:     ABDOMEN: The lung bases demonstrate a new oval, mildly enhancing,  rim-enhancing circumscribed lesion posterior medial right lower lobe  which is not seen previously; metastasis is a concern. There is also a  new, irregularly-shaped left retrocrural mass which measures 47 x 45 x  22 mm. This extends into the left T10 neuroforamen and extends slightly  into the spinal canal but no spinal stenosis identified at this time.  There is distraction of the left T10 transverse process as well as part  of the left lateral aspect of the T10 vertebral body. There is a new,  mild compression fracture of T10  compared to the prior study. Recommend  chest CT with contrast to evaluate for any additional lesions. There is  also new groundglass opacities in the lingula and right lower lobe. Mild  bronchiectasis identified in the right lower lobe new subcentimeter  right lower lobe nodule identified, recommend chest CT. The heart is  proper size. The liver demonstrates the circumscribed hypodense lesion  lateral right lobe of the liver measuring 9 mm and consistent with a  cyst; this is stable. The second hepatic lesion near the dome of the  diaphragm measuring 9 mm also measures 17 Hounsfield units consistent  with a cyst. This does  not demonstrate the rim enhancement that was seen  previously. The spleen not identified. No adrenal mass is present.  The  pancreas demonstrates that head of the pancreas which appears stable.  Body and tail appear to been resected and several metallic surgical  clips are noted in the surgical bed. Vascular calcifications noted. The  kidneys are unremarkable. The aorta is proper caliber. There is no free  fluid or adenopathy.     Pre-contrast images demonstrate no evidence of nephrolithiasis.     PELVIS: The appendix is not identified but no secondary signs of  appendicitis seen. There is a large stool burden. The rectum is  distended to 8.7 cm transverse diameter, possible fecal impaction. There  is streak artifact from patient's arm position as well as left hip  arthroplasty. Thoracolumbar scoliosis with significant degenerative  change identified.. The urinary bladder is unremarkable. There is no  significant free fluid or adenopathy.       Impression:      New 47 mm left retrocrural metastasis with extension into  the left T10 vertebral body and spinal canal at that level and partial  destruction of the left T10 vertebral body with new pathologic  compression fracture.     New, pleural-based 14 mm right lower lobe lesion also concerning for  metastasis, recommend chest CT with contrast if possible to evaluate for  any additional lesions.     Large stool burden with possible fecal impaction.     Improved appearance of subdiaphragmatic hepatic lesion which has the  appearance of a cyst and stable right lateral hepatic cyst..              CTDI: 9.3 mGy  DLP:939.81 mGy.cm     This report was signed and finalized on 3/28/2024 7:41 AM by Stormy Gonzalez MD.               Echo:    Results for orders placed in visit on 06/02/23    Adult Transthoracic Echo Complete W/ Cont if Necessary Per Protocol    Interpretation Summary    Left ventricular systolic function is normal. Estimated left ventricular EF = 66% Left  ventricular ejection fraction appears to be 66 - 70%.    Left ventricular diastolic function is consistent with (grade I) impaired relaxation.    Estimated right ventricular systolic pressure from tricuspid regurgitation is normal (<35 mmHg).    Condition on Discharge:      Stable.    Code status during the hospital stay:    Code Status and Medical Interventions:   Ordered at: 03/28/24 1020     Code Status (Patient has no pulse and is not breathing):    CPR (Attempt to Resuscitate)     Medical Interventions (Patient has pulse or is breathing):    Full Support     Discharge Disposition:    Home or Self Care    Discharge Medications:       Discharge Medications        New Medications        Instructions Start Date   azithromycin 500 MG tablet  Commonly known as: Zithromax   500 mg, Oral, Daily      bisacodyl 10 MG suppository  Commonly known as: DULCOLAX   10 mg, Rectal, Daily PRN      cefdinir 300 MG capsule  Commonly known as: OMNICEF   300 mg, Oral, 2 Times Daily      esomeprazole 40 MG capsule  Commonly known as: nexIUM   40 mg, Oral, Every Morning Before Breakfast      linaclotide 290 MCG capsule capsule  Commonly known as: Linzess   290 mcg, Oral, Every Morning Before Breakfast      ondansetron 8 MG tablet  Commonly known as: ZOFRAN   8 mg, Oral, 3 Times Daily PRN      ondansetron 4 MG tablet  Commonly known as: ZOFRAN   4 mg, Oral, Every 8 Hours PRN      ondansetron ODT 4 MG disintegrating tablet  Commonly known as: ZOFRAN-ODT   4 mg, Translingual, Every 8 Hours PRN      traZODone 50 MG tablet  Commonly known as: DESYREL   Oral             Changes to Medications        Instructions Start Date   sennosides-docusate 8.6-50 MG per tablet  Commonly known as: PERICOLACE  What changed: when to take this   2 tablets, Oral, 3 Times Daily             Continue These Medications        Instructions Start Date   busPIRone 10 MG tablet  Commonly known as: BUSPAR   10 mg, Oral, Every 12 Hours Scheduled       carbidopa-levodopa  MG per tablet  Commonly known as: SINEMET   Take 1 tablet by mouth 2 (Two) Times a Day.      carvedilol 6.25 MG tablet  Commonly known as: COREG   6.25 mg, Oral, 2 Times Daily With Meals      dexAMETHasone 4 MG tablet  Commonly known as: DECADRON   4 mg, Oral, 2 Times Daily With Meals      diphenhydrAMINE 25 mg capsule  Commonly known as: BENADRYL   25 mg, Oral, Nightly PRN      docusate sodium 100 MG capsule  Commonly known as: COLACE   Take 1 capsule every day by oral route.      Morphine 30 MG 12 hr tablet  Commonly known as: MS CONTIN   30 mg, Oral, 2 Times Daily      Narcan 4 MG/0.1ML nasal spray  Generic drug: naloxone   Call 911. Muir into nostril upon signs of overdose. May repeat in 2-3 minutes in other nostril if no or minimal breathing and responsiveness.      oxyCODONE 15 MG immediate release tablet  Commonly known as: ROXICODONE   15 mg, Oral, Every 4 Hours PRN      polyethylene glycol 17 g packet  Commonly known as: MIRALAX   17 g, Oral, Daily      prochlorperazine 5 MG tablet  Commonly known as: COMPAZINE   5 mg, Oral, Every 6 Hours PRN      Prolia 60 MG/ML solution prefilled syringe syringe  Generic drug: denosumab   INJECT 1 MILLILITER (60 MG) BY SUBCUTANEOUS ROUTE EVERY 6 MONTHS IN THE UPPER ARM, UPPER THIGH OR ABDOMEN             Stop These Medications      multivitamin with minerals tablet tablet            Discharge Diet:     Diet Instructions       Advance Diet As Tolerated -Target Diet: Regular      Target Diet: Regular          Activity at Discharge:       Follow-up Appointments:    Additional Instructions for the Follow-ups that You Need to Schedule       Discharge Follow-up with PCP   As directed       Currently Documented PCP:    Johnathan Aguilera MD    PCP Phone Number:    [phone]     Follow Up Details: 1 week               Contact information for after-discharge care       Home Medical Care       [facility] .    Service: Home  Hospice  Contact information:  [address]  [facility] [address]  [phone]                                 Future Appointments   Date Time Provider Department Center   4/4/2024  2:00 PM Johnathan Aguilera MD [facility]     Test Results Pending at Discharge:    Pending Labs       Order Current Status    Blood Culture - Blood, Arm, Left Preliminary result    Blood Culture - Blood, Arm, Right Preliminary result    Urine Culture - Urine, Urine, Catheter Preliminary result               Jonathan Barrientos DO  03/29/24  16:54 EDT    Time: I spent 45 minutes on this discharge activity which included: face-to-face encounter with the patient, reviewing the data in the system, coordination of the care with the nursing staff as well as consultants, documentation, and entering orders.     Dictated utilizing Dragon dictation.

## 2024-03-31 LAB
BACTERIA SPEC AEROBE CULT: ABNORMAL
BACTERIA SPEC AEROBE CULT: ABNORMAL

## 2024-03-31 NOTE — PROGRESS NOTES
Patient's urinalysis and culture has came back with polymicrobial bacterial colonization.  Patient had reported chronic pyuria and denied dysuria during recent admission.  No indication for antibiotics currently.

## 2024-04-02 LAB
BACTERIA SPEC AEROBE CULT: NORMAL
BACTERIA SPEC AEROBE CULT: NORMAL

## 2024-04-09 ENCOUNTER — APPOINTMENT (OUTPATIENT)
Dept: GENERAL RADIOLOGY | Facility: HOSPITAL | Age: 74
End: 2024-04-09
Payer: MEDICARE

## 2024-04-09 ENCOUNTER — HOSPITAL ENCOUNTER (EMERGENCY)
Facility: HOSPITAL | Age: 74
Discharge: HOME | End: 2024-04-10
Attending: EMERGENCY MEDICINE | Admitting: EMERGENCY MEDICINE
Payer: MEDICARE

## 2024-04-09 DIAGNOSIS — F41.9 ANXIETY: Primary | ICD-10-CM

## 2024-04-09 DIAGNOSIS — C78.89 METASTATIC ADENOCARCINOMA TO PANCREAS: ICD-10-CM

## 2024-04-09 LAB
ALBUMIN SERPL-MCNC: 3.4 G/DL (ref 3.5–5.2)
ALBUMIN/GLOB SERPL: 1.1 G/DL
ALP SERPL-CCNC: 165 U/L (ref 39–117)
ALT SERPL W P-5'-P-CCNC: 11 U/L (ref 1–33)
ANION GAP SERPL CALCULATED.3IONS-SCNC: 17.7 MMOL/L (ref 5–15)
AST SERPL-CCNC: 21 U/L (ref 1–32)
BACTERIA UR QL AUTO: ABNORMAL /HPF
BASOPHILS # BLD AUTO: 0.02 10*3/MM3 (ref 0–0.2)
BASOPHILS NFR BLD AUTO: 0.1 % (ref 0–1.5)
BILIRUB SERPL-MCNC: 0.4 MG/DL (ref 0–1.2)
BILIRUB UR QL STRIP: NEGATIVE
BUN SERPL-MCNC: 16 MG/DL (ref 8–23)
BUN/CREAT SERPL: 37.2 (ref 7–25)
CALCIUM SPEC-SCNC: 9.5 MG/DL (ref 8.6–10.5)
CHLORIDE SERPL-SCNC: 91 MMOL/L (ref 98–107)
CLARITY UR: ABNORMAL
CO2 SERPL-SCNC: 23.3 MMOL/L (ref 22–29)
COLOR UR: YELLOW
CREAT SERPL-MCNC: 0.43 MG/DL (ref 0.57–1)
DEPRECATED RDW RBC AUTO: 60 FL (ref 37–54)
EGFRCR SERPLBLD CKD-EPI 2021: 104.1 ML/MIN/1.73
EOSINOPHIL # BLD AUTO: 0.07 10*3/MM3 (ref 0–0.4)
EOSINOPHIL NFR BLD AUTO: 0.5 % (ref 0.3–6.2)
ERYTHROCYTE [DISTWIDTH] IN BLOOD BY AUTOMATED COUNT: 16.2 % (ref 12.3–15.4)
GLOBULIN UR ELPH-MCNC: 3.1 GM/DL
GLUCOSE SERPL-MCNC: 138 MG/DL (ref 65–99)
GLUCOSE UR STRIP-MCNC: ABNORMAL MG/DL
HCT VFR BLD AUTO: 26.6 % (ref 34–46.6)
HGB BLD-MCNC: 9 G/DL (ref 12–15.9)
HGB UR QL STRIP.AUTO: ABNORMAL
HOLD SPECIMEN: NORMAL
HOLD SPECIMEN: NORMAL
HYALINE CASTS UR QL AUTO: ABNORMAL /LPF
IMM GRANULOCYTES # BLD AUTO: 0.2 10*3/MM3 (ref 0–0.05)
IMM GRANULOCYTES NFR BLD AUTO: 1.3 % (ref 0–0.5)
KETONES UR QL STRIP: NEGATIVE
LEUKOCYTE ESTERASE UR QL STRIP.AUTO: ABNORMAL
LYMPHOCYTES # BLD AUTO: 0.21 10*3/MM3 (ref 0.7–3.1)
LYMPHOCYTES NFR BLD AUTO: 1.4 % (ref 19.6–45.3)
MAGNESIUM SERPL-MCNC: 2 MG/DL (ref 1.6–2.4)
MCH RBC QN AUTO: 34.5 PG (ref 26.6–33)
MCHC RBC AUTO-ENTMCNC: 33.8 G/DL (ref 31.5–35.7)
MCV RBC AUTO: 101.9 FL (ref 79–97)
MONOCYTES # BLD AUTO: 0.38 10*3/MM3 (ref 0.1–0.9)
MONOCYTES NFR BLD AUTO: 2.4 % (ref 5–12)
NEUTROPHILS NFR BLD AUTO: 14.65 10*3/MM3 (ref 1.7–7)
NEUTROPHILS NFR BLD AUTO: 94.3 % (ref 42.7–76)
NITRITE UR QL STRIP: NEGATIVE
NRBC BLD AUTO-RTO: 0.2 /100 WBC (ref 0–0.2)
PH UR STRIP.AUTO: 5.5 [PH] (ref 5–8)
PLATELET # BLD AUTO: 331 10*3/MM3 (ref 140–450)
PMV BLD AUTO: 10.8 FL (ref 6–12)
POTASSIUM SERPL-SCNC: 3.9 MMOL/L (ref 3.5–5.2)
PROT SERPL-MCNC: 6.5 G/DL (ref 6–8.5)
PROT UR QL STRIP: ABNORMAL
RBC # BLD AUTO: 2.61 10*6/MM3 (ref 3.77–5.28)
RBC # UR STRIP: ABNORMAL /HPF
REF LAB TEST METHOD: ABNORMAL
SODIUM SERPL-SCNC: 132 MMOL/L (ref 136–145)
SP GR UR STRIP: 1.02 (ref 1–1.03)
SQUAMOUS #/AREA URNS HPF: ABNORMAL /HPF
TROPONIN T SERPL HS-MCNC: 33 NG/L
UROBILINOGEN UR QL STRIP: ABNORMAL
WBC # UR STRIP: ABNORMAL /HPF
WBC NRBC COR # BLD AUTO: 15.53 10*3/MM3 (ref 3.4–10.8)
WHOLE BLOOD HOLD COAG: NORMAL
WHOLE BLOOD HOLD SPECIMEN: NORMAL

## 2024-04-09 PROCEDURE — 99284 EMERGENCY DEPT VISIT MOD MDM: CPT

## 2024-04-09 PROCEDURE — 81001 URINALYSIS AUTO W/SCOPE: CPT | Performed by: EMERGENCY MEDICINE

## 2024-04-09 PROCEDURE — 25810000003 SODIUM CHLORIDE 0.9 % SOLUTION: Performed by: EMERGENCY MEDICINE

## 2024-04-09 PROCEDURE — 83735 ASSAY OF MAGNESIUM: CPT | Performed by: EMERGENCY MEDICINE

## 2024-04-09 PROCEDURE — 80053 COMPREHEN METABOLIC PANEL: CPT | Performed by: EMERGENCY MEDICINE

## 2024-04-09 PROCEDURE — 84484 ASSAY OF TROPONIN QUANT: CPT | Performed by: EMERGENCY MEDICINE

## 2024-04-09 PROCEDURE — 71045 X-RAY EXAM CHEST 1 VIEW: CPT

## 2024-04-09 PROCEDURE — 85025 COMPLETE CBC W/AUTO DIFF WBC: CPT | Performed by: EMERGENCY MEDICINE

## 2024-04-09 PROCEDURE — 93005 ELECTROCARDIOGRAM TRACING: CPT | Performed by: EMERGENCY MEDICINE

## 2024-04-09 RX ORDER — SODIUM CHLORIDE 0.9 % (FLUSH) 0.9 %
10 SYRINGE (ML) INJECTION AS NEEDED
Status: DISCONTINUED | OUTPATIENT
Start: 2024-04-09 | End: 2024-04-10 | Stop reason: HOSPADM

## 2024-04-09 RX ADMIN — SODIUM CHLORIDE 1000 ML: 9 INJECTION, SOLUTION INTRAVENOUS at 22:19

## 2024-04-10 VITALS
BODY MASS INDEX: 19.7 KG/M2 | HEIGHT: 68 IN | RESPIRATION RATE: 18 BRPM | OXYGEN SATURATION: 97 % | HEART RATE: 105 BPM | SYSTOLIC BLOOD PRESSURE: 131 MMHG | DIASTOLIC BLOOD PRESSURE: 88 MMHG | TEMPERATURE: 98 F | WEIGHT: 130 LBS

## 2024-04-10 PROCEDURE — 25010000002 DROPERIDOL PER 5 MG: Performed by: EMERGENCY MEDICINE

## 2024-04-10 PROCEDURE — 96375 TX/PRO/DX INJ NEW DRUG ADDON: CPT

## 2024-04-10 PROCEDURE — 96374 THER/PROPH/DIAG INJ IV PUSH: CPT

## 2024-04-10 PROCEDURE — 25010000002 LORAZEPAM PER 2 MG: Performed by: EMERGENCY MEDICINE

## 2024-04-10 RX ORDER — LORAZEPAM 2 MG/ML
1 INJECTION INTRAMUSCULAR ONCE
Status: COMPLETED | OUTPATIENT
Start: 2024-04-10 | End: 2024-04-10

## 2024-04-10 RX ORDER — DROPERIDOL 2.5 MG/ML
1.25 INJECTION, SOLUTION INTRAMUSCULAR; INTRAVENOUS ONCE
Status: COMPLETED | OUTPATIENT
Start: 2024-04-10 | End: 2024-04-10

## 2024-04-10 RX ORDER — DIAZEPAM 5 MG/1
10 TABLET ORAL ONCE
Status: COMPLETED | OUTPATIENT
Start: 2024-04-10 | End: 2024-04-10

## 2024-04-10 RX ADMIN — DIAZEPAM 10 MG: 5 TABLET ORAL at 01:20

## 2024-04-10 RX ADMIN — LORAZEPAM 1 MG: 2 INJECTION, SOLUTION INTRAMUSCULAR; INTRAVENOUS at 00:48

## 2024-04-10 RX ADMIN — DROPERIDOL 1.25 MG: 2.5 INJECTION, SOLUTION INTRAMUSCULAR; INTRAVENOUS at 00:47

## 2024-04-10 NOTE — ED NOTES
Pt sitting up in bed eating a couple of chips and sips of water. Pt watching tv,  at bedside. Bed left in lowest position, call light within reach, side rails up 2. Bed brakes on. Pt denies any further needs at this time.

## 2024-04-10 NOTE — DISCHARGE INSTRUCTIONS
You need to follow-up with your hospice provider in the morning.  They report to me that they will call you first thing in the morning.  You may return to the emergency department for any concerning symptoms or new concerns.

## 2024-04-10 NOTE — ED NOTES
Pt resting in bed in high valdes's position, call light within reach, side rails up x2. Bed left in lowest position, lights dimmed.

## 2024-04-10 NOTE — ED PROVIDER NOTES
EMERGENCY DEPARTMENT ENCOUNTER    Pt Name: Silvia Chung  MRN: [id number]  Pt :   1950  Room Number:    Date of encounter:  2024  PCP: Johnathan Aguilera MD  ED Provider: Xavier Foss MD    Historian: Patient      HPI:  Chief Complaint: Generalized weakness, dyspnea        Context: Silvia Chung is a 73 y.o. female who presents to the ED c/o generalized weakness and dyspnea.  The patient has a past medical history significant for metastatic pancreatic cancer.  She reports to me that over the past several days she has been feeling increasingly weak.  She was hospitalized here  through the  for constipation, fecal impaction, GI bleeding and pneumonia.  She was discharged the following day after fecal disimpaction under sedation with home oxygen.  She returns emerged part tonight reporting that she feels generally weak and increasingly short of breath.  Patient denies having any new or worsening cough, headache, sore throat or myalgias.  She says she is currently pain-free.  She denies having chest pain or abdominal pain.  She says her last bowel movement was 2 or 3 days ago.      PAST MEDICAL HISTORY  Past Medical History:   Diagnosis Date    Asthma     Hyperlipidemia     Hypertension     Impaired functional mobility, balance, gait, and endurance     Pancreatic cancer     Parkinson disease     PONV (postoperative nausea and vomiting)     Scoliosis     Seasonal allergies          PAST SURGICAL HISTORY  Past Surgical History:   Procedure Laterality Date    ADENOIDECTOMY      BREAST BIOPSY Right 2019    Needle biopsy    CATARACT EXTRACTION Bilateral     CHOLECYSTECTOMY OPEN  2022    done at     COLONOSCOPY      EXAM UNDER ANESTHESIA, RECTAL BIOPSY N/A 3/29/2024    Procedure: manual removal of stool;  Surgeon: Oscar Barney MD;  Location: [facility] ENDOSCOPY;  Service: Gastroenterology;  Laterality: N/A;    FOOT SURGERY Left     torn ligament, screws placed.    HIP  HEMIARTHROPLASTY Left 10/21/2020    Procedure: HIP HEMIARTHROPLASTY LEFT;  Surgeon: Humza Winters MD;  Location: [facility] OR;  Service: Orthopedics;  Laterality: Left;    LYMPHADENECTOMY  12/19/2022    partial, done at     NECK SURGERY      four fusions    OMENTECTOMY  12/19/2022    done at     PANCREATECTOMY  12/19/2022    done at , left adrenolectomy also performed    PORTACATH PLACEMENT Right 02/09/2023    Procedure: INSERTION OF PORTACATH;  Surgeon: Marcio Garcia MD;  Location: [facility] OR;  Service: General;  Laterality: Right;    SPLENECTOMY  12/19/2022    done at     TONSILLECTOMY           FAMILY HISTORY  Family History   Problem Relation Age of Onset    Heart failure Father     Lung cancer Father     Cancer Brother     Breast cancer Maternal Grandmother     Breast cancer Paternal Grandmother     Breast cancer Cousin     Ovarian cancer Neg Hx          SOCIAL HISTORY  Social History     Socioeconomic History    Marital status:    Tobacco Use    Smoking status: Never    Smokeless tobacco: Never   Vaping Use    Vaping status: Never Used   Substance and Sexual Activity    Alcohol use: Not Currently     Comment: rare    Drug use: Defer    Sexual activity: Defer         ALLERGIES  Patient has no known allergies.        REVIEW OF SYSTEMS    All systems reviewed and negative except for those discussed in HPI.       PHYSICAL EXAM    I have reviewed the triage vital signs and nursing notes.    ED Triage Vitals   Temp Heart Rate Resp BP SpO2   04/09/24 2106 04/09/24 2104 04/09/24 2104 04/09/24 2104 04/09/24 2104   98.5 °F (36.9 °C) 108 20 146/100 96 %      Temp src Heart Rate Source Patient Position BP Location FiO2 (%)   04/09/24 2104 04/09/24 2104 04/09/24 2104 04/09/24 2104 --   Oral Monitor Sitting Left arm          General: Moderate acute distress  Skin: normal color, warm and dry  Head: normocephalic, atraumatic  Eyes: Pupils equally round and reactive to light.  Nose: normal nasal mucosa, no  visible deformity.  Mouth: dry mucous membranes.  Neck: supple.  Chest: no retractions, no visible deformity  Cardiovascular: Tachycardic, regular rhythm  Lungs: clear to auscultation bilaterally.  Abdomen: soft, non-tender, non-distended. No rebound tenderness, no guarding.  No peritonitis.  Extremities: no cyanosis or edema. Palpable radial pulses bilaterally. Palpable dorsalis pedis pulses bilaterally.  Neuro:  alert and oriented x3, no focal neurological deficits.  Psych:  appropriate mood and behavior.        LAB RESULTS  Recent Results (from the past 24 hour(s))   Urinalysis With Microscopic If Indicated (No Culture) - Urine, Clean Catch    Collection Time: 04/09/24  9:33 PM    Specimen: Urine, Clean Catch   Result Value Ref Range    Color, UA Yellow Yellow, Straw    Appearance, UA Turbid (A) Clear    pH, UA 5.5 5.0 - 8.0    Specific Gravity, UA 1.016 1.005 - 1.030    Glucose,  mg/dL (Trace) (A) Negative    Ketones, UA Negative Negative    Bilirubin, UA Negative Negative    Blood, UA Small (1+) (A) Negative    Protein, UA Trace (A) Negative    Leuk Esterase, UA Large (3+) (A) Negative    Nitrite, UA Negative Negative    Urobilinogen, UA 0.2 E.U./dL 0.2 - 1.0 E.U./dL   Urinalysis, Microscopic Only - Urine, Clean Catch    Collection Time: 04/09/24  9:33 PM    Specimen: Urine, Clean Catch   Result Value Ref Range    RBC, UA 6-10 (A) None Seen, 0-2 /HPF    WBC, UA 21-50 (A) None Seen, 0-2 /HPF    Bacteria, UA 3+ (A) None Seen /HPF    Squamous Epithelial Cells, UA 7-12 (A) None Seen, 0-2 /HPF    Hyaline Casts, UA None Seen None Seen /LPF    Methodology Manual Light Microscopy    Comprehensive Metabolic Panel    Collection Time: 04/09/24 10:19 PM    Specimen: Blood   Result Value Ref Range    Glucose 138 (H) 65 - 99 mg/dL    BUN 16 8 - 23 mg/dL    Creatinine 0.43 (L) 0.57 - 1.00 mg/dL    Sodium 132 (L) 136 - 145 mmol/L    Potassium 3.9 3.5 - 5.2 mmol/L    Chloride 91 (L) 98 - 107 mmol/L    CO2 23.3 22.0 - 29.0  mmol/L    Calcium 9.5 8.6 - 10.5 mg/dL    Total Protein 6.5 6.0 - 8.5 g/dL    Albumin 3.4 (L) 3.5 - 5.2 g/dL    ALT (SGPT) 11 1 - 33 U/L    AST (SGOT) 21 1 - 32 U/L    Alkaline Phosphatase 165 (H) 39 - 117 U/L    Total Bilirubin 0.4 0.0 - 1.2 mg/dL    Globulin 3.1 gm/dL    A/G Ratio 1.1 g/dL    BUN/Creatinine Ratio 37.2 (H) 7.0 - 25.0    Anion Gap 17.7 (H) 5.0 - 15.0 mmol/L    eGFR 104.1 >60.0 mL/min/1.73   Single High Sensitivity Troponin T    Collection Time: 04/09/24 10:19 PM    Specimen: Blood   Result Value Ref Range    HS Troponin T 33 (H) <14 ng/L   Magnesium    Collection Time: 04/09/24 10:19 PM    Specimen: Blood   Result Value Ref Range    Magnesium 2.0 1.6 - 2.4 mg/dL   Green Top (Gel)    Collection Time: 04/09/24 10:19 PM   Result Value Ref Range    Extra Tube Hold for add-ons.    Lavender Top    Collection Time: 04/09/24 10:19 PM   Result Value Ref Range    Extra Tube hold for add-on    Gold Top - SST    Collection Time: 04/09/24 10:19 PM   Result Value Ref Range    Extra Tube Hold for add-ons.    Light Blue Top    Collection Time: 04/09/24 10:19 PM   Result Value Ref Range    Extra Tube Hold for add-ons.    CBC Auto Differential    Collection Time: 04/09/24 10:19 PM    Specimen: Blood   Result Value Ref Range    WBC 15.53 (H) 3.40 - 10.80 10*3/mm3    RBC 2.61 (L) 3.77 - 5.28 10*6/mm3    Hemoglobin 9.0 (L) 12.0 - 15.9 g/dL    Hematocrit 26.6 (L) 34.0 - 46.6 %    .9 (H) 79.0 - 97.0 fL    MCH 34.5 (H) 26.6 - 33.0 pg    MCHC 33.8 31.5 - 35.7 g/dL    RDW 16.2 (H) 12.3 - 15.4 %    RDW-SD 60.0 (H) 37.0 - 54.0 fl    MPV 10.8 6.0 - 12.0 fL    Platelets 331 140 - 450 10*3/mm3    Neutrophil % 94.3 (H) 42.7 - 76.0 %    Lymphocyte % 1.4 (L) 19.6 - 45.3 %    Monocyte % 2.4 (L) 5.0 - 12.0 %    Eosinophil % 0.5 0.3 - 6.2 %    Basophil % 0.1 0.0 - 1.5 %    Immature Grans % 1.3 (H) 0.0 - 0.5 %    Neutrophils, Absolute 14.65 (H) 1.70 - 7.00 10*3/mm3    Lymphocytes, Absolute 0.21 (L) 0.70 - 3.10 10*3/mm3     Monocytes, Absolute 0.38 0.10 - 0.90 10*3/mm3    Eosinophils, Absolute 0.07 0.00 - 0.40 10*3/mm3    Basophils, Absolute 0.02 0.00 - 0.20 10*3/mm3    Immature Grans, Absolute 0.20 (H) 0.00 - 0.05 10*3/mm3    nRBC 0.2 0.0 - 0.2 /100 WBC       If labs were ordered, I independently reviewed the results and considered them in treating the patient.  See medical decision making discussion section for my interpretation of lab results.        RADIOLOGY  No Radiology Exams Resulted Within Past 24 Hours    I ordered and independently reviewed the above noted radiographic studies.  See radiologist's dictation for official interpretation.    Per my independent reading:    Chest radiograph is obtained based on my independent reading shows patchy airspace disease bilaterally but is otherwise negative for acute cardiopulmonary findings.  Chest radiograph does not appear to be significantly changed in comparison to her previous based on my independent reading.            PROCEDURES    Procedures    ECG 12 Lead ED Triage Standing Order; Weak / Dizzy / AMS   Final Result          MEDICATIONS GIVEN IN ER    Medications   sodium chloride 0.9 % bolus 1,000 mL (0 mL Intravenous Stopped 4/9/24 2344)   droperidol (INAPSINE) injection 1.25 mg (1.25 mg Intravenous Given 4/10/24 0047)   LORazepam (ATIVAN) injection 1 mg (1 mg Intravenous Given 4/10/24 0048)   diazePAM (VALIUM) tablet 10 mg (10 mg Oral Given 4/10/24 0120)         MEDICAL DECISION MAKING, PROGRESS, and CONSULTS    All labs, if obtained, have been independently reviewed by me.  All radiology studies, if obtained, have been reviewed by me and the radiologist dictating the report.  All EKG's, if obtained, have been independently viewed and interpreted by me/my attending physician.      Discussion below represents my analysis of pertinent findings related to patient's condition, differential diagnosis, treatment plan and final disposition.                         Differential  diagnosis:    Differential diagnosis for this patient includes sepsis, influenza, SARS-CoV-2, other viral illness, acute coronary syndrome, dehydration, pulm embolism, pneumonia, cystitis, pyelonephritis, cellulitis, abscess, necrotizing soft tissue infection, bacteremia, other acute emergency.    Medical Decision Making Discussion:    Patient vitals are reviewed and are remarkable for hypertension and tachycardia.    EKG is obtained and based on my independent reading demonstrates sinus tachycardia.  There is no acute ST segment elevation or depression.    Patient's labs demonstrate a baseline elevated high-sensitivity troponin, baseline leukocytosis and baseline anemia.    Urinalysis was sent from the patient's indwelling Cervantes catheter.  This is not thought to represent an acute infection but rather likely colonization.    Patient has requested medication for her anxiety.  She says she is very concerned about her inevitable death and has had increased difficulty coping with this.  She was given IV droperidol, IV Ativan and p.o. Valium.  He is requesting inpatient hospice care.  I discussed this with the on-call hospice nurse who reports that she does not meet criteria for inpatient stay at this time but they will contact her first thing in the morning for medication management of her anxiety.  The patient is agreeable with this plan.    Patient is discharged with instructions to expect a call from hospice in the morning regarding her further management of her anxiety and that she may return to the emerged department anytime for any concerning symptoms or new concerns.      Additional sources:    - Discussed/ obtained information from independent historians: Hospice nurse    - External (non-ED) record review: H&P from March 2024 documenting history of metastatic pancreatic cancer, chronic indwelling Cervantes catheter, chronic pain.  Hospitalized for GI bleed and community-acquired pneumonia.  Reviewed discharge  summary from March 29, 2024 documenting that patient presented with abdominal pain due to fecal impaction from chronic opiate use due to cancer pain.  She had to have manual disimpaction under sedation which was successful.  She was discharged to hospice at home with home oxygen.    - Chronic or social conditions impacting care: Metastatic pancreatic cancer, hospice care    Shared Decision Making:  After my consideration of clinical presentation and any laboratory/radiology studies obtained, I discussed the findings with the patient/patient representative who is in agreement with the treatment plan and the final disposition.   Risks and benefits of discharge and/or observation/admission were discussed.    Orders placed during this visit:  Orders Placed This Encounter   Procedures    XR Chest 1 View    [place] Draw    Comprehensive Metabolic Panel    Single High Sensitivity Troponin T    Magnesium    Urinalysis With Microscopic If Indicated (No Culture) - Urine, Clean Catch    CBC Auto Differential    Urinalysis, Microscopic Only - Urine, Clean Catch    NPO Diet NPO Type: Strict NPO    Undress & Gown    Continuous Pulse Oximetry    Vital Signs    Orthostatic Blood Pressure    Oxygen Therapy- Nasal Cannula; Titrate 1-6 LPM Per SpO2; 90 - 95%    ECG 12 Lead ED Triage Standing Order; Weak / Dizzy / AMS    Insert Peripheral IV    Fall Precautions    CBC & Differential    Green Top (Gel)    Lavender Top    Gold Top - SST    Light Blue Top           AS OF 04:11 EDT VITALS:    BP - 131/88  HR - 105  TEMP - 98 °F (36.7 °C) (Oral)  O2 SATS - 97%                  DIAGNOSIS  Final diagnoses:   Anxiety   Metastatic adenocarcinoma to pancreas         DISPOSITION  Discharge      Please note that portions of this document were completed with voice recognition software.        Xavier Foss MD  04/10/24 0413

## 2024-04-22 ENCOUNTER — TELEPHONE (OUTPATIENT)
Dept: INTERNAL MEDICINE | Facility: CLINIC | Age: 74
End: 2024-04-22
Payer: COMMERCIAL

## (undated) DEVICE — NDL HYPO ECLPS SFTY 22G 1 1/2IN

## (undated) DEVICE — SYR LL TP 10ML STRL

## (undated) DEVICE — RICH MAJOR PROCEDURE: Brand: MEDLINE INDUSTRIES, INC.

## (undated) DEVICE — STRIP,CLOSURE,WOUND,MEDI-STRIP,1/2X4: Brand: MEDLINE

## (undated) DEVICE — HANDPIECE SET WITH HIGH FLOW TIP AND SUCTION TUBE: Brand: INTERPULSE

## (undated) DEVICE — RECIPROCATING BLADE HEAVY DUTY LONG, OFFSET  (77.6 X 0.77 X 11.2MM)

## (undated) DEVICE — DECANTER BAG 9": Brand: MEDLINE INDUSTRIES, INC.

## (undated) DEVICE — INTENDED FOR TISSUE SEPARATION, AND OTHER PROCEDURES THAT REQUIRE A SHARP SURGICAL BLADE TO PUNCTURE OR CUT.: Brand: BARD-PARKER ® CARBON RIB-BACK BLADES

## (undated) DEVICE — TIBURON HIP DRAPE WITH POUCHES: Brand: CONVERTORS

## (undated) DEVICE — SUT SILK 3/0 SH 30IN K832H

## (undated) DEVICE — CLAVICLE STRAP: Brand: DEROYAL

## (undated) DEVICE — SYS SKIN CLS DERMABOND PRINEO W/22CM MESH TP

## (undated) DEVICE — NDL HYPO ECLPS SFTY 18G 1 1/2IN

## (undated) DEVICE — GLV SURG SENSICARE PI LF PF 8 GRN STRL

## (undated) DEVICE — SUT VIC 2/0 CT1 27IN J259H

## (undated) DEVICE — ZIPPERED TOGA, X-LARGE: Brand: FLYTE

## (undated) DEVICE — GLV SURG SENSICARE W/ALOE PF LF 8.5 STRL

## (undated) DEVICE — PK HIP GEN 20

## (undated) DEVICE — DRSNG GZ PETROLTM XEROFORM CURAD 1X8IN STRL

## (undated) DEVICE — GLV SURG BIOGEL PI ULTRATOUCH G SZ7.5 LF

## (undated) DEVICE — SUT VIC 0 CT 36IN J958H

## (undated) DEVICE — IRRISEPT WOUND DEBRIDMENT AND CLEANSING SYSTEM: Brand: IRRISEPT

## (undated) DEVICE — TBG PENCL TELESCP MEGADYNE SMOKE EVAC 10FT

## (undated) DEVICE — SYR LUERLOK 5CC

## (undated) DEVICE — CUFF SCD HEMOFORCE SEQ CALF STD MD

## (undated) DEVICE — SYR LUERLOK 20CC BX/50

## (undated) DEVICE — DRAPE,U/ SHT,SPLIT,PLAS,STERIL: Brand: MEDLINE

## (undated) DEVICE — NDL HYPO ECLPS SFTY 25G 1 1/2IN

## (undated) DEVICE — DRSNG SURESITE WNDW 4X4.5

## (undated) DEVICE — UNDYED BRAIDED (POLYGLACTIN 910), SYNTHETIC ABSORBABLE SUTURE: Brand: COATED VICRYL

## (undated) DEVICE — SUT ETHIB 2 CV V37 MS/4 30IN MX69G

## (undated) DEVICE — SUT VIC 3/0 SH 27IN J416H

## (undated) DEVICE — VIOLET BRAIDED (POLYGLACTIN 910), SYNTHETIC ABSORBABLE SUTURE: Brand: COATED VICRYL